# Patient Record
Sex: FEMALE | Race: WHITE | NOT HISPANIC OR LATINO | Employment: OTHER | URBAN - METROPOLITAN AREA
[De-identification: names, ages, dates, MRNs, and addresses within clinical notes are randomized per-mention and may not be internally consistent; named-entity substitution may affect disease eponyms.]

---

## 2017-07-14 ENCOUNTER — TRANSFERRED RECORDS (OUTPATIENT)
Dept: HEALTH INFORMATION MANAGEMENT | Facility: CLINIC | Age: 72
End: 2017-07-14

## 2017-07-18 ENCOUNTER — MEDICAL CORRESPONDENCE (OUTPATIENT)
Dept: HEALTH INFORMATION MANAGEMENT | Facility: CLINIC | Age: 72
End: 2017-07-18

## 2017-08-10 ENCOUNTER — OFFICE VISIT (OUTPATIENT)
Dept: FAMILY MEDICINE | Facility: CLINIC | Age: 72
End: 2017-08-10

## 2017-08-10 VITALS
BODY MASS INDEX: 37.74 KG/M2 | OXYGEN SATURATION: 97 % | WEIGHT: 226.8 LBS | RESPIRATION RATE: 16 BRPM | HEART RATE: 81 BPM | SYSTOLIC BLOOD PRESSURE: 138 MMHG | DIASTOLIC BLOOD PRESSURE: 78 MMHG | TEMPERATURE: 98.5 F

## 2017-08-10 DIAGNOSIS — E03.9 HYPOTHYROIDISM, UNSPECIFIED TYPE: ICD-10-CM

## 2017-08-10 DIAGNOSIS — I10 ESSENTIAL HYPERTENSION: ICD-10-CM

## 2017-08-10 DIAGNOSIS — M46.1 INFLAMMATION OF RIGHT SACROILIAC JOINT (H): ICD-10-CM

## 2017-08-10 DIAGNOSIS — E11.65 TYPE 2 DIABETES MELLITUS WITH HYPERGLYCEMIA, WITHOUT LONG-TERM CURRENT USE OF INSULIN (H): Primary | ICD-10-CM

## 2017-08-10 DIAGNOSIS — E11.9 TYPE 2 DIABETES MELLITUS WITHOUT COMPLICATION, WITHOUT LONG-TERM CURRENT USE OF INSULIN (H): ICD-10-CM

## 2017-08-10 DIAGNOSIS — S62.002D: ICD-10-CM

## 2017-08-10 NOTE — PROGRESS NOTES
Hospitalization Follow-up Visit         HPI       Hospital Follow-up Visit:    [place]  [facility] [place] of Admission: 7/14/17  Date of Discharge: 7/17/17  Reason(s) for Admission: Dislocated Left Hip, Fractured left wrist            Problems taking medications regularly:  None       Post Discharge Medication Reconciliation: discharge medications reconciled and changed, per note/orders (see AVS).       Problems adhering to non-medication therapy:  None - other than she does not want to be on meds       Medications reviewed by: by myself. Findings include limited meds.    Summary of hospitalization:  [facility] discharge summary reviewed  CareEverywhere information obtained and reviewed  Diagnostic Tests/Treatments reviewed.  Follow up needed: none  Other Healthcare Providers Involved in Patient s Care:         Orthopedists  Update since discharge: improved.   Plan of care communicated with patient     Admitted for her left hip dislocation and relocation.  Considering surgery. Likely will not have the surgery unless it is forced on her - unless she has no other choice. The surgery they are considering is to take out the socket and put on a larger head.  Her hip is now 17 years old.   Also had left wrist injury - fracture - happened after her dislocation when she fell off her walker- not supposed to weight bear with the hand.  Hurts along the 2nd MC head. Had her cast off 3 weeks ago and now with brace.  Planning to continue the PT.  Awaiting the decision by the hand specialist on next steps.  Scaphoid fracture on xray 7/15/2017  Can only walk 6 - 8 steps with the walker. Has OT or PT come in every day. Likely will be discharged from nursing.     While in the hospital, did not have her DM checked.  She knows her sugars are high and in the ambulance the BG was quite high.  Not on any meds right now.  Almost feels like she needs a  to check on her with this.  Is not checking her BG since having to pay for her  strips. Is currently not earning any money. Still has her meter. Is losing weight due to better eating at home now.     Hypothyroid - insurance won't pay for the [place].  She won't take synthroid.     Back pain - wondering what to do about that.  May have two compacted vertebra in the lumbar spine with mild scoliosis. Intermittent. Worse when she gets up - it hurts a lot.  If she tries to walk, it hurts.  Helps with the walker.  Is more right sided and over the SI joint. Pain very sharp and painful - unable to breath.  Doesn't seem to radiate although her right leg will hurt down to her foot - but not all the time. No change in bowel or bladder function and no perineal numbness. Told not siatica and is getting US treatment.     Moved to a new place - is a bit more accessible - apartment building for retired people.  Good that all on a flat surface and has an elevator, but is very lonely because there are no youth and all the people there have major health issues - all that people talk about.  Denies depression.   Radha - daughter is working to help her.     Has no car - can't take [employer]. Hurts in her back and her whole body when she gets jostled.                    Review of Systems:               Physical Exam:     Vitals:    08/10/17 0907   Weight: 226 lb 12.8 oz (102.9 kg)     Body mass index is 37.74 kg/(m^2).    GENERAL: healthy, alert, well nourished, well hydrated, no distress  NECK: no tenderness, no adenopathy, no asymmetry, no masses, no stiffness; thyroid- normal to palpation  RESP: lungs clear to auscultation - no rales, no rhonchi, no wheezes  CV: regular rates and rhythm, normal S1 S2, no S3 or S4 and no murmur, no click or rub -  ABDOMEN: soft, no tenderness, no  hepatosplenomegaly, no masses, normal bowel sounds  MS: extremities- 2+ edema bilaterally         Results:       Assessment and Plan      Mikayla was seen today for recheck.    Diagnoses and all orders for this visit:    Type 2  diabetes mellitus with hyperglycemia, without long-term current use of insulin (H) - our visit was about what she is OK doing and what she is not.  Past trauma as a patient and very leary of following our recommendations.  In the past, she was able to get her BG levels down 3 points with diet alone as long as se was testing regularly. Will restart this.  Will pay for her own strips. Would like refresher/support course for DM   -     DIABETES EDUCATOR REFERRAL    Essential hypertension - much better off her meds. Reassuring.     Hypothyroidism, unspecified type - had TSH around 10 in the past. Not wanting to check this. Talked a bit about how being on it would help her but not ready to try synthroid.     Inflammation of right sacroiliac joint (H) - per her description and location of her pain.  Will continue with PT.  May be getting orders for this.     Type 2 diabetes mellitus without complication, without long-term current use of insulin (H)  -     blood glucose monitoring (NO BRAND SPECIFIED) test strip; 100 strips by In Vitro route daily For OneTouch Ultra Blue.    Likely scaphoid fracture on the left - discussed that she likely has osteopenia. We did not talk about meds - since so resistant. Rather she return and we chip at things.     See me back in a month and will look at how her DM has been doing.  She is aware that she can't have wrist surgery if her DM is not controlled.     Total time - 50 min with more than half counseling her on her DM, her pain, her thyroid etc      E&M code to be billed if TCM cannot be: 69343    Type of decision making: High complexity (49118)      Options for treatment and follow-up care were reviewed with the patient  Mikayla Timmons   engaged in the decision making process and verbalized understanding of the options discussed and agreed with the final plan.      Nini Heaton MD

## 2017-08-10 NOTE — PATIENT INSTRUCTIONS
Here is the plan from today's visit    1. Type 2 diabetes mellitus with hyperglycemia, without long-term current use of insulin (H)  The phone number is: Your provider has referred you to Diabetes Education: FMG: Diabetes Education - All [facility] [phone]   [email or web address]  - DIABETES EDUCATOR REFERRAL    2. Essential hypertension  This is looking better    3. Hypothyroidism, unspecified type  Consider whether you want to do anything about this in the future - if we check it, we can chose not to treat it but will know what we are dealing with.     4. Inflammation of right sacroiliac joint (H)  Can take some ibuprofen.  Up to 2000mg a day.      5. Type 2 diabetes mellitus without complication, without long-term current use of insulin (H)  Did call these in.   - blood glucose monitoring (NO BRAND SPECIFIED) test strip; 100 strips by In Vitro route daily For OneTouch Ultra Blue.  Dispense: 100 strip; Refill: 12    Please call or return to clinic if your symptoms don't go away.    Follow up plan  See me in the next few months to see how you are doing.     Thank you for coming to [facility] today.  Lab Testing:  **If you had lab testing today and your results are reassuring or normal they will be mailed to you or sent through [facility] within 7 days.   **If the lab tests need quick action we will call you with the results.  The phone number we will call with results is # [phone] (home) . If this is not the best number please call our clinic and change the number.  Medication Refills:  If you need any refills please call your pharmacy and they will contact us.   If you need to  your refill at a new pharmacy, please contact the new pharmacy directly. The new pharmacy will help you get your medications transferred faster.   Scheduling:  If you have any concerns about today's visit or wish to schedule another appointment please call our office  during normal business hours [phone] (8-5:00 M-F)  If a referral was made to a [facility] and you don't get a call from central scheduling please call [phone].  If a Mammogram was ordered for you at [facility] call [phone] to schedule or change your appointment.  If you had an XRay/CT/Ultrasound/MRI ordered the number is [phone] to schedule or change your radiology appointment.   Medical Concerns:  If you have urgent medical concerns please call [phone] at any time of the day.  If you have a medical emergency please call [phone].

## 2017-08-10 NOTE — MR AVS SNAPSHOT
After Visit Summary   8/10/2017    Mikayla Timmons    MRN: [id number]           Patient Information     Date Of Birth          1945        Visit Information        Provider Department      8/10/2017 9:00 AM Nini Heaton MD [facility]        Today's Diagnoses     Type 2 diabetes mellitus with hyperglycemia, without long-term current use of insulin (H)    -  1    Essential hypertension        Hypothyroidism, unspecified type        Inflammation of right sacroiliac joint (H)        Type 2 diabetes mellitus without complication, without long-term current use of insulin (H)          Care Instructions    Here is the plan from today's visit    1. Type 2 diabetes mellitus with hyperglycemia, without long-term current use of insulin (H)  The phone number is: Your provider has referred you to Diabetes Education: FMG: Diabetes Education - All [facility] [phone]   [email or web address]  - DIABETES EDUCATOR REFERRAL    2. Essential hypertension  This is looking better    3. Hypothyroidism, unspecified type  Consider whether you want to do anything about this in the future - if we check it, we can chose not to treat it but will know what we are dealing with.     4. Inflammation of right sacroiliac joint (H)  Can take some ibuprofen.  Up to 2000mg a day.      5. Type 2 diabetes mellitus without complication, without long-term current use of insulin (H)  Did call these in.   - blood glucose monitoring (NO BRAND SPECIFIED) test strip; 100 strips by In Vitro route daily For OneTouch Ultra Blue.  Dispense: 100 strip; Refill: 12    Please call or return to clinic if your symptoms don't go away.    Follow up plan  See me in the next few months to see how you are doing.     Thank you for coming to [facility] today.  Lab Testing:  **If you had lab testing today and your results are reassuring or normal they will be mailed to you or sent  through [facility] within 7 days.   **If the lab tests need quick action we will call you with the results.  The phone number we will call with results is # [phone] (home) . If this is not the best number please call our clinic and change the number.  Medication Refills:  If you need any refills please call your pharmacy and they will contact us.   If you need to  your refill at a new pharmacy, please contact the new pharmacy directly. The new pharmacy will help you get your medications transferred faster.   Scheduling:  If you have any concerns about today's visit or wish to schedule another appointment please call our office during normal business hours [phone] (8-5:00 M-F)  If a referral was made to a [facility] and you don't get a call from central scheduling please call [phone].  If a Mammogram was ordered for you at [facility] call [phone] to schedule or change your appointment.  If you had an XRay/CT/Ultrasound/MRI ordered the number is [phone] to schedule or change your radiology appointment.   Medical Concerns:  If you have urgent medical concerns please call [phone] at any time of the day.  If you have a medical emergency please call [phone].            Follow-ups after your visit        Additional Services     DIABETES EDUCATOR REFERRAL       DIABETES SELF MANAGEMENT TRAINING (DSMT)      Your provider has referred you to Diabetes Education: FMG: Diabetes Education - All [facility] [phone]   [email or web address]    Type of training and number of hours: Previous Diagnosis: Follow-up DSMT - 2 hours.    Medicare covers: 10 hours of initial DSMT in 12 month period from the time of first visit, plus 2 hours of follow-up DSMT annually, and additional hours as requested for insulin training.    Diabetes Type: Type 2 - Diet Control             Diabetes Co-Morbidities: hypertension                A1C Goal:  <8.0       A1C is: Lab Results       Component                Value               Date                       A1C                      9.0                 07/01/2015              If an urgent visit is needed or A1C is above 12, Care Team to call the Diabetes Education Team at [phone] or send an In Basket message to the Diabetes Education Pool (P DIAB ED-PATIENT CARE).    Diabetes Education Topics: Knowledge: Healthy Coping    Special Educational Needs Requiring Individual DSMT: Has done the DM education in the past and is looking for help with motivation and support to continue working on her diet.  Has in the past, with diet, taken her HgA1c from 11 to 7.  Is not willing at this time to take meds.       MEDICAL NUTRITION THERAPY (MNT) for Diabetes    Medical Nutrition Therapy with a Registered Dietitian can be provided in coordination with Diabetes Self-Management Training to assist in achieving optimal diabetes management.    MNT Type and Hours:                       Medicare will cover: 3 hours initial MNT in 12 month period after first visit, plus 2 hours of follow-up MNT annually    Please be aware that coverage of these services is subject to the terms and limitations of your health insurance plan.  Call member services at your health plan to determine Diabetes Self-Management Training benefits and ask which blood glucose monitor brands are covered by your plan.      Please bring the following with you to your appointment:    (1)  List of current medications   (2)  List of Blood Glucose Monitor brands that are covered by your insurance plan  (3)  Blood Glucose Monitor and log book  (4)   Food records for the 3 days prior to your visit    The Certified Diabetes Educator may make diabetes medication adjustments per the CDE Protocol and Collaborative Practice Agreement.                  Who to contact     Please call your clinic at [phone] to:    Ask questions about your health    Make  or cancel appointments    Discuss your medicines    Learn about your test results    Speak to your doctor   If you have compliments or concerns about an experience at your clinic, or if you wish to file a complaint, please contact [facility] Patient Relations at [phone] or email us at [email or web address]         Additional Information About Your Visit        [place] Information     [employer] gives you secure access to your electronic health record. If you see a primary care provider, you can also send messages to your care team and make appointments. If you have questions, please call your primary care clinic.  If you do not have a primary care provider, please call [phone] and they will assist you.      [employer] is an electronic gateway that provides easy, online access to your medical records. With [employer], you can request a clinic appointment, read your test results, renew a prescription or communicate with your care team.     To access your existing account, please contact your [facility] or call [phone] for assistance.        Care EveryWhere ID     This is your Care EveryWhere ID. This could be used by other organizations to access your [place] medical records  [id number]        Your Vitals Were     Pulse Temperature Respirations Pulse Oximetry BMI (Body Mass Index)       81 98.5  F (36.9  C) (Oral) 16 97% 37.74 kg/m2        Blood Pressure from Last 3 Encounters:   08/10/17 138/78   07/24/15 132/79   07/16/15 115/75    Weight from Last 3 Encounters:   08/10/17 226 lb 12.8 oz (102.9 kg)   07/24/15 225 lb (102.1 kg)   07/01/15 229 lb (103.9 kg)              We Performed the Following     DIABETES EDUCATOR REFERRAL          Today's Medication Changes          These changes are accurate as of: 8/10/17 10:10 AM.  If you have any questions, ask your nurse or doctor.               These medicines have changed or have updated  prescriptions.        Dose/Directions    blood glucose monitoring test strip   Commonly known as:  no brand specified   This may have changed:    - when to take this  - additional instructions  - Another medication with the same name was removed. Continue taking this medication, and follow the directions you see here.   Used for:  Type 2 diabetes mellitus without complication, without long-term current use of insulin (H)   Changed by:  Nini Heaton MD        Dose:  100 strip   100 strips by In Vitro route daily For OneTouch Ultra Blue.   Quantity:  100 strip   Refills:  12       vitamin D 1000 UNITS capsule   This may have changed:  Another medication with the same name was removed. Continue taking this medication, and follow the directions you see here.   Changed by:  Nini Heaton MD        Dose:  1 capsule   Take 1 capsule by mouth daily.   Refills:  0         Stop taking these medicines if you haven't already. Please contact your care team if you have questions.     ARMOUR THYROID 90 MG Tabs   Generic drug:  Thyroid   Stopped by:  Nini Heaton MD           lisinopril 5 MG tablet   Commonly known as:  PRINIVIL/ZESTRIL   Stopped by:  Nini Heaton MD           metFORMIN 1000 MG tablet   Commonly known as:  GLUCOPHAGE   Stopped by:  Nini Heaton MD                Where to get your medicines      These medications were sent to [facility] [address] [facility] [address] [facility] [address] [facility]  [address] [address]     Phone:  [phone]     blood glucose monitoring test strip                Primary Care Provider Office Phone # Fax #    Nini Heaton [phone]       [address] [address]        Equal Access to Services     [facility]: Christiano chase Sorina, waaxda luqadaha, qaybta kaalmona taylor . So [facility] [phone].    ATENCIÓN: Zaira darling,  tiene a moore disposición servicios gratuitos de asistencia lingüística. Janet neri [phone].    We comply with applicable federal civil rights laws and [place] laws. We do not discriminate on the basis of race, color, national origin, age, disability sex, sexual orientation or gender identity.            Thank you!     Thank you for choosing [facility]  for your care. Our goal is always to provide you with excellent care. Hearing back from our patients is one way we can continue to improve our services. Please take a few minutes to complete the written survey that you may receive in the mail after your visit with us. Thank you!             Your Updated Medication List - Protect others around you: Learn how to safely use, store and throw away your medicines at www.disposemymeds.org.          This list is accurate as of: 8/10/17 10:10 AM.  Always use your most recent med list.                   Brand Name Dispense Instructions for use Diagnosis    blood glucose monitoring test strip    no brand specified    100 strip    100 strips by In Vitro route daily For OneTouch Ultra Blue.    Type 2 diabetes mellitus without complication, without long-term current use of insulin (H)       ONE TOUCH F[place] LANCETS      USE AS DIRECTED        ONE TOUCH ULTRA           vitamin D 1000 UNITS capsule      Take 1 capsule by mouth daily.

## 2017-08-14 ENCOUNTER — TELEPHONE (OUTPATIENT)
Dept: FAMILY MEDICINE | Facility: CLINIC | Age: 72
End: 2017-08-14

## 2017-08-14 NOTE — TELEPHONE ENCOUNTER
Returned call to home care nurse, unable to reach. Left VM with verbal orders per protocol as requested. Left callback number for questions.    Demetrice Reid RN

## 2017-08-14 NOTE — TELEPHONE ENCOUNTER
[facility] phone call message - order or referral request for patient:     Order or referral being requested: Extra skilled nursing visit this week, as patient cancelled last week    Additional Comments: none    OK to leave a message on voice mail? Yes    Primary language: English      needed? No    Call taken on August 14, 2017 at 11:16 AM by Ester Valdivia

## 2017-08-16 ENCOUNTER — TELEPHONE (OUTPATIENT)
Dept: FAMILY MEDICINE | Facility: CLINIC | Age: 72
End: 2017-08-16

## 2017-08-16 NOTE — TELEPHONE ENCOUNTER
[facility] phone call message- general phone call:    Reason for call: Jessica is calling to let us know that they are discharging the patient from Skilled Nursing / Home Care services.     Return call needed: No    OK to leave a message on voice mail? Yes    Primary language: English      needed? No    Call taken on August 16, 2017 at 10:56 AM by Gauri Morillo

## 2017-09-06 ENCOUNTER — DOCUMENTATION ONLY (OUTPATIENT)
Dept: FAMILY MEDICINE | Facility: CLINIC | Age: 72
End: 2017-09-06

## 2017-09-06 NOTE — PROGRESS NOTES
"When opening a documentation only encounter, be sure to enter in \"Chief Complaint\" Forms and in \" Comments\" Title of form, description if needed.    Mikayla is a 72 year old  female  Form received via: Fax  Form now resides in: PCS ready    Sheron Wen MA        Form has been completed by provider.     Form sent out via: Fax to [place] at Fax Number: [phone]  Patient informed: No, Reason:via fax  Output date: September 6, 2017    Sheron Wen MA      **Please close the encounter**              "

## 2017-10-19 ENCOUNTER — TELEPHONE (OUTPATIENT)
Dept: FAMILY MEDICINE | Facility: CLINIC | Age: 72
End: 2017-10-19

## 2017-10-19 NOTE — TELEPHONE ENCOUNTER
Called back at 5:09. Not sure why noted I need to call but did. Left message that I received the message. It is possible that they needed a PT order and verbally OKed that.

## 2017-10-19 NOTE — TELEPHONE ENCOUNTER
[facility] phone call message- patient requesting to speak with PCP or provider:    PCP: Nini Heaton    Additional Comments: Itz called from [facility] to send the following FYI to patient's PCP: Patient fell on 10/16/2017.  Per Itz, the patient tripped on walker, fell on right side, and coccyx.  States  came out, helped the patient off of the floor, and checked her out.  Reports no serious injuries, but that patient has bruising and muscle pain.    Is a call back needed? Yes    Patient informed that it may take up to 2 business days to hear back from PCP:Yes    OK to leave a message on voice mail? Yes    Primary language: English      needed? No    Call taken on October 19, 2017 at 4:27 PM by Catrina Hickman

## 2017-10-20 NOTE — TELEPHONE ENCOUNTER
RN called Itz to follow up. Itz states he did not need a return call. He just said to call back if anything needs to be done and just wanted to send a FYI message to patient's PCP to be aware.    Dian Munoz RN

## 2017-12-19 ENCOUNTER — TELEPHONE (OUTPATIENT)
Dept: FAMILY MEDICINE | Facility: CLINIC | Age: 72
End: 2017-12-19

## 2017-12-19 NOTE — TELEPHONE ENCOUNTER
[facility] phone call message - order or referral request for patient:     Order or referral being requested: Extend/continue home care PT effective 12/21/17    Additional Comments: 2x/week for 3 weeks to progress ambulation and progress to loft strand crutches. Itz noted that home care PT has been on hold until patient receives crutches.    OK to leave a message on voice mail? Yes    Primary language: English      needed? No    Call taken on December 19, 2017 at 11:01 AM by Ester Valdivia

## 2017-12-19 NOTE — TELEPHONE ENCOUNTER
Returned call to home care PT, unable to reach. Left VM with verbal orders per protocol as requested. Left callback number for questions.    Demetrice Reid RN

## 2018-02-02 ENCOUNTER — DOCUMENTATION ONLY (OUTPATIENT)
Dept: FAMILY MEDICINE | Facility: CLINIC | Age: 73
End: 2018-02-02

## 2018-02-02 NOTE — PROGRESS NOTES
"When opening a documentation only encounter, be sure to enter in \"Chief Complaint\" Forms and in \" Comments\" Title of form, description if needed.    Mikayla is a 72 year old  female  Form received via: Fax  Form now resides in: Provider Ready    Form has been completed by provider.     Form sent out via: Fax to [place] at Fax Number: [phone] or [phone]  Patient informed: N/A  Output date: February 2, 2018    Genesis Au CMA      **Please close the encounter**                    "

## 2018-05-21 DIAGNOSIS — E11.9 TYPE 2 DIABETES MELLITUS WITHOUT COMPLICATION, WITHOUT LONG-TERM CURRENT USE OF INSULIN (H): ICD-10-CM

## 2018-05-21 NOTE — TELEPHONE ENCOUNTER
Request for medication refill:    Date of last visit at clinic: 8/10/2017    Please complete refill if appropriate and CLOSE ENCOUNTER.    Closing the encounter signifies the refill is complete.    If refill has been denied, please complete the smart phrase .smirefuse and route it to the [facility] RN TRIAGE pool to inform the patient and the pharmacy.    Srikanth De Leon CMA

## 2019-02-25 ENCOUNTER — OFFICE VISIT (OUTPATIENT)
Dept: PHARMACY | Facility: CLINIC | Age: 74
End: 2019-02-25
Payer: MEDICARE

## 2019-02-25 ENCOUNTER — OFFICE VISIT (OUTPATIENT)
Dept: FAMILY MEDICINE | Facility: CLINIC | Age: 74
End: 2019-02-25
Payer: MEDICARE

## 2019-02-25 VITALS
BODY MASS INDEX: 39.11 KG/M2 | HEART RATE: 81 BPM | WEIGHT: 235 LBS | DIASTOLIC BLOOD PRESSURE: 92 MMHG | OXYGEN SATURATION: 97 % | SYSTOLIC BLOOD PRESSURE: 165 MMHG | TEMPERATURE: 97.6 F

## 2019-02-25 DIAGNOSIS — E11.65 TYPE 2 DIABETES MELLITUS WITH HYPERGLYCEMIA, WITHOUT LONG-TERM CURRENT USE OF INSULIN (H): Primary | ICD-10-CM

## 2019-02-25 DIAGNOSIS — E11.9 TYPE 2 DIABETES MELLITUS WITHOUT COMPLICATION, WITHOUT LONG-TERM CURRENT USE OF INSULIN (H): ICD-10-CM

## 2019-02-25 DIAGNOSIS — I10 ESSENTIAL HYPERTENSION: ICD-10-CM

## 2019-02-25 DIAGNOSIS — E03.9 HYPOTHYROIDISM, UNSPECIFIED TYPE: ICD-10-CM

## 2019-02-25 DIAGNOSIS — G14 POSTPOLIO SYNDROME (H): ICD-10-CM

## 2019-02-25 RX ORDER — CALCIUM CARBONATE/VITAMIN D3 500-10/5ML
1 LIQUID (ML) ORAL DAILY
COMMUNITY
End: 2019-11-01

## 2019-02-25 RX ORDER — IBUPROFEN 200 MG
400 TABLET ORAL
COMMUNITY
Start: 2018-05-06 | End: 2019-05-31

## 2019-02-25 RX ORDER — THYROID 90 MG/1
90 TABLET ORAL DAILY
Qty: 90 TABLET | Refills: 3 | Status: SHIPPED | OUTPATIENT
Start: 2019-02-25 | End: 2019-06-01

## 2019-02-25 NOTE — Clinical Note
Srikanth Kang - thanks for seeing her - that really helped!! She does want to test more than once a day and it really did help her in the past. I told her we were looking at how to get her the cheapest strips. Get back to me with ideas!Migdalia

## 2019-02-25 NOTE — PATIENT INSTRUCTIONS
Here is the plan from today's visit    1. Postpolio syndrome  Our goal is to get you to 7 HgA1c so you can have the surgery    2. Essential hypertension  We will discuss this more at our next visit. Hopefully you will find it lower as you do better.     3. Hypothyroidism, unspecified type  Refilled. Repeat testing in 1 month or later to check.   - thyroid (ARMOUR) 90 MG tablet; Take 1 tablet (90 mg) by mouth daily  Dispense: 90 tablet; Refill: 3    4. Type 2 diabetes mellitus with hyperglycemia, without long-term current use of insulin (H)  Increase the Leviimr to 14 Units in the am.       Please call or return to clinic if your symptoms don't go away.    Follow up plan  A month or soon after that. Would do lab testing.     Thank you for coming to [facility] today.  Lab Testing:  **If you had lab testing today and your results are reassuring or normal they will be mailed to you or sent through [employer] within 7 days.   **If the lab tests need quick action we will call you with the results.  The phone number we will call with results is # [phone] (home) . If this is not the best number please call our clinic and change the number.  Medication Refills:  If you need any refills please call your pharmacy and they will contact us.   If you need to  your refill at a new pharmacy, please contact the new pharmacy directly. The new pharmacy will help you get your medications transferred faster.   Scheduling:  If you have any concerns about today's visit or wish to schedule another appointment please call our office during normal business hours [phone] (8-5:00 M-F)  If a referral was made to a [facility] and you don't get a call from central scheduling please call [phone].  If a Mammogram was ordered for you at [facility] call [phone] to schedule or change your appointment.  If you had an XRay/CT/Ultrasound/MRI ordered the number is [phone] to schedule or  change your radiology appointment.   Medical Concerns:  If you have urgent medical concerns please call [phone] at any time of the day.    Nini Heaton MD

## 2019-02-25 NOTE — PROGRESS NOTES
Pharmacy Progress Note: Transitions of Care     History of Hospitalization     Mikayla Timmons was referred by Nini Crystal  for transitional care and medication management following their recent hospitalization.       HISTORY OF Hospitalization    Reason for hospitalization: Left Hip Instability with prosthetic dislocation     Date of ADMIT: 19   Date of DISCHARGE 19   Other hospitalizations in past year:      Hospital problem list/ specific issues to address:  1.     Hip dislocation         SUBJECTIVE  Mikayla is here to see Dr. Heaton regarding her recent hospitalization.  Medication taken after discharge her listed below.      1) Mag 400 1 AM  2) D3 2000 international unit(s) BID (50 mcg)  3) [place] 90mcg QDAY  4)  mg once a day PM as needed   5) Melatonin 5 mg at 8PM    6) Levemer 10 units once a day.  Before breakfast.     7) Novolo units before meals.    Three meals a day.    No snacks.  FlexPen  Inject with meals as instructed:  Glucose 130-150 0   Glucose 151-200 2 units   Glucose 201-250 4 units   Glucose 251-300 6 units   Glucose 301-350 8 units   Glucose 351-400 10 units   Glucose 401-500 12 units   Glucose GREATER THAN 501 14 units and call provider      Access to medication:  Patient describes not being able to afford her medications in the past.  In the hospital she states to have been charged $1600 for her medications.  She acknowledges that insulin is the majority of the cost.  She currently has 5 pens of both a meter and the NovoLog insulin.  She is hoping to reduce her insulin need in the near future so she does not have to continue taking this medication.  She is not sure how she would be able to afford insulin in the        Current Outpatient Medications:      blood glucose monitoring (NO BRAND SPECIFIED) test strip, 100 strips by In Vitro route daily For OneTouch Ultra Blue., Disp: 100 strip, Rfl: 12     Blood Glucose Monitoring Suppl (ONE TOUCH ULTRA), , Disp: , Rfl:       Cholecalciferol (VITAMIN D) 1000 UNITS capsule, Take 1 capsule by mouth daily., Disp: , Rfl:      ONE TOUCH FINE POINT LANCETS, USE AS DIRECTED, Disp: , Rfl:       Adherence    On a scale of 1-10, how good are you at taking your medications? (1= never, 10= always): 10  Factors that may prevent patient from taking medication(s): felix Degroot describes a  that she is going to work with to address some of the cost issues.       OBJECTIVE  Creatinine   Date Value Ref Range Status   07/17/2015 0.7 0.5 - 1.0 mg/dL Final   ]  Lab Results   Component Value Date    A1C 9.0 07/01/2015    A1C 8.1 07/24/2014    A1C 7.8 10/26/2012    A1C 8.2 06/26/2012    A1C 11.1 08/05/2011        Record of home blood sugars        Date Fasting AM Before lunch After lunch Before Dinner After Dinner Before Bedtime Late night   2/25 237, 263               2/24 197 311, 277   236 251       2/23 257 216   220 208   212   2/22 274, 229 243   257 209   190   2/21           218     2/19           229                                                                Liver Function Studies -   Recent Labs   Lab Test 07/01/15  1035   PROTTOTAL 7.2   BILITOTAL 0.5   ALKPHOS 58.4   AST 15.5   ALT 19.6     Immunization History   Administered Date(s) Administered     Influenza (IIV3) PF 10/20/1995, 10/21/1996, 10/21/1997     TD (ADULT, 7+) 10/20/1995     Tdap (Adacel,Boostrix) 04/25/2007           ASSESSMENT     Drug Therapy Problems  Patient s recent hospitalization was likely/not likely related to medication use.      1) DIABETES;       Status:  uncontrolled       DTP:  cannot afford drug product  , DTP degree:2              Home blood sugar results show elevation of results throughout the day.  Fasting values in the low 200s.    Mikayla has been working to reduce her calorie intake per day.  She is restricting her diet to 1200 jeny/day.  White blood sugars have improved since before hospitalization, I believe she still needs insulin.  Today  recommend to increase her long-acting basal insulin between 2 and 4 units.  Today we discussed goal fasting blood sugar to be between 80 and 130 mg/dL.  While the patient is resistant to using insulin long-term is likely that she will need insulin to reach her goals.  Recommended continue to monitor blood sugars 3 times per day.    2) COST;    DTP degree:1            Out-of-pocket expenses for the patient's medication is extremely high.    Cost for the insulin pens the patient has been discharged with her 100s of dollars.  While this is a appropriate insulin regimen we may need to reconsider in the future due to cost.    Mikayla has been on Metformin in the past and felt it was not helpful.      It is possible that she would benefit from an SGL T-2 taking this considering her blood pressure is elevated today.    May recommend in the future to use a 70/30 over-the-counter insulin.    There is concerned about affording test strips.  I have called the pharmacy today to order more frequent blood sugar test strips for the patient and described new insulin.  Pharmacy has informed us that they will send a CMM form to be filled and sent to [facility] to justify the increased frequency and blood sugar monitoring.          Health literacy           General: Low/Moderate/High/Unknown  Comment: Moderate    PLAN     Outpatient medication list has been updated to reflects patient s current medication use.    Recommend to elevate Levemir to 12-14 units once per day.           Follow-up:    In 1-2 weeks with PCP or pharmacy        MTM and medication reconciliation have been completed by pharmacy. Medication assessment and plan address patient s specific concerns and goals.     Dr. Heaton was provided our recommendations in clinic today  before/after they saw patient and  was available for supervision during the visit and is the authorizing prescriber for this visit through the pharmacist collaborative practice agreement.      Thank you for the opportunity to participate in the care of this patient.  Jorge Luis Beckett, Pharm.D.  Appointment length: 30

## 2019-02-25 NOTE — PROGRESS NOTES
Clinical Pharmacy Note     Record of home blood sugars      Date Fasting AM Before lunch After lunch Before Dinner After Dinner Before Bedtime Late night   2/25 237, 263         2/24 197 311, 277  236 251     2/23 257 216  220 208  212   2/22 274, 229 243  257 209  190   2/21      218    2/19      229

## 2019-02-25 NOTE — PROGRESS NOTES
AYESHA       Mikayla is a 73 year old  female  who presents for follow up of concern(s) listed below:    Chief Complaint   Patient presents with     Hospital F/U     1/29/19 left hip dislocation     Goals: gardening this summer, walking without the walker. Gardening provides her with the foods she needs for wellness since can't afford them.     Is now 2 weeks post hospitalization for her hip dislocation. Did not go to rehab this time (was on observation). Has a SW, RN and PT that are coming to her home. NOt sure if needs OT.  Someone is at her house 4/5 days.  Is accepting the attention.   SW did not have a lot of solutions for her meds- only getting coupons.   Never followed through on part D for Medicare so has no coverage for meds. Is now taking her insulin (see pharm D note) and the armour thyroid.     Has had multiple hospitalizations for her hip in the last year.  Hoping to get surgery but needs DM controlled.     DM:   HgA1c - 12/30/2018 = 12.0  TSH = 16.51  Back to  eating much better again  Goal is HgA1c to 7 for her to be able to get the surgery she needs.     Not been able to take care of herself for 2 years - tons of stress.   Moved to be closer to her daughter Radha (by [place]).  Radha is very busy so she can't get in to see us.  Goal is to move back to [facility].  Is only person she has here.      Can't afford the strips and only oked for 1 a day.  In the past benefited by frequent testing.      Is still with the walker.  Is trying to walk more.     Thyroid.  Wanting refill of her armour.  Started likely about 3 weeks ago.      Sx:  Denies CP, SOB or HAs,   Since starting the insulin she no longer gets up 4 times at night and is now only getting up once.    No swelling in her legs uless she sits for wehil.  None when gets up in am.         Patient Active Problem List   Diagnosis     Avitaminosis D     Diabetes mellitus, type 2 (H)     Essential hypertension     Postpolio syndrome     Status  post THR (total hip replacement)     Carpal tunnel syndrome     Hyperlipidemia LDL goal <100     Hypothyroidism     Decreased sensation of foot     Right-sided low back pain without sciatica     Closed fracture of scaphoid of left wrist with routine healing, unspecified portion of scaphoid, subsequent encounter       Current Outpatient Medications   Medication Sig Dispense Refill     ibuprofen (ADVIL/MOTRIN) 200 MG tablet Take 400 mg by mouth       insulin aspart (NOVOLOG FLEXPEN) 100 UNIT/ML pen Inject with meals as instructed:  Glucose 130-150 0   Glucose 151-200 2 units   Glucose 201-250 4 units   Glucose 251-300 6 units   Glucose 301-350 8 units   Glucose 351-400 10 units   Glucose 401-500 12 units   Glucose GREATER THAN 501 14 units and call provider       insulin detemir (LEVEMIR FLEXTOUCH) 100 UNIT/ML pen Inject 10 Units Subcutaneous       magnesium oxide 400 MG CAPS Take 1 capsule by mouth daily       melatonin 5 MG tablet Take 5 mg by mouth nightly as needed for sleep       blood glucose monitoring (NO BRAND SPECIFIED) test strip 100 strips by In Vitro route daily For OneTouch Ultra Blue. 100 strip 12     Blood Glucose Monitoring Suppl (ONE TOUCH ULTRA)        Cholecalciferol (VITAMIN D) 1000 UNITS capsule Take 2,000 Units by mouth 2 times daily        ONE TOUCH FINE POINT LANCETS USE AS DIRECTED            Allergies   Allergen Reactions     Hydromorphone Itching     Ketamine Other (See Comments)                Review of Systems:   CONSTITUTIONAL: no fatigue, no unexpected change in weight  ENT: no ear problems, no mouth problems, no throat problems  RESP: no significant cough, no shortness of breath  CV: no chest pain, no palpitations, no new or worsening peripheral edema            Physical Exam:     There were no vitals filed for this visit.  There is no height or weight on file to calculate BMI.  BP was 150 /100.  P = 85.  Afebrile, Pulse Ox >95  GENERAL: healthy, alert, well nourished, well hydrated,  no distress  RESP: lungs clear to auscultation - no rales, no rhonchi, no wheezes  CV: regular rates and rhythm, normal S1 S2, no S3 or S4 and no murmur, no click or rub -  MS: extremities- no gross deformities noted, 1+ pitting edema to the knees.  Vibartory sense decreased in both feet. Some callous but no foot lesions. Has some hyperpigmented raised areas over the left shin.     Orders Only on 07/17/2015   Component Date Value Ref Range Status     Urea Nitrogen 07/17/2015 18.3  7.0 - 19.0 mg/dL Final     Calcium 07/17/2015 10.6* 8.5 - 10.1 mg/dL Final     Chloride 07/17/2015 109.9  98.0 - 110.0 mmol/L Final     Carbon Dioxide 07/17/2015 24.1  20.0 - 32.0 mmol/L Final     Creatinine 07/17/2015 0.7  0.5 - 1.0 mg/dL Final     Glucose 07/17/2015 162.7* 70.0 - 99.0 mg'dL Final     Potassium 07/17/2015 4.2  3.4 - 5.3 mmol/dL Final     Sodium 07/17/2015 143.3  133.0 - 144.0 mmol/L Final     GFR Estimate 07/17/2015 87.7  mL/min/1.7 m2 Final     GFR Estimate If Black 07/17/2015 106.1  mL/min/1.7 m2 Final     Parathyroid Hormone Intact 07/17/2015 53  12 - 72 pg/mL Final     Calcium Ionized 07/17/2015 5.4* 4.4 - 5.2 mg/dL Final         Assessment and Plan     Mikayla was seen today for hospital f/u.    Diagnoses and all orders for this visit:    Type 2 diabetes mellitus with hyperglycemia, without long-term current use of insulin (H) - is doing relatively well with her daily dosing but her fasting is 200+. Increased her Levimir to 14 U daily. Will talk to the Pharm D team to see if we can get her strips so can test more than once a day.  Struggles with getting in and her independence. She has early neuropathy - asx. Demonstrated this to her.     Postpolio syndrome - continues with very frequent spontaneous dislocations. Hoping to have surgery.  Motivating her to take care of her DM.     Essential hypertension - not controlled. Did not discuss much today but told her we would do lab testing when I see her back in 2 months  (full DM labs and lipids and TSH) and then can start thinking about her BP.      Hypothyroidism, unspecified type- she will only take armour. Refilled today.  Check TSH iin 2 monhts.   -     thyroid (ARMOUR) 90 MG tablet; Take 1 tablet (90 mg) by mouth daily    Total time: 35 minutes with more than half spent counseling on her DM options, her testing, her symptoms and her goals.     There are no discontinued medications.    Options for treatment and follow-up care were reviewed with the patient . Mikayla Timmons  engaged in the decision making process and verbalized understanding of the options discussed and agreed with the final plan.    Nini Heaton MD

## 2019-03-07 ENCOUNTER — TELEPHONE (OUTPATIENT)
Dept: FAMILY MEDICINE | Facility: CLINIC | Age: 74
End: 2019-03-07

## 2019-03-07 NOTE — TELEPHONE ENCOUNTER
RN left voicemail for Mikayla to obtain verbal authorization to speak with Niya at [facility] through [facility]'s home care division. Niya is aware of RN obtaining and understands.    If patient calls back and consents, please document in here and RN will call Niya.  Kiara Currie RN

## 2019-03-07 NOTE — TELEPHONE ENCOUNTER
[facility] phone call message- patient requesting a refill:    Full Medication Name: insulin aspart (NOVOLOG FLEXPEN) 100 UNIT/ML pen    Dose: See chart    Pharmacy confirmed as   [id number] - [place], [place] - [address] AT [facility] [address]  [address]  [facility] [address]  Phone: [phone] Fax: [phone]  : Yes    Additional Comments: Jodee would like to discuss with Dr. Heaton that the patient thinks she is going to be going off insulin in two months so she is not renewing her insurance.  [phone]     OK to leave a message on voice mail? Yes    Primary language: English      needed? No    Call taken on March 7, 2019 at 2:51 PM by Lili Rossi

## 2019-03-07 NOTE — TELEPHONE ENCOUNTER
Spent 20 minutes on the phone with patient who spoke about the fact that she does not want to be insulin dependent for her life. She believes she could eventually get off it with diet and exercise. She believes in the power of the body to be able to heal itself. She spoke about the fact that no medical professional has been able to tell her exactly what insulin is made out of or if it has any significant side effects. Her insulin costs her 1600/month. RN did not really speak much, mainly listened. RN did inquire if she would be interested in meeting with SW to talk about drug coverage programs. She did agree to that.    At the end of the call she agreed to allow RN to speak to Niya, but only about her insulin and not the rest of her life. She requested a call back from RN once we talk to Niya .Niya is out of office on Friday's. Will have to call her Monday.  Kiara Currie RN

## 2019-03-07 NOTE — TELEPHONE ENCOUNTER
Mikayla returned Kiara's call and she would like to speak with Kiara as she is not pleased with her homecare service and is thinking about cancelling it. Please call her back.

## 2019-03-07 NOTE — TELEPHONE ENCOUNTER
" asked me to contact patient and relay a message from our PharmJorge Luis MCCORD. \"Patient can buy all her supplies for her diabetes at [facility] for about $50\". \"Not cheap, but if she goes there, she can discuss with them her options and then I can order the items for her if they think that will help\".   Patient is confused by this message and would like a call back from Jorge Luis Rose. Afternoons work best for the call back.    Marimar Camp  Care Coordinator    "

## 2019-03-11 ENCOUNTER — DOCUMENTATION ONLY (OUTPATIENT)
Dept: FAMILY MEDICINE | Facility: CLINIC | Age: 74
End: 2019-03-11

## 2019-03-11 DIAGNOSIS — E11.9 TYPE 2 DIABETES MELLITUS WITHOUT COMPLICATION, WITHOUT LONG-TERM CURRENT USE OF INSULIN (H): ICD-10-CM

## 2019-03-11 NOTE — PROGRESS NOTES
"When opening a documentation only encounter, be sure to enter in \"Chief Complaint\" Forms and in \" Comments\" Title of form, description if needed.    Mikayla is a 73 year old  female  Form received via: Fax  Form now resides in: Provider Ready    NANCY Currie 2:01 PM March 11, 2019    Form has been completed by provider.     Form sent out via: Fax to [facility] at Fax Number: [phone]  Patient informed: No  Output date: March 14, 2019    NANCY Currie 2:24 PM March 14, 2019        **Please close the encounter**                    "

## 2019-03-11 NOTE — TELEPHONE ENCOUNTER

## 2019-03-12 ENCOUNTER — TELEPHONE (OUTPATIENT)
Dept: PHARMACY | Facility: CLINIC | Age: 74
End: 2019-03-12

## 2019-03-12 DIAGNOSIS — Z79.4 TYPE 2 DIABETES MELLITUS WITH HYPERGLYCEMIA, WITH LONG-TERM CURRENT USE OF INSULIN (H): Primary | ICD-10-CM

## 2019-03-12 DIAGNOSIS — E11.65 TYPE 2 DIABETES MELLITUS WITH HYPERGLYCEMIA, WITH LONG-TERM CURRENT USE OF INSULIN (H): Primary | ICD-10-CM

## 2019-03-13 RX ORDER — FLASH GLUCOSE SCANNING READER
1 EACH MISCELLANEOUS 3 TIMES DAILY PRN
Qty: 1 DEVICE | Refills: 0 | Status: SHIPPED | OUTPATIENT
Start: 2019-03-13 | End: 2019-04-20

## 2019-03-13 RX ORDER — FLASH GLUCOSE SENSOR
1 KIT MISCELLANEOUS
Qty: 2 EACH | Refills: 11 | Status: SHIPPED | OUTPATIENT
Start: 2019-03-13 | End: 2019-04-20

## 2019-03-13 NOTE — TELEPHONE ENCOUNTER
PHARMACY TELEPHONE ENCOUNTER:    Reason: Glucometer      I returned pt's call about purchasing a glucometer.     She is limited to only 3 Test strips per day  She has been trying to find a way to afford more test strips per day.  I have described an out of pocket option at [facility] that be least expensive, if she purchases the DM supplies on her own.      She has been switching between glucometers and purchasing Test strips online that is likely more advantageous than the [facility] option.      Other concerns that Mikayla shared with me include the future expected cost of her insulin. And, the concern she has that she will be on the medication forever.      I tried to explain my position that she will likely need insulin to continue to improve her DM considering her most recent A1c.       Ref Range & Units Value   Hemoglobin A1C 4.0 - 6.0 % 12.0 Abnormally high         We briefly discussed other insulin options, but acknowledged that the OTC versions of insulin would not provide the same level of DM coverage as what we expect from her current  Novolog and Levemir.         Jorge Luis Beckett, Pharm.D.

## 2019-03-18 ENCOUNTER — TELEPHONE (OUTPATIENT)
Dept: PHARMACY | Facility: CLINIC | Age: 74
End: 2019-03-18

## 2019-03-18 NOTE — TELEPHONE ENCOUNTER
PHARMACY TELEPHONE ENCOUNTER:    Reason: Christophe not covered       I called the pharmacy and was told the Christophe glucometer was not covered by her ins. I called the pt and left a VM.  Pt has an appointment 3/27/19 with PCP    Jorge Luis Beckett PharmJasonD.

## 2019-03-19 NOTE — TELEPHONE ENCOUNTER
Patient returned call with questions. Please call back to discuss around 12-1 if possible, okay to leave voicemail.     Kerry Torres, Patient Representative

## 2019-03-20 NOTE — TELEPHONE ENCOUNTER
PHARMACY TELEPHONE ENCOUNTER:    Reason: called to discuss insulin costs      I spoke with Mikayla today regarding her insulin cost.  She takes a combination of rapid acting insulin and long-acting insulin.  She feels that she is on a very expensive regimen.  She would like to find an alternative for Levemir.  Currently this cost her $500 out-of-pocket and has been told that there is $137 option at [facility].  I suspect that the lower cost insulin is Basaglar.  I have explained to the patient that Basaglar/Lantus/Levemir are nearly interchangeable in the clinic would be willing to change prescription to improve affordability.  Patient also requests    Information about patient assistance programs.  I will leave material for Lantus and Levemir and Basaglar patient assistance programs for Dr. Heaton at her next visit.    Jorge Luis Beckett, Pharm.D.

## 2019-03-28 ENCOUNTER — TELEPHONE (OUTPATIENT)
Dept: PHARMACY | Facility: CLINIC | Age: 74
End: 2019-03-28

## 2019-03-28 ENCOUNTER — TELEPHONE (OUTPATIENT)
Dept: FAMILY MEDICINE | Facility: CLINIC | Age: 74
End: 2019-03-28

## 2019-03-28 NOTE — TELEPHONE ENCOUNTER
Clinical Pharmacy Note     Mikayla called the clinic today to ask a question about her insulin.  She states she has been working hard on her diet to help decrease weight.  Lately, in the mornings, her blood sugars have been in the low 80's-100's.  This has happened for the last week.  She is worried that if she continues to take her insulin at night she will wake up with a low blood sugar.     We discussed a true low blood sugar was <70 mg/dL.  I also ensured she was not using the short acting insulin unless needed via instructions.  She is not to use short acting insulin unless blood sugar is >150 mg/dL when she is eating.  She agrees to doing this.      Additionally, she is due for an appointment since insulin was titrated at her last clinic visit. Insulin was increased from 10 units to 14 units daily at the end of February.  She is unable to come in next week due to transportation issues.  We decide to do a phone call next Monday to assess her medications and blood sugars.  She will have ~10 days worth of readings to share with me on Monday.      PharmD plan to call Mikayla around 3pm on 4/1/19.     Laureen Harrison, EstelaD

## 2019-03-28 NOTE — TELEPHONE ENCOUNTER
[facility] phone call message-patient reporting a symptom:     Symptom: Diarrhea, DM    Same Day Visit Offered: Yes, declined    Additional comments: Patient would like to discuss diarrhea symptoms but also discuss issues with insulin. Patient does not want a call back from RN but from PCP, faculty, or Jorge Luis in pharmacy. Per patient, it is urgent she speak with PCP, Faculty, or Jorge Luis as soon as possible bp sut would not disclose any further details.     OK to leave message on voice mail? Yes    Primary language: English      needed? No    Call taken on March 28, 2019 at 11:05 AM by Renetta Abdullahi

## 2019-03-29 NOTE — TELEPHONE ENCOUNTER
Called Mikayla to address her concerns.  Left message that I recommend she talk to our RNs so that we can help her while I am in clinic busy this am.  Will try to call her back around noon.     12:49 PM  Called back.   Woke up early in am with diarrhea 3/27. Is better today.  Diarrhea started 1 week ago.   She is on the insulin and is worried her sugars might be too low.  Long acting is 14 U.  Woke up one morning 2 - 3 days ago, woke up with fasting glucose of 83. Wondering if she needs to reduce the long acting.   Has not been taking the novolog since on a sliding scale - over 150.   Is now eating a lot better    She did talk to Laureen who has a phone meeting coming up.   Will be recording her sugars and report to her.     Advised her that if she has a pattern in the 70s or 80s then should drop to 12 Units.

## 2019-04-01 ENCOUNTER — TELEPHONE (OUTPATIENT)
Dept: PHARMACY | Facility: CLINIC | Age: 74
End: 2019-04-01

## 2019-04-01 NOTE — TELEPHONE ENCOUNTER
Clinical Pharmacy Note     PharmD called to discuss insulin and blood sugars with Mikayla.  See blood sugar log below.  Her blood sugars look good and only a few are outside of the goal of  mg/dL fasting and <180 mg/dL 2 hours after a meal.  She was worried about hypoglycemia, yet she has no numbers indicating a low.      Record of home blood sugars    Date Fasting AM Before lunch After lunch Before Dinner After Dinner Before Bedtime Late night   4/1 121/135 159 180       3/31 140 167  220 147  109   3/30 124/129  157  165 191    3/29 176 164  137 171 150    3/28 89 144 139  170     3/27 116/130  143 172 145 108    3/26 127 154  122 157  149     She is able to identify triggers including stress and eating too late in the day.  When she is stressed she tends to eat more than normal. She worries about the cost of her insulin and asks if there are cheaper alternatives since she does not have insurance.  I discussed that there might be other options but she would need to make an in clinic appointment and we could work as a team to see the best options available.  She plans to try to arrange transportation to a clinic visit.     Plan: continue insulin as prescribed and continue to monitor blood sugars daily.   Insulin detemir 14 units daily   Insulin aspart sliding scale based on blood sugars before eating    Follow-up in clinic when transportation is available. Call with any questions or concerns you may have.     Laureen Harrison, PharmD

## 2019-04-12 ENCOUNTER — TELEPHONE (OUTPATIENT)
Dept: FAMILY MEDICINE | Facility: CLINIC | Age: 74
End: 2019-04-12

## 2019-04-12 NOTE — TELEPHONE ENCOUNTER
[facility] phone call message- general phone call:    Reason for call: Patient calling to schedule an urgent visit with Dr. Heaton regarding new uncontrolled diabetes diagnosis and thyroid issues. Author informed patient PCP does not have openings. Patient refused to schedule with another provider, stating it is unacceptable that PCP is booked/unavailable. Patient wants to be seen on 4/22 or 4/26 at 10:00 with Dr. Heaton. Patient is expecting a call back today, please advise.      Return call needed: Yes    OK to leave a message on voice mail? Yes    Primary language: English      needed? No    Call taken on April 12, 2019 at 3:12 PM by Renetta Abdullahi

## 2019-04-12 NOTE — TELEPHONE ENCOUNTER
RN left voicemail for patient that RN will be sending message to provider, but we will likely not have an answer for her today, and will call her as soon as we can.  Kiara Currie RN

## 2019-04-13 NOTE — TELEPHONE ENCOUNTER
Can she come on 4/23 at 3:40?  I can see her then or possibly earlier.    The other half days are totally full.    I also created a clinic for 4/19 in the am from 8 - 10 to help with access. That is wide open still.   Migadlia

## 2019-04-15 NOTE — TELEPHONE ENCOUNTER
Patient returning RN call. Author relayed message below to which patient stated she cannot come in the afternoon, appointment needs to be in the morning before 10 am. Author unable to schedule patient on 4/18 d/t providers schedule closed. please call patient back AFTER 11:00 AM today.

## 2019-04-15 NOTE — TELEPHONE ENCOUNTER
RN connected with Renetta to gain clarification, she intended to state that she cannot schedule on the 19th (which was the date provider stated was open).  Kiara Currie RN

## 2019-04-15 NOTE — TELEPHONE ENCOUNTER
RN attempted to reach patient, but was unable to reach. Left voicemail with name and callback number.    If she calls back, please offer her what  presented below. If any concerns with the double book, can contact RN. Thanks.  Kiara Currie RN

## 2019-04-18 NOTE — PROGRESS NOTES
pth         HPI       Mikayla is a 73 year old  female  who presents:    Chief Complaint   Patient presents with     Diabetes     recheck      Thyroid Problem     recheck       BP:  Her PT takes her BP every time she is there: 108/81 - 156/90.   Is also always in pain when comes to the clinic.  Not wanting to be on BP meds.     DM:   Readings are going down. This am was 115 fasting.  Ranges to high 200s but many in the mid 100s.  Taking mostly the Levimir - her sugars are otherwise closer to goal so not adding her Novolog.   Medicare is paying for her strips now.    Reviewed CGM    ROS:   Woke up with an exploding headache - just today for the first time. Gets some nagging low grade headaches intermittently.  Now much improved.  Worried that her sugar might have been too low. BG was at about 127.  Was on the top of her head. Took ibuprofen and a couple of hours later much improved.  No neck stiffness. No numbness or tingling anywhere or weakness. No pain over her temporal area.      Needing eye exam.      ROS:  After up for a couple of hours, her ankles do begin to swell  Denies CP, SOB or vision changes.   Continues with nerve symptoms in both legs and is worse on one side. Thinks could be due to her post polio in addition to her diabetes and wanting to know how to treat.   Lost 10 pounds in the last few months    PT twice a week so she is moving more. Is using an eliptical. Although always worried her hip will go out. Plan is to get surgery, why she is working hard on her DM    Compression hose hard for her to put on one of her leg.     Rakesh Mora 2015    Patient Active Problem List   Diagnosis     Avitaminosis D     Diabetes mellitus, type 2 (H)     Essential hypertension     Postpolio syndrome     Status post THR (total hip replacement)     Carpal tunnel syndrome     Hyperlipidemia LDL goal <100     Hypothyroidism     Decreased sensation of foot     Right-sided low back pain without sciatica     Closed fracture of  scaphoid of left wrist with routine healing, unspecified portion of scaphoid, subsequent encounter     Closed dislocation of hip (H)       Current Outpatient Medications   Medication Sig Dispense Refill     blood glucose (NO BRAND SPECIFIED) test strip 100 strips by In Vitro route 3 times daily For OneTouch Ultra Blue. 100 strip 12     blood glucose monitoring (ONE TOUCH DELICA) lancets Use to test blood sugar 3 times daily or as directed. 100 each 11     Blood Glucose Monitoring Suppl (ONE TOUCH ULTRA)        Cholecalciferol (VITAMIN D) 1000 UNITS capsule Take 2,000 Units by mouth 2 times daily        Continuous Blood Gluc  (FREESTYLE PAOLA 14 DAY READER) BERTHA 1 Units 3 times daily as needed (for blood sugar monitoring) 1 Device 0     Continuous Blood Gluc Sensor (FREESTYLE PAOLA 14 DAY SENSOR) MISC 1 patch by Subdermal route every 14 days 2 each 11     ibuprofen (ADVIL/MOTRIN) 200 MG tablet Take 400 mg by mouth       insulin aspart (NOVOLOG FLEXPEN) 100 UNIT/ML pen Inject with meals as instructed:  Glucose 130-150 0   Glucose 151-200 2 units   Glucose 201-250 4 units   Glucose 251-300 6 units   Glucose 301-350 8 units   Glucose 351-400 10 units   Glucose 401-500 12 units   Glucose GREATER THAN 501 14 units and call provider       insulin detemir (LEVEMIR FLEXTOUCH) 100 UNIT/ML pen Inject 14 Units Subcutaneous daily       magnesium oxide 400 MG CAPS Take 1 capsule by mouth daily       melatonin 5 MG tablet Take 5 mg by mouth nightly as needed for sleep       thyroid (ARMOUR) 90 MG tablet Take 1 tablet (90 mg) by mouth daily 90 tablet 3          Allergies   Allergen Reactions     Hydromorphone Itching     Ketamine Other (See Comments)              Review of Systems:               Physical Exam:     Vitals:    04/19/19 0819 04/19/19 0822   BP: 182/90 177/85   BP Location: Left arm Left arm   Patient Position: Sitting Sitting   Cuff Size: Adult Large Adult Large   Pulse: 50    Resp: 16    Temp: 97.6  F (36.4   C)    TempSrc: Oral    SpO2: 98%    Weight: 103 kg (227 lb)      Body mass index is 37.77 kg/m .  Vitals were reviewed and were normal  Vital signs normal except BP  GENERAL: healthy, alert, well nourished, well hydrated, no distress  EXT: 1 + edema bilaterally    Orders Only on 07/17/2015   Component Date Value Ref Range Status     Urea Nitrogen 07/17/2015 18.3  7.0 - 19.0 mg/dL Final     Calcium 07/17/2015 10.6* 8.5 - 10.1 mg/dL Final     Chloride 07/17/2015 109.9  98.0 - 110.0 mmol/L Final     Carbon Dioxide 07/17/2015 24.1  20.0 - 32.0 mmol/L Final     Creatinine 07/17/2015 0.7  0.5 - 1.0 mg/dL Final     Glucose 07/17/2015 162.7* 70.0 - 99.0 mg'dL Final     Potassium 07/17/2015 4.2  3.4 - 5.3 mmol/dL Final     Sodium 07/17/2015 143.3  133.0 - 144.0 mmol/L Final     GFR Estimate 07/17/2015 87.7  mL/min/1.7 m2 Final     GFR Estimate If Black 07/17/2015 106.1  mL/min/1.7 m2 Final     Parathyroid Hormone Intact 07/17/2015 53  12 - 72 pg/mL Final     Calcium Ionized 07/17/2015 5.4* 4.4 - 5.2 mg/dL Final         Assessment and Plan     Mikayla was seen today for diabetes and thyroid problem.    Diagnoses and all orders for this visit:    Type 2 diabetes mellitus with hyperglycemia, without long-term current use of insulin (H) - numbers better.Last HgA1c was 2 months ago so will wait to repeat in 1 month. Plan to do other testing today including her TSH since was off her meds prior to last appointment and now 8 weeks post restart. Referral to optho.  Does not want CBM  -     Basic Metabolic Panel ([facility])  -     Cancel: Hemoglobin A1c ([facility])  -     Albumin Random Urine Quantitative with Creat Ratio  -     TSH  -     Lipid Cascade ([facility])  -     OPTHALMOLOGY ADULT REFERRAL - INTERNAL    Hypercalcemia - mild. Check PTH and high nl. Likely primary hyperparathyroidism and could do 24 hour urine calcium at next visit with ongoing monitoring.   -     Parathyroid Hormone Intact    Postpolio syndrome - asking to see   [facility] - referral put in.  She wonders if some of her foot symptoms are from this rather than her DM. Last time exam consistent with polyneuropathy and has had poorly controlled DM for quite awile. Reviewed that treatment of sugar might help and that other meds available for symptoms  -     Other Specialty Referral - EXTERNAL    Essential hypertension - BP more consistent with white coat. She is very resistant to treatment with meds - so will continue work on lifestyle mgmt.  Is not interested in a statin.     Total time: 25 minutes with more than half spent counseling on med options (CBM, medication payment) and ongoing mgmt.     Medications Discontinued During This Encounter   Medication Reason     Continuous Blood Gluc  (FREESTYLE PAOLA 14 DAY READER) BERTHA      Continuous Blood Gluc Sensor (FREESTYLE PAOLA 14 DAY SENSOR) MISC        Options for treatment and follow-up care were reviewed with the patient . Mikayla Timmons  engaged in the decision making process and verbalized understanding of the options discussed and agreed with the final plan.    Nini Heaton MD

## 2019-04-19 ENCOUNTER — OFFICE VISIT (OUTPATIENT)
Dept: FAMILY MEDICINE | Facility: CLINIC | Age: 74
End: 2019-04-19
Payer: MEDICARE

## 2019-04-19 VITALS
HEART RATE: 50 BPM | OXYGEN SATURATION: 98 % | TEMPERATURE: 97.6 F | BODY MASS INDEX: 37.77 KG/M2 | RESPIRATION RATE: 16 BRPM | SYSTOLIC BLOOD PRESSURE: 177 MMHG | WEIGHT: 227 LBS | DIASTOLIC BLOOD PRESSURE: 85 MMHG

## 2019-04-19 DIAGNOSIS — E11.65 TYPE 2 DIABETES MELLITUS WITH HYPERGLYCEMIA, WITHOUT LONG-TERM CURRENT USE OF INSULIN (H): Primary | ICD-10-CM

## 2019-04-19 DIAGNOSIS — G14 POSTPOLIO SYNDROME (H): ICD-10-CM

## 2019-04-19 DIAGNOSIS — E83.52 HYPERCALCEMIA: ICD-10-CM

## 2019-04-19 DIAGNOSIS — I10 ESSENTIAL HYPERTENSION: ICD-10-CM

## 2019-04-19 LAB
BUN SERPL-MCNC: 11.5 MG/DL (ref 7–19)
CALCIUM SERPL-MCNC: 10.4 MG/DL (ref 8.5–10.1)
CHLORIDE SERPLBLD-SCNC: 106.7 MMOL/L (ref 98–110)
CHOLEST SERPL-MCNC: 207.6 MG/DL (ref 0–200)
CHOLEST/HDLC SERPL: 4.4 {RATIO} (ref 0–5)
CO2 SERPL-SCNC: 25.9 MMOL/L (ref 20–32)
CREAT SERPL-MCNC: 0.5 MG/DL (ref 0.5–1)
CREAT UR-MCNC: 97 MG/DL
GFR SERPL CREATININE-BSD FRML MDRD: >90 ML/MIN/1.7 M2
GLUCOSE SERPL-MCNC: 120.7 MG'DL (ref 70–99)
HDLC SERPL-MCNC: 46.8 MG/DL
LDLC SERPL CALC-MCNC: 124 MG/DL (ref 0–129)
MICROALBUMIN UR-MCNC: 35 MG/L
MICROALBUMIN/CREAT UR: 35.82 MG/G CR (ref 0–25)
POTASSIUM SERPL-SCNC: 3.8 MMOL/DL (ref 3.3–4.5)
PTH-INTACT SERPL-MCNC: 74 PG/ML (ref 18–80)
SODIUM SERPL-SCNC: 137.7 MMOL/L (ref 132.6–141.4)
TRIGL SERPL-MCNC: 184.2 MG/DL (ref 0–150)
TSH SERPL DL<=0.005 MIU/L-ACNC: 18.73 MU/L (ref 0.4–4)
VLDL CHOLESTEROL: 36.8 MG/DL (ref 7–32)

## 2019-04-19 RX ORDER — LANOLIN ALCOHOL/MO/W.PET/CERES
1000 CREAM (GRAM) TOPICAL DAILY
COMMUNITY
End: 2020-04-22

## 2019-04-19 NOTE — LETTER
April 20, 2019      Mikayla Timmons  [address]  [facility] [address]        Dear Mikayla,    Thank you for getting your care at [facility]. Please see below for your test results.  Your kidney function is looking OK although there is some protein in your urine that we need to recheck.   Your thyroid is not at the level it should be - which can make your cholesterol worse, your energy worse, mood worse, your swelling worse etc.  If you have been taking the armour thyroid regularly (every day), then we should increase the dose a bit. I will have Netta call you to see if you are Ok with us increasing the dose (and whether it makes sense to do so). I would try a small increase like 15 mg and then if that is not enough, increase by 30 mg.      Resulted Orders   Basic Metabolic Panel ([facility])   Result Value Ref Range    Urea Nitrogen 11.5 7.0 - 19.0 mg/dL    Calcium 10.4 (H) 8.5 - 10.1 mg/dL    Chloride 106.7 98.0 - 110.0 mmol/L    Carbon Dioxide 25.9 20.0 - 32.0 mmol/L    Creatinine 0.5 0.5 - 1.0 mg/dL    Glucose 120.7 (H) 70.0 - 99.0 mg'dL    Potassium 3.8 3.3 - 4.5 mmol/dL    Sodium 137.7 132.6 - 141.4 mmol/L    GFR Estimate >90 >60.0 mL/min/1.7 m2    GFR Estimate If Black >90 >60.0 mL/min/1.7 m2   Albumin Random Urine Quantitative with Creat Ratio   Result Value Ref Range    Creatinine Urine 97 mg/dL    Albumin Urine mg/L 35 mg/L    Albumin Urine mg/g Cr 35.82 (H) 0 - 25 mg/g Cr   TSH   Result Value Ref Range    TSH 18.73 (H) 0.40 - 4.00 mU/L   Lipid Cascade ([facility])   Result Value Ref Range    Cholesterol 207.6 (H) 0.0 - 200.0 mg/dL    Cholesterol/HDL Ratio 4.4 0.0 - 5.0    HDL Cholesterol 46.8 >40.0 mg/dL    LDL Cholesterol Calculated 124 0 - 129 mg/dL    Triglycerides 184.2 (H) 0.0 - 150.0 mg/dL    VLDL Cholesterol 36.8 (H) 7.0 - 32.0 mg/dL   Parathyroid Hormone Intact   Result Value Ref Range    Parathyroid Hormone Intact 74 18 - 80 pg/mL       If you have any concerns about these results please call  and leave a message for me or send a MyChart message to the clinic.    Sincerely,    Nini Heaton MD

## 2019-04-19 NOTE — Clinical Note
Hi team - could you call Mikayla to figure out what we can do with her thyroid. She is on 90 mg of armour and her TSH is 18 (not controlled). If she has been taking her armour daily then she needs to increase her dose. She tends to be very reluctant to change things... Also, it is a bit odd that her TSH was 16 when she was [place] [facility] [place] ... But regardless, I would like to go up to 90 mg plus 15 mg tabs for 8 weeks, then recheck her levels. Can you have that conversation with her so I know if I can call in 15 mg tabs in addition? Migdalia

## 2019-04-19 NOTE — PATIENT INSTRUCTIONS
Here is the plan from today's visit    1. Type 2 diabetes mellitus with hyperglycemia, without long-term current use of insulin (H)  Keep doing what you are doing.   - Basic Metabolic Panel ([facility])  - Hemoglobin A1c ([facility])  - Albumin Random Urine Quantitative with Creat Ratio  - TSH  - Lipid Cascade ([facility])  - OPTHALMOLOGY ADULT REFERRAL - INTERNAL    I will look into whether we can get you in to see Dr. Mora and Marimar will call you with the status.       Please call or return to clinic if your symptoms don't go away.    Follow up plan  1 month    Thank you for coming to [facility] today.  Lab Testing:  **If you had lab testing today and your results are reassuring or normal they will be mailed to you or sent through [employer] within 7 days.   **If the lab tests need quick action we will call you with the results.  The phone number we will call with results is # [phone] (home) . If this is not the best number please call our clinic and change the number.  Medication Refills:  If you need any refills please call your pharmacy and they will contact us.   If you need to  your refill at a new pharmacy, please contact the new pharmacy directly. The new pharmacy will help you get your medications transferred faster.   Scheduling:  If you have any concerns about today's visit or wish to schedule another appointment please call our office during normal business hours [phone] (8-5:00 M-F)  If a referral was made to a [facility] and you don't get a call from central scheduling please call [phone].  If a Mammogram was ordered for you at [facility] call [phone] to schedule or change your appointment.  If you had an XRay/CT/Ultrasound/MRI ordered the number is [phone] to schedule or change your radiology appointment.   Medical Concerns:  If you have urgent medical concerns please call [phone] at any time of the day.    Nini Heaton  MD    Ophthalmology referral  [phone]  Scheduled 5/23      Referral to Dr. Rakesh Mora - PM&R clinic                                [facility]                [address]                [address]                ?[address]     [phone]    Contacted clinic, message will be forwarded to Jessica Lopez RN, she will review and contact patient directly to schedule.

## 2019-04-20 PROBLEM — E83.52 HYPERCALCEMIA: Status: ACTIVE | Noted: 2019-04-20

## 2019-04-23 ENCOUNTER — TELEPHONE (OUTPATIENT)
Dept: PHYSICAL MEDICINE AND REHAB | Facility: CLINIC | Age: 74
End: 2019-04-23

## 2019-04-23 ENCOUNTER — TELEPHONE (OUTPATIENT)
Dept: FAMILY MEDICINE | Facility: CLINIC | Age: 74
End: 2019-04-23

## 2019-04-23 DIAGNOSIS — E03.9 HYPOTHYROIDISM, UNSPECIFIED TYPE: Primary | ICD-10-CM

## 2019-04-23 NOTE — TELEPHONE ENCOUNTER
[facility] Call Center    Phone Message    May a detailed message be left on voicemail: yes    Reason for Call: Other: [facility] calling on behalf of pt wanting to schedule with Dr. Mora. Please contact pt to schedule.     Action Taken: Message routed to:  [facility] KATLYN PMJONAS

## 2019-04-23 NOTE — TELEPHONE ENCOUNTER
Hi team - could you call Mikayla to figure out what we can do with her thyroid. She is on 90 mg of armour and her TSH is 18 (not controlled). If she has been taking her armour daily then she needs to increase her dose. She tends to be very reluctant to change things... Also, it is a bit odd that her TSH was 16 when she was at [facility] in January ... But regardless, I would like to go up to 90 mg plus 15 mg tabs for 8 weeks, then recheck her levels. Can you have that conversation with her so I know if I can call in 15 mg tabs in addition? Migdalia PARRISH attempted to reach patient, but was unable to reach. Left voicemail with name and callback number.  Kiara Currie RN

## 2019-04-24 RX ORDER — THYROID 15 MG/1
15 TABLET ORAL DAILY
Qty: 90 TABLET | Refills: 0 | Status: SHIPPED | OUTPATIENT
Start: 2019-04-24 | End: 2019-06-01

## 2019-04-24 NOTE — TELEPHONE ENCOUNTER
Patient returned missed call, would like to discuss. Please call back, okay to leave voicemail.    Kerry Torres, Patient Representative

## 2019-04-24 NOTE — TELEPHONE ENCOUNTER
RN called pt to relay. Pt states she takes her armour everyday and never misses a dose. Pt would like to know the cause of her TSH going up and needing more medication. RN stated sometimes the body doesn't respond well to that dose and it needs to be changed. Pt didn't believe that answer and would like to speak with Dr. Heaton before medication is sent to pharmacy.    Pt wouldn't be able to get to pharmacy until Sunday.    Message routed to PCP     Demetrice Reid RN

## 2019-05-07 ENCOUNTER — TELEPHONE (OUTPATIENT)
Dept: FAMILY MEDICINE | Facility: CLINIC | Age: 74
End: 2019-05-07

## 2019-05-07 DIAGNOSIS — E11.65 TYPE 2 DIABETES MELLITUS WITH HYPERGLYCEMIA, WITHOUT LONG-TERM CURRENT USE OF INSULIN (H): Primary | ICD-10-CM

## 2019-05-07 NOTE — TELEPHONE ENCOUNTER

## 2019-05-07 NOTE — TELEPHONE ENCOUNTER
Verify that the refill encounter hasn't been started Yes    [facility] phone call message- patient requesting a refill:    Full Medication Name: Insulin, but a new prescription    Dose:      Pharmacy confirmed as   [id number] - [place], [place] - [address] AT [facility] [address]  [address]  [facility] [address]  Phone: [phone] Fax: [phone]  : Yes    Medication tab checked to see if medication has been sent  Yes    Additional Comments: Patient wants to talk to Dr. Heaton as she needs to get a new type of insulin and is almost out. Also, she did make an appointment with Dr. Heaton for 5/31/19 at 10:20, but she is concerned that this won't be soon enough.She would like a call back.    OK to leave a message on voice mail? Yes    Advised patient refill may take up to 2 business days? Yes    Primary language: English      needed? No    Call taken on May 7, 2019 at 4:10 PM by Lili Cee to  [facility] REFILL

## 2019-05-08 RX ORDER — BLOOD SUGAR DIAGNOSTIC
STRIP MISCELLANEOUS
Qty: 100 EACH | Refills: 11 | Status: SHIPPED | OUTPATIENT
Start: 2019-05-08 | End: 2019-06-12

## 2019-05-08 NOTE — TELEPHONE ENCOUNTER
Mikayla calls today to discuss a change in her insulin regimen.  She has been taking Levemir and Novolog insulin, but is moving into taking only the LA insulin .  She has only used the RA insulin once in the past three days.  She uses this when her BS are above 150 mg/dl.  She has been taking 14 units once daily.    Mikayla has started the process to use a patient assistance program from the manufacture to help pay for the insulin, but has had trouble getting the supporting documentation.  She is nearly out of her insulin and will need a plan for insulin coming into the weekend.  She is interested in using the $25 option available at [facility].    New rx sent for Novolin NPH insulin Generic vial 10 ml :  4 units AM and 6 units PM    Jorge Luis Beckett Pharm.D.

## 2019-05-31 ENCOUNTER — OFFICE VISIT (OUTPATIENT)
Dept: FAMILY MEDICINE | Facility: CLINIC | Age: 74
End: 2019-05-31
Payer: MEDICARE

## 2019-05-31 VITALS
OXYGEN SATURATION: 96 % | TEMPERATURE: 99.3 F | WEIGHT: 219.6 LBS | BODY MASS INDEX: 36.54 KG/M2 | HEART RATE: 77 BPM | SYSTOLIC BLOOD PRESSURE: 158 MMHG | DIASTOLIC BLOOD PRESSURE: 79 MMHG

## 2019-05-31 DIAGNOSIS — Z79.4 TYPE 2 DIABETES MELLITUS WITH HYPERGLYCEMIA, WITH LONG-TERM CURRENT USE OF INSULIN (H): Primary | ICD-10-CM

## 2019-05-31 DIAGNOSIS — G14 POSTPOLIO SYNDROME (H): ICD-10-CM

## 2019-05-31 DIAGNOSIS — E11.65 TYPE 2 DIABETES MELLITUS WITH HYPERGLYCEMIA, WITH LONG-TERM CURRENT USE OF INSULIN (H): Primary | ICD-10-CM

## 2019-05-31 DIAGNOSIS — E03.9 HYPOTHYROIDISM, UNSPECIFIED TYPE: ICD-10-CM

## 2019-05-31 LAB
BUN SERPL-MCNC: 15.8 MG/DL (ref 7–19)
CALCIUM SERPL-MCNC: 10.9 MG/DL (ref 8.5–10.1)
CHLORIDE SERPLBLD-SCNC: 105.7 MMOL/L (ref 98–110)
CO2 SERPL-SCNC: 25 MMOL/L (ref 20–32)
CREAT SERPL-MCNC: 0.7 MG/DL (ref 0.5–1)
CREAT UR-MCNC: 76 MG/DL
GFR SERPL CREATININE-BSD FRML MDRD: 86.7 ML/MIN/1.7 M2
GLUCOSE SERPL-MCNC: 162.3 MG'DL (ref 70–99)
HBA1C MFR BLD: 6.5 % (ref 4.1–5.7)
MICROALBUMIN UR-MCNC: 30 MG/L
MICROALBUMIN/CREAT UR: 39.39 MG/G CR (ref 0–25)
POTASSIUM SERPL-SCNC: 4.3 MMOL/DL (ref 3.3–4.5)
SODIUM SERPL-SCNC: 136.8 MMOL/L (ref 132.6–141.4)
TSH SERPL DL<=0.005 MIU/L-ACNC: 6.58 MU/L (ref 0.4–4)

## 2019-05-31 NOTE — PROGRESS NOTES
AYESHA Degroot is a 73 year old  female  who presents for follow up of concern(s) listed below:    Chief Complaint   Patient presents with     RECHECK     DM     Thyroid Disease     adjusted thyroid medication, needs refills today.      HTN: is very sure she has elevated BP in the clinic and was very upset with her ride here.   Was 110/69 at home.  Checked by PT.     DM: was taking levimir only. Does not want to be on Metformin. Levimir - ran out a couple of weeks ago.  At that time was using a few units of Novolog.Has the Novolog pen at home that she is not using and wondering if she can.   Is now using 6 NPH units in the am and 4 at bedtime. Only thing she is using now.     167, 152, 142, 168, 157, 174, 146, 167  Highest after eating is 187  Highest fasting 183  Although has had 200 for one day.   Is working on her plan with total calories and macronutrients and keeps track of it religously.  And if she sticks to it, she loses weight.    Does not go hungry.     Denies CP, SOB, worsening edema. Edema is a bit better.   Skin over the left leg is better too.   Hgbs have been normal with last nl one in 1/2019      Is doing light PT and finds that helps but only able to very little due to her terrible pain.     Working towards surgery on her hip.  Her surgeon will not proceed unless she is below HgA1c.     Wt Readings from Last 4 Encounters:   05/31/19 99.6 kg (219 lb 9.6 oz)   04/19/19 103 kg (227 lb)   02/25/19 106.6 kg (235 lb)   08/10/17 102.9 kg (226 lb 12.8 oz)       Eye exam done? June 19th appointment.     TSH:  Is now on increased dose of armour.  Started her increased dose on 4/28.      HA - better?  Has had it two more times but then goes away. Finds it gets better when she drinks a lot of water.  And when doesn't, then it recurs. Drinks 8 - 12 glasses a water a day.     Social - struggles with transportation - bad experience with [employer] this am.  Finds that bus are much worse, than the cabs  "since there is so much bumping.  Hoping to get a letter from me. Will send me an application as to why she can't use a bus.   She can use a standard car - can get in and out of them.  [employer].      Patient Active Problem List   Diagnosis     Avitaminosis D     Diabetes mellitus, type 2 (H)     Essential hypertension     Postpolio syndrome     Status post THR (total hip replacement)     Carpal tunnel syndrome     Hyperlipidemia LDL goal <100     Hypothyroidism     Decreased sensation of foot     Right-sided low back pain without sciatica     Closed fracture of scaphoid of left wrist with routine healing, unspecified portion of scaphoid, subsequent encounter     Closed dislocation of hip (H)     Hypercalcemia       Current Outpatient Medications   Medication Sig Dispense Refill     blood glucose (NO BRAND SPECIFIED) test strip 100 strips by In Vitro route 3 times daily For OneTouch Ultra Blue. 100 strip 12     blood glucose monitoring (ONE TOUCH DELICA) lancets Use to test blood sugar 3 times daily or as directed. 100 each 11     Blood Glucose Monitoring Suppl (ONE TOUCH ULTRA)        Cholecalciferol (VITAMIN D) 1000 UNITS capsule Take 2,000 Units by mouth 2 times daily        cyanocobalamin (VITAMIN B-12) 1000 MCG tablet Take 1,000 mcg by mouth daily       ibuprofen (ADVIL/MOTRIN) 200 MG tablet Take 400 mg by mouth       insulin aspart (NOVOLOG FLEXPEN) 100 UNIT/ML pen Inject with meals as instructed:  Glucose 130-150 0   Glucose 151-200 2 units   Glucose 201-250 4 units   Glucose 251-300 6 units   Glucose 301-350 8 units   Glucose 351-400 10 units   Glucose 401-500 12 units   Glucose GREATER THAN 501 14 units and call provider 3 mL 11     insulin NPH (HUMULIN N/NOVOLIN N VIAL) 100 UNIT/ML vial Inject subcutaneous 6 units in the AM and 4 units in the PM 10 mL 3     insulin syringe-needle U-100 (31G X 5/16\" 0.3 ML) 31G X 5/16\" 0.3 ML miscellaneous Use 2 syringes daily or as directed. 100 each 11     magnesium oxide " 400 MG CAPS Take 1 capsule by mouth daily       melatonin 5 MG tablet Take 5 mg by mouth nightly as needed for sleep       STATIN NOT PRESCRIBED (INTENTIONAL) Please choose reason not prescribed, below       thyroid (ARMOUR) 15 MG tablet Take 1 tablet (15 mg) by mouth daily 90 tablet 0     thyroid (ARMOUR) 90 MG tablet Take 1 tablet (90 mg) by mouth daily 90 tablet 3          Allergies   Allergen Reactions     Hydromorphone Itching     Ketamine Other (See Comments)                Review of Systems:               Physical Exam:     Vitals:    05/31/19 1017 05/31/19 1018   BP: 168/90 158/79   Pulse: 77    Temp: 99.3  F (37.4  C)    TempSrc: Oral    SpO2: 96%    Weight: 99.6 kg (219 lb 9.6 oz)      Body mass index is 36.54 kg/m .  Vital signs normal except BP  Extremity - 1+ edema bilaterally. Left shin with thickened, scattered hyperkeratotic plaques    Office Visit on 04/19/2019   Component Date Value Ref Range Status     Urea Nitrogen 04/19/2019 11.5  7.0 - 19.0 mg/dL Final     Calcium 04/19/2019 10.4* 8.5 - 10.1 mg/dL Final     Chloride 04/19/2019 106.7  98.0 - 110.0 mmol/L Final     Carbon Dioxide 04/19/2019 25.9  20.0 - 32.0 mmol/L Final     Creatinine 04/19/2019 0.5  0.5 - 1.0 mg/dL Final     Glucose 04/19/2019 120.7* 70.0 - 99.0 mg'dL Final     Potassium 04/19/2019 3.8  3.3 - 4.5 mmol/dL Final     Sodium 04/19/2019 137.7  132.6 - 141.4 mmol/L Final     GFR Estimate 04/19/2019 >90  >60.0 mL/min/1.7 m2 Final     GFR Estimate If Black 04/19/2019 >90  >60.0 mL/min/1.7 m2 Final     Creatinine Urine 04/19/2019 97  mg/dL Final     Albumin Urine mg/L 04/19/2019 35  mg/L Final     Albumin Urine mg/g Cr 04/19/2019 35.82* 0 - 25 mg/g Cr Final     TSH 04/19/2019 18.73* 0.40 - 4.00 mU/L Final     Cholesterol 04/19/2019 207.6* 0.0 - 200.0 mg/dL Final     Cholesterol/HDL Ratio 04/19/2019 4.4  0.0 - 5.0 Final     HDL Cholesterol 04/19/2019 46.8  >40.0 mg/dL Final     LDL Cholesterol Calculated 04/19/2019 124  0 - 129 mg/dL  Final     Triglycerides 04/19/2019 184.2* 0.0 - 150.0 mg/dL Final     VLDL Cholesterol 04/19/2019 36.8* 7.0 - 32.0 mg/dL Final     Parathyroid Hormone Intact 04/19/2019 74  18 - 80 pg/mL Final         Assessment and Plan     Mikayla was seen today for recheck and thyroid disease.    Diagnoses and all orders for this visit:    Type 2 diabetes mellitus with hyperglycemia, with long-term current use of insulin (H) - much better and very excited. Is working hard on her diet. She has not had anemia so doubt this is falsely low.  Tested numbers a bit higher but close.  Plan to have her continue what she is doing, notify us by MyChart if she is going lower and we can help her back down her insulin.  She is now at goal for her surgery so will be trying to figure out next steps.   -     Hemoglobin A1c ([facility])  -     Basic Metabolic Panel ([facility])  -     Albumin Random Urine Quantitative with Creat Ratio    Hypothyroidism, unspecified type - will refill her meds once get back her testing. A bit early for testing so will need to take this into account. LIkely higher dose is helping her manage her weight and her sugars better.   -     TSH    Postpolio syndrome - to send in her forms. I do believe, due to her chronic pain ,that she needs to be picked up by cars.     Total time: 30 minutes with more than half spent counseling on next steps, insulin needs, plan for thyroid and her travel mgmt.     Medications Discontinued During This Encounter   Medication Reason     ibuprofen (ADVIL/MOTRIN) 200 MG tablet        Options for treatment and follow-up care were reviewed with the patient . Mikayla Timmons  engaged in the decision making process and verbalized understanding of the options discussed and agreed with the final plan.    Nini Heaton MD

## 2019-05-31 NOTE — LETTER
May 31, 2019      Mikayla Timmons  [address]  [facility] [facility] [address]        Dear Mikayla,    Thank you for getting your care at [facility]. Please see below for your test results.  Kidneys look OK as well.     Resulted Orders   Hemoglobin A1c ([facility])   Result Value Ref Range    Hemoglobin A1C 6.5 (H) 4.1 - 5.7 %   Basic Metabolic Panel ([facility])   Result Value Ref Range    Urea Nitrogen 15.8 7.0 - 19.0 mg/dL    Calcium 10.9 (H) 8.5 - 10.1 mg/dL    Chloride 105.7 98.0 - 110.0 mmol/L    Carbon Dioxide 25.0 20.0 - 32.0 mmol/L    Creatinine 0.7 0.5 - 1.0 mg/dL    Glucose 162.3 (H) 70.0 - 99.0 mg'dL    Potassium 4.3 3.3 - 4.5 mmol/dL    Sodium 136.8 132.6 - 141.4 mmol/L    GFR Estimate 86.7 >60.0 mL/min/1.7 m2    GFR Estimate If Black >90 >60.0 mL/min/1.7 m2       If you have any concerns about these results please call and leave a message for me or send a CRVt message to the clinic.    Sincerely,    Nini Heaton MD

## 2019-05-31 NOTE — PATIENT INSTRUCTIONS
Here is the plan from today's visit    1. Type 2 diabetes mellitus with hyperglycemia, with long-term current use of insulin (H)  Keep taking the new Novolin NPH with 6 units and 4 units in the am.   Ok to use the pen here and there as needed for increased blood sugar  - Hemoglobin A1c ([facility])  - Basic Metabolic Panel ([facility])  - Albumin Random Urine Quantitative with Creat Ratio    2. Hypothyroidism, unspecified type  I will send in the correct dose over the weekend. We will recheck this again in the future.   - TSH      Please call or return to clinic if your symptoms don't go away.    Follow up plan  6 - 8 weeks       Thank you for coming to [facility] today.  Lab Testing:  **If you had lab testing today and your results are reassuring or normal they will be mailed to you or sent through [facility] within 7 days.   **If the lab tests need quick action we will call you with the results.  The phone number we will call with results is # [phone] (home) . If this is not the best number please call our clinic and change the number.  Medication Refills:  If you need any refills please call your pharmacy and they will contact us.   If you need to  your refill at a new pharmacy, please contact the new pharmacy directly. The new pharmacy will help you get your medications transferred faster.   Scheduling:  If you have any concerns about today's visit or wish to schedule another appointment please call our office during normal business hours [phone] (8-5:00 M-F)  If a referral was made to a [facility] and you don't get a call from central scheduling please call [phone].  If a Mammogram was ordered for you at [facility] call [phone] to schedule or change your appointment.  If you had an XRay/CT/Ultrasound/MRI ordered the number is [phone] to schedule or change your radiology appointment.   Medical Concerns:  If you have urgent medical concerns please call  [phone] at any time of the day.    Nini Heaton MD

## 2019-05-31 NOTE — LETTER
June 1, 2019      Mikayla Timmons  [address]  [place] [address]        Dear Mikayla,    Thank you for getting your care at [facility]. Please see below for your test results.  Your thyroid level is close to where we want it. I think we keep you on th 90 and 15 mg pills and repeat your TSH in 3 months to see if we need to increase it just a little bit more.  There is no 105 mg pills, so I refilled the 90 mg and the 15 mg pills, for 90 tablets each.   Your urine albumin is up - stable from the last time we checked, but it is a sign of some early kidney strain. Keeping your diabetes well controlled is key to keeping your kidneys from worsening. We can discuss this at your next visit. Your job until then is to keep doing the great work you are doing with your diet.      Resulted Orders   Hemoglobin A1c ([facility])   Result Value Ref Range    Hemoglobin A1C 6.5 (H) 4.1 - 5.7 %   Basic Metabolic Panel ([facility])   Result Value Ref Range    Urea Nitrogen 15.8 7.0 - 19.0 mg/dL    Calcium 10.9 (H) 8.5 - 10.1 mg/dL    Chloride 105.7 98.0 - 110.0 mmol/L    Carbon Dioxide 25.0 20.0 - 32.0 mmol/L    Creatinine 0.7 0.5 - 1.0 mg/dL    Glucose 162.3 (H) 70.0 - 99.0 mg'dL    Potassium 4.3 3.3 - 4.5 mmol/dL    Sodium 136.8 132.6 - 141.4 mmol/L    GFR Estimate 86.7 >60.0 mL/min/1.7 m2    GFR Estimate If Black >90 >60.0 mL/min/1.7 m2   Albumin Random Urine Quantitative with Creat Ratio   Result Value Ref Range    Creatinine Urine 76 mg/dL    Albumin Urine mg/L 30 mg/L    Albumin Urine mg/g Cr 39.39 (H) 0 - 25 mg/g Cr   TSH   Result Value Ref Range    TSH 6.58 (H) 0.40 - 4.00 mU/L       If you have any concerns about these results please call and leave a message for me or send a MyChart message to the clinic.    Sincerely,    Nini Heaton MD

## 2019-06-01 RX ORDER — THYROID 15 MG/1
15 TABLET ORAL DAILY
Qty: 90 TABLET | Refills: 0 | Status: SHIPPED | OUTPATIENT
Start: 2019-06-01 | End: 2019-06-01

## 2019-06-01 RX ORDER — THYROID 90 MG/1
90 TABLET ORAL DAILY
Qty: 90 TABLET | Refills: 0 | Status: SHIPPED | OUTPATIENT
Start: 2019-06-01 | End: 2019-07-30

## 2019-06-01 RX ORDER — THYROID 15 MG/1
15 TABLET ORAL DAILY
Qty: 90 TABLET | Refills: 0 | Status: SHIPPED | OUTPATIENT
Start: 2019-06-01 | End: 2019-07-30

## 2019-06-01 RX ORDER — THYROID 90 MG/1
90 TABLET ORAL DAILY
Qty: 90 TABLET | Refills: 0 | Status: SHIPPED | OUTPATIENT
Start: 2019-06-01 | End: 2019-06-01

## 2019-06-03 ENCOUNTER — MYC MEDICAL ADVICE (OUTPATIENT)
Dept: FAMILY MEDICINE | Facility: CLINIC | Age: 74
End: 2019-06-03

## 2019-06-03 NOTE — TELEPHONE ENCOUNTER
Yes - please use the Novolog pens, following the scale below, for numbers higher than 150.      Glucose 130-150 0   Glucose 151-200 2 units   Glucose 201-250 4 units   Glucose 251-300 6 units   Glucose 301-350 8 units   Glucose 351-400 10 units   Glucose 401-500 12 units   Glucose GREATER THAN 501 14 units and call provider    Thanks for confirming!  Migdalia Heaton MD

## 2019-06-12 ENCOUNTER — TELEPHONE (OUTPATIENT)
Dept: FAMILY MEDICINE | Facility: CLINIC | Age: 74
End: 2019-06-12

## 2019-06-12 DIAGNOSIS — E11.65 TYPE 2 DIABETES MELLITUS WITH HYPERGLYCEMIA, WITHOUT LONG-TERM CURRENT USE OF INSULIN (H): ICD-10-CM

## 2019-06-12 RX ORDER — BLOOD SUGAR DIAGNOSTIC
STRIP MISCELLANEOUS
Qty: 100 EACH | Refills: 11 | Status: CANCELLED | OUTPATIENT
Start: 2019-06-12

## 2019-06-12 RX ORDER — BLOOD SUGAR DIAGNOSTIC
STRIP MISCELLANEOUS
Qty: 100 EACH | Refills: 11 | Status: SHIPPED | OUTPATIENT
Start: 2019-06-12 | End: 2019-06-27

## 2019-06-12 NOTE — TELEPHONE ENCOUNTER
Called patient to notify that prescriptions for pen needles and insulin were sent  to preffered pharmacy on file, patient verbalized understanding and was thankfull for the call.    Mary Deleon RN

## 2019-06-12 NOTE — TELEPHONE ENCOUNTER

## 2019-06-12 NOTE — TELEPHONE ENCOUNTER
RN contacted pharmacy to find out which needles patient prefers and ordered them. During call, RN ensured that they have her insulins on file, they were missing the long acting. RN re-sent the order that was originally sent in May.  Kiara Currie RN

## 2019-06-12 NOTE — TELEPHONE ENCOUNTER
"Called pharmacy to clarify, per pharmacy staff,  patient needs \"new prescription for insulin pen needle\", refil; request routed to provider.  In addition RN called and spoke with the patient who stated that pharmacy needs new prescription for her insulin pen needle, in addition insulin syringe/ Needle refill request also routed to provider to approve if appropriate.    Mary Deleon RN    "

## 2019-06-12 NOTE — TELEPHONE ENCOUNTER
"[facility] phone call message- medication clarification/question:    Full Medication Name: insulin syringe-needle U-100 (31G X 5/16\" 0.3 ML) 31G X 5/16\" 0.3 ML miscellaneous   Dose: Use 2 syringes daily or as directed.    Question/Clarification needed: Patient stated that she is unable to get needle prescription filled due to issues with insurance and is not sure what to do now. Please call back to discuss options. Would preferably like to discuss with PCP, but will discuss with nurse if PCP not available.    Pharmacy confirmed as   [facility] [address] [place], [place] - [address] AT [facility] [address]  [address] [facility] [address]  Phone: [phone] Fax: [phone]  : Yes    Please leave ONLY preferred pharmacy    OK to leave a message on voice mail? Yes    Advised patient that RN would call back within 3 hours, unless emergent.    Primary language: English      needed? No    Call taken on June 12, 2019 at 2:25 PM by Kerry Cee to [facility] TRIAGE      "

## 2019-06-27 ENCOUNTER — TELEPHONE (OUTPATIENT)
Dept: FAMILY MEDICINE | Facility: CLINIC | Age: 74
End: 2019-06-27

## 2019-06-27 DIAGNOSIS — E11.65 TYPE 2 DIABETES MELLITUS WITH HYPERGLYCEMIA, WITHOUT LONG-TERM CURRENT USE OF INSULIN (H): ICD-10-CM

## 2019-06-27 RX ORDER — BLOOD SUGAR DIAGNOSTIC
STRIP MISCELLANEOUS
Qty: 100 EACH | Refills: 11 | Status: SHIPPED | OUTPATIENT
Start: 2019-06-27

## 2019-06-27 NOTE — TELEPHONE ENCOUNTER

## 2019-07-11 ENCOUNTER — DOCUMENTATION ONLY (OUTPATIENT)
Dept: FAMILY MEDICINE | Facility: CLINIC | Age: 74
End: 2019-07-11

## 2019-07-11 NOTE — PROGRESS NOTES
"When opening a documentation only encounter, be sure to enter in \"Chief Complaint\" Forms and in \" Comments\" Title of form, description if needed.    Mikayla is a 73 year old  female  Form received via: Fax  Form now resides in: Provider Ready    NANYC Currie 1:02 PM July 11, 2019      Form has been completed by provider.     Form sent out via: Fax to [facility] at Fax Number: [phone]  Patient informed: No  Output date: July 11, 2019    NANCY Currie 3:01 PM July 11, 2019    **Please close the encounter**                  "

## 2019-07-11 NOTE — PROGRESS NOTES
"When opening a documentation only encounter, be sure to enter in \"Chief Complaint\" Forms and in \" Comments\" Title of form, description if needed.    Mikayla is a 73 year old  female  Form received via: Fax  Form now resides in: Provider Ready    NANCY Currie 1:03 PM July 11, 2019        Form has been completed by provider.     Form sent out via: Fax to [facility] at Fax Number: [phone]  Patient informed: No  Output date: July 11, 2019    NANCY Currie 3:01 PM July 11, 2019    **Please close the encounter**                "

## 2019-07-25 ENCOUNTER — DOCUMENTATION ONLY (OUTPATIENT)
Dept: CARE COORDINATION | Facility: CLINIC | Age: 74
End: 2019-07-25

## 2019-07-25 NOTE — TELEPHONE ENCOUNTER
RECORDS RECEIVED FROM: Spinal Evaluation for adequate preparation of possible revision hip surgery, Back pain, Scoliosis, ref by Raoul Peacock [facility]   DATE RECEIVED: Aug 2, 2019    NOTES STATUS DETAILS   OFFICE NOTE from referring provider Care Everywhere 6/26/19 Dr. Peacock   OFFICE NOTE from other specialist N/A    DISCHARGE SUMMARY from hospital N/A    DISCHARGE REPORT from the ER N/A    OPERATIVE REPORT N/A    MEDICATION LIST Care Everywhere    IMPLANT RECORD/STICKER N/A    LABS     CBC/DIFF N/A    CULTURES N/A    INJECTIONS DONE IN RADIOLOGY N/A    MRI N/A    CT SCAN N/A    XRAYS (IMAGES & REPORTS) Received [facility] 6/26/19, 7/14/17   TUMOR     PATHOLOGY  Slides & report N/A

## 2019-07-29 ENCOUNTER — OFFICE VISIT (OUTPATIENT)
Dept: FAMILY MEDICINE | Facility: CLINIC | Age: 74
End: 2019-07-29
Payer: MEDICARE

## 2019-07-29 VITALS
TEMPERATURE: 98.8 F | HEART RATE: 89 BPM | WEIGHT: 221 LBS | SYSTOLIC BLOOD PRESSURE: 159 MMHG | BODY MASS INDEX: 36.78 KG/M2 | OXYGEN SATURATION: 95 % | DIASTOLIC BLOOD PRESSURE: 84 MMHG | RESPIRATION RATE: 18 BRPM

## 2019-07-29 DIAGNOSIS — E11.65 TYPE 2 DIABETES MELLITUS WITH HYPERGLYCEMIA, WITH LONG-TERM CURRENT USE OF INSULIN (H): Primary | ICD-10-CM

## 2019-07-29 DIAGNOSIS — Z79.4 TYPE 2 DIABETES MELLITUS WITH HYPERGLYCEMIA, WITH LONG-TERM CURRENT USE OF INSULIN (H): Primary | ICD-10-CM

## 2019-07-29 DIAGNOSIS — E03.9 HYPOTHYROIDISM, UNSPECIFIED TYPE: ICD-10-CM

## 2019-07-29 DIAGNOSIS — S73.006D: ICD-10-CM

## 2019-07-29 DIAGNOSIS — I10 ESSENTIAL HYPERTENSION: ICD-10-CM

## 2019-07-29 LAB
GLUCOSE SERPL-MCNC: 188 MG'DL (ref 70–99)
TSH SERPL DL<=0.005 MIU/L-ACNC: 7.42 MU/L (ref 0.4–4)

## 2019-07-29 NOTE — LETTER
July 30, 2019      Mikayla Timmons  [address]  [place] [address]        Dear Mikayla,    Thank you for getting your care at [facility]. Please see below for your test results.  Your thyroid continues to not be fully controlled. So, I think we can increase to 120 mg of the Mears.  I have called that in.     Resulted Orders   Glucose ([facility])   Result Value Ref Range    Glucose 188.0 (H) 70.0 - 99.0 mg'dL   TSH   Result Value Ref Range    TSH 7.42 (H) 0.40 - 4.00 mU/L       If you have any concerns about these results please call and leave a message for me or send a [facility] message to the clinic.    Sincerely,    Nini Heaton MD

## 2019-07-29 NOTE — PROGRESS NOTES
Fasting        HPI       Mikayla is a 73 year old  female  who presents for follow up of concern(s) listed below:    Chief Complaint   Patient presents with     RECHECK     Would like to discuss her diabetes and her unusual reading coming from glucometer and discuss upcoming surgery.     Thyroid - is on 90 and 15 mg [place] thyroid - does not like paying extra for 2 meds.   She notes better sleep. Denies symptoms. Rearranged so she can sleep longer shifts and finds that is working better    Albinuria now diagnosed x 2  - should be on ace. Wanting to research that.     PT BPs at home - she is using a wrist monitor.  115/74, 108/81  Her wrist monitor - 141/71, 143/73, 131/68.  One of her's was a cuff - 129/83  Does not cook with salt. Cooks all her meals.     DM - worried that her sugars are not making sense, where she checks the number 3 times in a row. Specifically she had a 154 late pm result and then 222 in the am (x 3 with the numbers a few points different).  Because they can vary and so she is afraid of taking her insulin. What she does is average out the number and then respond to that for chosing her insulin dosing.   Takes the Novolog when her reaching is over 150 - will then take according to her sliding scale.    Taking the Novolin 6 Units in the am and in the pm.   Bedtime: usually between 120 - 150.   The blood glucose machine company of hers asked her to do an 8 hour fast. To compare to our number.  Was 181 in our clinic and 178 on her meter.   Denies any lows.  Reviewed them and agree.   No longer has nocturia.     Diet - is having some wheat bread but not much. Is limiting.     Was told in the past that she has a murmur but not heard that she has one in her last few admissions. Denies having had an echo recently.     9/9 has her surgery.     Results for orders placed or performed in visit on 07/29/19   Glucose ([facility])   Result Value Ref Range    Glucose 188.0 (H) 70.0 - 99.0 mg'dL         Patient  "Active Problem List   Diagnosis     Avitaminosis D     Diabetes mellitus, type 2 (H)     Essential hypertension     Postpolio syndrome     Status post THR (total hip replacement)     Carpal tunnel syndrome     Hyperlipidemia LDL goal <100     Hypothyroidism     Decreased sensation of foot     Right-sided low back pain without sciatica     Closed fracture of scaphoid of left wrist with routine healing, unspecified portion of scaphoid, subsequent encounter     Closed dislocation of hip (H)     Hypercalcemia       Current Outpatient Medications   Medication Sig Dispense Refill     blood glucose (NO BRAND SPECIFIED) test strip 100 strips by In Vitro route 3 times daily For OneTouch Ultra Blue. 100 strip 12     blood glucose monitoring (ONE TOUCH DELICA) lancets Use to test blood sugar 3 times daily or as directed. 100 each 11     Blood Glucose Monitoring Suppl (ONE TOUCH ULTRA)        Cholecalciferol (VITAMIN D) 1000 UNITS capsule Take 2,000 Units by mouth 2 times daily        cyanocobalamin (VITAMIN B-12) 1000 MCG tablet Take 1,000 mcg by mouth daily       insulin aspart (NOVOLOG FLEXPEN) 100 UNIT/ML pen Inject with meals as instructed:  Glucose 130-150 0   Glucose 151-200 2 units   Glucose 201-250 4 units   Glucose 251-300 6 units   Glucose 301-350 8 units   Glucose 351-400 10 units   Glucose 401-500 12 units   Glucose GREATER THAN 501 14 units and call provider (Patient not taking: Reported on 5/31/2019) 3 mL 11     insulin NPH (HUMULIN N/NOVOLIN N VIAL) 100 UNIT/ML vial Inject subcutaneous 6 units in the AM and 4 units in the PM 10 mL 3     insulin pen needle (31G X 5 MM) 31G X 5 MM miscellaneous Use 3 pen needles daily or as directed. 100 each 11     insulin syringe-needle U-100 (31G X 5/16\" 0.3 ML) 31G X 5/16\" 0.3 ML miscellaneous Use 2 syringes daily or as directed. 100 each 11     magnesium oxide 400 MG CAPS Take 1 capsule by mouth daily       melatonin 5 MG tablet Take 5 mg by mouth nightly as needed for sleep  "      STATIN NOT PRESCRIBED (INTENTIONAL) Please choose reason not prescribed, below (Patient not taking: Reported on 5/31/2019)       thyroid (ARMOUR) 15 MG tablet Take 1 tablet (15 mg) by mouth daily 90 tablet 0     thyroid (ARMOUR) 90 MG tablet Take 1 tablet (90 mg) by mouth daily 90 tablet 0          Allergies   Allergen Reactions     Hydromorphone Itching     Ketamine Other (See Comments)                Review of Systems:                 Physical Exam:     Vitals:    07/29/19 1033 07/29/19 1037   BP: 149/90 159/84   BP Location: Left arm Right arm   Patient Position: Sitting Sitting   Cuff Size: Adult Large Adult Large   Pulse: 89    Resp: 18    Temp: 98.8  F (37.1  C)    TempSrc: Oral    SpO2: 95%    Weight: 100.2 kg (221 lb)      Body mass index is 36.78 kg/m .  Vital signs normal except BP  GENERAL: healthy, alert, well nourished, well hydrated, no distress  NECK: no tenderness, no adenopathy, no asymmetry, no masses, no stiffness; thyroid- normal to palpation  RESP: lungs clear to auscultation - no rales, no rhonchi, no wheezes  CV: regular rates and rhythm, normal S1 S2, no S3 or S4 and no murmur, no click or rub -    Results for orders placed or performed in visit on 05/31/19   Hemoglobin A1c ([facility])   Result Value Ref Range    Hemoglobin A1C 6.5 (H) 4.1 - 5.7 %   Basic Metabolic Panel ([facility])   Result Value Ref Range    Urea Nitrogen 15.8 7.0 - 19.0 mg/dL    Calcium 10.9 (H) 8.5 - 10.1 mg/dL    Chloride 105.7 98.0 - 110.0 mmol/L    Carbon Dioxide 25.0 20.0 - 32.0 mmol/L    Creatinine 0.7 0.5 - 1.0 mg/dL    Glucose 162.3 (H) 70.0 - 99.0 mg'dL    Potassium 4.3 3.3 - 4.5 mmol/dL    Sodium 136.8 132.6 - 141.4 mmol/L    GFR Estimate 86.7 >60.0 mL/min/1.7 m2    GFR Estimate If Black >90 >60.0 mL/min/1.7 m2   Albumin Random Urine Quantitative with Creat Ratio   Result Value Ref Range    Creatinine Urine 76 mg/dL    Albumin Urine mg/L 30 mg/L    Albumin Urine mg/g Cr 39.39 (H) 0 - 25 mg/g Cr   TSH   Result  Value Ref Range    TSH 6.58 (H) 0.40 - 4.00 mU/L          Assessment and Plan     Mikayla was seen today for recheck.    Diagnoses and all orders for this visit:    Type 2 diabetes mellitus with hyperglycemia, with long-term current use of insulin (H) - has had a much better HgA1c 2 weeks ago. Encouraged her to continue to monitor closely - reviewed that it is Ok for her to take extra insulin when it is higher, that I DO believe her machine is working.   -     Glucose ([facility])    Hypothyroidism, unspecified type - repeated today, Has now been on the combined dose for 2 months. Goal is to get her to one pill a day but the next level dose for norbert is 120 mg - and I did not want to move too fast. If 4 or higher, will go to 120 mg daily. If between 2 and 4 will keep on current dose but might switch to 120 mg x 5 days and 90 mg x 2 days.   -     TSH    Essential hypertension - I told her that I do not believe she is fully controlled at home - that I did not want to rely on wrist cuffs. Recommended she start lisinopril and reviewed the benefit in someone with albinuria. She will review and get back to me.     Pre - op = she needs this. Will return late August for her pre-op at which time we will do labs. Has to be at 8 or lower per her orthopedist. If not at goal, will postpone. Do not think she needs echo based on our visit today but will need to better assess her cardiac symptoms.     Total time: 35 minutes with more than half spent counseling on DM, insulin, BP and diet        There are no discontinued medications.    Options for treatment and follow-up care were reviewed with the patient . Mikayla Timmons  engaged in the decision making process and verbalized understanding of the options discussed and agreed with the final plan.    Nini Heaton MD

## 2019-07-29 NOTE — PATIENT INSTRUCTIONS
Here is the plan from today's visit    1. Type 2 diabetes mellitus with hyperglycemia, with long-term current use of insulin (H)  I believe that your meter is working.    Reminder that the occasional higher sugars are due to either diet, stress, pain, or illness.  Plan is for you to see the dietitian to help you better figure out your insulin needs.   - Glucose ([facility])    2. Hypothyroidism, unspecified type  We will check this next time and then change you to one med a day if we can.     3. Essential hypertension  I believe that your blood pressures are likely higher. I would recommend that we start you on lisinopril (or another Ace Inhibitor) to both bring your blood pressure down to 120/70 and to protect your kidneys now that there is some albumin in your urine (albinuria).  Continue to not cook with salt    Please call or return to clinic if your symptoms don't go away.    Follow up plan  August - check the HgA1c and the thyroid.     Thank you for coming to [facility] today.  Lab Testing:  **If you had lab testing today and your results are reassuring or normal they will be mailed to you or sent through [employer] within 7 days.   **If the lab tests need quick action we will call you with the results.  The phone number we will call with results is # [phone] (home) . If this is not the best number please call our clinic and change the number.  Medication Refills:  If you need any refills please call your pharmacy and they will contact us.   If you need to  your refill at a new pharmacy, please contact the new pharmacy directly. The new pharmacy will help you get your medications transferred faster.   Scheduling:  If you have any concerns about today's visit or wish to schedule another appointment please call our office during normal business hours [phone] (8-5:00 M-F)  If a referral was made to a [facility] and you don't get a call from central scheduling please call  [phone].  If a Mammogram was ordered for you at [facility] call [phone] to schedule or change your appointment.  If you had an XRay/CT/Ultrasound/MRI ordered the number is [phone] to schedule or change your radiology appointment.   Medical Concerns:  If you have urgent medical concerns please call [phone] at any time of the day.    Nini Heaton MD

## 2019-07-29 NOTE — RESULT ENCOUNTER NOTE
Discussed the following results during the clinic visit. See progress note for details.     Nini Heaton MD

## 2019-07-30 DIAGNOSIS — E03.9 HYPOTHYROIDISM, UNSPECIFIED TYPE: Primary | ICD-10-CM

## 2019-07-30 RX ORDER — THYROID 120 MG/1
120 TABLET ORAL DAILY
Qty: 90 TABLET | Refills: 1 | Status: SHIPPED | OUTPATIENT
Start: 2019-07-30 | End: 2019-08-05

## 2019-07-31 ENCOUNTER — OFFICE VISIT (OUTPATIENT)
Dept: PHYSICAL MEDICINE AND REHAB | Facility: CLINIC | Age: 74
End: 2019-07-31
Payer: MEDICARE

## 2019-07-31 VITALS
SYSTOLIC BLOOD PRESSURE: 154 MMHG | WEIGHT: 221 LBS | OXYGEN SATURATION: 97 % | HEART RATE: 84 BPM | DIASTOLIC BLOOD PRESSURE: 85 MMHG | BODY MASS INDEX: 36.78 KG/M2

## 2019-07-31 DIAGNOSIS — M41.9 SCOLIOSIS: Primary | ICD-10-CM

## 2019-07-31 DIAGNOSIS — G14 POST-POLIO SYNDROME (H): Primary | ICD-10-CM

## 2019-07-31 ASSESSMENT — PAIN SCALES - GENERAL: PAINLEVEL: WORST PAIN (10)

## 2019-07-31 NOTE — PROGRESS NOTES
Service Date: 07/31/2019      HISTORY OF PRESENT ILLNESS:  Mikayla Timmons is a very pleasant 73-year-old woman who has come into the Rehab Clinic with concerns of postpolio syndrome.  The patient reports that Dr. Nini Rodas about 5 years ago first recommended that she see me but for a number of reasons she missed that appointment and would schedule appointments on about a yearly basis, but always ended up missing the appointments.  However, today she has come in to clinic.      I spent over 60 minutes with the patient talking about postpolio syndrome and her particular situations and concerns.  I did answer all of her questions.      In a nutshell, the patient had polio at the age of 1 in 1947.  She reports that she did not learn a lot about her acute polio illness from her mother.  Apparently, her mother was guilt-ridden her entire life about the patient having polio.  She reports that her mother on her deathbed asked that Mikayla forgive her because her mother had blamed herself for her having gotten polio.      She therefore cannot tell me any information about her acute polio or her early polio care.  As she recalls, even as a young girl she was able to be up and active.  She required no assistive devices as she recalls in grade school, sonu high and high school.  She was as active as her friends.  She states played softball on the softball team and from her recollection she was having no problems.      The first problem she had was requiring a left total hip arthroplasty in 1975.  She reports subsequent to that time, she has had 4 or 5 revisions.  She states now over the past 3-4 years she has had in the range of 25 dislocations.  She has seen Dr. Raoul Peacock at [facility] and she is scheduled for a total hip arthroplasty revision on 09/09 of this year.  She is very much looking forward to this as each time she had a dislocation it was an ambulance ride to the emergency room in severe  pain.      At the present time, she is getting some home physical therapy to help get her ready for her left total hip arthroplasty.      At this point in time, she is using a 4-wheeled walker at home.  She does have a physical therapist working with her.  Her goal is to be able to hopefully walk with 2 canes.  Ultimately, her goal after her hip revision surgery will be able to walk with one single-ended cane.      The patient currently lives in [place] [facility] in accessible housing for elderly called [facility].  She does have a good friend who does her grocery shopping.  She does live independently.  She moved in to [facility] about 3 years ago.  At this point in time, she is considered homebound.      She reports that a couple of months ago she developed pain in the left thigh area where it was tender, sore, throbbing.  She indicates that Dr. Peacock thinks that this is probably related to her dysfunctional hip arthroplasty.  She states that he is confident that that will resolve following the surgery.      PHYSICAL EXAMINATION:  Mikayla is a very pleasant, alert, oriented and cooperative 73-year-old woman.  She does have a 4-wheeled walker that she uses for ambulation.      On manual muscle testing tested in the seated position, she has low normal strength of all muscle groups in both upper limbs.  She has a good cough indicating reasonable abdominal muscle strength.  Right lower limb strength is in the grade 4 range.  Left lower limb strength in the hip area is below grade 4, but she is having discomfort there.  Knee extension and flexion are in the low grade 4 range.      I had a long discussion with the patient about postpolio syndrome.  I was actually encouraging for her.  I indicated that I thought that following her hip reconstruction surgery that she could regain strength and I do believe that it is possible she could advance to a point of walking with a single-ended cane.  At this point in  time of course she does need the 4-wheeled walker.  I would like to see the patient in followup in Rehab Clinic in 4 months, so it will be a good 2-1/2 months following her hip reconstruction surgery to see if there would be other specific exercises that could be offered to help her with regaining more function.      She specifically would like to be able to get back to gardening.  She does have raised gardens and would like to be able to get back to doing that.  She also is planning on a family trip down to [place] in March.  The family there has adopted 3 children, her grandchildren, and she is very much looking forward to that visit and she would like to be more active there.        Again, total time spent on this evaluation well over 60 minutes, the vast majority of the time was spent discussing these various above issues.      MD ZACH Singh MD             D: 2019   T: 2019   MT: JUAN      Name:     TEA SINGER   MRN:      [id number]        Account:      [id number]   :      1945           Service Date: 2019      Document: [id number]

## 2019-07-31 NOTE — LETTER
7/31/2019       RE: Mikayla Timmons  [address]  [place] [address]     Dear Colleague,    Thank you for referring your patient, Mikayla Timmons, to the [facility] PHYSICAL MEDICINE AND REHABILITATION at [facility]. Please see a copy of my visit note below.    Service Date: 07/31/2019      HISTORY OF PRESENT ILLNESS:  Mikayla Timmons is a very pleasant 73-year-old woman who has come into the Rehab Clinic with concerns of postpolio syndrome.  The patient reports that Dr. Nini Rodas about 5 years ago first recommended that she see me but for a number of reasons she missed that appointment and would schedule appointments on about a yearly basis, but always ended up missing the appointments.  However, today she has come in to clinic.      I spent over 60 minutes with the patient talking about postpolio syndrome and her particular situations and concerns.  I did answer all of her questions.      In a nutshell, the patient had polio at the age of 1 in 1947.  She reports that she did not learn a lot about her acute polio illness from her mother.  Apparently, her mother was guilt-ridden her entire life about the patient having polio.  She reports that her mother on her deathbed asked that Mikayla forgive her because her mother had blamed herself for her having gotten polio.      She therefore cannot tell me any information about her acute polio or her early polio care.  As she recalls, even as a young girl she was able to be up and active.  She required no assistive devices as she recalls in grade school, sonu high and high school.  She was as active as her friends.  She states played softball on the softball team and from her recollection she was having no problems.      The first problem she had was requiring a left total hip arthroplasty in 1975.  She reports subsequent to that time, she has had 4 or 5 revisions.  She states now over the past 3-4 years she has had in the range of  25 dislocations.  She has seen Dr. Raoul Peacock at [facility] and she is scheduled for a total hip arthroplasty revision on 09/09 of this year.  She is very much looking forward to this as each time she had a dislocation it was an ambulance ride to the emergency room in severe pain.      At the present time, she is getting some home physical therapy to help get her ready for her left total hip arthroplasty.      At this point in time, she is using a 4-wheeled walker at home.  She does have a physical therapist working with her.  Her goal is to be able to hopefully walk with 2 canes.  Ultimately, her goal after her hip revision surgery will be able to walk with one single-ended cane.      The patient currently lives in [facility] [facility] in accessible housing for elderly called [facility].  She does have a good friend who does her grocery shopping.  She does live independently.  She moved in to [facility] about 3 years ago.  At this point in time, she is considered homebound.      She reports that a couple of months ago she developed pain in the left thigh area where it was tender, sore, throbbing.  She indicates that Dr. Peacock thinks that this is probably related to her dysfunctional hip arthroplasty.  She states that he is confident that that will resolve following the surgery.      PHYSICAL EXAMINATION:  Mikayla is a very pleasant, alert, oriented and cooperative 73-year-old woman.  She does have a 4-wheeled walker that she uses for ambulation.      On manual muscle testing tested in the seated position, she has low normal strength of all muscle groups in both upper limbs.  She has a good cough indicating reasonable abdominal muscle strength.  Right lower limb strength is in the grade 4 range.  Left lower limb strength in the hip area is below grade 4, but she is having discomfort there.  Knee extension and flexion are in the low grade 4 range.      I had a long discussion with the  patient about postpolio syndrome.  I was actually encouraging for her.  I indicated that I thought that following her hip reconstruction surgery that she could regain strength and I do believe that it is possible she could advance to a point of walking with a single-ended cane.  At this point in time of course she does need the 4-wheeled walker.  I would like to see the patient in followup in Rehab Clinic in 4 months, so it will be a good 2-1/2 months following her hip reconstruction surgery to see if there would be other specific exercises that could be offered to help her with regaining more function.      She specifically would like to be able to get back to gardening.  She does have raised gardens and would like to be able to get back to doing that.  She also is planning on a family trip down to [place] in March.  The family there has adopted 3 children, her grandchildren, and she is very much looking forward to that visit and she would like to be more active there.        Again, total time spent on this evaluation well over 60 minutes, the vast majority of the time was spent discussing these various above issues.      MD ZACH Singh MD             D: 2019   T: 2019   MT: JUAN      Name:     TEA SINGER   MRN:      [id number]        Account:      [id number]   :      1945           Service Date: 2019      Document: [id number]

## 2019-07-31 NOTE — NURSING NOTE
Chief Complaint   Patient presents with     New Patient     [facility] NEW VERIFICATION OF POST POLIO, CLEARANCE FOR LEFT HIP RECONSTRUCTION       Rosa Christianson, EMT

## 2019-08-01 ENCOUNTER — TELEPHONE (OUTPATIENT)
Dept: FAMILY MEDICINE | Facility: CLINIC | Age: 74
End: 2019-08-01

## 2019-08-01 NOTE — TELEPHONE ENCOUNTER
[facility] phone call message- patient requesting results.    Test: Lab    Date of test:  07/29/2019 ( Thyroid test)     Additional Comments: Patient is wanting to discuss her Thyroid results and talk with Dr. Heaton about her medication and what the next step is.      Lab tab checked to verify if result comment present with in each order: Yes    Letters tab checked to verify if lab result letter has been entered: Yes    OK to leave a message on voice mail? Yes    Advised patient response may take up to 2 business days: Yes    Primary language: English      needed? No    Call taken on August 1, 2019 at 9:14 AM by Nakia Cee to [facility]

## 2019-08-01 NOTE — TELEPHONE ENCOUNTER
"Called her back. Recommend she go up to 120 mg daily.   She has removed all the foods that can \"aggravate\" her thyroid.   Yesterday went to see the post-polio syndrome provider.  Was a good appointment for her.   Wanting to know when to take the thyroid. Will continue to take it 1 hour before she eats in the am.   Surgery is on schedule.   "

## 2019-08-02 ENCOUNTER — PRE VISIT (OUTPATIENT)
Dept: ORTHOPEDICS | Facility: CLINIC | Age: 74
End: 2019-08-02

## 2019-08-02 ENCOUNTER — ANCILLARY PROCEDURE (OUTPATIENT)
Dept: GENERAL RADIOLOGY | Facility: CLINIC | Age: 74
End: 2019-08-02
Attending: ORTHOPAEDIC SURGERY
Payer: MEDICARE

## 2019-08-02 ENCOUNTER — OFFICE VISIT (OUTPATIENT)
Dept: ORTHOPEDICS | Facility: CLINIC | Age: 74
End: 2019-08-02
Payer: MEDICARE

## 2019-08-02 VITALS — WEIGHT: 221 LBS | BODY MASS INDEX: 36.78 KG/M2

## 2019-08-02 DIAGNOSIS — M54.16 LUMBAR RADICULOPATHY: Primary | ICD-10-CM

## 2019-08-02 ASSESSMENT — ENCOUNTER SYMPTOMS
EXERCISE INTOLERANCE: 1
SLEEP DISTURBANCES DUE TO BREATHING: 0
LEG PAIN: 0
MUSCLE WEAKNESS: 1
LIGHT-HEADEDNESS: 0
HYPERTENSION: 0
SYNCOPE: 0
HYPOTENSION: 0
ORTHOPNEA: 0
STIFFNESS: 1
PALPITATIONS: 0

## 2019-08-02 NOTE — PROGRESS NOTES
Spine Surgery Consultation    REFERRING PHYSICIAN: Raoul Peacock   PRIMARY CARE PHYSICIAN: Nini Heaton           Chief Complaint:   Consult (back pain )      History of Present Illness:  Symptom Profile Including: location of symptoms, onset, severity, exacerbating/alleviating factors, previous treatments:        Mikayla Timmons is a 73 year old female who presents to me as a referral from her hip replacement surgeon with a history of a recurrently dislocating left hip prosthesis.  She has had multiple surgeries for this.  She also has severe dysesthetic type pain in the left thigh.  It is very tender to palpation and she feels like it is hard to stand on that left side.  She is aware that with even small movements the left hip might dislocate.  She is considering another revision surgery for hip dislocation and during the work-up for this it was discovered that she had scoliosis.  She is referred to me to see if the scoliosis could be contributing to her recurrent hip dislocations.         Past Medical History:   No past medical history on file.         Past Surgical History:   No past surgical history on file.         Social History:     Social History     Tobacco Use     Smoking status: Never Smoker     Smokeless tobacco: Never Used   Substance Use Topics     Alcohol use: Yes     Comment: Lightly            Family History:   No family history on file.         Allergies:     Allergies   Allergen Reactions     Hydromorphone Itching     Ketamine Other (See Comments)            Medications:     Current Outpatient Medications   Medication     blood glucose (NO BRAND SPECIFIED) test strip     blood glucose monitoring (ONE TOUCH DELICA) lancets     Blood Glucose Monitoring Suppl (ONE TOUCH ULTRA)     Cholecalciferol (VITAMIN D) 1000 UNITS capsule     cyanocobalamin (VITAMIN B-12) 1000 MCG tablet     insulin aspart (NOVOLOG FLEXPEN) 100 UNIT/ML pen     insulin NPH (HUMULIN N/NOVOLIN N VIAL) 100 UNIT/ML vial      "insulin pen needle (31G X 5 MM) 31G X 5 MM miscellaneous     insulin syringe-needle U-100 (31G X 5/16\" 0.3 ML) 31G X 5/16\" 0.3 ML miscellaneous     magnesium oxide 400 MG CAPS     melatonin 5 MG tablet     STATIN NOT PRESCRIBED (INTENTIONAL)     thyroid (ARMOUR) 120 MG tablet     No current facility-administered medications for this visit.              Review of Systems:     A 10 point ROS was performed and reviewed. Specific responses to these questions are noted at the end of the document.         Physical Exam:   Vitals: Wt 100.2 kg (221 lb)   BMI 36.78 kg/m    Constitutional: awake, alert, cooperative, no apparent distress, appears stated age.    Eyes: The sclera are white.  Ears, Nose, Throat: The trachea is midline.  Psychiatric: The patient has a normal affect.  Respiratory: breathing non-labored  Cardiovascular: The extremities are warm and perfused.  Skin: no obvious rashes or lesions.  Musculoskeletal, Neurologic, and Spine:          Lumbar Spine:    Appearance - No gross stepoffs or deformities    Motor -     L2-3: Hip flexion 5/5 R and 5/5 L strength          L3/4:  Knee extension R 5/5 and L 5/5 strength         L4/5:  Foot dorsiflexion R 5/5 L 5/5 and       EHL dorsiflexion R 4/5 L 4/5 strength         S1:  Plantarflexion/Peroneal Muscles  R 5/5 and L 5/5 strength    Sensation: intact to light touch L3-S1 distribution BLE, dyesthetic pain over left thigh.  Numbness over left thigh.      Neurologic:      REFLEXES Left Right             Brachioradialis 1+ 1+   Patella 1+ 1+   Ankle jerk 1+ 1+   Babinski No upgoing great toe No upgoing great toe   Clonus 0 beats 0 beats     Hip Exam:  No pain with hip log roll and no tenderness over the greater trochanters.    Alignment:  Patient stands with a neutral standing sagittal balance.           Imaging:   We ordered and independently reviewed new radiographs at this clinic visit. The results were discussed with the patient.  Findings include:    Standing " scoliosis radiographs show preserved global coronal and sagittal balance.  Idiopathic appearing scoliosis is present.  Idiopathic appearing scoliotic curvature.  She has a history of a recurrently dislocating hip prosthesis.             Assessment and Plan:   Assessment:  73 year old female with idiopathic scoliosis and recurrent left hip prosthetic dislocations.     Plan:  1. The patient does not have severe pelvic retroversion on her standing images.  I think that her global coronal and sagittal alignment is acceptable.  She has no back pain.  Based on this I would not recommend any surgical intervention for the spine.  I do not think that her current alignment is contributing substantially to risk for hip dislocation and I would not consider spinal realignment surgery for that reason.  I encouraged her to maintain follow-up with her hip replacement surgeon.  I see no spine related contraindications to undergoing a hip surgery.      Respectfully,  Reggie Knox MD  Spine Surgery  [facility]      Answers for HPI/ROS submitted by the patient on 8/2/2019   General Symptoms: No  Skin Symptoms: No  HENT Symptoms: No  EYE SYMPTOMS: No  HEART SYMPTOMS: Yes  LUNG SYMPTOMS: No  INTESTINAL SYMPTOMS: No  URINARY SYMPTOMS: No  GYNECOLOGIC SYMPTOMS: No  BREAST SYMPTOMS: No  SKELETAL SYMPTOMS: Yes  BLOOD SYMPTOMS: No  NERVOUS SYSTEM SYMPTOMS: No  MENTAL HEALTH SYMPTOMS: No  Chest pain or pressure: No  Fast or irregular heartbeat: No  Pain in legs with walking: No  Trouble breathing while lying down: No  Fingers or toes appear blue: No  High blood pressure: No  Low blood pressure: No  Fainting: No  Murmurs: No  Pacemaker: No  Varicose veins: No  Edema or swelling: No  Wake up at night with shortness of breath: No  Light-headedness: No  Exercise intolerance: Yes  Bone pain: Yes  Muscle weakness: Yes  Joint stiffness: Yes  Bone fracture: No

## 2019-08-02 NOTE — NURSING NOTE
Reason For Visit:   Chief Complaint   Patient presents with     Consult     back pain        Primary MD: Nini Heaton  Ref. MD: Madonna     ?  No  Occupation retired .  Currently working? No.  Work status?  Retired.  Date of injury: awhile ago   Type of injury: chronic .  Date of surgery: none   Type of surgery: none .  Smoker: No  Request smoking cessation information: No    Wt 100.2 kg (221 lb)   BMI 36.78 kg/m      Pain Assessment  Patient Currently in Pain: Yes  0-10 Pain Scale: 8(left thigh )    Oswestry (MARLON) Questionnaire    No flowsheet data found.         Neck Disability Index (NDI) Questionnaire    No flowsheet data found.                Promis 10 Assessment    No flowsheet data found.             Ivan Almaraz, ATC

## 2019-08-05 ENCOUNTER — TELEPHONE (OUTPATIENT)
Dept: FAMILY MEDICINE | Facility: CLINIC | Age: 74
End: 2019-08-05

## 2019-08-05 DIAGNOSIS — E03.9 HYPOTHYROIDISM, UNSPECIFIED TYPE: ICD-10-CM

## 2019-08-05 RX ORDER — THYROID 120 MG/1
120 TABLET ORAL DAILY
Qty: 90 TABLET | Refills: 1 | Status: SHIPPED | OUTPATIENT
Start: 2019-08-05 | End: 2019-09-05

## 2019-08-05 NOTE — TELEPHONE ENCOUNTER
Verify that the refill encounter hasn't been started Yes    [facility] phone call message- patient requesting a refill:    Full Medication Name: thyroid (ARMOUR) 120 MG tablet    Dose: Take 1 tablet (120 mg) by mouth daily - Oral     Pharmacy confirmed as   [id number] - [place], [place] - [address]  [address] [place] [address]  Phone: [phone] Fax: [phone]  : Yes    Medication tab checked to see if medication has been sent  Yes    Additional Comments: Rx was sent to the wrong pharmacy per patient. Please resend to [facility] pharmacy listed above. Patient Is out of medication.    OK to leave a message on voice mail? Yes    Advised patient refill may take up to 2 business days? Yes    Primary language: English      needed? No    Call taken on August 5, 2019 at 10:18 AM by Renetta Cee to P [facility] MED REFILL

## 2019-08-22 ENCOUNTER — TELEPHONE (OUTPATIENT)
Dept: ORTHOPEDICS | Facility: CLINIC | Age: 74
End: 2019-08-22

## 2019-08-22 DIAGNOSIS — M54.16 LUMBAR RADICULOPATHY: Primary | ICD-10-CM

## 2019-08-22 RX ORDER — GABAPENTIN 300 MG/1
CAPSULE ORAL
Qty: 90 CAPSULE | Refills: 1 | Status: SHIPPED | OUTPATIENT
Start: 2019-08-22 | End: 2019-09-04

## 2019-08-22 NOTE — TELEPHONE ENCOUNTER
Per Dr. Knox's request, patient is to take 300mg Gabapentin on taper and also receive a L3-4 TFESI. Orders are put in by Ivan.  RN proceeded to call patient to inform her of this plan. Patient has more questions and stated she doesn't know why she needs an injection or taking Gabapentin. Patient stated her physical therapy is helping and she is en route to her house. RN tried his best to explain the reasons behind the med and injection. Also provided the spine-health website so patient can research on her own time. RN encouraged patient to write back to us using [facility] as Dr. Knox responds to [employer] very well. Patient verbalized understanding.  I relayed all these back to Dr. Knox and hope that Dr. Knox will reach out to her.     Felicity Quan RN

## 2019-08-22 NOTE — TELEPHONE ENCOUNTER
I reviewed the patient's lumbar MRI which does show stenosis that could be contributing to her leg symptoms.  I called the patient to offer an epidural steroid injection, gabapentin, and lumbar PT.  I spoke with her hip surgeon via email as well to communicate these findings.  I have asked my team to arrange follow up in clinic.    Reggie Knox MD

## 2019-09-04 ENCOUNTER — TRANSFERRED RECORDS (OUTPATIENT)
Dept: HEALTH INFORMATION MANAGEMENT | Facility: CLINIC | Age: 74
End: 2019-09-04

## 2019-09-04 ENCOUNTER — OFFICE VISIT (OUTPATIENT)
Dept: FAMILY MEDICINE | Facility: CLINIC | Age: 74
End: 2019-09-04
Payer: MEDICARE

## 2019-09-04 VITALS
BODY MASS INDEX: 35.38 KG/M2 | OXYGEN SATURATION: 95 % | TEMPERATURE: 98.2 F | DIASTOLIC BLOOD PRESSURE: 83 MMHG | HEART RATE: 92 BPM | WEIGHT: 212.6 LBS | SYSTOLIC BLOOD PRESSURE: 145 MMHG | RESPIRATION RATE: 18 BRPM

## 2019-09-04 DIAGNOSIS — Z79.4 TYPE 2 DIABETES MELLITUS WITH HYPERGLYCEMIA, WITH LONG-TERM CURRENT USE OF INSULIN (H): Primary | ICD-10-CM

## 2019-09-04 DIAGNOSIS — S73.006D: ICD-10-CM

## 2019-09-04 DIAGNOSIS — Z01.818 PREOP GENERAL PHYSICAL EXAM: ICD-10-CM

## 2019-09-04 DIAGNOSIS — Z01.818 PRE-OP EXAM: ICD-10-CM

## 2019-09-04 DIAGNOSIS — R01.1 SYSTOLIC MURMUR: ICD-10-CM

## 2019-09-04 DIAGNOSIS — E03.9 HYPOTHYROIDISM, UNSPECIFIED TYPE: ICD-10-CM

## 2019-09-04 DIAGNOSIS — E11.65 TYPE 2 DIABETES MELLITUS WITH HYPERGLYCEMIA, WITH LONG-TERM CURRENT USE OF INSULIN (H): Primary | ICD-10-CM

## 2019-09-04 LAB
BUN SERPL-MCNC: 20.7 MG/DL (ref 7–19)
CALCIUM SERPL-MCNC: 10.9 MG/DL (ref 8.5–10.1)
CHLORIDE SERPLBLD-SCNC: 104.1 MMOL/L (ref 98–110)
CO2 SERPL-SCNC: 23.3 MMOL/L (ref 20–32)
CREAT SERPL-MCNC: 0.5 MG/DL (ref 0.5–1)
GFR SERPL CREATININE-BSD FRML MDRD: >90 ML/MIN/1.7 M2
GLUCOSE SERPL-MCNC: 190.4 MG'DL (ref 70–99)
HBA1C MFR BLD: 6.6 % (ref 4.1–5.7)
HEMOGLOBIN: 12.2 G/DL (ref 11.7–15.7)
POTASSIUM SERPL-SCNC: 4.2 MMOL/DL (ref 3.3–4.5)
SODIUM SERPL-SCNC: 138.7 MMOL/L (ref 132.6–141.4)
TSH SERPL DL<=0.005 MIU/L-ACNC: 0.24 MU/L (ref 0.4–4)

## 2019-09-04 ASSESSMENT — PAIN SCALES - GENERAL: PAINLEVEL: EXTREME PAIN (8)

## 2019-09-04 NOTE — PROGRESS NOTES
[facility]  [address],  [address]  [place] [address]  Phone: [phone]  Fax: [phone]    9/4/2019    Adult PRE-OP Evaluation:    Mikayla Timmons, 1945 presents for pre-operative evaluation and assessment as requested by Raoul Astudillo, prior to undergoing surgery/procedure for treatment of  Recurrent left hip dislocation (1 - 2 per year) .  All her past care has been at [facility] so testing not as available to me.   Proposed procedure: partial left revision hip surgery      Date of Surgery/ Procedure: 9/9/2019  Hospital/Surgical Facility: [facility]     Primary Physician: Nini Heaton  Type of Anesthesia Anticipated: General  History of anesthesia complications: NONE - although she does not want Ketamine (absolutely does not want the out of body experience and the Hydromorphone since that caused mouth itching)  History of  abnormal bleeding: NONE   History of blood transfusions: does not believe she has but cannot recall her first surgery  Patient has a Health Care Directive or Living Will:  Did not discuss    Preoperative Questions   1. NO - Do you have a history of heart attack, stroke, stent, bypass or surgery on an artery in the head, neck, heart or legs?  2. NO - Do you ever have any pain or discomfort in your chest?  3. NO - Have you ever had a severe pain across the front of your chest lasting for half an hour or more?  4. NO - Do you have a history of Congestive Heart Failure?  5. NO - Are you troubled by shortness of breath when: walking on the level/ up a slight hill/ at night? (She does not climb stairs or walk up hills)   6. NO - Does your chest ever sound wheezy or whistling?  7. NO - Do you currently have a cold, bronchitis or other respiratory infection?  8. NO - Have you had a cold, bronchitis or other respiratory infection within the last 2 weeks?  9. NO - Do you usually have a cough?  10. NO - Do you sometimes get pains in the calves of your legs when you  walk?  11. NO - Do you or anyone in your family have previous history of blood clots?  12. NO - Do you or does anyone in your family have a serious bleeding problem such as prolonged bleeding following surgeries or cuts?  13. NO - Have you ever had problems with anemia or been told to take iron pills?  14. NO - Have you had any abnormal blood loss such as black, tarry or bloody stools, or abnormal vaginal bleeding?  15. NO - Have you ever had a blood transfusion?  16. YES - Have you or any of your relatives ever had problems with anesthesia? Ketamine and the Hyromorphone  17. NO - Do you have sleep apnea, excessive snoring or daytime drowsiness?  18. NO - Do you have any prosthetic heart valves?  19. YES - Do you have prosthetic joints?  20. NO - Is there any chance that you may be pregnant?  21. Had three teeth taken out in prep for her surgery. One of them had infection and has been using oral rinse and has dental appointment on 9/10/2019    Diabetes: was requiring the aspart insulin 2 - 3 times a week. Ran out 2 weeks ago.   BP: PT checks her BPs at home and they have all been normal.     Patient Active Problem List   Diagnosis     Avitaminosis D     Diabetes mellitus, type 2 (H)     Essential hypertension     Postpolio syndrome     Status post THR (total hip replacement)     Carpal tunnel syndrome     Hyperlipidemia LDL goal <100     Hypothyroidism     Decreased sensation of foot     Right-sided low back pain without sciatica     Closed fracture of scaphoid of left wrist with routine healing, unspecified portion of scaphoid, subsequent encounter     Closed dislocation of hip (H)     Hypercalcemia     Lumbar radiculopathy         Current Outpatient Medications on File Prior to Visit:  blood glucose (NO BRAND SPECIFIED) test strip 100 strips by In Vitro route 3 times daily For OneTouch Ultra Blue.   blood glucose monitoring (ONE TOUCH DELICA) lancets Use to test blood sugar 3 times daily or as directed.   insulin  "aspart (NOVOLOG FLEXPEN) 100 UNIT/ML pen Inject with meals as instructed:Glucose 130-150 0 Glucose 151-200 2 units Glucose 201-250 4 units Glucose 251-300 6 units Glucose 301-350 8 units Glucose 351-400 10 units Glucose 401-500 12 units Glucose GREATER THAN 501 14 units and call provider   insulin NPH (HUMULIN N/NOVOLIN N VIAL) 100 UNIT/ML vial Inject subcutaneous 6 units in the AM and 4 units in the PM   insulin pen needle (31G X 5 MM) 31G X 5 MM miscellaneous Use 3 pen needles daily or as directed.   insulin syringe-needle U-100 (31G X 5/16\" 0.3 ML) 31G X 5/16\" 0.3 ML miscellaneous Use 2 syringes daily or as directed.   thyroid (ARMOUR) 120 MG tablet Take 1 tablet (120 mg) by mouth daily   Blood Glucose Monitoring Suppl (ONE TOUCH ULTRA)    Cholecalciferol (VITAMIN D) 1000 UNITS capsule Take 2,000 Units by mouth 2 times daily    cyanocobalamin (VITAMIN B-12) 1000 MCG tablet Take 1,000 mcg by mouth daily   magnesium oxide 400 MG CAPS Take 1 capsule by mouth daily   melatonin 5 MG tablet Take 5 mg by mouth nightly as needed for sleep   STATIN NOT PRESCRIBED (INTENTIONAL) Please choose reason not prescribed, below     No current facility-administered medications on file prior to visit.     OTC products: None - stopped all her food supplements     Allergies   Allergen Reactions     Hydromorphone Itching     Ketamine Other (See Comments)     Latex Allergy: NO    Social History     Socioeconomic History     Marital status: Unknown     Spouse name: None     Number of children: None     Years of education: None     Highest education level: None   Occupational History     None   Social Needs     Financial resource strain: None     Food insecurity:     Worry: None     Inability: None     Transportation needs:     Medical: None     Non-medical: None   Tobacco Use     Smoking status: Never Smoker     Smokeless tobacco: Never Used   Substance and Sexual Activity     Alcohol use: Yes     Comment: Lightly     Drug use: No     " Sexual activity: None   Lifestyle     Physical activity:     Days per week: None     Minutes per session: None     Stress: None   Relationships     Social connections:     Talks on phone: None     Gets together: None     Attends Caodaism service: None     Active member of club or organization: None     Attends meetings of clubs or organizations: None     Relationship status: None     Intimate partner violence:     Fear of current or ex partner: None     Emotionally abused: None     Physically abused: None     Forced sexual activity: None   Other Topics Concern     Parent/sibling w/ CABG, MI or angioplasty before 65F 55M? Not Asked   Social History Narrative     None       REVIEW OF SYSTEMS:   Constitutional, HEENT, cardiovascular, pulmonary, GI, , neuro, skin, endocrine and psych systems are negative, except as otherwise noted. Does have ongoing spinal stenosis and scoliosis.     EXAM:     Patient Vitals for the past 24 hrs:   BP Temp Temp src Pulse Resp SpO2 Weight   09/04/19 1311 (!) 145/83 -- -- 92 -- -- --   09/04/19 1308 (!) 156/85 98.2  F (36.8  C) Oral 92 18 95 % 96.4 kg (212 lb 9.6 oz)     Body mass index is 35.38 kg/m .  GENERAL: healthy, alert and no distress  EYES: Eyes grossly normal to inspection, extraocular movements - intact, and PERRL  HENT: ear canals- normal; TMs- normal; Nose- normal; Mouth- no ulcers, no lesions  NECK: no tenderness, no adenopathy, no asymmetry, no masses, no stiffness; thyroid- normal to palpation  RESP: lungs clear to auscultation - no rales, no rhonchi, no wheezes  CV: regular rates and rhythm, normal S1 S2, 2/6 short systolic ejection murmur heard best at the LSB, no click or rub -  ABDOMEN: soft, no tenderness, no  hepatosplenomegaly, no masses, normal bowel sounds  MS: extremities- no gross deformities noted, no edema  SKIN: left and right anterior shins with some scale, left more than right. No skin breakdown, no sign of infection - 1+ edema  NEURO: strength and tone-  normal except for LLE, sensory exam- grossly normal, mentation- intact, speech- normal,  BACK: no CVA tenderness, + paralumbar tenderness  PSYCH: Alert and oriented times 3; speech- coherent , normal rate and volume; able to articulate logical thoughts  LYMPHATICS: ant. cervical- normal, post. cervical- normal    DIAGNOSTICS:      Hemoglobin A1C: 6.6  Hemoglobin: 12.2    Last Comprehensive Metabolic Panel:  Sodium   Date Value Ref Range Status   09/04/2019 138.7 132.6 - 141.4 mmol/L Final     Potassium   Date Value Ref Range Status   09/04/2019 4.2 3.3 - 4.5 mmol/dL Final     Chloride   Date Value Ref Range Status   09/04/2019 104.1 98.0 - 110.0 mmol/L Final     Carbon Dioxide   Date Value Ref Range Status   09/04/2019 23.3 20.0 - 32.0 mmol/L Final     Anion Gap   Date Value Ref Range Status   02/26/2010 13 6 - 17 mmol/L Final     Glucose   Date Value Ref Range Status   09/04/2019 190.4 (H) 70.0 - 99.0 mg'dL Final     Urea Nitrogen   Date Value Ref Range Status   09/04/2019 20.7 (H) 7.0 - 19.0 mg/dL Final     Creatinine   Date Value Ref Range Status   09/04/2019 0.5 0.5 - 1.0 mg/dL Final     GFR Estimate   Date Value Ref Range Status   09/04/2019 >90 >60.0 mL/min/1.7 m2 Final     Calcium   Date Value Ref Range Status   09/04/2019 10.9 (H) 8.5 - 10.1 mg/dL Final       TSH   Date Value Ref Range Status   09/04/2019 0.24 (L) 0.40 - 4.00 mU/L Final   07/29/2019 7.42 (H) 0.40 - 4.00 mU/L Final   05/31/2019 6.58 (H) 0.40 - 4.00 mU/L Final   04/19/2019 18.73 (H) 0.40 - 4.00 mU/L Final   07/01/2015 0.84 0.40 - 4.00 mU/L Final       EKG was done.  EKG Interpretation:  By Nini Heaton MD    Indication:Pre op evaluation  Symptoms at time of EKG: None   Interpretation: NSR, horizontal axis, poor R wave progression, no LVH by voltage criteria, there are no prior tracings available      Patient informed at visit.      RISK ASSESSMENT:     Cardiovascular Risk:  -Patient is able to perform ADL's without assistance without chest  pain.  -The patient does not have chest pain at rest.  -Patient does not have a history of congestive heart failure.    -The patient does not have a history of stroke and does not have a history of valvular disease although has a murmur    Pulmonary Risk:  -In terms of risk factors for pulmonary complication, the patient has no risk factors    Perioperative Complications:  -The patient does not have a history of bleeding or clotting problems in the past.    -The patient has not had complications from surgeries.    -The patient does not have a family history of any anesthesia or surgical complications.      IMPRESSION:   Reason for surgery/procedure: recurrent left hip dislocations    The proposed surgical procedure is considered INTERMEDIATE risk.    For above listed surgery and anesthesia:   Patient is at moderate risk for surgery/procedure and perioperative/procedure complications.    RECOMMENDATIONS:     Labs:  Hgb, K+, Creatinine, TSH and A1c:  DM: controlled on diet and low dose insulin.  Thyroid: her TSH was a bit overcontrolled. In the past was undercontrolled. She will only take [place] Thyroid and we recently increased from about 105 mg weekly (90 mg plus 15 mg) to 120 mg.  Recommend that she drop to 120 mg for 5 days a week and 90 mg for 2 days a week post op and in the TCU.    Calcium: is slightly up. Plan to work up post TCU.    Fasting:  No eating or drinking after midnight the night before surgery. Ok to take sip of water with provider-okayed medications the morning of surgery.     Preop Plan:  --Approval given to proceed with proposed procedure, with follow up on the echo to look at the valves.  Is asymptomatic.She has an echo scheduled at [facility] on 9/6 in the am. Please review this prior to proceeding to surgery on Monday.  I anticipate she will still be able to have the surgery but anesthesia needs to be aware.   --Patient is taking diabetes medications. NPH - 6 in am and 4 in pm   -----Take 50% of  long acting insulin (e.g. Lantus, NPH) while NPO (fasting) - ie. 3 Units of NPH    Medications:  Patient should take their regular medications the morning of surgery unless otherwise instructed.      In addition:   call the [facility] at [phone] after 3:00 PM and before 8:00 PM the day before surgery.   Please use the cleansing cloths provided in clinic to clean your body the night before surgery 1-2 hours after showering and repeat again in the morning per information sheet provided.      Nini Heaton MD    Please contact our office if there are any further questions or information required about this patient.

## 2019-09-04 NOTE — LETTER
September 5, 2019      Mikayla Timmons  [address]  [facility] [place] [facility] [address]        Dear Mikayla,    Thank you for getting your care at [facility]. Please see below for your test results.  As we discussed on the phone:  Diabetes - looks good  Kidneys - fine  Calcium - a little higher - in the future, we will test your parathyroid hormone  Thyroid - you are taking a bit too much now. We will move to taking 90 mg twice a week (you pick the days) and continue with the 120 mg the other 5 days a week.  You can start this once you get home from rehab, but I will recommend this on your pre-op so that they can start in the TCU.    You are not anemic.     Resulted Orders   Basic Metabolic Panel ([facility])   Result Value Ref Range    Urea Nitrogen 20.7 (H) 7.0 - 19.0 mg/dL    Calcium 10.9 (H) 8.5 - 10.1 mg/dL    Chloride 104.1 98.0 - 110.0 mmol/L    Carbon Dioxide 23.3 20.0 - 32.0 mmol/L    Creatinine 0.5 0.5 - 1.0 mg/dL    Glucose 190.4 (H) 70.0 - 99.0 mg'dL    Potassium 4.2 3.3 - 4.5 mmol/dL    Sodium 138.7 132.6 - 141.4 mmol/L    GFR Estimate >90 >60.0 mL/min/1.7 m2    GFR Estimate If Black >90 >60.0 mL/min/1.7 m2   Hemoglobin A1c ([facility])   Result Value Ref Range    Hemoglobin A1C 6.6 (H) 4.1 - 5.7 %   TSH   Result Value Ref Range    TSH 0.24 (L) 0.40 - 4.00 mU/L   Hemoglobin (HGB) ([facility])   Result Value Ref Range    Hemoglobin 12.2 11.7 - 15.7 g/dL       If you have any concerns about these results please call and leave a message for me or send a [employer] message to the clinic.    Sincerely,    Nini Heaton MD

## 2019-09-04 NOTE — PATIENT INSTRUCTIONS
Here is the plan from today's visit    1. Type 2 diabetes mellitus with hyperglycemia, with long-term current use of insulin (H) - Recommend taking 3 Units of the NPH the day of surgery  Take your thyroid for the surgery   - Basic Metabolic Panel ([facility])  - Hemoglobin A1c ([facility])    2. Closed dislocation of hip, unspecified laterality, subsequent encounter    3. Pre-op exam  - Hemoglobin (HGB) ([facility])  - Echocardiogram Complete; Future    4. Preop general physical exam    5. Hypothyroidism, unspecified type  - TSH    6. Systolic murmur  I recommend that you get an echo before the surgery.    - Echocardiogram Complete; Future      Please call or return to clinic if your symptoms don't go away.    Follow up plan  Post surgery    Thank you for coming to [facility] today.  Lab Testing:  **If you had lab testing today and your results are reassuring or normal they will be mailed to you or sent through [employer] within 7 days.   **If the lab tests need quick action we will call you with the results.  The phone number we will call with results is # [phone] (home) . If this is not the best number please call our clinic and change the number.  Medication Refills:  If you need any refills please call your pharmacy and they will contact us.   If you need to  your refill at a new pharmacy, please contact the new pharmacy directly. The new pharmacy will help you get your medications transferred faster.   Scheduling:  If you have any concerns about today's visit or wish to schedule another appointment please call our office during normal business hours [phone] (8-5:00 M-F)  If a referral was made to a [facility] and you don't get a call from central scheduling please call [phone].  If a Mammogram was ordered for you at [facility] call [phone] to schedule or change your appointment.  If you had an XRay/CT/Ultrasound/MRI ordered the number is [phone] to schedule  or change your radiology appointment.   Medical Concerns:  If you have urgent medical concerns please call [phone] at any time of the day.    Nini Heaton MD

## 2019-09-05 ENCOUNTER — TELEPHONE (OUTPATIENT)
Dept: FAMILY MEDICINE | Facility: CLINIC | Age: 74
End: 2019-09-05

## 2019-09-05 DIAGNOSIS — E03.9 HYPOTHYROIDISM, UNSPECIFIED TYPE: Primary | ICD-10-CM

## 2019-09-05 RX ORDER — THYROID 90 MG/1
TABLET ORAL
Qty: 90 TABLET | Refills: 1 | Status: SHIPPED | OUTPATIENT
Start: 2019-09-05 | End: 2020-05-18

## 2019-09-05 RX ORDER — THYROID 120 MG/1
TABLET ORAL
Qty: 90 TABLET | Refills: 1 | COMMUNITY
Start: 2019-09-05 | End: 2020-04-22

## 2019-09-05 NOTE — TELEPHONE ENCOUNTER
[facility] phone call message- general phone call:    Reason for call: Niya calling from [facility] to request that pre-op notes from appointment on 9/4/19 please be faxed to [phone]. Patient is scheduled for surgery on 9/9/19.    Action Desired: See above.    Return call needed: No    OK to leave a message on voice mail? Yes    Advised patient response may take up to 2 business days: No    Primary language: English      needed? No    Call taken on September 5, 2019 at 9:56 AM by Ester Cee to [facility] TRIAGE

## 2019-09-05 NOTE — TELEPHONE ENCOUNTER
I called [facility] to see if they can do the echo prior to her surgery.  Talked to Daniela, surgery scheduler ([phone]) who will be contacting Dr. Merritt on whether they can do the echo there.   Migdalia

## 2019-09-06 ENCOUNTER — TRANSFERRED RECORDS (OUTPATIENT)
Dept: HEALTH INFORMATION MANAGEMENT | Facility: CLINIC | Age: 74
End: 2019-09-06

## 2019-09-06 NOTE — TELEPHONE ENCOUNTER
Patient now with echo at 9 am on 9/6/2019 at [facility].  Situation resolved,   Migdalia Heaton MD

## 2019-09-12 ENCOUNTER — DOCUMENTATION ONLY (OUTPATIENT)
Dept: FAMILY MEDICINE | Facility: CLINIC | Age: 74
End: 2019-09-12

## 2019-09-12 NOTE — PROGRESS NOTES
"When opening a documentation only encounter, be sure to enter in \"Chief Complaint\" Forms and in \" Comments\" Title of form, description if needed.    Mikayla is a 74 year old  female  Form received via: Fax  Form now resides in: Provider NANCY Choi 8:08 AM September 12, 2019  Form has been completed by provider.     Form sent out via: Fax to [facility]  at Fax Number: [phone]  Patient informed: No  Output date: September 18, 2019    Lluvia Haynes      **Please close the encounter**                      "

## 2019-09-18 ENCOUNTER — DOCUMENTATION ONLY (OUTPATIENT)
Dept: FAMILY MEDICINE | Facility: CLINIC | Age: 74
End: 2019-09-18

## 2019-09-29 ENCOUNTER — HEALTH MAINTENANCE LETTER (OUTPATIENT)
Age: 74
End: 2019-09-29

## 2019-10-17 ENCOUNTER — TELEPHONE (OUTPATIENT)
Dept: FAMILY MEDICINE | Facility: CLINIC | Age: 74
End: 2019-10-17

## 2019-10-17 DIAGNOSIS — E11.65 TYPE 2 DIABETES MELLITUS WITH HYPERGLYCEMIA, WITHOUT LONG-TERM CURRENT USE OF INSULIN (H): ICD-10-CM

## 2019-10-17 NOTE — TELEPHONE ENCOUNTER
[facility] phone call message- patient requesting to speak directly with PCP or provider.    PCP: Nini Heaton    Reason for Call: Wants to discuss diabetic medications as they were switched several times while patient was in rehab. ONLY wants to speak with Dr Heaton and by the end of tomorrow (10/18/19)    Additional Details: Has TCU F/U scheduled for 11/1/19 but would not agree to PharmD consult beforehand.    Are you willing to speak with a nurse? No    Is a call back needed? Yes    Patient informed that it may take up to 2 business days to hear back from PCP:Yes    OK to leave a message on voice mail? Yes    Primary language: English      needed? No    Call taken on October 17, 2019 at 9:07 AM by Lili Rossi    Only route to PCP unless willing to speak with a nurse.

## 2019-10-18 NOTE — TELEPHONE ENCOUNTER
Called Mikayla.    Home from rehab and has an appt 11/1 with me.   Questions with the insulin - all changed now with lots more insulin that was given throughout the day and at night.   Same insulin 12 units in am and 10 at night (prior was 6 and 4).  Was told to continue with the   Only two more doses left.   Range: has had a few under 100 for 2 - 3 times. 78 was the lowest.  She woke up at night last night shaking and sugar was 80.  Ended up eating fig.   Highest - 151 (lunch).  Almost always under 150 prior to meals.   Almost no pain.     A/P:  Excellent sugars although at higher risk for hypoglycemia.  Plan - stay the course and will see me on 11/1.

## 2019-11-01 ENCOUNTER — TELEPHONE (OUTPATIENT)
Dept: FAMILY MEDICINE | Facility: CLINIC | Age: 74
End: 2019-11-01

## 2019-11-01 ENCOUNTER — OFFICE VISIT (OUTPATIENT)
Dept: FAMILY MEDICINE | Facility: CLINIC | Age: 74
End: 2019-11-01
Payer: MEDICARE

## 2019-11-01 VITALS
SYSTOLIC BLOOD PRESSURE: 132 MMHG | TEMPERATURE: 97.9 F | OXYGEN SATURATION: 97 % | BODY MASS INDEX: 36.89 KG/M2 | RESPIRATION RATE: 16 BRPM | HEART RATE: 70 BPM | DIASTOLIC BLOOD PRESSURE: 78 MMHG | WEIGHT: 221.4 LBS | HEIGHT: 65 IN

## 2019-11-01 DIAGNOSIS — E11.9 TYPE 2 DIABETES MELLITUS WITHOUT COMPLICATION, WITHOUT LONG-TERM CURRENT USE OF INSULIN (H): Primary | ICD-10-CM

## 2019-11-01 DIAGNOSIS — E03.9 HYPOTHYROIDISM, UNSPECIFIED TYPE: ICD-10-CM

## 2019-11-01 DIAGNOSIS — Z00.00 PREVENTATIVE HEALTH CARE: ICD-10-CM

## 2019-11-01 DIAGNOSIS — Z96.642 STATUS POST TOTAL REPLACEMENT OF LEFT HIP: ICD-10-CM

## 2019-11-01 LAB — TSH SERPL DL<=0.005 MIU/L-ACNC: 2.42 MU/L (ref 0.4–4)

## 2019-11-01 RX ORDER — ACETAMINOPHEN 325 MG/1
975 TABLET ORAL PRN
COMMUNITY
Start: 2018-05-06 | End: 2021-03-24

## 2019-11-01 SDOH — ECONOMIC STABILITY: TRANSPORTATION INSECURITY
IN THE PAST 12 MONTHS, HAS THE LACK OF TRANSPORTATION KEPT YOU FROM MEDICAL APPOINTMENTS OR FROM GETTING MEDICATIONS?: YES

## 2019-11-01 SDOH — ECONOMIC STABILITY: TRANSPORTATION INSECURITY
IN THE PAST 12 MONTHS, HAS LACK OF TRANSPORTATION KEPT YOU FROM MEETINGS, WORK, OR FROM GETTING THINGS NEEDED FOR DAILY LIVING?: YES

## 2019-11-01 SDOH — ECONOMIC STABILITY: TRANSPORTATION INSECURITY: IN THE PAST 12 MONTHS, HAS LACK OF TRANSPORTATION KEPT YOU FROM MEDICAL APPOINTMENTS OR FROM GETTING MEDICATIONS?: YES

## 2019-11-01 ASSESSMENT — ACTIVITIES OF DAILY LIVING (ADL): LACK_OF_TRANSPORTATION: YES

## 2019-11-01 ASSESSMENT — MIFFLIN-ST. JEOR: SCORE: 1491.39

## 2019-11-01 NOTE — TELEPHONE ENCOUNTER
Patient called stating that they had a missed call from clinic. Unable to locate call documentation in chart, patient would like a call back from PCP. Please call back, okay to LVM.    Kerry Torres, Patient Representative

## 2019-11-01 NOTE — PATIENT INSTRUCTIONS
Here is the plan from today's visit    1. Hypothyroidism, unspecified type  - TSH    2. Preventative health care  Think about colon cancer screening: Two options are: colonoscopy every 10 years or Fecal Stool Testing every year.      3. Edema - I can see if we can get you the zippered type of compression hose.    - other options are water pill which has the benefit of also lowering your blood pressure.  Ideal goal is 120/80.     4. Diabetes - keep doing great work! We will check on your weight next time.     Please call or return to clinic if your symptoms don't go away.    Follow up plan  1 month       Thank you for coming to [facility] today.  Lab Testing:  **If you had lab testing today and your results are reassuring or normal they will be mailed to you or sent through [email or web address] within 7 days.   **If the lab tests need quick action we will call you with the results.  The phone number we will call with results is # [phone] (home) . If this is not the best number please call our clinic and change the number.  Medication Refills:  If you need any refills please call your pharmacy and they will contact us.   If you need to  your refill at a new pharmacy, please contact the new pharmacy directly. The new pharmacy will help you get your medications transferred faster.   Scheduling:  If you have any concerns about today's visit or wish to schedule another appointment please call our office during normal business hours [phone] (8-5:00 M-F)  If a referral was made to a [facility] and you don't get a call from central scheduling please call [phone].  If a Mammogram was ordered for you at [facility] call [phone] to schedule or change your appointment.  If you had an XRay/CT/Ultrasound/MRI ordered the number is [phone] to schedule or change your radiology appointment.   Medical Concerns:  If you have urgent medical concerns please call [phone] at any  time of the day.    Nini Heaton MD

## 2019-11-01 NOTE — PROGRESS NOTES
AYESHA Degroot is a 74 year old  female  who presents for follow up of concern(s) listed below:    Chief Complaint   Patient presents with     Hospital F/U     Had a left hip revision on 9-9 at [facility], things have been going well since surgery per pt.     Follow up on TCU post left hip revision at [facility]  Hip: she is now working with Dr. Garcia and is very pleased with the care. She actually did not think she would wake up from the surgery (which she was at peace with). Happy with marked improvement in pain.    Continues on all the precautions.  Took off the brace on the left due to her having a bad reaction to the steel.   Exhausting to do PT but believes it will help in endurance.      DM: 12 Units in am and 10 at night.  She thinks it is called Novolog and not sure that it is a mixed brand. Has not used the fast acting. Is always a bit over 100. Highest was 147 to 170 but most in the 120 range.  Eats well. Had grapes the other night and way high but did not change her mgmt. Three hours was back down to 131. No lows.    Pending appointment at the  for eye exam.     Thyroid: is taking as prescribed     Edema: worse on the left side than on the right. Feels like she is not walking enough. She has tried different socks and they bunch at her ankles.  Her surgeon recommended that she consider water pills.     BP: at home, taken by her PT are: 138/88, 132/82, 141/88, 132/85.      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 Denies back pain but finds back fatigue when stands too long.  Also told that she has spinal stenosis but not aware that walking bent forward is better.  Is worried about the diagnosis of lumbar stenosis and wondering why.   Ferny did MRI 8/2019:   There are 5 lumbar-type vertebrae, and this convention is used for the purposes of this dictation.  The tip of the conus medullaris is at approximately the level of T12-L1. Minimal stepwise retrolisthesis of L3 on L4 and L2 on L3. Lumbar  scoliotic curvature with leftward convexity centered at approximately L2-3. Multilevel endplate spurring, and facet arthropathy. Degenerative Schmorl's node like deformities within the superior endplates of L2, L3, T12 and inferior endplates of T10 and T11. Multilevel disc desiccation.   There is  severe disc space narrowing and loss of T2 signal within the discs of L2-3. Type II degenerative Modic changes within the opposing endplates of L2-3.      ROS: no diarrhea.   Metromobility - sedan is only way she can travel due to the pain of the ride and bumpiness.         Patient Active Problem List   Diagnosis     Avitaminosis D     Diabetes mellitus, type 2 (H)     Essential hypertension     Postpolio syndrome     Status post THR (total hip replacement)     Carpal tunnel syndrome     Hyperlipidemia LDL goal <100     Hypothyroidism     Decreased sensation of foot     Right-sided low back pain without sciatica     Closed fracture of scaphoid of left wrist with routine healing, unspecified portion of scaphoid, subsequent encounter     Closed dislocation of hip (H)     Hypercalcemia     Lumbar radiculopathy       Current Outpatient Medications   Medication Sig Dispense Refill     acetaminophen (TYLENOL) 325 MG tablet Take 975 mg by mouth as needed       blood glucose (NO BRAND SPECIFIED) test strip 100 strips by In Vitro route 3 times daily For OneTouch Ultra Blue. 100 strip 12     blood glucose monitoring (ONE TOUCH DELICA) lancets Use to test blood sugar 3 times daily or as directed. 100 each 11     Blood Glucose Monitoring Suppl (ONE TOUCH ULTRA)        Cholecalciferol (VITAMIN D) 1000 UNITS capsule Take 2,000 Units by mouth 2 times daily        cyanocobalamin (VITAMIN B-12) 1000 MCG tablet Take 1,000 mcg by mouth daily       insulin NPH (HUMULIN N/NOVOLIN N VIAL) 100 UNIT/ML vial Inject subcutaneous 6 units in the AM and 4 units in the PM 10 mL 3     insulin pen needle (31G X 5 MM) 31G X 5 MM miscellaneous Use 3 pen  "needles daily or as directed. 100 each 11     insulin syringe-needle U-100 (31G X 5/16\" 0.3 ML) 31G X 5/16\" 0.3 ML miscellaneous Use 2 syringes daily or as directed. 100 each 11     melatonin 5 MG tablet Take 5 mg by mouth nightly as needed for sleep       STATIN NOT PRESCRIBED (INTENTIONAL) Please choose reason not prescribed, below       thyroid (ARMOUR) 120 MG tablet Take one pill 5 times a week in addition to the 90 mg tablets, 2 times a week 90 tablet 1     thyroid (ARMOUR) 90 MG tablet Take 90 mg tablets once a day for 2 days a week, and the 120 mg tablets 5 days a week 90 tablet 1     insulin aspart (NOVOLOG FLEXPEN) 100 UNIT/ML pen Inject with meals as instructed:  Glucose 130-150 0   Glucose 151-200 2 units   Glucose 201-250 4 units   Glucose 251-300 6 units   Glucose 301-350 8 units   Glucose 351-400 10 units   Glucose 401-500 12 units   Glucose GREATER THAN 501 14 units and call provider (Patient not taking: Reported on 11/1/2019) 3 mL 11          Allergies   Allergen Reactions     Hydromorphone Itching     Ketamine Other (See Comments)                Review of Systems:                 Physical Exam:     Vitals:    11/01/19 0940 11/01/19 0944   BP: (!) 161/88 132/78   BP Location: Left arm Right arm   Patient Position: Sitting Sitting   Cuff Size: Adult Large Adult Large   Pulse: 70    Resp: 16    Temp: 97.9  F (36.6  C)    TempSrc: Oral    SpO2: 97%    Weight: 100.4 kg (221 lb 6.4 oz)    Height: 1.645 m (5' 4.76\")      Body mass index is 37.11 kg/m .  Vitals were reviewed and were normal  EXT: bilateral 1+ edema.     No results found for any visits on 11/01/19.       Assessment and Plan     Mikayla was seen today for hospital f/u.    Diagnoses and all orders for this visit:    Type 2 diabetes mellitus without complication, without long-term current use of insulin (H) - likely doing quite well. Return in 1 month for repeat HgA1c.  I will call the pharmacy to make sure that she is on NPH (turns out that she " is)  Will get her eye exam.      Hypothyroidism, unspecified type - check today to make sure the recent adjustment is appropriate.   -     TSH    Preventative health care  -     TDAP VACCINE (BOOSTRIX)    Status post total replacement of left hip - doing well. Reflected that she showed courage and that she is doing a lot more for herself compared to a few years ago.     Edema - discussion around option of hydrochlorothiazide. Tried in the past and no known side effect. Reviewed that a bit lower bp is helpful for her in light of her DM.  She will consider.         Medications Discontinued During This Encounter   Medication Reason     magnesium oxide 400 MG CAPS        Total time: 30 minutes with more than half spent counseling on why she has edema, how to treat, why treating BP is good, and planing for next visit.        Options for treatment and follow-up care were reviewed with the patient . Mikayla Timmons  engaged in the decision making process and verbalized understanding of the options discussed and agreed with the final plan.    Nini Heaton MD

## 2019-11-01 NOTE — TELEPHONE ENCOUNTER
Called Mikayla to clarify her insulin. She is taking NPH which is what our Epic shows. So she is on the correct dosing. Also informed her that her TSH is perfect.

## 2019-11-04 ENCOUNTER — DOCUMENTATION ONLY (OUTPATIENT)
Dept: FAMILY MEDICINE | Facility: CLINIC | Age: 74
End: 2019-11-04

## 2019-11-04 NOTE — PROGRESS NOTES
"When opening a documentation only encounter, be sure to enter in \"Chief Complaint\" Forms and in \" Comments\" Title of form, description if needed.    Mikayla is a 74 year old  female  Form received via: Fax  Form now resides in: Provider NANCY Choi 11:42 AM November 4, 2019    Form has been completed by provider.     Form sent out via: Fax to [facility] at Fax Number: [phone]  Patient informed: N/A  Output date: November 8, 2019    MAYI VANEGAS MA    **Please close the encounter**                        "

## 2019-11-18 ENCOUNTER — TELEPHONE (OUTPATIENT)
Dept: PHYSICAL MEDICINE AND REHAB | Facility: CLINIC | Age: 74
End: 2019-11-18

## 2019-11-18 ENCOUNTER — TELEPHONE (OUTPATIENT)
Dept: FAMILY MEDICINE | Facility: CLINIC | Age: 74
End: 2019-11-18

## 2019-11-18 DIAGNOSIS — E11.65 TYPE 2 DIABETES MELLITUS WITH HYPERGLYCEMIA, WITHOUT LONG-TERM CURRENT USE OF INSULIN (H): ICD-10-CM

## 2019-11-18 NOTE — TELEPHONE ENCOUNTER
[facility] phone call message- medication clarification/question:    Full Medication Name: Patient would only said insulin    Dose: N/A    Question/Clarification needed: Patient said something about needing to get Dr Heaton back on the list of prescribing PCPs at her pharmacy. When I asked which pharmacy, patient said she only uses the [facility] in [place] (though medication tabs show [facility] in [facility]). She was very annoyed by my questions for logging the telephone encounter and said she would call back later to speak directly with Dr Heaton.  She did not want to speak with an RM.      Pharmacy confirmed as   [id number] - [place], [place] - [address] AT [facility] [address]  [address] [facility] [address]  Phone: [phone] Fax: [phone]    [facility] [address] [place] [address] [id number]  [address] [place] [address]  Phone: [phone] Fax: [phone]  : Yes    Please leave ONLY preferred pharmacy    OK to leave a message on voice mail? N/A    Advised patient that RN would call back within 3 hours, unless emergent.    Primary language: English      needed? No    Call taken on November 18, 2019 at 8:28 AM by Lili Cee to [facility] [place] [facility]

## 2019-11-18 NOTE — TELEPHONE ENCOUNTER
[facility] phone call message- medication clarification/question:    Full Medication Name: Patient would not give  Dose: Patient would not give. Said Dr Heaton would know    Question/Clarification needed: Patient said Dr Heaton needs her name back on the list at the pharmacy as PCP.  She also said she had another issue but didn't give me that issue. Patient said she does not want to speak with an RN, only Dr Heaton.    Pharmacy confirmed as     [facility] [address] [place] [address] - [address]  [address] [place] [address]  Phone: [phone] Fax: [phone]  : No:     Please leave ONLY preferred pharmacy    OK to leave a message on voice mail? Yes    Advised patient that RN would call back within 3 hours, unless emergent.    Primary language: English      needed? No    Call taken on November 18, 2019 at 8:32 AM by Lili Rossi    Route to [facility]

## 2019-11-18 NOTE — TELEPHONE ENCOUNTER
[facility]    Phone Message    May a detailed message be left on voicemail: no    Reason for Call: Other: Pt has questions about her appt on 11/20 in regards to what will happen during the appt. Pt also needs to speak with someone who can secure a wheelchair for her and then push her around through the building for her appt while she's there due to her recovery from surgery. Pt has a walker but can only use it far enough to get inside the building and no further. Please call Pt back, she has PT from 11am-12pm, so no calls during that time.    Action Taken: Message routed to:  [facility]

## 2019-11-18 NOTE — TELEPHONE ENCOUNTER
Patient called again as she really needs to speak with Dr Heaton as she will be out of medication (insulin) by Wednesday.

## 2019-11-18 NOTE — TELEPHONE ENCOUNTER
Spoke with the patient. Informed the patient it was a follow up appointment. She was concerned with the amount of pain she would be in due to surgery. She preferred to reschedule her appointment to give herself time to heal. Appointment rescheduled.

## 2019-11-27 ENCOUNTER — DOCUMENTATION ONLY (OUTPATIENT)
Dept: FAMILY MEDICINE | Facility: CLINIC | Age: 74
End: 2019-11-27

## 2019-11-27 NOTE — PROGRESS NOTES
"When opening a documentation only encounter, be sure to enter in \"Chief Complaint\" Forms and in \" Comments\" Title of form, description if needed.    Mikayla is a 74 year old  female  Form received via: Fax  Form now resides in: Provider MAYI Clark MA                  "

## 2019-12-03 NOTE — PROGRESS NOTES
Form has been completed by provider.     Form sent out via: Fax to [facility] at Fax Number: [phone]  Patient informed: N/A  Output date: December 3, 2019    Emmanuelle Mooney CMA

## 2020-02-07 ENCOUNTER — DOCUMENTATION ONLY (OUTPATIENT)
Dept: FAMILY MEDICINE | Facility: CLINIC | Age: 75
End: 2020-02-07

## 2020-02-07 NOTE — PROGRESS NOTES
"When opening a documentation only encounter, be sure to enter in \"Chief Complaint\" Forms and in \" Comments\" Title of form, description if needed.    Mikayla is a 74 year old  female  Form received via: Fax  Form now resides in: Provider Ready    Emmanuelle Mooney CMA                  "

## 2020-02-07 NOTE — PROGRESS NOTES
Form has been completed by provider.     Form sent out via: Fax to [facility] at Fax Number: [phone]  Patient informed: N/A  Output date: February 7, 2020    Emmanuelle Mooney CMA

## 2020-03-15 ENCOUNTER — HEALTH MAINTENANCE LETTER (OUTPATIENT)
Age: 75
End: 2020-03-15

## 2020-04-16 ENCOUNTER — TELEPHONE (OUTPATIENT)
Dept: FAMILY MEDICINE | Facility: CLINIC | Age: 75
End: 2020-04-16

## 2020-04-16 DIAGNOSIS — E11.9 TYPE 2 DIABETES MELLITUS WITHOUT COMPLICATION, WITHOUT LONG-TERM CURRENT USE OF INSULIN (H): ICD-10-CM

## 2020-04-16 NOTE — TELEPHONE ENCOUNTER
Verify that the refill encounter hasn't been started Yes    [facility] phone call message- patient requesting a refill:    Full Medication Name: blood glucose (NO BRAND SPECIFIED) test strip     Dose: Route: 100 strips by In Vitro route 3 times daily For OneTouch Ultra Blue. - In Vitro     Pharmacy confirmed as   [id number] - [place], [place] - [address] AT [facility] [address]  [address]  [facility] [address]  Phone: [phone] Fax: [phone]  : Yes    Medication tab checked to see if medication has been sent  Yes    Additional Comments: Patient is out of test strips, no refills at pharmacy. Please advise.        OK to leave a message on voice mail? Yes    Advised patient refill may take up to 2 business days? Yes    Primary language: English      needed? No    Call taken on April 16, 2020 at 11:32 AM by Renetta Cee to [facility]

## 2020-04-22 ENCOUNTER — VIRTUAL VISIT (OUTPATIENT)
Dept: FAMILY MEDICINE | Facility: CLINIC | Age: 75
End: 2020-04-22
Payer: MEDICARE

## 2020-04-22 VITALS — BODY MASS INDEX: 38.65 KG/M2 | WEIGHT: 232 LBS | HEIGHT: 65 IN

## 2020-04-22 DIAGNOSIS — Z79.4 TYPE 2 DIABETES MELLITUS WITH HYPERGLYCEMIA, WITH LONG-TERM CURRENT USE OF INSULIN (H): Primary | ICD-10-CM

## 2020-04-22 DIAGNOSIS — M46.1 INFLAMMATION OF RIGHT SACROILIAC JOINT (H): ICD-10-CM

## 2020-04-22 DIAGNOSIS — E11.65 TYPE 2 DIABETES MELLITUS WITH HYPERGLYCEMIA, WITH LONG-TERM CURRENT USE OF INSULIN (H): Primary | ICD-10-CM

## 2020-04-22 DIAGNOSIS — E03.9 HYPOTHYROIDISM, UNSPECIFIED TYPE: ICD-10-CM

## 2020-04-22 DIAGNOSIS — G56.03 BILATERAL CARPAL TUNNEL SYNDROME: ICD-10-CM

## 2020-04-22 ASSESSMENT — MIFFLIN-ST. JEOR: SCORE: 1543.23

## 2020-04-22 NOTE — PROGRESS NOTES
"Family Medicine Telephone Visit Note         Telephone Visit Consent   Patient was verbally read the following and verbal consent was obtained.    \"This telephone visit will be conducted via a call between you and your physician/provider. We have found that certain health care needs can be provided without the need for a physical exam.  This service lets us provide the care you need with a short phone conversation.  If a prescription is necessary we can send it directly to your pharmacy.  If lab work is needed we can place an order for that and you can then stop by our lab to have the test done at a later time.    Telephone visits are billed at different rates depending on your insurance coverage. During this emergency period, for some insurers they may be billed the same as an in-person visit.  Please reach out to your insurance provider with any questions.    If during the course of the call the physician/provider feels a telephone visit is not appropriate, you will not be charged for this service.\"    Name person giving consent:  Patient   Date verbal consent given:  4/22/2020  Time verbal consent given:  1:28 PM  Chief Complaint   Patient presents with     Diabetes     Hypertension     Musculoskeletal Problem                HPI   Patients name: Mikayla  Appointment start time:  1:28 PM      DM: needed test strips Rx. Needed to reorder before 3/11. She did get 200 test strips. Usually gets 100 per month. I need to call them to give new approval to Medicare Part D. Needs HGA1c to complete the forms.   She is not eating as well due to COVID since she can't get out of the house. So, has had too much fruit and dessert on Easter and favorite bread.   Emotionally harder to. All alone.   Lowest: 98   All her values are before food: 100 range  She had one 300 after food.   For the past couple of weeks has had to give herself rapid insulin 3 - 4 times a week. Knows will happen when she eats too much or the wrong food. "   Frequency of testing: four times a day.   ROS: No CP, SOB, vision changes or edema more than usual. Denies any neuropathy in her feet. Has had some in her hands and is using splints which help with the numbness at night and during the day. Did have carpal tunnel surgery in one hand. Feels like that.   Weight: gone up because of her eating.    Only able to walk down halls in her building so is doing less walking. Can't go outside.   Changes: needs to be better organized with her food. Her refrigerator is full of good food.  She will not be eating the bread. Has to plan. Her daughter is now asking for a list so she buys what Mikayla wants to eat.   Is being alone - needs to find ways to entertain herself. Is trying to call people.       HTN:  Does not have a blood pressure cuff and has not been out to check. Thinks could use one.     Hips: totally successful. No pain in her hip like she was before. Still has some mild pain in her back which she is managing. Lost her PT and that has discouraged her more than all. She is feeling weaker now. Has not walked outside due to fear and now has a neighbor who will walk with her.      Thyroid - very costly with the two doses and wondering if she can go on one dose in the future.     Recently had Pink Eye - took 16 days to get better but canceled all her appointments.    Has to take metromobitily and no longer can drive in front due to COVID. Radha can take her in to clinic if need be.     Meds - is not taking Vit B12 nor D at this time due to cost. Never had Vit B12 defiency        Current Outpatient Medications   Medication Sig Dispense Refill     insulin NPH (HUMULIN N/NOVOLIN N VIAL) 100 UNIT/ML vial Inject subcutaneous 12 units in the AM and 10 units in the PM 10 mL 3     insulin regular (HUMULIN R/NOVOLIN R VIAL) 100 UNIT/ML vial For premeal  -200 give 1 Unit. Increase by 1 unit for each additional BG of 50. 10 mL 3     thyroid (ARMOUR) 90 MG tablet Take 90 mg  "tablets once a day for 2 days a week, and the 120 mg tablets 5 days a week 90 tablet 1     acetaminophen (TYLENOL) 325 MG tablet Take 975 mg by mouth as needed       blood glucose (NO BRAND SPECIFIED) test strip 100 strips by In Vitro route 3 times daily For OneTouch Ultra Blue. 200 strip 0     Blood Glucose Monitoring Suppl (ONE TOUCH ULTRA)        insulin pen needle (31G X 5 MM) 31G X 5 MM miscellaneous Use 3 pen needles daily or as directed. 100 each 11     insulin syringe-needle U-100 (31G X 5/16\" 0.3 ML) 31G X 5/16\" 0.3 ML miscellaneous Use 2 syringes daily or as directed. 100 each 11     STATIN NOT PRESCRIBED (INTENTIONAL) Please choose reason not prescribed, below       Allergies   Allergen Reactions     Hydromorphone Itching     Ketamine Other (See Comments)              Review of Systems:              Physical Exam:     Ht 1.651 m (5' 5\")   Wt 105.2 kg (232 lb)   LMP  (LMP Unknown)   BMI 38.61 kg/m    Estimated body mass index is 38.61 kg/m  as calculated from the following:    Height as of this encounter: 1.651 m (5' 5\").    Weight as of this encounter: 105.2 kg (232 lb).    Exam:  Constitutional: healthy, alert and no distress  Psychiatric: mentation appears normal and affect normal/bright            Assessment and Plan   Mikayla was seen today for diabetes, hypertension and musculoskeletal problem.    Diagnoses and all orders for this visit:    Type 2 diabetes mellitus with hyperglycemia, with long-term current use of insulin (H) - appears to be relatively well controlled despite her weight gain.  She is requiring a bit more rapid acting correction which makes sense. She is working on improved eating which is hard in the COVID isolation.  She will come in for labs and if she comes in a few weeks from now, we might have a BP cuff that we can offer her to check BPs at home.    -     Hemoglobin A1c ([facility]); Future  -     TSH with free T4 reflex; Future  -     Basic Metabolic Panel ([facility]); Future  -  "    Lipid Cascade ([facility]); Future  -     Albumin Random Urine Quantitative with Creat Ratio; Future    Right hip pain - post recurrent dislocations and now repeat corrective surgery. Her pain is much improved and doing much better. Recommended she call her surgeon to see if she could get virtual PT since she is limited in her ability to get out and do things and was getting inhome PT    Bilateral carpal tunnel syndrome - was going to see a surgeon. Recommended she continue with her braces.     Hypothyroid - will recheck level and see if we can get her on one strength and not a combination one.    Refilled medications that would be required in the next 3 months.     After Visit Information:  Patient chose to view AVS via [employer]    Return in about 3 months (around 7/22/2020) for in person.    Appointment end time: 2:03 PM  This is a telephone visit that took 31 minutes.      Clinician location:  [place]    Nini Heaton MD

## 2020-04-22 NOTE — PATIENT INSTRUCTIONS
Here is the plan from today's visit    1. Hip   Keep doing what you can to get exercise that is reasonable.   Can possibly do the PT virtually - she will contact Dr. Finley.       2. Type 2 diabetes mellitus with hyperglycemia, with long-term current use of insulin (H)  - plan to come in for a diabetes test and also kidneys, cholesterol etc.  - work on the portion sizes and better planning. Not eating late at night.   - I will fill out the paperwork for testing      3. Bilateral carpal tunnel syndrome  It is clear that your symptoms are from carpal tunnel. Keep wearing your splints to help keep symptoms down.  Keeping your sugars as good as possible will help too.     4. Thyroid  Keep taking what you are taking for now. We will check the levels when you come in.       Please call or return to clinic if your symptoms don't go away.    Follow up plan  3 months    Thank you for coming to [facility] today.  Lab Testing:  **If you had lab testing today and your results are reassuring or normal they will be mailed to you or sent through [email or web address] within 7 days.   **If the lab tests need quick action we will call you with the results.  The phone number we will call with results is # [phone] (home) . If this is not the best number please call our clinic and change the number.  Medication Refills:  If you need any refills please call your pharmacy and they will contact us.   If you need to  your refill at a new pharmacy, please contact the new pharmacy directly. The new pharmacy will help you get your medications transferred faster.   Scheduling:  If you have any concerns about today's visit or wish to schedule another appointment please call our office during normal business hours [phone] (8-5:00 M-F)  If a referral was made to a [facility] and you don't get a call from central scheduling please call [phone].  If a Mammogram was ordered for you at [facility] call  [phone] to schedule or change your appointment.  If you had an XRay/CT/Ultrasound/MRI ordered the number is [phone] to schedule or change your radiology appointment.   Medical Concerns:  If you have urgent medical concerns please call [phone] at any time of the day.    Nini Heaton MD

## 2020-04-23 ENCOUNTER — TELEPHONE (OUTPATIENT)
Dept: FAMILY MEDICINE | Facility: CLINIC | Age: 75
End: 2020-04-23

## 2020-04-23 DIAGNOSIS — E11.9 TYPE 2 DIABETES MELLITUS WITHOUT COMPLICATION, WITHOUT LONG-TERM CURRENT USE OF INSULIN (H): ICD-10-CM

## 2020-04-23 RX ORDER — LANCETS 33 GAUGE
1 EACH MISCELLANEOUS 4 TIMES DAILY
Qty: 1 EACH | Refills: 4 | Status: SHIPPED | OUTPATIENT
Start: 2020-04-23 | End: 2021-07-16

## 2020-04-23 NOTE — TELEPHONE ENCOUNTER
"Request for medication refill: blood glucose monitoring (ONE TOUCH DELICA) lancets   - Medication shows to have  on 2020 . Please advise and refill!    Providers if patient needs an appointment and you are willing to give a one month supply please refill for one month and  send a letter/MyChart using \".SMILLIMITEDREFILL\" .smillimited and route chart to \"P SMI \" (Giving one month refill in non controlled medications is strongly recommended before denial)    If refill has been denied, meaning absolutely no refills without visit, please complete the smart phrase \".smirxrefuse\" and route it to the \"P SMI MED REFILLS\"  pool to inform the patient and the pharmacy.    Lauri Scott MA        "

## 2020-05-01 DIAGNOSIS — Z79.4 TYPE 2 DIABETES MELLITUS WITH HYPERGLYCEMIA, WITH LONG-TERM CURRENT USE OF INSULIN (H): ICD-10-CM

## 2020-05-01 DIAGNOSIS — E11.65 TYPE 2 DIABETES MELLITUS WITH HYPERGLYCEMIA, WITH LONG-TERM CURRENT USE OF INSULIN (H): ICD-10-CM

## 2020-05-01 LAB
BUN SERPL-MCNC: 25.1 MG/DL (ref 7–19)
CALCIUM SERPL-MCNC: 9.9 MG/DL (ref 8.5–10.1)
CHLORIDE SERPLBLD-SCNC: 101.6 MMOL/L (ref 98–110)
CHOLEST SERPL-MCNC: 196.1 MG/DL (ref 0–200)
CHOLEST/HDLC SERPL: 4.1 {RATIO} (ref 0–5)
CO2 SERPL-SCNC: 24.1 MMOL/L (ref 20–32)
CREAT SERPL-MCNC: 0.6 MG/DL (ref 0.5–1)
GFR SERPL CREATININE-BSD FRML MDRD: >90 ML/MIN/1.7 M2
GLUCOSE SERPL-MCNC: 136.3 MG'DL (ref 70–99)
HBA1C MFR BLD: 7 % (ref 4.1–5.7)
HDLC SERPL-MCNC: 47.3 MG/DL
LDLC SERPL CALC-MCNC: 119 MG/DL (ref 0–129)
POTASSIUM SERPL-SCNC: 4.3 MMOL/L (ref 3.3–4.5)
SODIUM SERPL-SCNC: 136.2 MMOL/L (ref 132.6–141.4)
TRIGL SERPL-MCNC: 151 MG/DL (ref 0–150)
TSH SERPL DL<=0.005 MIU/L-ACNC: 0.6 MU/L (ref 0.4–4)
VLDL CHOLESTEROL: 30.2 MG/DL (ref 7–32)

## 2020-05-07 DIAGNOSIS — E11.65 TYPE 2 DIABETES MELLITUS WITH HYPERGLYCEMIA, WITHOUT LONG-TERM CURRENT USE OF INSULIN (H): ICD-10-CM

## 2020-05-07 NOTE — TELEPHONE ENCOUNTER

## 2020-05-07 NOTE — TELEPHONE ENCOUNTER
Verify that the refill encounter hasn't been started Yes    [facility] Family Medicine phone call message- patient requesting a refill:    Full Medication Name: insulin NPH (HUMULIN N/NOVOLIN N VIAL) 100 UNIT/ML vial / Inject subcutaneous 12 units in the AM and 10 units in the PM    insulin regular (HUMULIN R/NOVOLIN R VIAL) 100 UNIT/ML vial / For premeal  -200 give 1 Unit. Increase by 1 unit for each additional BG of 50.     thyroid (ARMOUR) 90 MG tablet / Take 90 mg tablets once a day for 2 days a week, and the 120 mg tablets 5 days a week      Pharmacy confirmed as   [facility] [address] [place] [address] - [address]  [address] [place] [address]  Phone: [phone] Fax: [phone]  : Yes    Medication tab checked to see if medication has been sent  Yes    Additional Comments: Will be out of all 3 meds on [place] 5/10/20. Daughter will  Saturday or [place] May 9 or 10. Patient advised that she takes 120mg of thyroid ARMOUR.    OK to leave a message on voice mail? Yes    Advised patient refill may take up to 2 business days? Yes    Primary language: English      needed? No    Call taken on May 7, 2020 at 10:06 AM by Ester Cee to P SMI MED REFILL

## 2020-05-11 DIAGNOSIS — E03.9 HYPOTHYROIDISM, UNSPECIFIED TYPE: ICD-10-CM

## 2020-05-11 NOTE — TELEPHONE ENCOUNTER
"Request for medication refill: thyroid (ARMOUR) 90 MG tablet FYI the sig on the refill request form differs from what is in the computer. The form states \"Take 1 Tablet by mouth once daily.\"      Providers if patient needs an appointment and you are willing to give a one month supply please refill for one month and  send a letter/MyChart using \".SMILLIMITEDREFILL\" .smillimited and route chart to \"P SMI \" (Giving one month refill in non controlled medications is strongly recommended before denial)    If refill has been denied, meaning absolutely no refills without visit, please complete the smart phrase \".smirxrefuse\" and route it to the \"P SMI MED REFILLS\"  pool to inform the patient and the pharmacy.    Amairani Tapia, CMA        "

## 2020-05-13 DIAGNOSIS — E03.9 HYPOTHYROIDISM, UNSPECIFIED TYPE: Primary | ICD-10-CM

## 2020-05-13 RX ORDER — THYROID 120 MG/1
120 TABLET ORAL
COMMUNITY
Start: 2019-10-10 | End: 2020-05-13

## 2020-05-13 RX ORDER — THYROID 90 MG/1
TABLET ORAL
Qty: 90 TABLET | Refills: 1 | Status: CANCELLED | OUTPATIENT
Start: 2020-05-13

## 2020-05-13 NOTE — TELEPHONE ENCOUNTER
"Per other encounter \"She is hoping to be on only one pill for her [place]. Based on her last TSH, I think we can give her 120 mg for 6 days out of the week. So she would skip her thyroid medication one day a week.       If she is willing to do that, then we won't refill the 90 mg pills she is requesting.      Please review this with her when/if she calls back.   Left her a message stating we could change her meds (5/12)\"    RN left Detailed VM on secure line asking if that dosing is what pt wants    Demetrice Reid RN    "

## 2020-05-13 NOTE — TELEPHONE ENCOUNTER
[facility] Family Medicine phone call message- patient requesting a refill:    Full Medication Name: [place] 120 mg        Pharmacy confirmed as   [facility] [address] [place] [address] - [address]  [address] [place] [address]  Phone: [phone] Fax: [phone]: Yes    Additional Comments: Patient is out of medication     OK to leave a message on voice mail? Yes    Primary language: English      needed? No    Call taken on May 13, 2020 at 9:46 AM by Carmel Khan CMA

## 2020-05-13 NOTE — TELEPHONE ENCOUNTER
RN returned call and left VM for patient to call back inuring if she would like the dose of 120mg for 6days a week and nothing on the 7th day    Demetrice Reid RN

## 2020-05-13 NOTE — TELEPHONE ENCOUNTER
She is hoping to be on only one pill for her [place]. Based on her last TSH, I think we can give her 120 mg for 6 days out of the week. So she would skip her thyroid medication one day a week.      If she is willing to do that, then we won't refill the 90 mg pills she is requesting.     Please review this with her when/if she calls back.   Left her a message stating we could change her meds (5/12)

## 2020-05-18 RX ORDER — THYROID 120 MG/1
TABLET ORAL
Qty: 90 TABLET | Refills: 0 | Status: SHIPPED | OUTPATIENT
Start: 2020-05-18 | End: 2020-09-25

## 2020-05-18 NOTE — TELEPHONE ENCOUNTER
Patient called stating she would like the 120 mg dose of the [place] medication for TSH. She has been out of medication for 10 days and would like this sent immediately. She is also concerned that this will affect her rehab being without it for 10 days.     She would like someone to call her as soon as it's sent to the pharmacy, so she can call and check in with them.

## 2020-05-18 NOTE — TELEPHONE ENCOUNTER
RN sent this over for patient since provider clearly stated the plan verbally in the results.    Attempted reaching patient and could not reach. Left detailed message that it was sent and explained the change in directions.  Kiara Currie RN

## 2020-05-20 ENCOUNTER — VIRTUAL VISIT (OUTPATIENT)
Dept: FAMILY MEDICINE | Facility: CLINIC | Age: 75
End: 2020-05-20
Payer: MEDICARE

## 2020-05-20 DIAGNOSIS — R20.2 NUMBNESS AND TINGLING OF FOOT: ICD-10-CM

## 2020-05-20 DIAGNOSIS — E11.65 TYPE 2 DIABETES MELLITUS WITH HYPERGLYCEMIA, WITH LONG-TERM CURRENT USE OF INSULIN (H): ICD-10-CM

## 2020-05-20 DIAGNOSIS — Z79.4 TYPE 2 DIABETES MELLITUS WITH HYPERGLYCEMIA, WITH LONG-TERM CURRENT USE OF INSULIN (H): ICD-10-CM

## 2020-05-20 DIAGNOSIS — E03.9 HYPOTHYROIDISM, UNSPECIFIED TYPE: Primary | ICD-10-CM

## 2020-05-20 DIAGNOSIS — H53.9 VISION CHANGES: ICD-10-CM

## 2020-05-20 DIAGNOSIS — R20.0 NUMBNESS AND TINGLING OF FOOT: ICD-10-CM

## 2020-05-20 NOTE — PROGRESS NOTES
"Family Medicine Telephone Visit Note         Telephone Visit Consent   Patient was verbally read the following and verbal consent was obtained.    \"Telephone visits are billed at different rates depending on your insurance coverage. During this emergency period, for some insurers they may be billed the same as an in-person visit.  Please reach out to your insurance provider with any questions.  If during the course of the call the physician/provider feels a telephone visit is not appropriate, you will not be charged for this service.\"    Name person giving consent:  Patient   Date verbal consent given:  5/20/2020  Time verbal consent given:  3:43 PM    Chief Complaint   Patient presents with     RECHECK     per pt she would like to discuss thyroid medication regarding being out of her medication for about 10 days but is now on its way              HPI   Patients name: Mikayla  Appointment start time:  4:57 PM    Thyroid - Has been without armour thyroid for 10 days. Are mailing her the Rx. Wondering if there are food ways to help. Has some seaweed at home.     DM - insulin - at what point can she decrease the amount. Since last she saw me, has been extra careful - and has lost 4 pounds since her last    (fasting) and only 4 times has been above 150.  One of those days she ate 3 pieces of fruit and the next am was 184.   During the day is 154 and often below 150.   Does not eat processed sugar. All her carbs come from food.   Denies any lows. Occasionally has headaches when wakes up and goes away when drinks water. Has checked when she gets up to urinate in the middle of night. No symptoms of hyperglycemia.     Has started on the second box of test strips. Is testing 3 times a day.      Podiatry - needs someone to trim her toenails due to her hip dislocation and not able to cut them herselves. Also with new numbness.     Current Outpatient Medications   Medication Sig Dispense Refill     acetaminophen (TYLENOL) " "325 MG tablet Take 975 mg by mouth as needed       blood glucose (NO BRAND SPECIFIED) test strip 100 strips by In Vitro route 3 times daily For OneTouch Ultra Blue. 200 strip 0     Blood Glucose Monitoring Suppl (ONE TOUCH ULTRA)        insulin  UNIT/ML vial Inject subcutaneous 12 units in the AM and 10 units in the PM 10 mL 3     insulin pen needle (31G X 5 MM) 31G X 5 MM miscellaneous Use 3 pen needles daily or as directed. 100 each 11     insulin regular 100 UNIT/ML vial For premeal  -200 give 1 Unit. Increase by 1 unit for each additional BG of 50. 10 mL 3     insulin syringe-needle U-100 (31G X 5/16\" 0.3 ML) 31G X 5/16\" 0.3 ML miscellaneous Use 2 syringes daily or as directed. 100 each 11     OneTouch Delica Lancets 33G MISC 1 Units 4 times daily Use to test blood sugar 4 times daily or as directed. 1 each 4     STATIN NOT PRESCRIBED (INTENTIONAL) Please choose reason not prescribed, below       thyroid (ARMOUR) 120 MG tablet Take 1 tablet (120 mg) daily for 6 days out of the week. On seventh day, take nothing. 90 tablet 0     Allergies   Allergen Reactions     Hydromorphone Itching     Ketamine Other (See Comments)              Review of Systems:            Physical Exam:     LMP  (LMP Unknown)   Estimated body mass index is 38.61 kg/m  as calculated from the following:    Height as of 4/22/20: 1.651 m (5' 5\").    Weight as of 4/22/20: 105.2 kg (232 lb).    Exam:  Constitutional: healthy, alert and no distress  Psychiatric: mentation appears normal and affect normal/bright            Assessment and Plan   Mikayla was seen today for recheck.    Diagnoses and all orders for this visit:    Hypothyroidism, unspecified type - to restart her meds when they come. Has not been out terribly long. Will be taking 120 mg of [place] 6 days a week because that is cheaper for her than trying to combine a 90 mg and 120 mg during the week. Will need repeat TSH when we do labs in 3 months.     Type 2 diabetes mellitus " with hyperglycemia, with long-term current use of insulin (H) - recent HgA1c = 7 which is great.  She continues to require lower dose insulin and managing this well. We spent a lot of time on whether she should decrease her dose now since her highs come when she eats sugar - reviewed how the goal is to keep her sugars in line and so at this point it appears that she needs the dose she is on.   -     Orthopedic & Spine  Referral; Future  -     OPHTHALMOLOGY ADULT REFERRAL    Vision changes - needs dilated eye exam. Her sugars don't seem to fluctuate enough to blame her changing vision on changing blood sugars.   -     OPHTHALMOLOGY ADULT REFERRAL    Numbness and tingling of foot - she needs help with her nails (can't bend over without popping her hip) and she has some new numbness.         Refilled medications that would be required in the next 3 months.     After Visit Information:  Patient chose to view AVS via [employer]    No follow-ups on file.    Appointment end time: 5:27 PM  This is a telephone visit that took 30 minutes.  Total time spent counseling was more than half of the 30 minute visit.       Clinician location:  Zaina Heaton MD

## 2020-05-20 NOTE — PATIENT INSTRUCTIONS
Here is the plan from today's visit    1. Hypothyroidism, unspecified type  Ok to restart your medication when you get them. Need to check your thyroid level in 3 months of this new dosing.     2. Type 2 diabetes mellitus with hyperglycemia, with long-term current use of insulin (H)  I have referred you to the podiatrist and to the eye doctor. You do need to have an eye exam with dilation to make sure that your diabetes has not caused damage - we can treat that and prevent blindness. They can also, at that time, make sure your glasses are the right strength.   - Orthopedic & Spine  Referral; Future  - OPHTHALMOLOGY ADULT REFERRAL    3. Vision changes  I have put in a referral to the eye doctor - see above. I do not know if they are seeing everyone again with COVID pandemic, so may not be able to right away  - OPHTHALMOLOGY ADULT REFERRAL    4. Numbness and tingling of foot  Plan to tell your podiatrist too. They will then examine you for numbness from diabetes.     5. Come in for a Wellness exam when you can. We are actually doing these by video too.     Please call or return to clinic if your symptoms don't go away.    Follow up plan  3 months for     Thank you for coming to [facility] today.  Lab Testing:  **If you had lab testing today and your results are reassuring or normal they will be mailed to you or sent through [employer] within 7 days.   **If the lab tests need quick action we will call you with the results.  The phone number we will call with results is # [phone] (home) . If this is not the best number please call our clinic and change the number.  Medication Refills:  If you need any refills please call your pharmacy and they will contact us.   If you need to  your refill at a new pharmacy, please contact the new pharmacy directly. The new pharmacy will help you get your medications transferred faster.   Scheduling:  If you have any concerns about today's visit or wish to  schedule another appointment please call our office during normal business hours [phone] (8-5:00 M-F)  If a referral was made to a [facility] and you don't get a call from central scheduling please call [phone].  If a Mammogram was ordered for you at [facility] call [phone] to schedule or change your appointment.  If you had an XRay/CT/Ultrasound/MRI ordered the number is [phone] to schedule or change your radiology appointment.   Medical Concerns:  If you have urgent medical concerns please call [phone] at any time of the day.    Nini Heaton MD

## 2020-05-21 ENCOUNTER — DOCUMENTATION ONLY (OUTPATIENT)
Dept: FAMILY MEDICINE | Facility: CLINIC | Age: 75
End: 2020-05-21

## 2020-05-21 NOTE — PROGRESS NOTES
"When opening a documentation only encounter, be sure to enter in \"Chief Complaint\" Forms and in \" Comments\" Title of form, description if needed.    Mikayla is a 74 year old  female  Form received via: Fax  Form now resides in: Provider Ready    Lauri Scott MA    Form has been completed by provider.     Form sent out via: Fax to [facility] at Fax Number: [phone]  Patient informed: n/a  Output date: May 21, 2020    Lauri Scott MA      **Please close the encounter**                  "

## 2020-06-03 ENCOUNTER — DOCUMENTATION ONLY (OUTPATIENT)
Dept: FAMILY MEDICINE | Facility: CLINIC | Age: 75
End: 2020-06-03

## 2020-06-03 NOTE — PROGRESS NOTES
"When opening a documentation only encounter, be sure to enter in \"Chief Complaint\" Forms and in \" Comments\" Title of form, description if needed.    Mikayla is a 74 year old  female  Form received via: Fax  Form now resides in: Provider Ready    Lauri Scott MA        Form has been completed by provider.     Form sent out via: [facility] to [facility] at Fax Number: [phone]  Patient informed: n/a  Output date: Araceli 3, 2020    Lauri Scott MA      **Please close the encounter**              "

## 2020-06-05 ENCOUNTER — TELEPHONE (OUTPATIENT)
Dept: FAMILY MEDICINE | Facility: CLINIC | Age: 75
End: 2020-06-05

## 2020-06-05 DIAGNOSIS — E11.9 TYPE 2 DIABETES MELLITUS WITHOUT COMPLICATION, WITHOUT LONG-TERM CURRENT USE OF INSULIN (H): ICD-10-CM

## 2020-06-05 NOTE — TELEPHONE ENCOUNTER
Verify that the refill encounter hasn't been started Yes    [facility] phone call message- patient requesting a refill:    Full Medication Name: blood glucose (NO BRAND SPECIFIED) test strip     Dose: see chart     Pharmacy confirmed as   [id number] - [place], [place] - [address] AT [facility] [address]  [address] [facility] [address]  Phone: [phone] Fax: [phone]  : Yes    Medication tab checked to see if medication has been sent  Yes    Additional Comments: Refill request called in by pharmacy staff Bobby at request of patient. Bobby unsure, but thinks patient may be out of test strips (or almost out).     OK to leave a message on voice mail? Yes    Advised patient refill may take up to 2 business days? Yes    Primary language: English      needed? No    Call taken on June 5, 2020 at 3:34 PM by Ester Valdivia    Route to P [facility] MED REFILL

## 2020-06-09 NOTE — TELEPHONE ENCOUNTER
DIAGNOSIS: Diabetic foot care, nail trim per pt. Referred by Dr. Nini Heaton from [facility]. Ok'd by Dian from [facility].    APPOINTMENT DATE: 6.29.2020   NOTES STATUS DETAILS   OFFICE NOTE from referring provider Internal 5.20.20 [facility]   OFFICE NOTE from other specialist N/A    DISCHARGE SUMMARY from hospital N/A    DISCHARGE REPORT from the ER N/A    OPERATIVE REPORT N/A    MEDICATION LIST Internal    EMG (for Spine) N/A    IMPLANT RECORD/STICKER N/A    LABS     CBC/DIFF Internal    CULTURES N/A    INJECTIONS DONE IN RADIOLOGY N/A    MRI N/A    CT SCAN N/A    XRAYS (IMAGES & REPORTS) Internal 7.13.07   TUMOR     PATHOLOGY  Slides & report N/A

## 2020-06-29 ENCOUNTER — OFFICE VISIT (OUTPATIENT)
Dept: ORTHOPEDICS | Facility: CLINIC | Age: 75
End: 2020-06-29
Payer: MEDICARE

## 2020-06-29 ENCOUNTER — PRE VISIT (OUTPATIENT)
Dept: ORTHOPEDICS | Facility: CLINIC | Age: 75
End: 2020-06-29

## 2020-06-29 DIAGNOSIS — L60.3 NAIL DYSTROPHY: ICD-10-CM

## 2020-06-29 DIAGNOSIS — E11.49 TYPE II OR UNSPECIFIED TYPE DIABETES MELLITUS WITH NEUROLOGICAL MANIFESTATIONS, NOT STATED AS UNCONTROLLED(250.60) (H): Primary | ICD-10-CM

## 2020-06-29 NOTE — NURSING NOTE
Reason For Visit:   Chief Complaint   Patient presents with     Consult     Diabetic foot exam. Toe nail trimming.        Pain Assessment  Patient Currently in Pain: Yes  Primary Pain Location: (Left hip, right knee.)        Allergies   Allergen Reactions     Hydromorphone Itching     Ketamine Other (See Comments)           Love Evans LPN

## 2020-06-29 NOTE — PROGRESS NOTES
Date of Service: 6/29/2020    Chief Complaint   Patient presents with     Consult     Diabetic foot exam. Toe nail trimming.           HPI: Mikayla is a 74 year old female who presents today for a diabetic foot exam and management. She relates that she has been diabetic for years. She is being managed by Dr. Rodas at [facility]. She relates that she does use insulin. Does have any neuropathy symptoms in her feet. She had polio when she was 2. Relates that she has not seen pod in about 10 years. She wears crocs because she cannot bend her hip to get shoes on. She cannot perform nail care by herself. Relates that she does have an uncomfortable sensation in her feet, but does not want oral medication for this.     Review of Systems: No n/v/d/f/c/ns/sob/cp    PMH: LBP  Postpolio syndrome  Carpal Tunnel  T2DM  HTN    PsxHx: Right THR    Allergies: Hydromorphone and Ketamine    SH:   Social History     Socioeconomic History     Marital status: Unknown     Spouse name: Not on file     Number of children: Not on file     Years of education: Not on file     Highest education level: Not on file   Occupational History     Not on file   Social Needs     Financial resource strain: Not on file     Food insecurity     Worry: Not on file     Inability: Not on file     Transportation needs     Medical: Yes     Non-medical: Yes   Tobacco Use     Smoking status: Never Smoker     Smokeless tobacco: Never Used   Substance and Sexual Activity     Alcohol use: Yes     Comment: Lightly     Drug use: No     Sexual activity: Not on file   Lifestyle     Physical activity     Days per week: Not on file     Minutes per session: Not on file     Stress: Not on file   Relationships     Social connections     Talks on phone: Not on file     Gets together: Not on file     Attends Tenriism service: Not on file     Active member of club or organization: Not on file     Attends meetings of clubs or organizations: Not on file     Relationship status: Not  on file     Intimate partner violence     Fear of current or ex partner: Not on file     Emotionally abused: Not on file     Physically abused: Not on file     Forced sexual activity: Not on file   Other Topics Concern     Parent/sibling w/ CABG, MI or angioplasty before 65F 55M? Not Asked   Social History Narrative     Not on file       FH: No family history on file.    Objective:    Hemoglobin A1C   Date Value Ref Range Status   05/01/2020 7.0 (H) 4.1 - 5.7 % Final   09/04/2019 6.6 (H) 4.1 - 5.7 % Final   05/31/2019 6.5 (H) 4.1 - 5.7 % Final         PT pulses are not palpable 2/2 edema and DP pulses are 2/4 bilaterally. CRT is WNL. Diminished pedal hair.   Gross sensation is intact bilaterally. Protective sensation is normal except at the plantar halluces.   Equinus is noted bilaterally. No pain with active or passive ROM of the ankle, MTJ, 1st ray, or halluces bilaterally,.   Nails elongated and dystrophic bilaterally. No open lesions are noted.     Assessment: DM2 with neuropathy - controlled.   Nail dystrophy x 10.    Plan:  - Pt seen and evaluated  - Nails trimmed x 10.   - She cannot get any other shoes on except slides. Will defer these.  - See again in 4 months.

## 2020-06-29 NOTE — LETTER
6/29/2020     RE: Mikayla Timmons  [address]  [facility] [address]    Dear Colleague,    Thank you for referring your patient, Mikayla Timmons, to the [facility]. Please see a copy of my visit note below.    Date of Service: 6/29/2020    Chief Complaint   Patient presents with     Consult     Diabetic foot exam. Toe nail trimming.           HPI: Mikayla is a 74 year old female who presents today for a diabetic foot exam and management. She relates that she has been diabetic for years. She is being managed by Dr. Rodas at [facility]. She relates that she does use insulin. Does have any neuropathy symptoms in her feet. She had polio when she was 2. Relates that she has not seen pod in about 10 years. She wears crocs because she cannot bend her hip to get shoes on. She cannot perform nail care by herself. Relates that she does have an uncomfortable sensation in her feet, but does not want oral medication for this.     Review of Systems: No n/v/d/f/c/ns/sob/cp    PMH: LBP  Postpolio syndrome  Carpal Tunnel  T2DM  HTN    PsxHx: Right THR    Allergies: Hydromorphone and Ketamine    SH:   Social History     Socioeconomic History     Marital status: Unknown     Spouse name: Not on file     Number of children: Not on file     Years of education: Not on file     Highest education level: Not on file   Occupational History     Not on file   Social Needs     Financial resource strain: Not on file     Food insecurity     Worry: Not on file     Inability: Not on file     Transportation needs     Medical: Yes     Non-medical: Yes   Tobacco Use     Smoking status: Never Smoker     Smokeless tobacco: Never Used   Substance and Sexual Activity     Alcohol use: Yes     Comment: Lightly     Drug use: No     Sexual activity: Not on file   Lifestyle     Physical activity     Days per week: Not on file     Minutes per session: Not on file     Stress: Not on file   Relationships     Social connections     Talks on phone: Not  on file     Gets together: Not on file     Attends Nondenominational service: Not on file     Active member of club or organization: Not on file     Attends meetings of clubs or organizations: Not on file     Relationship status: Not on file     Intimate partner violence     Fear of current or ex partner: Not on file     Emotionally abused: Not on file     Physically abused: Not on file     Forced sexual activity: Not on file   Other Topics Concern     Parent/sibling w/ CABG, MI or angioplasty before 65F 55M? Not Asked   Social History Narrative     Not on file       FH: No family history on file.    Objective:    Hemoglobin A1C   Date Value Ref Range Status   05/01/2020 7.0 (H) 4.1 - 5.7 % Final   09/04/2019 6.6 (H) 4.1 - 5.7 % Final   05/31/2019 6.5 (H) 4.1 - 5.7 % Final         PT pulses are not palpable 2/2 edema and DP pulses are 2/4 bilaterally. CRT is WNL. Diminished pedal hair.   Gross sensation is intact bilaterally. Protective sensation is normal except at the plantar halluces.   Equinus is noted bilaterally. No pain with active or passive ROM of the ankle, MTJ, 1st ray, or halluces bilaterally,.   Nails elongated and dystrophic bilaterally. No open lesions are noted.     Assessment: DM2 with neuropathy - controlled.   Nail dystrophy x 10.    Plan:  - Pt seen and evaluated  - Nails trimmed x 10.   - She cannot get any other shoes on except slides. Will defer these.  - See again in 4 months.       Again, thank you for allowing me to participate in the care of your patient.      Sincerely,      Swapnil Baez DPM

## 2020-07-07 DIAGNOSIS — E11.9 TYPE 2 DIABETES MELLITUS WITHOUT COMPLICATION, WITHOUT LONG-TERM CURRENT USE OF INSULIN (H): ICD-10-CM

## 2020-07-07 NOTE — TELEPHONE ENCOUNTER

## 2020-09-16 ENCOUNTER — TELEPHONE (OUTPATIENT)
Dept: FAMILY MEDICINE | Facility: CLINIC | Age: 75
End: 2020-09-16

## 2020-09-16 NOTE — LETTER
September 16, 2020      Miakyla Timmons  [address],   [place] [address]        Dear Mikayla,    This letter is to confirm that you are at high risk for poor outcomes from COVID-19, based on your age and medical health issues. Due to your health conditions, you are unable to manage your own shopping and  and thus rely on your only in-town daughter, Radha Timmons, for your health and wellness.     Sincerely,    Nini Heaton MD

## 2020-09-16 NOTE — TELEPHONE ENCOUNTER
[facility] phone call message- patient requesting to speak directly with PCP or provider.    PCP: Nini Heaton    Reason for Call: Patient calling to speak with Dr. Heaton to discuss having her daughter work from home instead of going into the classroom during COVID. Patient is requesting to be contacted today.     Additional Details:     Are you willing to speak with a nurse? NO    Is a call back needed? Yes    Patient informed that it may take up to 2 business days to hear back from PCP:Yes    OK to leave a message on voice mail? Yes    Primary language: English      needed? No    Call taken on September 16, 2020 at 11:32 AM by Renetta Vinson route to PCP unless willing to speak with a nurse.

## 2020-09-16 NOTE — TELEPHONE ENCOUNTER
Appointment coming up for lab work next week and phone visit coming up.     Radha, her daughter, needs a letter about Mikayla being a vulnerable adult during this crisis which will allow her to work on line. Advice given to her by HR.  Not sure what that all entails. Are currently on line and plan is to be in the classroom.   Radha does the house work, does the groceries and cares for Mikayla. Do not live together. Mikayla can't get into any car other than Radha's and so she is the only one that can get her where she needs to go. She brings her [employer]. [employer] is not operating right now.     Will pick it up in a week from now.

## 2020-09-23 ENCOUNTER — DOCUMENTATION ONLY (OUTPATIENT)
Dept: PHARMACY | Facility: PHYSICIAN GROUP | Age: 75
End: 2020-09-23

## 2020-09-23 DIAGNOSIS — E11.65 TYPE 2 DIABETES MELLITUS WITH HYPERGLYCEMIA, WITH LONG-TERM CURRENT USE OF INSULIN (H): ICD-10-CM

## 2020-09-23 DIAGNOSIS — Z79.4 TYPE 2 DIABETES MELLITUS WITH HYPERGLYCEMIA, WITH LONG-TERM CURRENT USE OF INSULIN (H): ICD-10-CM

## 2020-09-23 DIAGNOSIS — E83.52 HYPERCALCEMIA: ICD-10-CM

## 2020-09-23 LAB
CALCIUM SERPL-MCNC: 10 MG/DL (ref 8.5–10.1)
CREAT UR-MCNC: 86 MG/DL
HBA1C MFR BLD: 7.5 % (ref 4.1–5.7)
MICROALBUMIN UR-MCNC: 24 MG/L
MICROALBUMIN/CREAT UR: 28.42 MG/G CR (ref 0–25)
TSH SERPL DL<=0.005 MIU/L-ACNC: 2.51 MU/L (ref 0.4–4)

## 2020-09-23 NOTE — PROGRESS NOTES
Clinical Pharmacy Consult:                                                    Mikayla Timmons is a 75 year old female seen for a clinical pharmacist consult.  She was referred to me from Nini Heaton MD.     Reason for Consult: Mikayla had a wrist blood pressure cuff at home that stopped working recently. Dr. Heaton referred her to Pharm D for new cuff to continue to monitor blood pressure at home. Mikayla also requested education on how to properly use the cuff at home.    Discussion: In clinic today, Mikayla was educated on proper at-home blood pressure technique. This included sitting for at least 5 mins before monitoring, sitting with feet flat on the floor, no caffeine, smoking, or exercise 30 mins prior to reading, place cuff with cord down inside of arm and rest arm at heart level while reading. Take 3 readings in the morning and record the readings.    Plan:  1. Pharmacist will call in 2 weeks to obtain blood pressure readings and continue to monitor.    Jessica Mccarthy, Pharm D.       ===================    Pharmacy Attestation Statement:    I have verified the content of the note, which accurately reflects my assessment of the patient and the plan of care.     Jorge Luis Beckett, PharmD.    ===================

## 2020-09-24 LAB — DEPRECATED CALCIDIOL+CALCIFEROL SERPL-MC: 48 UG/L (ref 20–75)

## 2020-09-25 ENCOUNTER — VIRTUAL VISIT (OUTPATIENT)
Dept: FAMILY MEDICINE | Facility: CLINIC | Age: 75
End: 2020-09-25
Payer: MEDICARE

## 2020-09-25 VITALS — BODY MASS INDEX: 40.94 KG/M2 | WEIGHT: 246 LBS

## 2020-09-25 DIAGNOSIS — E03.9 HYPOTHYROIDISM, UNSPECIFIED TYPE: ICD-10-CM

## 2020-09-25 DIAGNOSIS — R80.9 MICROALBUMINURIA: Primary | ICD-10-CM

## 2020-09-25 DIAGNOSIS — E11.65 TYPE 2 DIABETES MELLITUS WITH HYPERGLYCEMIA, WITHOUT LONG-TERM CURRENT USE OF INSULIN (H): ICD-10-CM

## 2020-09-25 RX ORDER — THYROID 120 MG/1
TABLET ORAL
Qty: 90 TABLET | Refills: 1 | Status: SHIPPED | OUTPATIENT
Start: 2020-09-25 | End: 2021-08-02

## 2020-09-25 NOTE — PROGRESS NOTES
"Family Medicine Telephone Visit Note           Telephone Visit Consent   Patient was verbally read the following and verbal consent was obtained.    \"Telephone visits are billed at different rates depending on your insurance coverage. During this emergency period, for some insurers they may be billed the same as an in-person visit.  Please reach out to your insurance provider with any questions.  If during the course of the call the physician/provider feels a telephone visit is not appropriate, you will not be charged for this service.\"    Name person giving consent:  Patient   Date verbal consent given:  9/25/2020  Time verbal consent given:  8:46 AM     Chief Complaint   Patient presents with     Follow Up     Lab results she had done on Wednesday, pt infroms to be taking a advance nerve supplement (reccommended OTC),      Refill Request     Thyroid and insulin     Hypertension     pt informs to have recieved a BP cuff but the machine did not come with batteries so she is not able to record her BP until next week when her batteries come in.        Due to patient being non-English speaking/uses sign language, an  was used for this visit. Only for face-to-face interpretation by an external agency, date and length of interpretation can be found on the scanned worksheet.             HPI   Patients name: Mikayla  Appointment start time:  9:14 AM    She got the letter.     Is not doing as well with her DM.    Notes that her eating habits are worse. Is not getting the food that she normally gets partially due to COVID. Has talked to her daughter who buys her food for her and so her daughter will try to do better. Also found out that [employer] will deliver and [employer] will  and deliver food for no charge.      BG: her numbers are going up more now.  Is eating more fruit. Is eating more volumes  There were 4 times where her meter said she was over 600. This was the two prior weeks. This week has " "been fine.    When 600, took 7 Units.  She then checked it every hour until normalized and then by 5 am it was back down to 200. Before bedtime. Then she can't sleep due to being afraid.  She then checks the next morning and it will be around 200 or less. Lowest it gets is 127.  All day yesterday was 143 (7 am)- 138 (noon) and 147(7 pm). Ate well yesterday.   Another issue is staying up late and then eating late.   Drinks about 2.5L of fluids every day. Has been doing less recently.     Insulin: is still on 12 Units and 10 Units. NPH    Weight - has regained. Now at 246.4. Was down to 211 a year ago.       HTN:  Not able to check due to no batteries. Does not have an old cuff.   PT was coming twice weekly and was doing pretty well.      Thyroid:  Reviewed and is normal    Vitamin D:  Reviewed. Has run out of meds at this time for the last months.         Current Outpatient Medications   Medication Sig Dispense Refill     acetaminophen (TYLENOL) 325 MG tablet Take 975 mg by mouth as needed       blood glucose (NO BRAND SPECIFIED) test strip 100 strips by In Vitro route 3 times daily For OneTouch Ultra Blue. 200 strip 3     Blood Glucose Monitoring Suppl (ONE TOUCH ULTRA)        insulin  UNIT/ML vial Inject subcutaneous 15 units in the AM and 11 units in the PM 10 mL 3     insulin pen needle (31G X 5 MM) 31G X 5 MM miscellaneous Use 3 pen needles daily or as directed. 100 each 11     insulin regular 100 UNIT/ML vial For premeal  -200 give 1 Unit. Increase by 1 unit for each additional BG of 50. 10 mL 3     insulin syringe-needle U-100 (31G X 5/16\" 0.3 ML) 31G X 5/16\" 0.3 ML miscellaneous Use 2 syringes daily or as directed. 100 each 11     OneTouch Delica Lancets 33G MISC 1 Units 4 times daily Use to test blood sugar 4 times daily or as directed. 1 each 4     thyroid (ARMOUR) 120 MG tablet Take 1 tablet (120 mg) daily for 6 days out of the week. On seventh day, take nothing. 90 tablet 1     STATIN NOT " "PRESCRIBED (INTENTIONAL) Please choose reason not prescribed, below (Patient not taking: Reported on 5/20/2020)       Allergies   Allergen Reactions     Hydromorphone Itching     Ketamine Other (See Comments)              Review of Systems:              Physical Exam:     Wt 111.6 kg (246 lb)   LMP  (LMP Unknown)   BMI 40.94 kg/m    Estimated body mass index is 40.94 kg/m  as calculated from the following:    Height as of 4/22/20: 1.651 m (5' 5\").    Weight as of this encounter: 111.6 kg (246 lb).    Exam:  Constitutional: healthy, alert and no distress  Psychiatric: mentation appears normal and affect normal/bright    Results from last visit:  Orders Only on 09/23/2020   Component Date Value Ref Range Status     Calcium 09/23/2020 10.0  8.5 - 10.1 mg/dL Final     Vitamin D Deficiency screening 09/23/2020 48  20 - 75 ug/L Final    Comment: Season, race, dietary intake, and treatment affect the concentration of   25-hydroxy-Vitamin D. Values may decrease during winter months and increase   during summer months. Values 20-29 ug/L may indicate Vitamin D insufficiency   and values <20 ug/L may indicate Vitamin D deficiency.  Vitamin D determination is routinely performed by an immunoassay specific for   25 hydroxyvitamin D3.  If an individual is on vitamin D2 (ergocalciferol)   supplementation, please specify 25 OH vitamin D2 and D3 level determination by   LCMSMS test VITD23.       TSH 09/23/2020 2.51  0.40 - 4.00 mU/L Final     Hemoglobin A1C 09/23/2020 7.5* 4.1 - 5.7 % Final     Creatinine Urine 09/23/2020 86  mg/dL Final     Albumin Urine mg/L 09/23/2020 24  mg/L Final     Albumin Urine mg/g Cr 09/23/2020 28.42* 0 - 25 mg/g Cr Final           Assessment and Plan   Mikayla was seen today for follow up, refill request and hypertension.    Diagnoses and all orders for this visit:    Microalbuminuria - reviewed that she should be on an Ace Inh regardless of HTN.  She will be meeting by phone with Jessica, our Pharm D " resident, in 5 days to review her BP numbers with her cuff at home.  Jessica should consider starting low dose Ace if her BP is above 110/70 and normal.  Mikayla pays for her own meds so is very resistant to adding.  Reviewed the benefits today.     Type 2 diabetes mellitus with hyperglycemia, without long-term current use of insulin (H) - worried with her increased weight that she is more insulin resistant and needing higher doses.  She was better this week but based on 600s last week, will increase am NPH to 15 and pm to 11.  Total increase of 4 units.  She will continue to frequently check and if too low, to back down to her usual dosing. Will not change her rapid dosing.    Recent BG of 600s. Made Mikayla aware that this can cause confusion, is typically managed in the hospital and that I worry about her doing the mgmt at home. If this happens again, to take 10 Units of insulin, drink 2 L of fluids and call 911 if any signs of confusion or symptoms.  She notes being completely asymptomatic at the time. Also to call our clinic asap so that we can help her manage.   -     insulin  UNIT/ML vial; Inject subcutaneous 15 units in the AM and 11 units in the PM    Hypothyroidism, unspecified type - reassured that at goal. No need to change mgmt at this time.   -     thyroid (ARMOUR) 120 MG tablet; Take 1 tablet (120 mg) daily for 6 days out of the week. On seventh day, take nothing.        Refilled medications that would be required in the next 3 months.     After Visit Information:  Patient chose to view AVS via [employer]    No follow-ups on file.    Appointment end time: 9:51 AM  This is a telephone visit that took 37 minutes.      Clinician location:  [facility]     Nini Heaton MD

## 2020-09-25 NOTE — PATIENT INSTRUCTIONS
Here is the plan from today's visit    1. Type 2 diabetes mellitus with hyperglycemia, without long-term current use of insulin (H)  Increase your NPH insulin in the morning to 15 Units and then 11 Units in the pm.   If your sugars are low then please go back down to your normal dosing.   I am going to ask Jessica/Jorge Luis if they can check on your diabetes  If any questions, call over the weekend  - insulin  UNIT/ML vial; Inject subcutaneous 15 units in the AM and 11 units in the PM  Dispense: 10 mL; Refill: 3    2. Microalbuminuria  I recommend that you start on Lisinopril, even if your blood pressure is normal, if it is higher than 110/70. This medication protects your kidneys from more scarring even if you don't have high blood pressure. Also, treating high blood pressure is super important as well - so glad you are working with Jessica to figure that part out.     3. Hypothyroidism, unspecified type  Level is perfect. No need to change anything!  - thyroid (ARMOUR) 120 MG tablet; Take 1 tablet (120 mg) daily for 6 days out of the week. On seventh day, take nothing.  Dispense: 90 tablet; Refill: 1        Follow up plan  Return in about 3 months (around 12/25/2020) for phone, in person, video, Lab Work.  (choose between phone/video or in-person).  Also to keep working with Devora on your sugars in the meantime.      Thank you for coming to [facility] today.  Lab Testing:  **If you had lab testing today and your results are reassuring or normal they will be mailed to you or sent through [employer] within 7 days.   **If the lab tests need quick action we will call you with the results.  The phone number we will call with results is # [phone] (home) . If this is not the best number please call our clinic and change the number.  Medication Refills:  If you need any refills please call your pharmacy and they will contact us.   If you need to  your refill at a new pharmacy, please contact the  new pharmacy directly. The new pharmacy will help you get your medications transferred faster.   Scheduling:  If you have any concerns about today's visit or wish to schedule another appointment please call our office during normal business hours [phone] (8-5:00 M-F)  If a referral was made to a [facility] and you don't get a call from central scheduling please call [phone].  If a Mammogram was ordered for you at [facility] call [phone] to schedule or change your appointment.  If you had an XRay/CT/Ultrasound/MRI ordered the number is [phone] to schedule or change your radiology appointment.   Medical Concerns:  If you have urgent medical concerns please call [phone] at any time of the day.    Nini Heaton MD

## 2020-09-25 NOTE — Clinical Note
Srikanth Lopez  I talked to Jorge Luis about her. Hoping that in addition to managing her BPs with the new cuff, you can revisit her sugars and decide if adding lisinopril is reasonable with her microalbinuria.   Migdalia

## 2020-10-07 DIAGNOSIS — Z79.4 TYPE 2 DIABETES MELLITUS WITH HYPERGLYCEMIA, WITH LONG-TERM CURRENT USE OF INSULIN (H): Primary | ICD-10-CM

## 2020-10-07 DIAGNOSIS — E11.65 TYPE 2 DIABETES MELLITUS WITH HYPERGLYCEMIA, WITH LONG-TERM CURRENT USE OF INSULIN (H): Primary | ICD-10-CM

## 2020-10-08 ENCOUNTER — TELEPHONE (OUTPATIENT)
Dept: PHARMACY | Facility: CLINIC | Age: 75
End: 2020-10-08

## 2020-10-08 NOTE — TELEPHONE ENCOUNTER
Clinical Pharmacy Consult:                                                    Mikayla Timmons is a 75 year old female called for a clinical pharmacist consult.  She was referred to me from Nini Heaton MD .     Reason for Consult:Follow up with at home blood pressure cuff monitoring.      Discussion:     Mikayla reports that due to carpal tunnel and prior dislocated thumbs she is having a difficult time getting her cuff on to measure her BP at home. When she has been able to get readings they have been 150s/70s-80s. She is unable to locate her log where she has been writing them down.    She agreed to have her daughter help her get readings when she comes over on Sundays.    Mikayla also had questions on whether it was safe to eat grapefruit while she was on [place] thyroid. It was explained as long as she takes [place] on an empty stomach 1 hour before food, it is unlikely to have a significant effect on thyroid levels.    Mikayla is also concerned with weight gain and insulin. She has taken metformin in the past. She doesn't care to take medications when possible and pays for medication out of pocket (no drug coverage). Mikayla agreed to set up a phone visit to discuss options in 2 weeks.        Plan:  1. Record blood pressure readings.  2. Ok to enjoy occasional grapefruit  3. Schedule phone visit in 2 weeks with PharmD    Jessica Mccarthy, Pharm D.

## 2020-10-08 NOTE — TELEPHONE ENCOUNTER
Clinical Pharmacy Consult:                                                    Mikayla Timmons is a 75 year old female called for a clinical pharmacist follow-up.  She was referred to me from Nini Heaton MD.     Reason for Consult: Home blood pressure monitoring follow up    No answer; LM to call back PharmFLEX Mccarthy, Pharm D.

## 2020-10-30 ENCOUNTER — OFFICE VISIT (OUTPATIENT)
Dept: ORTHOPEDICS | Facility: CLINIC | Age: 75
End: 2020-10-30
Payer: MEDICARE

## 2020-10-30 DIAGNOSIS — E11.49 TYPE II OR UNSPECIFIED TYPE DIABETES MELLITUS WITH NEUROLOGICAL MANIFESTATIONS, NOT STATED AS UNCONTROLLED(250.60) (H): Primary | ICD-10-CM

## 2020-10-30 DIAGNOSIS — L60.3 NAIL DYSTROPHY: ICD-10-CM

## 2020-10-30 PROCEDURE — 99213 OFFICE O/P EST LOW 20 MIN: CPT | Performed by: PODIATRIST

## 2020-10-30 NOTE — PROGRESS NOTES
Date of Service: 6/29/2020    Chief Complaint   Patient presents with     RECHECK     4 month follow up Foot care          HPI: Mikayla is a 74 year old female who presents today for a diabetic foot exam and management. She relates that she would like a decreased amount of time between visits, as her nails get long and painful. Relates that she has been concerned about neuropathy and its implications. She is getting her glucose under good control.     Review of Systems: No n/v/d/f/c/ns/sob/cp    PMH: LBP  Postpolio syndrome  Carpal Tunnel  T2DM  HTN    PsxHx: Right THR    Allergies: Hydromorphone and Ketamine    SH:   Social History     Socioeconomic History     Marital status:      Spouse name: Not on file     Number of children: Not on file     Years of education: Not on file     Highest education level: Not on file   Occupational History     Not on file   Social Needs     Financial resource strain: Not on file     Food insecurity     Worry: Not on file     Inability: Not on file     Transportation needs     Medical: Yes     Non-medical: Yes   Tobacco Use     Smoking status: Never Smoker     Smokeless tobacco: Never Used   Substance and Sexual Activity     Alcohol use: Yes     Comment: Lightly     Drug use: No     Sexual activity: Not on file   Lifestyle     Physical activity     Days per week: Not on file     Minutes per session: Not on file     Stress: Not on file   Relationships     Social connections     Talks on phone: Not on file     Gets together: Not on file     Attends Religion service: Not on file     Active member of club or organization: Not on file     Attends meetings of clubs or organizations: Not on file     Relationship status: Not on file     Intimate partner violence     Fear of current or ex partner: Not on file     Emotionally abused: Not on file     Physically abused: Not on file     Forced sexual activity: Not on file   Other Topics Concern     Parent/sibling w/ CABG, MI or  angioplasty before 65F 55M? Not Asked   Social History Narrative     Not on file       FH: No family history on file.    Objective:    Hemoglobin A1C   Date Value Ref Range Status   09/23/2020 7.5 (H) 4.1 - 5.7 % Final   05/01/2020 7.0 (H) 4.1 - 5.7 % Final   09/04/2019 6.6 (H) 4.1 - 5.7 % Final         PT pulses are not palpable 2/2 edema and DP pulses are 2/4 bilaterally. CRT is WNL. Diminished pedal hair.   Gross sensation is intact bilaterally. Protective sensation is normal except at the plantar halluces at the last visit. Intact intact.   Equinus is noted bilaterally. No pain with active or passive ROM of the ankle, MTJ, 1st ray, or halluces bilaterally,.   Nails elongated and dystrophic bilaterally. No open lesions are noted.     Assessment: DM2 with neuropathy - controlled.   Nail dystrophy x 10.    Plan:  - Pt seen and evaluated.  - I discussed neuropathy in detail with her.   - Nails trimmed x 10.   - She cannot get any other shoes on except slides. Will defer diabetic shoes.  - See again in 3 months.

## 2020-10-30 NOTE — LETTER
10/30/2020         RE: Mikayla Timmons  [address]  [place] [address]        Dear Colleague,    Thank you for referring your patient, Mikayla Timmons, to the [facility] [place]. Please see a copy of my visit note below.    Date of Service: 6/29/2020    Chief Complaint   Patient presents with     RECHECK     4 month follow up Foot care          HPI: Mikayla is a 74 year old female who presents today for a diabetic foot exam and management. She relates that she would like a decreased amount of time between visits, as her nails get long and painful. Relates that she has been concerned about neuropathy and its implications. She is getting her glucose under good control.     Review of Systems: No n/v/d/f/c/ns/sob/cp    PMH: LBP  Postpolio syndrome  Carpal Tunnel  T2DM  HTN    PsxHx: Right THR    Allergies: Hydromorphone and Ketamine    SH:   Social History     Socioeconomic History     Marital status:      Spouse name: Not on file     Number of children: Not on file     Years of education: Not on file     Highest education level: Not on file   Occupational History     Not on file   Social Needs     Financial resource strain: Not on file     Food insecurity     Worry: Not on file     Inability: Not on file     Transportation needs     Medical: Yes     Non-medical: Yes   Tobacco Use     Smoking status: Never Smoker     Smokeless tobacco: Never Used   Substance and Sexual Activity     Alcohol use: Yes     Comment: Lightly     Drug use: No     Sexual activity: Not on file   Lifestyle     Physical activity     Days per week: Not on file     Minutes per session: Not on file     Stress: Not on file   Relationships     Social connections     Talks on phone: Not on file     Gets together: Not on file     Attends Cheondoism service: Not on file     Active member of club or organization: Not on file     Attends meetings of clubs or organizations: Not on file     Relationship  status: Not on file     Intimate partner violence     Fear of current or ex partner: Not on file     Emotionally abused: Not on file     Physically abused: Not on file     Forced sexual activity: Not on file   Other Topics Concern     Parent/sibling w/ CABG, MI or angioplasty before 65F 55M? Not Asked   Social History Narrative     Not on file       FH: No family history on file.    Objective:    Hemoglobin A1C   Date Value Ref Range Status   09/23/2020 7.5 (H) 4.1 - 5.7 % Final   05/01/2020 7.0 (H) 4.1 - 5.7 % Final   09/04/2019 6.6 (H) 4.1 - 5.7 % Final         PT pulses are not palpable 2/2 edema and DP pulses are 2/4 bilaterally. CRT is WNL. Diminished pedal hair.   Gross sensation is intact bilaterally. Protective sensation is normal except at the plantar halluces at the last visit. Intact intact.   Equinus is noted bilaterally. No pain with active or passive ROM of the ankle, MTJ, 1st ray, or halluces bilaterally,.   Nails elongated and dystrophic bilaterally. No open lesions are noted.     Assessment: DM2 with neuropathy - controlled.   Nail dystrophy x 10.    Plan:  - Pt seen and evaluated.  - I discussed neuropathy in detail with her.   - Nails trimmed x 10.   - She cannot get any other shoes on except slides. Will defer diabetic shoes.  - See again in 3 months.           Swapnil Baez, DPM

## 2020-10-30 NOTE — NURSING NOTE
Reason For Visit:   Chief Complaint   Patient presents with     RECHECK     4 month follow up Foot care       LMP  (LMP Unknown)     Pain Assessment  Patient Currently in Pain: Everardo Aguirre, ATC

## 2020-11-05 DIAGNOSIS — E11.65 TYPE 2 DIABETES MELLITUS WITH HYPERGLYCEMIA, WITHOUT LONG-TERM CURRENT USE OF INSULIN (H): ICD-10-CM

## 2020-11-05 NOTE — TELEPHONE ENCOUNTER
"Request for medication refill:  insulin regular 100 UNIT/ML vial    Providers if patient needs an appointment and you are willing to give a one month supply please refill for one month and  send a letter/MyChart using \".SMILLIMITEDREFILL\" .smillimited and route chart to \"P SMI \" (Giving one month refill in non controlled medications is strongly recommended before denial)    If refill has been denied, meaning absolutely no refills without visit, please complete the smart phrase \".smirxrefuse\" and route it to the \"P SMI MED REFILLS\"  pool to inform the patient and the pharmacy.    Marcela Arteaga MA        "

## 2020-12-08 ENCOUNTER — TELEPHONE (OUTPATIENT)
Dept: PHARMACY | Facility: CLINIC | Age: 75
End: 2020-12-08

## 2020-12-08 NOTE — TELEPHONE ENCOUNTER
Called Mikayla to discuss home blood pressure readings and request appointment to discuss blood pressure and diabetes. No answer. Requested call back to make a phone appointment with Pharm D.    Jessica Mccarthy, Pharm D.

## 2020-12-31 ENCOUNTER — TELEPHONE (OUTPATIENT)
Dept: FAMILY MEDICINE | Facility: CLINIC | Age: 75
End: 2020-12-31

## 2020-12-31 DIAGNOSIS — E11.9 TYPE 2 DIABETES MELLITUS WITHOUT COMPLICATION, WITHOUT LONG-TERM CURRENT USE OF INSULIN (H): ICD-10-CM

## 2020-12-31 NOTE — TELEPHONE ENCOUNTER
"Request for medication refill: Pt is requesting test strips. Pt is testing three to five times  Daily but per medicare Part B pharmacy cant bill for variable testing. Please send new rx for five times a day for a 3 month supply    Providers if patient needs an appointment and you are willing to give a one month supply please refill for one month and  send a letter/MyChart using \".SMILLIMITEDREFILL\" .smillimited and route chart to \"P SMI \" (Giving one month refill in non controlled medications is strongly recommended before denial)    If refill has been denied, meaning absolutely no refills without visit, please complete the smart phrase \".smirxrefuse\" and route it to the \"P SMI MED REFILLS\"  pool to inform the patient and the pharmacy.    Nakia Traore, [facility]        "

## 2021-01-04 ENCOUNTER — TELEPHONE (OUTPATIENT)
Dept: FAMILY MEDICINE | Facility: CLINIC | Age: 76
End: 2021-01-04

## 2021-01-04 DIAGNOSIS — E11.9 TYPE 2 DIABETES MELLITUS WITHOUT COMPLICATION, WITHOUT LONG-TERM CURRENT USE OF INSULIN (H): ICD-10-CM

## 2021-01-04 NOTE — TELEPHONE ENCOUNTER
"Request for medication refill:  Per pharmacy medicare won't cover testing 5 times daily. Max 3 times daily--please send new Rx.      Providers if patient needs an appointment and you are willing to give a one month supply please refill for one month and  send a letter/MyChart using \".SMILLIMITEDREFILL\" .smillimited and route chart to \"P SMI \" (Giving one month refill in non controlled medications is strongly recommended before denial)    If refill has been denied, meaning absolutely no refills without visit, please complete the smart phrase \".smirxrefuse\" and route it to the \"P SMI MED REFILLS\"  pool to inform the patient and the pharmacy.    Taina Arnett, EMT        "

## 2021-01-14 ENCOUNTER — HEALTH MAINTENANCE LETTER (OUTPATIENT)
Age: 76
End: 2021-01-14

## 2021-03-08 ENCOUNTER — TELEPHONE (OUTPATIENT)
Dept: FAMILY MEDICINE | Facility: CLINIC | Age: 76
End: 2021-03-08

## 2021-03-08 NOTE — TELEPHONE ENCOUNTER
Verify that the refill encounter hasn't been started Yes    [facility] phone call message- patient requesting a refill:    Full Medication Name: Ina Freestyle Glucose Monitor    Dose: see chart     Pharmacy confirmed as   [facility] - [place] - [address]  [address] [place] [address]  Phone: [phone] Fax: [phone]  : Yes    Medication tab checked to see if medication has been sent  Yes    Additional Comments: none     OK to leave a message on voice mail? Yes    Advised patient refill may take up to 2 business days? No    Primary language: English      needed? No    Call taken on March 8, 2021 at 1:48 PM by Ester Valdivia    Route to [facility] MED REFILL

## 2021-03-08 NOTE — TELEPHONE ENCOUNTER
Attempted to reach patient today to schedule possible COVID vaccine. Left message for patient to call back at [phone], if she is interested in getting the vaccine. If patient calls back, please schedule.    Marimar Camp  Care Coordinator

## 2021-03-08 NOTE — TELEPHONE ENCOUNTER
RN spoke to patient to clarify request- states she is interested in getting the Freestyle Christophe blood glucose monitor. States she was told that it is now being covered by Medicare. Patient would like Dr. Heaton to order if appropriate and Dr. Beckett to follow up about questions and how to use it. Patient does have virtual appointment with Dr. Heaton for 3/24/21.   Shelbie Hanson RN

## 2021-03-10 DIAGNOSIS — E83.52 HYPERCALCEMIA: Primary | ICD-10-CM

## 2021-03-10 DIAGNOSIS — E11.65 TYPE 2 DIABETES MELLITUS WITH HYPERGLYCEMIA, WITHOUT LONG-TERM CURRENT USE OF INSULIN (H): ICD-10-CM

## 2021-03-10 LAB
ANION GAP SERPL CALCULATED.3IONS-SCNC: 7 MMOL/L (ref 3–14)
BUN SERPL-MCNC: 23 MG/DL (ref 7–30)
CALCIUM SERPL-MCNC: 10.3 MG/DL (ref 8.5–10.1)
CHLORIDE SERPL-SCNC: 110 MMOL/L (ref 94–109)
CHOLEST SERPL-MCNC: 231 MG/DL
CO2 SERPL-SCNC: 23 MMOL/L (ref 20–32)
CREAT SERPL-MCNC: 0.6 MG/DL (ref 0.52–1.04)
DEPRECATED CALCIDIOL+CALCIFEROL SERPL-MC: 30 UG/L (ref 20–75)
GFR SERPL CREATININE-BSD FRML MDRD: 88 ML/MIN/{1.73_M2}
GLUCOSE SERPL-MCNC: 120 MG/DL (ref 70–99)
HBA1C MFR BLD: 6.9 % (ref 4.1–5.7)
HDLC SERPL-MCNC: 54 MG/DL
LDLC SERPL CALC-MCNC: 149 MG/DL
NONHDLC SERPL-MCNC: 177 MG/DL
POTASSIUM SERPL-SCNC: 4.4 MMOL/L (ref 3.4–5.3)
SODIUM SERPL-SCNC: 141 MMOL/L (ref 133–144)
T4 FREE SERPL-MCNC: 0.63 NG/DL (ref 0.76–1.46)
TRIGL SERPL-MCNC: 139 MG/DL
TSH SERPL DL<=0.005 MIU/L-ACNC: 8.28 MU/L (ref 0.4–4)

## 2021-03-10 PROCEDURE — 36416 COLLJ CAPILLARY BLOOD SPEC: CPT

## 2021-03-10 PROCEDURE — 84439 ASSAY OF FREE THYROXINE: CPT | Performed by: FAMILY MEDICINE

## 2021-03-10 PROCEDURE — 80048 BASIC METABOLIC PNL TOTAL CA: CPT | Performed by: FAMILY MEDICINE

## 2021-03-10 PROCEDURE — 80061 LIPID PANEL: CPT | Performed by: FAMILY MEDICINE

## 2021-03-10 PROCEDURE — 84443 ASSAY THYROID STIM HORMONE: CPT | Performed by: FAMILY MEDICINE

## 2021-03-10 PROCEDURE — 83036 HEMOGLOBIN GLYCOSYLATED A1C: CPT

## 2021-03-10 PROCEDURE — 82306 VITAMIN D 25 HYDROXY: CPT | Performed by: FAMILY MEDICINE

## 2021-03-10 RX ORDER — FLASH GLUCOSE SENSOR
1 KIT MISCELLANEOUS
Qty: 2 EACH | Refills: 11 | Status: CANCELLED | OUTPATIENT
Start: 2021-03-10

## 2021-03-10 NOTE — TELEPHONE ENCOUNTER
Patient presented in clinic (here for lab appointment) to request that order for glucose monitor please be sent to [facility], fax [phone], as Medicare will cover monitor 100% through this vendor.

## 2021-03-11 ENCOUNTER — DOCUMENTATION ONLY (OUTPATIENT)
Dept: FAMILY MEDICINE | Facility: CLINIC | Age: 76
End: 2021-03-11

## 2021-03-11 NOTE — PROGRESS NOTES
"When opening a documentation only encounter, be sure to enter in \"Chief Complaint\" Forms and in \" Comments\" Title of form, description if needed.    Mikayla is a 75 year old  female  Form received via: Fax  Form now resides in: Provider Ready    Lauri Scott MA                  "

## 2021-03-12 ENCOUNTER — MEDICAL CORRESPONDENCE (OUTPATIENT)
Dept: HEALTH INFORMATION MANAGEMENT | Facility: CLINIC | Age: 76
End: 2021-03-12

## 2021-03-12 ENCOUNTER — TELEPHONE (OUTPATIENT)
Dept: FAMILY MEDICINE | Facility: CLINIC | Age: 76
End: 2021-03-12

## 2021-03-12 DIAGNOSIS — E11.65 TYPE 2 DIABETES MELLITUS WITH HYPERGLYCEMIA, WITH LONG-TERM CURRENT USE OF INSULIN (H): Primary | ICD-10-CM

## 2021-03-12 DIAGNOSIS — Z79.4 TYPE 2 DIABETES MELLITUS WITH HYPERGLYCEMIA, WITH LONG-TERM CURRENT USE OF INSULIN (H): Primary | ICD-10-CM

## 2021-03-12 RX ORDER — FLASH GLUCOSE SCANNING READER
1 EACH MISCELLANEOUS ONCE
Qty: 1 EACH | Refills: 0 | Status: SHIPPED | OUTPATIENT
Start: 2021-03-12 | End: 2021-03-13

## 2021-03-12 RX ORDER — FLASH GLUCOSE SENSOR
1 KIT MISCELLANEOUS
Qty: 2 EACH | Refills: 11 | Status: SHIPPED | OUTPATIENT
Start: 2021-03-12 | End: 2021-03-13

## 2021-03-12 NOTE — TELEPHONE ENCOUNTER
[facility] phone call message- general phone call:    Reason for call: Patient returning call from Pharm D on 3/10/21; advised to call Friday morning 3/12/21. Patient advised call is regarding a refill; no other details available.    Action Desired: Please return patient's call    Return call needed: Yes    OK to leave a message on voice mail? Yes    Advised patient response may take up to 2 business days: No    Primary language: English      needed? No    Call taken on March 12, 2021 at 8:10 AM by Ester Cee to [facility] TRIAGE

## 2021-03-12 NOTE — TELEPHONE ENCOUNTER
Spoke to pt about plan  Will send angie to  pharmacy first and see if this is covered.  If not covered will send to [facility].    Will return call on Ayad RendonD.

## 2021-03-12 NOTE — TELEPHONE ENCOUNTER
Patient calling to follow up, as she has not received call back from Pharm D yet and she needs to leave home for a while. Patient advised that she will be available again between 12-1pm today.

## 2021-03-13 DIAGNOSIS — E11.65 TYPE 2 DIABETES MELLITUS WITH HYPERGLYCEMIA, WITH LONG-TERM CURRENT USE OF INSULIN (H): ICD-10-CM

## 2021-03-13 DIAGNOSIS — Z79.4 TYPE 2 DIABETES MELLITUS WITH HYPERGLYCEMIA, WITH LONG-TERM CURRENT USE OF INSULIN (H): ICD-10-CM

## 2021-03-13 RX ORDER — FLASH GLUCOSE SENSOR
1 KIT MISCELLANEOUS
Qty: 2 EACH | Refills: 11 | Status: SHIPPED | OUTPATIENT
Start: 2021-03-13 | End: 2022-10-17

## 2021-03-13 RX ORDER — FLASH GLUCOSE SCANNING READER
1 EACH MISCELLANEOUS ONCE
Qty: 1 EACH | Refills: 0 | Status: SHIPPED | OUTPATIENT
Start: 2021-03-13 | End: 2021-03-13

## 2021-03-13 NOTE — TELEPHONE ENCOUNTER
I have called [facility] pharmacy and confirm that they are not able to process this Freestyle Christophe per their Medicare Part B contract.  Rx sent to [facility], fax [phone] per the pt's request.  I have called and left a VM for Mikayla to let her know the Rx was faxed 03/13/21 .    Jorge Luis eBckett, Pharm.D.

## 2021-03-13 NOTE — TELEPHONE ENCOUNTER
Rx sent to [facility].  Patient notified. See other Telephone encounter note.    Jorge Luis Beckett, Pharm.D.

## 2021-03-15 NOTE — PROGRESS NOTES
Form has been completed by provider.     Form sent out via: Fax to [employer] at Fax Number: [phone]  Patient informed: n/a  Output date: March 15, 2021    Lauri Scott MA      **Please close the encounter**

## 2021-03-16 NOTE — TELEPHONE ENCOUNTER
Michela from [employer] called stating they need their original paperwork and the last 2 office notes in order to process this order. Advised the paperwork was faxed yesterday, states she has not received it.

## 2021-03-16 NOTE — PROGRESS NOTES
Michela, from [employer] states she never received faxed paperwork and would like to receive the fax again with also the last 2 office visit notes.

## 2021-03-16 NOTE — TELEPHONE ENCOUNTER
Called [facility].  They state that the only missing item is a 2021 office visit note.  They need a recent office visit in order to proceed.  They are contacting the patient to explain.    Jorge Luis Beckett Pharm.D.

## 2021-03-19 ENCOUNTER — OFFICE VISIT (OUTPATIENT)
Dept: ORTHOPEDICS | Facility: CLINIC | Age: 76
End: 2021-03-19
Payer: MEDICARE

## 2021-03-19 DIAGNOSIS — E11.49 TYPE II OR UNSPECIFIED TYPE DIABETES MELLITUS WITH NEUROLOGICAL MANIFESTATIONS, NOT STATED AS UNCONTROLLED(250.60) (H): Primary | ICD-10-CM

## 2021-03-19 DIAGNOSIS — L60.3 NAIL DYSTROPHY: ICD-10-CM

## 2021-03-19 PROCEDURE — 99213 OFFICE O/P EST LOW 20 MIN: CPT | Performed by: PODIATRIST

## 2021-03-19 NOTE — LETTER
3/19/2021         RE: Mikayla Timmons  [address]  [place] [address]        Dear Colleague,    Thank you for referring your patient, Mikayla Timmons, to the [facility] [place]. Please see a copy of my visit note below.      Chief Complaint   Patient presents with     RECHECK     foot care          HPI: Mikayla is a 74 year old female who presents today for a diabetic foot exam and management. Right little toenail came off a few weeks ago. Bled a little, but dried out.     Review of Systems: No n/v/d/f/c/ns/sob/cp    PMH: LBP  Postpolio syndrome  Carpal Tunnel  T2DM  HTN    PsxHx: Right THR    Allergies: Hydromorphone and Ketamine    SH:   Social History     Socioeconomic History     Marital status:      Spouse name: Not on file     Number of children: Not on file     Years of education: Not on file     Highest education level: Not on file   Occupational History     Not on file   Social Needs     Financial resource strain: Not on file     Food insecurity     Worry: Not on file     Inability: Not on file     Transportation needs     Medical: Yes     Non-medical: Yes   Tobacco Use     Smoking status: Never Smoker     Smokeless tobacco: Never Used   Substance and Sexual Activity     Alcohol use: Yes     Comment: Lightly     Drug use: No     Sexual activity: Not on file   Lifestyle     Physical activity     Days per week: Not on file     Minutes per session: Not on file     Stress: Not on file   Relationships     Social connections     Talks on phone: Not on file     Gets together: Not on file     Attends Uatsdin service: Not on file     Active member of club or organization: Not on file     Attends meetings of clubs or organizations: Not on file     Relationship status: Not on file     Intimate partner violence     Fear of current or ex partner: Not on file     Emotionally abused: Not on file     Physically abused: Not on file     Forced sexual activity: Not  on file   Other Topics Concern     Parent/sibling w/ CABG, MI or angioplasty before 65F 55M? Not Asked   Social History Narrative     Not on file       FH: No family history on file.    Objective:    Hemoglobin A1C   Date Value Ref Range Status   03/10/2021 6.9 (H) 4.1 - 5.7 % Final   09/23/2020 7.5 (H) 4.1 - 5.7 % Final   05/01/2020 7.0 (H) 4.1 - 5.7 % Final         PT pulses are not palpable 2/2 edema and DP pulses are 2/4 bilaterally. CRT is WNL. Diminished pedal hair.   Gross sensation is intact bilaterally. Protective sensation is normal except at the plantar halluces at the last visit. Intact intact.   Equinus is noted bilaterally. No pain with active or passive ROM of the ankle, MTJ, 1st ray, or halluces bilaterally,.   Nails elongated and dystrophic bilaterally. No open lesions are noted.     Assessment: DM2 with neuropathy - controlled.   Nail dystrophy x 10.    Plan:  - Pt seen and evaluated.  - I discussed neuropathy in detail with her.   - Nails trimmed x 10. Nail bed damage 2/2 thick nail on the left 5th digit. Covered with abx ointment.   - She cannot get any other shoes on except slides. Will defer diabetic shoes.  - See again in 3 months.         Swapnil Baez DPM

## 2021-03-19 NOTE — NURSING NOTE
Reason For Visit:   Chief Complaint   Patient presents with     RECHECK     foot care       LMP  (LMP Unknown)          Gauri Aguirre, ATC

## 2021-03-19 NOTE — PROGRESS NOTES
Chief Complaint   Patient presents with     RECHECK     foot care          HPI: Mikayla is a 74 year old female who presents today for a diabetic foot exam and management. Right little toenail came off a few weeks ago. Bled a little, but dried out.     Review of Systems: No n/v/d/f/c/ns/sob/cp    PMH: LBP  Postpolio syndrome  Carpal Tunnel  T2DM  HTN    PsxHx: Right THR    Allergies: Hydromorphone and Ketamine    SH:   Social History     Socioeconomic History     Marital status:      Spouse name: Not on file     Number of children: Not on file     Years of education: Not on file     Highest education level: Not on file   Occupational History     Not on file   Social Needs     Financial resource strain: Not on file     Food insecurity     Worry: Not on file     Inability: Not on file     Transportation needs     Medical: Yes     Non-medical: Yes   Tobacco Use     Smoking status: Never Smoker     Smokeless tobacco: Never Used   Substance and Sexual Activity     Alcohol use: Yes     Comment: Lightly     Drug use: No     Sexual activity: Not on file   Lifestyle     Physical activity     Days per week: Not on file     Minutes per session: Not on file     Stress: Not on file   Relationships     Social connections     Talks on phone: Not on file     Gets together: Not on file     Attends Protestant service: Not on file     Active member of club or organization: Not on file     Attends meetings of clubs or organizations: Not on file     Relationship status: Not on file     Intimate partner violence     Fear of current or ex partner: Not on file     Emotionally abused: Not on file     Physically abused: Not on file     Forced sexual activity: Not on file   Other Topics Concern     Parent/sibling w/ CABG, MI or angioplasty before 65F 55M? Not Asked   Social History Narrative     Not on file       FH: No family history on file.    Objective:    Hemoglobin A1C   Date Value Ref Range Status   03/10/2021 6.9 (H) 4.1 - 5.7  % Final   09/23/2020 7.5 (H) 4.1 - 5.7 % Final   05/01/2020 7.0 (H) 4.1 - 5.7 % Final         PT pulses are not palpable 2/2 edema and DP pulses are 2/4 bilaterally. CRT is WNL. Diminished pedal hair.   Gross sensation is intact bilaterally. Protective sensation is normal except at the plantar halluces at the last visit. Intact intact.   Equinus is noted bilaterally. No pain with active or passive ROM of the ankle, MTJ, 1st ray, or halluces bilaterally,.   Nails elongated and dystrophic bilaterally. No open lesions are noted.     Assessment: DM2 with neuropathy - controlled.   Nail dystrophy x 10.    Plan:  - Pt seen and evaluated.  - I discussed neuropathy in detail with her.   - Nails trimmed x 10. Nail bed damage 2/2 thick nail on the left 5th digit. Covered with abx ointment.   - She cannot get any other shoes on except slides. Will defer diabetic shoes.  - See again in 3 months.

## 2021-03-24 ENCOUNTER — VIRTUAL VISIT (OUTPATIENT)
Dept: FAMILY MEDICINE | Facility: CLINIC | Age: 76
End: 2021-03-24
Payer: MEDICARE

## 2021-03-24 DIAGNOSIS — E03.9 HYPOTHYROIDISM, UNSPECIFIED TYPE: ICD-10-CM

## 2021-03-24 DIAGNOSIS — Z79.4 TYPE 2 DIABETES MELLITUS WITH HYPERGLYCEMIA, WITH LONG-TERM CURRENT USE OF INSULIN (H): Primary | ICD-10-CM

## 2021-03-24 DIAGNOSIS — E11.65 TYPE 2 DIABETES MELLITUS WITH HYPERGLYCEMIA, WITH LONG-TERM CURRENT USE OF INSULIN (H): Primary | ICD-10-CM

## 2021-03-24 DIAGNOSIS — I10 ESSENTIAL HYPERTENSION: ICD-10-CM

## 2021-03-24 PROCEDURE — 99443 PR PHYSICIAN TELEPHONE EVALUATION 21-30 MIN: CPT | Mod: 95 | Performed by: FAMILY MEDICINE

## 2021-03-24 NOTE — PROGRESS NOTES
"Mikayla is a 75 year old who is being evaluated via a billable telephone visit.      What phone number would you like to be contacted at? [phone]  How would you like to obtain your AVS? Nitish    3:07 PM     Assessment & Plan     Type 2 diabetes mellitus with hyperglycemia, with long-term current use of insulin (H)  Per last testing, and her reported BG levels her control is doing well on her current insulin dose.  Working with me and our Pharm D on getting her a CBG monitor which I think will be particularly useful to her since she truly uses her BG levels to guide her nutrition.    She is not willing to be on a statin  - EYE ADULT REFERRAL; Future    Hypothyroidism, unspecified type  TSH is worse. She reports a change in her armour to a generic version and so I am guessing that the dosing is not quite equivalent. Will start taking 120 mcg daily and repeat testing in 2 months. She and I discussed need to stay on the same brand in the future.     Essential hypertension  We don't have these values. She thought checking intermittently was not good enough - which is not the case. Will have her daughter Radha check her BPs every time they are together and send me the results.        Diagnosis or treatment significantly limited by social determinants of health - disability         BMI:   Estimated body mass index is 40.94 kg/m  as calculated from the following:    Height as of 4/22/20: 1.651 m (5' 5\").    Weight as of 9/25/20: 111.6 kg (246 lb).   Plan to increase treatment of her thyroid        Return in about 3 months (around 6/24/2021) for with me, using a phone visit.    Nini Heaton MD  [facility]   Mikayla is a 75 year old who presents for the following health issues:    HPI   Needs to be seen to get BP - key for her ongoing care   Sent home with BP monitor but due to her injured hands, she can't use it.    Her daughter can check it once every couple of weeks.      LDL " high:  Is not Ok starting a medication.   Is higher on her last check and at that time her thyroid is elevated.   Questions about whether she needs micronutrients checked    Thyroid is also not controlled - worse again - ? Why  - 8 months did not have any for 2 weeks  - is now taking it 6 days a week  - is gaining weight without any change in her eating  - is more tired  - she is on generic armour now for the last two months    DM:  Monday morning her glucometer batteries have . Can't buy those because her bank account was compromised and her card was stopped. Takes 10 days to get replaced so will be another 5 days or so.   Is doing her insulin the same she has before.  Wants to know how to deal with that.    Lowest reading was 87 in the am.  Has learned that it is important to look at what, when and how much she eats.    Highest level is the biggest mid day when she eats her biggest meal and then in the mid 200s.   Has been trying to get a Free Style. Originally sent to her pharmacy and then to a medical supply place. Needs progress notes to finish the Rx.   Has been able to dramatically control her blood sugars with very frequent blood glucose checks.            Review of Systems         Objective           Vitals:  No vitals were obtained today due to virtual visit.    Physical Exam   healthy, alert and no distress  PSYCH: Alert and oriented times 3; coherent speech, normal   rate and volume, able to articulate logical thoughts, able   to abstract reason, no tangential thoughts, no hallucinations   or delusions  Her affect is normal  RESP: No cough, no audible wheezing, able to talk in full sentences  Remainder of exam unable to be completed due to telephone visits              3:36 PM    Phone call duration: 29 minutes

## 2021-03-24 NOTE — PATIENT INSTRUCTIONS
Patient Education   Here is the plan from today's visit    1. Type 2 diabetes mellitus with hyperglycemia, with long-term current use of insulin (H)  Keep doing what you are doing! I will work with the company for the continuous blood check   Referred you for eye exam     2. Hypothyroidism, unspecified type  Increase to taking the armour every day and then come return in 2 months to repeat the levels    3. Essential hypertension  Please check your blood pressure and send me your numbers      Please call or return to clinic if your symptoms don't go away.    Follow up plan  Recheck blood work in 8 weeks    Thank you for coming to [facility] today.  Lab Testing:  **If you had lab testing today and your results are reassuring or normal they will be mailed to you or sent through [facility] within 7 days.   **If the lab tests need quick action we will call you with the results.  The phone number we will call with results is # [phone] (home) . If this is not the best number please call our clinic and change the number.  Medication Refills:  If you need any refills please call your pharmacy and they will contact us.   If you need to  your refill at a new pharmacy, please contact the new pharmacy directly. The new pharmacy will help you get your medications transferred faster.   Scheduling:  If you have any concerns about today's visit or wish to schedule another appointment please call our office during normal business hours [phone] (8-5:00 M-F)  If a referral was made to a [facility] and you don't get a call from central scheduling please call [phone].  If a Mammogram was ordered for you at [facility] call [phone] to schedule or change your appointment.  If you had an XRay/CT/Ultrasound/MRI ordered the number is [phone] to schedule or change your radiology appointment.   Medical Concerns:  If you have urgent medical concerns please call [phone] at any  time of the day.    Nini Heaton MD

## 2021-04-01 DIAGNOSIS — E11.65 TYPE 2 DIABETES MELLITUS WITH HYPERGLYCEMIA, WITHOUT LONG-TERM CURRENT USE OF INSULIN (H): ICD-10-CM

## 2021-04-01 NOTE — TELEPHONE ENCOUNTER
"Request for medication refill:  insulin  UNIT/ML vial    Providers if patient needs an appointment and you are willing to give a one month supply please refill for one month and  send a letter/MyChart using \".SMILLIMITEDREFILL\" .smillimited and route chart to \"P SMI \" (Giving one month refill in non controlled medications is strongly recommended before denial)    If refill has been denied, meaning absolutely no refills without visit, please complete the smart phrase \".smirxrefuse\" and route it to the \"P SMI MED REFILLS\"  pool to inform the patient and the pharmacy.    Marcela Arteaga MA        "

## 2021-04-08 ENCOUNTER — TELEPHONE (OUTPATIENT)
Dept: PHARMACY | Facility: CLINIC | Age: 76
End: 2021-04-08

## 2021-04-08 NOTE — TELEPHONE ENCOUNTER
Clinical Pharmacy Telephone Encounter     Mikayla called today to follow-up on status of Freestyle Christophe documentation necessary to fill meter through [employer].     She called [employer] this morning and reports a progress note from her most recent visit with Dr. Heaton is necessary for filling meter per representative with [employer].    Mikayla has one test strip left for tomorrow. Offered to send prescription to nearby pharmacy for finger-stick glucometer for her to use until Christophe is received, but Mikayla does not have transportation to a pharmacy.     Communicated to Mikayla that I would call [employer] to verify the information required and push to get meter filled and sent as soon as possible.    Mikayla spoke with Leanne at [employer] - [phone]. Will call to follow-up on current issue.    Will call Mikayla back on mobile number when issue has been resolved.     Mayela Agudelo, PharmD, BCACP

## 2021-04-08 NOTE — TELEPHONE ENCOUNTER
Clinical Pharmacy Telephone Encounter     Spoke to Leanne at [facility] ([phone]) regarding Mikayla's Freestyle Christophe. Leanne verified that the prescription was received, but that a progress note from most recent visit with Dr. Heaton is also required. Leanne requested most recent progress note and medication list be faxed to her. She is aware that Mikayla needs the meter quickly and has indicated she will be watching for this fax.     Successfully faxed progress note and medication list to [phone].     Requested a call back from Leanne regarding the progress of this Christophe order.    Mayela Agudelo, PharmD, BCACP

## 2021-04-08 NOTE — TELEPHONE ENCOUNTER
Clinical Pharmacy Telephone Encounter     [facility] called Mikayla to let her know the documentation was complete and that her prescription has been approved. They are sending her meter [employer] today.     Mikayla is worried about being without test strips for the next few days as she awaits her new meter. We discussed the concern about her blood sugars going too low. She has never experienced a low episode before (lowest number she recalls is 86mg/dL). She also rarely requires her meal-time insulin, so we discussed skipping it until getting her Freestyle Christophe. She will continue NPH and measure fasting BGs as she is able (with only a few strips left for the next few days).     We discussed symptoms of hypoglycemia and how to safely correct suspected low episodes without ability to test. Encouraged Mikayla that it is safer to err on the side of higher blood sugars without ability to test. She was able to accurately report back to me the symptoms of hypoglycemia and how to safely correct.     Because Mikayla has never used Freestyle Christophe before we discussed plans for education. She does not have ability to have video visit, but does have access to the internet. I shared some online instructional resources with her and encouraged her to call pharmacy if she has remaining questions.     Mayela Agudelo, PharmD, BCACP

## 2021-04-09 ENCOUNTER — OFFICE VISIT (OUTPATIENT)
Dept: OPHTHALMOLOGY | Facility: CLINIC | Age: 76
End: 2021-04-09
Attending: FAMILY MEDICINE
Payer: MEDICARE

## 2021-04-09 DIAGNOSIS — H52.03 HYPEROPIA OF BOTH EYES: ICD-10-CM

## 2021-04-09 DIAGNOSIS — H40.003 GLAUCOMA SUSPECT OF BOTH EYES: ICD-10-CM

## 2021-04-09 DIAGNOSIS — E11.3491 SEVERE NONPROLIFERATIVE DIABETIC RETINOPATHY OF RIGHT EYE WITHOUT MACULAR EDEMA ASSOCIATED WITH TYPE 2 DIABETES MELLITUS (H): ICD-10-CM

## 2021-04-09 DIAGNOSIS — E11.65 TYPE 2 DIABETES MELLITUS WITH HYPERGLYCEMIA, WITH LONG-TERM CURRENT USE OF INSULIN (H): ICD-10-CM

## 2021-04-09 DIAGNOSIS — E11.3522 LEFT EYE AFFECTED BY PROLIFERATIVE DIABETIC RETINOPATHY WITH TRACTION RETINAL DETACHMENT INVOLVING MACULA, ASSOCIATED WITH TYPE 2 DIABETES MELLITUS (H): Primary | ICD-10-CM

## 2021-04-09 DIAGNOSIS — Z79.4 TYPE 2 DIABETES MELLITUS WITH HYPERGLYCEMIA, WITH LONG-TERM CURRENT USE OF INSULIN (H): ICD-10-CM

## 2021-04-09 DIAGNOSIS — H52.4 PRESBYOPIA: ICD-10-CM

## 2021-04-09 DIAGNOSIS — H35.3131 EARLY DRY STAGE NONEXUDATIVE AGE-RELATED MACULAR DEGENERATION OF BOTH EYES: ICD-10-CM

## 2021-04-09 DIAGNOSIS — H25.813 COMBINED FORM OF AGE-RELATED CATARACT, BOTH EYES: ICD-10-CM

## 2021-04-09 DIAGNOSIS — H53.9 VISION CHANGES: ICD-10-CM

## 2021-04-09 PROCEDURE — 99207 FUNDUS PHOTOS OU (BOTH EYES): CPT | Performed by: OPHTHALMOLOGY

## 2021-04-09 PROCEDURE — G0463 HOSPITAL OUTPT CLINIC VISIT: HCPCS | Mod: 25

## 2021-04-09 PROCEDURE — 92235 FLUORESCEIN ANGRPH MLTIFRAME: CPT | Performed by: OPHTHALMOLOGY

## 2021-04-09 PROCEDURE — 92250 FUNDUS PHOTOGRAPHY W/I&R: CPT | Performed by: OPHTHALMOLOGY

## 2021-04-09 PROCEDURE — 99205 OFFICE O/P NEW HI 60 MIN: CPT | Performed by: OPHTHALMOLOGY

## 2021-04-09 PROCEDURE — 92015 DETERMINE REFRACTIVE STATE: CPT | Mod: GY

## 2021-04-09 PROCEDURE — 92134 CPTRZ OPH DX IMG PST SGM RTA: CPT | Performed by: OPHTHALMOLOGY

## 2021-04-09 ASSESSMENT — CONF VISUAL FIELD
OS_NORMAL: 1
OD_NORMAL: 1
METHOD: COUNTING FINGERS

## 2021-04-09 ASSESSMENT — VISUAL ACUITY
OD_SC+: -2
METHOD: SNELLEN - LINEAR
OD_PH_SC+: -1
OS_PH_SC: 20/60
OD_SC: J16
OD_SC: 20/60
OD_PH_SC: 20/30
OS_SC: 20/400
OS_SC: 20/125

## 2021-04-09 ASSESSMENT — REFRACTION_MANIFEST
OD_ADD: +2.50
OS_ADD: +2.50
OS_SPHERE: +1.25
OD_CYLINDER: +1.25
OD_AXIS: 151
OS_AXIS: 173
OS_CYLINDER: +1.50
OD_SPHERE: +1.00

## 2021-04-09 ASSESSMENT — TONOMETRY
IOP_METHOD: TONOPEN
OD_IOP_MMHG: 25
OS_IOP_MMHG: 23

## 2021-04-09 ASSESSMENT — SLIT LAMP EXAM - LIDS
COMMENTS: NORMAL
COMMENTS: NORMAL

## 2021-04-09 ASSESSMENT — CUP TO DISC RATIO
OD_RATIO: 0.35
OS_RATIO: 0.5

## 2021-04-09 NOTE — NURSING NOTE
Chief Complaints and History of Present Illnesses   Patient presents with     Diabetic Eye Exam     Chief Complaint(s) and History of Present Illness(es)     Diabetic Eye Exam     Associated symptoms: Negative for floaters and flashes    Diabetes Type: Type 2 and on insulin    Duration: 10 years    Blood Sugars: is controlled    Pain scale: 0/10              Comments     Mikayla is here as a new patient in need of a diabetic eye exam. She wears only readers, but says they don't work as well as they used to. He last eye exam was 11 years ago she says.     Last A1C was 6.2 less than a month ago.  BS today was 118    Erick Dennis COT 2:14 PM April 9, 2021

## 2021-04-09 NOTE — Clinical Note
Dear Dr. Heaton,    I saw Mikayla Timmons today in ophthalmology clinic for a diabetic eye exam. 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 I discussed with her the importance of appropriate treatment to avoid permanent vision loss and she will follow up with our retina service. She is currently scheduled for 4/20.    Thanks,  Anita

## 2021-04-09 NOTE — PROGRESS NOTES
HPI:  Mikayla Timmons is a 75 year old female presenting for diabetic eye exam.    Referred by Dr. Nini Heaton.  Type 2 diabetes on insulin.    Saw Dr. Zaragoza in 2010 as glaucoma suspect with IOP 21/24, thick pachys, open angles, slightly larger cup left eye.  No eye exam since that time.    Here for diabetic eye exam and states she has been having trouble with reading, feels this is at its worst in the morning but lasts all day.  Has been using reading glasses for the past 10 years. Feels worsening gradually and has been the worst for the past few months.  Has been having trouble with computer too.  Able to read street signs at a distance, but has to squint. Most concerned about near/intermediate distance vision.  Using OTC readers which help but are not helping enough.  Has +4.50 readers with her, had tried +5 and felt they were too much.  Does not drive.  Has floating right knee cap and cannot lift foot well enough to control pedals, no car at present. Thinking about driving in the future if gets surgical repair of joints.     No eye pain. Left eye burning since drops in eye clinic.    Diabetes on insulin. Last .  Going to start reducing insulin soon because A1c doing so well. Has been managing herself mostly with diet.  Sugars pretty constant, not fluctuating.  Issues with reading are constant and longstanding, not changing. No changes in vision with sugars.    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 No pets. Daughter lives 40 miles away and helps get her around.  Never smoker.    FH:  No known family history of blindness, glaucoma, macular  degeneration    ASSESSMENT and PLAN:  1. Severe nonproliferative 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 macular edema associated with type 2 diabetes mellitus (H)  2. Left eye affected by proliferative diabetic retinopathy with traction retinal detachment involving macula, associated with type 2 diabetes mellitus (H)  - last A1c 6.9 in March 2021, improved from 7.5 in Sept 2020, 10.6 in Sept 2019, 12.2 on 9/4/19  - blood sugars have been well-controlled recently; however, previously uncontrolled and no eye exams since 2010    - Fundus Photos OU (both eyes)  Right Eye: sharp disc margins with PPA, microaneurysms and blot hemorrhages scattered in macula/nasal/temporal/inferior and milder superior, no neovascularization, superonasal two small round flat scars; FAF with hypoAF corresponding to areas of blot hemorrhages and aneurysms  Left Eye: sharp disc margins with PPA, PVD, many blot hemorrhages with superior MARCELO, nasal neovascularization with area of peripheral hemorrhage, temporal neovascularization with temporal/inferotemporal tractional detachment extending into peripheral macula with overlying retinal hemorrhages; FAF with hypoAF corresponding to areas of heme    - OCT Retina Spectralis OU (both eyes)  Right Eye: hazy view, detached posterior hyaloid, relative thickening of subfoveal choroid, scattered drusen, temporal and superior intraretinal hemorrhages and exudate with thickening of superotemporal retina, no definite IRF, no SRF  Left Eye: hazy view, ERM, inner retinal wrinkling, intraretinal cyst perifoveally, inferotemporal tractional detachment with retina attached more peripherally, scattered drusen    - Fluorescein Angiography OU (both eyes)  Right Eye: scattered microaneurysms throughout the macula and extending nasally with concern for peripheral nasal/temporal ischemia vs imaging artifact, hyperfluorescence of superior peripheral drusen, no neovascularization  Left eye: nasal neovascularization  and hemorrhage with peripheral ischemia, temporal neovascularization and hemorrhages overlying area of tractional retinal detachment with leakage at nasal margin of detachment, diffuse microaneurysms 360 with leakage of neovascularization nasal/temporal and leakage along inferior>superior arcade with late disc leakage and no NVD, hyperfluorescence of superior peripheral drusen    - long discussion with patient and reviewed images; discussed severe diabetic retinopathy left>right with neovascularization left eye  - discussed importance of management and follow up to protect vision, risk of permanent vision loss if left untreated  - discussed importance of blood sugar control but that given extent of retinopathy she requires further treatment and that her retinopathy will not go away on its own  - recommend retina evaluation   - initiated insurance paperwork for anti-VEGF   - patient unable to transport herself and unable to use [employer] during COVID due to seating limitations and her hip issues; her daughter (Radha) brings her to appointments but works as a teacher and cannot come in the mornings, needs to take time off work for transportation --> discussed and made appointment with Dr. Rangel for Tuesday PM on 4/20    - discuss with Dr. Heaton and Dr. Rangel    3. Early dry stage nonexudative age-related macular degeneration of both eyes  - small drusen both eyes  - does not meet criteria for AREDS2  - discussed UV protection, dark leafy greens, no smoking    4. Combined form of age-related cataract, both eyes  5. Hyperopia of both eyes  6. Presbyopia  - discussed with patient visually significant cataracts limiting vision; however, need to address #1-2 as priority and she understands  - she is currently happy with uncorrected distance vision and does not drive; discussed she does not see well enough to drive and would need glasses if she starts driving again. She understands  - discussed OK to  continue with [facility] readers and would need less strong ones for intermediate (computer work) and discussed bright lighting to help with vision    7. Glaucoma suspect  - previously evaluated with Dr. Zaragoza in 2010 with thick pachys and open angles and IOP in low-mid 20s  - IOP today 25/23 with tonopen before dilation; patient then seen dilated  - clinically discs stable compared to topcon disc photos from 2010; unable to obtain repeat disc photos today as topcon machine broken  - address #1-2 above and monitor for now; can repeat disc photos for comparison at a future visit      Follow up with retina (Dr. Rangel on 4/20 at 1pm) or sooner PRN        -----------------------------------------------------------------------------------    Attestation:  Complete documentation of historical and exam elements from today's encounter can be found in the full encounter summary report (not reduplicated in this progress note). I personally obtained the chief complaint(s) and history of present illness.  I confirmed and edited as necessary the review of systems, past medical/surgical history, family history, social history, and examination findings as documented by others; and I examined the patient myself. I personally reviewed the relevant tests, images, and reports as documented above.     I formulated and edited as necessary the assessment and plan and discussed the findings and management plan with the patient and family.      Anita Lan MD

## 2021-04-10 ENCOUNTER — IMMUNIZATION (OUTPATIENT)
Dept: FAMILY MEDICINE | Facility: CLINIC | Age: 76
End: 2021-04-10
Payer: MEDICARE

## 2021-04-10 PROCEDURE — 0031A PR COVID VAC JANSSEN AD26 0.5ML: CPT

## 2021-04-10 PROCEDURE — 91303 PR COVID VAC JANSSEN AD26 0.5ML: CPT

## 2021-04-11 PROBLEM — E11.3491: Status: ACTIVE | Noted: 2021-04-11

## 2021-04-13 DIAGNOSIS — E11.3491: Primary | ICD-10-CM

## 2021-04-20 ENCOUNTER — OFFICE VISIT (OUTPATIENT)
Dept: OPHTHALMOLOGY | Facility: CLINIC | Age: 76
End: 2021-04-20
Attending: OPHTHALMOLOGY
Payer: MEDICARE

## 2021-04-20 DIAGNOSIS — E11.3512 PROLIFERATIVE DIABETIC RETINOPATHY OF LEFT EYE WITH MACULAR EDEMA ASSOCIATED WITH TYPE 2 DIABETES MELLITUS (H): ICD-10-CM

## 2021-04-20 DIAGNOSIS — E11.3391 MODERATE NONPROLIFERATIVE DIABETIC RETINOPATHY OF RIGHT EYE WITHOUT MACULAR EDEMA ASSOCIATED WITH TYPE 2 DIABETES MELLITUS (H): Primary | ICD-10-CM

## 2021-04-20 PROCEDURE — 67228 TREATMENT X10SV RETINOPATHY: CPT | Mod: LT | Performed by: OPHTHALMOLOGY

## 2021-04-20 PROCEDURE — G0463 HOSPITAL OUTPT CLINIC VISIT: HCPCS | Mod: 25

## 2021-04-20 PROCEDURE — 99207 PR BUNDLED PROCEDURE IN GLOBAL PKG: CPT | Performed by: OPHTHALMOLOGY

## 2021-04-20 ASSESSMENT — VISUAL ACUITY
METHOD: SNELLEN - LINEAR
OD_SC: 20/50
OS_PH_SC: 20/70
OD_SC+: -2
OS_PH_SC+: -1
OS_SC: 20/100

## 2021-04-20 ASSESSMENT — TONOMETRY
OS_IOP_MMHG: 26
IOP_METHOD: TONOPEN
OD_IOP_MMHG: 30

## 2021-04-20 ASSESSMENT — CUP TO DISC RATIO
OS_RATIO: 0.4
OD_RATIO: 0.4

## 2021-04-20 ASSESSMENT — EXTERNAL EXAM - LEFT EYE: OS_EXAM: NORMAL

## 2021-04-20 ASSESSMENT — CONF VISUAL FIELD
OS_NORMAL: 1
METHOD: COUNTING FINGERS
OD_NORMAL: 1

## 2021-04-20 ASSESSMENT — SLIT LAMP EXAM - LIDS
COMMENTS: NORMAL
COMMENTS: NORMAL

## 2021-04-20 ASSESSMENT — EXTERNAL EXAM - RIGHT EYE: OD_EXAM: NORMAL

## 2021-04-20 NOTE — PROGRESS NOTES
CC -   PDR    INTERVAL HISTORY - No changes since saw Dr. Lan    Initial visit with me.  Referred to retina by Edyta, saw Edyta d/t decreaed vision and had not had eye exam since ~ 2011 with Dr. Zaragoza      [facility] -   Mikayla Timmons is a  75 year old year-old patient with PDR OS referred by Dr Lan.  Developed DM II ~ 2010, seen for eye exams by Dr. Nini Velasquez and Saida Zaragoza @ [facility] in 2011, then no eye exam until 2021.  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 Lan      # OAG suspect/OHT   - IOP > 20, moderate CDR, thick pachmetry   - will follow with Edyta      return to clinic: 3-4 weeks , DFE OU, OCT OU, possible PRP OS    ATTESTATION     Attending Attestation:     Complete documentation of historical and exam elements from today's encounter can be found in the full encounter summary report (not reduplicated in this progress note).  I personally obtained the chief complaint(s) and  history of present illness.  I confirmed and edited as necessary the review of systems, past medical/surgical history, family history, social history, and examination findings as documented by others; and I examined the patient myself.  I personally reviewed the relevant tests, images, and reports as documented above.  I formulated and edited as necessary the assessment and plan and discussed the findings and management plan with the patient and family    Chelsi Rangel MD, PhD  , Vitreoretinal Surgery  Department of Ophthalmology  [facility]

## 2021-04-20 NOTE — NURSING NOTE
Chief Complaints and History of Present Illnesses   Patient presents with     Diabetic Eye Exam Follow Up     Chief Complaint(s) and History of Present Illness(es)     Diabetic Eye Exam Follow Up     Associated symptoms: itching and tearing (LE)    Response to treatment: no improvement    Pain scale: 0/10              Comments     Mikayla is here referred by Dr Lan for Type 2 diabetes mellitus with severe nonproliferative retinopathy of left eye without macular edema, unspecified whether long term insulin use (H).  Imaging already done, here for injection only today LE.  She states no vision change since last visit.    Lab Results       Component                Value               Date                       A1C                      6.9                 03/10/2021                 A1C                      7.5                 09/23/2020                 BS today was 123             Erick Dennis COT 1:02 PM April 20, 2021

## 2021-04-28 DIAGNOSIS — E11.3491: Primary | ICD-10-CM

## 2021-05-04 ENCOUNTER — OFFICE VISIT (OUTPATIENT)
Dept: OPHTHALMOLOGY | Facility: CLINIC | Age: 76
End: 2021-05-04
Attending: OPHTHALMOLOGY
Payer: MEDICARE

## 2021-05-04 DIAGNOSIS — E11.3491: ICD-10-CM

## 2021-05-04 PROCEDURE — 99213 OFFICE O/P EST LOW 20 MIN: CPT | Mod: 25 | Performed by: OPHTHALMOLOGY

## 2021-05-04 PROCEDURE — 92134 CPTRZ OPH DX IMG PST SGM RTA: CPT | Performed by: OPHTHALMOLOGY

## 2021-05-04 PROCEDURE — 67228 TREATMENT X10SV RETINOPATHY: CPT | Mod: LT | Performed by: OPHTHALMOLOGY

## 2021-05-04 PROCEDURE — G0463 HOSPITAL OUTPT CLINIC VISIT: HCPCS | Mod: 25

## 2021-05-04 PROCEDURE — 99207 PANRETINAL PHOTOCOAGULATION (PRP) OS (LEFT EYE): CPT | Mod: LT | Performed by: OPHTHALMOLOGY

## 2021-05-04 ASSESSMENT — CONF VISUAL FIELD
OS_INFERIOR_NASAL_RESTRICTION: 3
OD_NORMAL: 1
OS_SUPERIOR_NASAL_RESTRICTION: 3

## 2021-05-04 ASSESSMENT — SLIT LAMP EXAM - LIDS
COMMENTS: NORMAL
COMMENTS: NORMAL

## 2021-05-04 ASSESSMENT — CUP TO DISC RATIO
OS_RATIO: 0.4
OD_RATIO: 0.4

## 2021-05-04 ASSESSMENT — VISUAL ACUITY
OS_SC: 20/300
OD_PH_SC: 20/60
OS_PH_SC: 20/60-2
OD_SC: 20/70+
METHOD: SNELLEN - LINEAR

## 2021-05-04 ASSESSMENT — EXTERNAL EXAM - RIGHT EYE: OD_EXAM: NORMAL

## 2021-05-04 ASSESSMENT — TONOMETRY
IOP_METHOD: TONOPEN
OD_IOP_MMHG: 23
OS_IOP_MMHG: 19

## 2021-05-04 ASSESSMENT — EXTERNAL EXAM - LEFT EYE: OS_EXAM: NORMAL

## 2021-05-04 NOTE — PROGRESS NOTES
CC -   PDR    INTERVAL HISTORY - VA blurry, no changes in night or peripheral vision      PMH -   Mikayla Timmons is a  75 year old year-old patient with PDR OS referred by Dr Lan.  Developed DM II ~ 2010, seen for eye exams by Dr. Nini Velasquez and Saida Zaragoza @ [facility] in 2011, then no eye exam until 2021.  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   - BP/BG control   - given asymmetry check carotid U/S    # PDR OS with DM II and DME   - PRP done 4/20/21 (350 spots)   - further fill-in today (125 spots today 5/4/21)     - recheck 1 month      - r/b/a PRP d/w patient (peripheral vision loss, night vision loss)    # DME OU   - very mild on OCT      # 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   - no RD, not threatenign fovea   - observe      # VH OS   - mild      # NS/PSC OU   - visually significant   - will address after PDR stable    - has seen Dr. Lan      # OAG suspect/OHT   - IOP > 20, moderate CDR, thick pachmetry   - will follow with Edyta      return to clinic:  1 month, OCT OU, DFE OU    ATTESTATION     Attending Attestation:     Complete documentation of historical and exam elements from today's encounter can be found in the full encounter summary report (not reduplicated in this progress note).  I personally obtained the chief complaint(s) and history of present illness.  I confirmed and edited as necessary the review of systems, past  medical/surgical history, family history, social history, and examination findings as documented by others; and I examined the patient myself.  I personally reviewed the relevant tests, images, and reports as documented above.  I formulated and edited as necessary the assessment and plan and discussed the findings and management plan with the patient and family    Chelsi Rangel MD, PhD  , Vitreoretinal Surgery  Department of Ophthalmology  [facility]

## 2021-05-09 ENCOUNTER — HEALTH MAINTENANCE LETTER (OUTPATIENT)
Age: 76
End: 2021-05-09

## 2021-05-21 ENCOUNTER — TELEPHONE (OUTPATIENT)
Dept: FAMILY MEDICINE | Facility: CLINIC | Age: 76
End: 2021-05-21

## 2021-05-21 NOTE — TELEPHONE ENCOUNTER
"Rn received call from patient who is currently at [facility] for dislocated hip. She states her hospital nurse questioned why she was on her current doses of insulin when her blood sugar has been normal, and the hospital nurse suggested patient consult her primary about it.     This RN inquired whether there was an inpatient endocrine consult that had occurred, patient stated no. This RN also advised that the available BGs that are in the chart didn't appear to be too labile. Patient stated \"I just want Dr. Heaton to look at this because the insulin could kill me, and I want to live another 50 years.\"  RN also assured patient that there are frequent hospital BG checks, but patient mentioned she would like the reassurance and to be rid of the anxiety surrounding insulin right now. RN will route message to Dr. Heaton, patient requesting phone call follow up.     Brady Jang RN    "

## 2021-05-28 NOTE — TELEPHONE ENCOUNTER
Reviewed that her hospital team discharged her on her usual insulin. Did not call back. Will hopefully reconnect in her follow up visit.

## 2021-06-02 ENCOUNTER — TRANSFERRED RECORDS (OUTPATIENT)
Dept: HEALTH INFORMATION MANAGEMENT | Facility: CLINIC | Age: 76
End: 2021-06-02

## 2021-06-09 ENCOUNTER — VIRTUAL VISIT (OUTPATIENT)
Dept: FAMILY MEDICINE | Facility: CLINIC | Age: 76
End: 2021-06-09
Payer: MEDICARE

## 2021-06-09 DIAGNOSIS — M25.561 CHRONIC PAIN OF RIGHT KNEE: ICD-10-CM

## 2021-06-09 DIAGNOSIS — G89.29 CHRONIC PAIN OF RIGHT KNEE: ICD-10-CM

## 2021-06-09 DIAGNOSIS — S73.005D CLOSED DISLOCATION OF LEFT HIP, SUBSEQUENT ENCOUNTER: Primary | ICD-10-CM

## 2021-06-09 DIAGNOSIS — Z79.4 TYPE 2 DIABETES MELLITUS WITH HYPERGLYCEMIA, WITH LONG-TERM CURRENT USE OF INSULIN (H): ICD-10-CM

## 2021-06-09 DIAGNOSIS — E11.65 TYPE 2 DIABETES MELLITUS WITH HYPERGLYCEMIA, WITH LONG-TERM CURRENT USE OF INSULIN (H): ICD-10-CM

## 2021-06-09 PROBLEM — M46.1: Status: RESOLVED | Noted: 2020-04-22 | Resolved: 2021-06-09

## 2021-06-09 PROCEDURE — 99496 TRANSJ CARE MGMT HIGH F2F 7D: CPT | Mod: 95 | Performed by: FAMILY MEDICINE

## 2021-06-09 RX ORDER — LIDOCAINE 50 MG/G
1 PATCH TOPICAL EVERY 24 HOURS
Qty: 30 PATCH | Refills: 3 | Status: SHIPPED | OUTPATIENT
Start: 2021-06-09 | End: 2021-12-10

## 2021-06-09 RX ORDER — ACETAMINOPHEN 325 MG/1
975 TABLET ORAL
COMMUNITY
Start: 2021-05-15 | End: 2023-02-02

## 2021-06-09 NOTE — PROGRESS NOTES
Mikayla is a 75 year old who is being evaluated via a billable telephone visit.      What phone number would you like to be contacted at? [phone]  How would you like to obtain your AVS? Carolinhart    Assessment & Plan     Closed dislocation of left hip, subsequent encounter  She is managing to do Ok post recent multiple dislocations.  Limited in her ability to get in and out of cars so she can't come to the office for evaluation    Chronic pain of right knee  Is having more right knee pain and Oked lidoderm patches. Is paying for her own meds.   - lidocaine (LIDODERM) 5 % patch; Place 1 patch onto the skin every 24 hours To prevent lidocaine toxicity, patient should be patch free for 12 hrs daily.    Type 2 diabetes mellitus with hyperglycemia, with long-term current use of insulin (H)  These meds were not changed. Is doing well and her sugars are at goal,.  Reviewed that limiting before bed foods might lead to hypoglycemia especially with NPH at night. Plan is to move that to 6 pm and monitor. If continues to have some lows, might need to decrease the pm dose and/or add back a snack.   Come see me in 3 months for HgA1c                   No follow-ups on file.    Nini Heaton MD  [facility] CLAIRE Degroot is a 75 year old who presents for the following health issues     HPI   1:47 PM      Hospital Follow-up Visit:    Hospital/Nursing Home/IP Rehab Facility: [facility]  Date of Admission: 5/14, 5/18, and 5/25  Date of Discharge:  with last discharge 6/2/2021  Reason(s) for Admission: Dislocation of her left hip. Went straight home from the hospital   The first two times she dislocated right after going home and was kept longer the last time. No surgery. Is working very hard to prevent further dislocation and has not had any further events.       Was your hospitalization related to COVID-19? No   Problems taking medications regularly:  None  Medication changes since discharge: Her insulins  were kept the same   While in the hospital was on Vitamin D and a blood thinner but not sent home on this. 15 Units in the am 11 in the pm. Not requiring the rapid acting  Also got a steroid injection in her right knee  Pain is tylenol which is taking rarely  Sent home on lidoderm patch and needs more  Problems adhering to non-medication therapy:  None    Summary of hospitalization:  CareEverywhere information obtained and reviewed  -  Primarily managed her dislocation through pain mgmt, insulin mgmt, physical therapy.     Diagnostic Tests/Treatments reviewed.  Follow up needed: none  Other Healthcare Providers Involved in Patient s Care: PT at home twice a week.          Update since discharge: improved.     Now has a hospital bed which she agreed to to limit her movements that cause the dislocation  Post Discharge Medication Reconciliation: discharge medications reconciled, continue medications without change.  Plan of care communicated with patient                    B, 91, 115,124, 150, 177  Is eating earlier in the evenings and finds her sugars are better in the am. One time she woke up at 3 am and was sweating and she checked her blood sugar 68.   Takes her NPH at 7 pm.    Review of Systems         Objective       Vitals:  No vitals were obtained today due to virtual visit.    Physical Exam   healthy, alert and no distress  PSYCH: Alert and oriented times 3; coherent speech, normal   rate and volume,  Her affect is normal  RESP: No cough, no audible wheezing, able to talk in full sentences  Remainder of exam unable to be completed due to telephone visits              2:09 PM  Phone call duration: 24 minutes

## 2021-06-09 NOTE — PATIENT INSTRUCTIONS
Patient Education   Here is the plan from today's visit    1. Closed dislocation of left hip, subsequent encounter  Doing better!!!    2. Chronic pain of right knee  - lidocaine (LIDODERM) 5 % patch; Place 1 patch onto the skin every 24 hours To prevent lidocaine toxicity, patient should be patch free for 12 hrs daily.  Dispense: 30 patch; Refill: 3    3. Type 2 diabetes mellitus with hyperglycemia, with long-term current use of insulin (H)  Keep doing what you are doing and drop your evening NPH to 6 pm so that you decrease your chance of having low sugars in the middle of the night.  Plan to come back in 3 months for testing.       Please call or return to clinic if your symptoms don't go away.    Follow up plan  Return in about 3 months (around 9/9/2021) for in person.    Thank you for coming to [facility] today.  COVID-19 Vaccine:  If you are eligible for the COVID-19 vaccine, you can schedule via [employer] or call [facility] Sched[facility] at [phone]. If you need assistance with scheduling, please speak to a Care Coordinator or your provider.   Lab Testing:  **If you had lab testing today and your results are reassuring or normal they will be mailed to you or sent through [employer] within 7 days.   **If the lab tests need quick action we will call you with the results.  **If you are having labs done on a different day, please call [phone] to schedule at [facility] or [phone] for other [facility] locations.   The phone number we will call with results is # [phone] (home) . If this is not the best number please call our clinic and change the number.  Medication Refills:  If you need any refills please call your pharmacy and they will contact us.   If you need to  your refill at a new pharmacy, please contact the new pharmacy directly. The new pharmacy will help you get your medications transferred faster.   Scheduling:  If you have any concerns about today's visit or wish to  schedule another appointment please call our office during normal business hours [phone] (8-5:00 M-F)  If a referral was made to a [facility] and you don't get a call from central scheduling please call [phone].  If a Mammogram was ordered for you at [facility] call [phone] to schedule or change your appointment.  If you had an EKG/XRay/CT/Ultrasound/MRI ordered the number is [phone] to schedule or change your radiology appointment.   Medical Concerns:  If you have urgent medical concerns please call [phone] at any time of the day.    Nini Heaton MD

## 2021-06-11 DIAGNOSIS — E11.3491: Primary | ICD-10-CM

## 2021-06-21 ENCOUNTER — TELEPHONE (OUTPATIENT)
Dept: OPHTHALMOLOGY | Facility: CLINIC | Age: 76
End: 2021-06-21

## 2021-06-21 NOTE — TELEPHONE ENCOUNTER
[facility] Call Center    Phone Message    May a detailed message be left on voicemail: yes     Reason for Call: Other: Pt said she has questions about her Eye Glass Prescription from Dr Lan, Please call Pt back to discuss    Thank you,    Action Taken: Message routed to:  [facility]    Travel Screening: Not Applicable

## 2021-06-22 NOTE — TELEPHONE ENCOUNTER
I spoke to the patient who needs the PD for her eyeglasses.  She will have someone take her PD for her online eyeglass shop    She also notes that she cannot get into a car currently because of reoccurring dislocated hip.  She is concerned about her retina follow up.  She will schedule as soon as she is able to travel.

## 2021-07-04 ENCOUNTER — TRANSFERRED RECORDS (OUTPATIENT)
Dept: HEALTH INFORMATION MANAGEMENT | Facility: CLINIC | Age: 76
End: 2021-07-04

## 2021-07-04 ENCOUNTER — HEALTH MAINTENANCE LETTER (OUTPATIENT)
Age: 76
End: 2021-07-04

## 2021-07-16 ENCOUNTER — VIRTUAL VISIT (OUTPATIENT)
Dept: FAMILY MEDICINE | Facility: CLINIC | Age: 76
End: 2021-07-16
Payer: MEDICARE

## 2021-07-16 DIAGNOSIS — E83.52 HYPERCALCEMIA: ICD-10-CM

## 2021-07-16 DIAGNOSIS — R60.0 LOCALIZED EDEMA: ICD-10-CM

## 2021-07-16 DIAGNOSIS — E11.9 TYPE 2 DIABETES MELLITUS WITHOUT COMPLICATION, WITHOUT LONG-TERM CURRENT USE OF INSULIN (H): Primary | ICD-10-CM

## 2021-07-16 DIAGNOSIS — E03.9 HYPOTHYROIDISM, UNSPECIFIED TYPE: ICD-10-CM

## 2021-07-16 PROCEDURE — 99214 OFFICE O/P EST MOD 30 MIN: CPT | Mod: 95 | Performed by: FAMILY MEDICINE

## 2021-07-16 NOTE — PATIENT INSTRUCTIONS
Patient Education   Here is the plan from today's visit    1. Type 2 diabetes mellitus without complication, without long-term current use of insulin (H)  Doing well!  CGM working well.  No change in insulin needs. Keep tracking and at some point, we have to figure out how to check your Hemoglobin A1c.  Would you be Ok with a nurse coming to draw blood at your home?    2. Hypercalcemia  Has had two slightly elevated levels in the last 6 months. May be related to the armour if there is some contamination so offered switching to synthroid to test that option. Otherwise, we need to repeat test your calcium in the next 3 months and if still high do some of the testing to figure out why.   - Calcium; Future    3. Hypothyroidism, unspecified type  DIsucssion around switching to synthroid because it is more consistent. You will get back to me in a few days on what you decide to do.     4. Localized edema  Likely from being in the hospital and getting IV fluids and your inability to walk as much as you should.  Great to hear you are starting to walk. Keeping your legs up as much as you can is important too. I think it is fine to try a diuretic tea to see if that helps.       Please call or return to clinic if your symptoms don't go away.    Follow up plan  No follow-ups on file.    Thank you for coming to [facility] today.  COVID-19 Vaccine:  If you are eligible for the COVID-19 vaccine, you can schedule via [email or web address] or call [facility] Scheduling at [phone]. If you need assistance with scheduling, please speak to a Care Coordinator or your provider.   Lab Testing:  **If you had lab testing today and your results are reassuring or normal they will be mailed to you or sent through [email or web address] within 7 days.   **If the lab tests need quick action we will call you with the results.  **If you are having labs done on a different day, please call [phone] to schedule at [facility] or [phone] for other [facility] locations.   The phone number we will call with results is # [phone] (home) . If this is not the best number please call our clinic and change the number.  Medication Refills:  If you need any refills please call your pharmacy and they will contact us.   If you need to  your refill at a new pharmacy, please contact the new pharmacy directly. The new pharmacy will help you get your medications transferred faster.   Scheduling:  If you have any concerns about today's visit or wish to schedule another appointment please call our office during normal business hours [phone] (8-5:00 M-F)  If a referral was made to a [facility] and you don't get a call from central scheduling please call [phone].  If a Mammogram was ordered for you at [facility] call [phone] to schedule or change your appointment.  If you had an EKG/XRay/CT/Ultrasound/MRI ordered the number is [phone] to schedule or change your radiology appointment.   Medical Concerns:  If you have urgent medical concerns please call [phone] at any time of the day.    Nini Heaton MD

## 2021-08-02 ENCOUNTER — TELEPHONE (OUTPATIENT)
Dept: FAMILY MEDICINE | Facility: CLINIC | Age: 76
End: 2021-08-02

## 2021-08-02 DIAGNOSIS — L89.309 PRESSURE INJURY OF SKIN OF BUTTOCK, UNSPECIFIED INJURY STAGE, UNSPECIFIED LATERALITY: Primary | ICD-10-CM

## 2021-08-02 DIAGNOSIS — L89.91 PRESSURE INJURY, STAGE 1, UNSPECIFIED LOCATION: Primary | ICD-10-CM

## 2021-08-02 RX ORDER — ZINC OXIDE
OINTMENT (GRAM) TOPICAL 2 TIMES DAILY PRN
Qty: 113 G | Refills: 1 | Status: SHIPPED | OUTPATIENT
Start: 2021-08-02 | End: 2021-12-10

## 2021-08-02 NOTE — TELEPHONE ENCOUNTER
[facility] phone call message- general phone call:    Reason for call: Patient is requesting an order for home nursing from [facility] because she is developing bed sores where he PT is so they caN WORK [facility]    Return call needed: Yes    OK to leave a message on voice mail? Yes    Primary language: English      needed? No    Call taken on August 2, 2021 at 11:39 AM by John Vincent

## 2021-08-02 NOTE — TELEPHONE ENCOUNTER
"Rn spoke to patient who states she is currently bed/recliner bound due to recent surgery on her hip. States her physical therapist told her she has developed a pressure ulcer on her bottom that is \"open\" and two other \"areas of concern\". Patient denies fever but does endorse pain if she is sitting directly on the area. She is requesting that Dr. Heaton place a Home Care Nursing order to evaluate and treat the ulcers. States she would like this sent to [facility] who she is currently getting her PT through so that they all have the same information. Patient requesting this be done as soon as possible and faxed over to them. She would also like a call back when it has been done.   Routing high priority to PCP to order if appropriate.   Shelbie Hanson RN    "

## 2021-08-02 NOTE — TELEPHONE ENCOUNTER
-BP normal  -Immunizations: declined flu, s/p Tdap in last 10 years  -HIV/STI screening: declined    -Negative alcohol and tobacco screening. Reviewed alcohol intake for men recommended at no more than 2 drinks a day.   -Reviewed diet recommendation of decreased intake of saturated fat and cholesterol, sodium and added sugar and increased amounts of vegetables, fruits, whole grains, legumes, and fish 1-2 times a week.  -Reviewed exercise recommendations for adults are 150 minutes a week of moderate intensity aerobic exercise and strength training two times a week.     Patient calling to check status of order- RN unable to send as it is not signed yet by provider. Will route high priority to provider.   Shelbie Hanson, RN

## 2021-08-03 ENCOUNTER — TELEPHONE (OUTPATIENT)
Dept: OPHTHALMOLOGY | Facility: CLINIC | Age: 76
End: 2021-08-03

## 2021-08-03 NOTE — TELEPHONE ENCOUNTER
Recommended f/u was for one month following 5-4-2021 appt with Dr. Rangel.    Pt not able to ambulate/transfer to car following hospitalizations recently.    Pt currently scheduled august 30th for follow up    Pt wondering if ok to wait til august 30thj    Reviewed recommmendations for one month f/u     Reviewed would be able to see in clinic if pt would be able to transfer/ambulate to automobile/clinic sooner than august 30th    No new vision changes/symptoms per pt in past 3 months and reviewed encouraging has not had changes.    Reviewed may f/u in few more weeks when feels able to come to clinic.    Reviewed may call for any worsening symptoms/vision changes between visits    Pt verbally demonstrated understanding    Soy Simon RN 5:23 PM 08/03/21            [facility] Call [facility]    Phone Message    May a detailed message be left on voicemail: yes     Reason for Call: Other: Pt requesting call back to discuss the care of her eyes as she was hospitalized and has had to reschedule a needed f/up a few times now. Please call as soon as possible to discuss, thanks!     Action Taken: Message routed to:  [facility] Eye    Travel Screening: Not Applicable

## 2021-08-20 ENCOUNTER — MEDICAL CORRESPONDENCE (OUTPATIENT)
Dept: HEALTH INFORMATION MANAGEMENT | Facility: CLINIC | Age: 76
End: 2021-08-20

## 2021-08-31 ENCOUNTER — TELEPHONE (OUTPATIENT)
Dept: FAMILY MEDICINE | Facility: CLINIC | Age: 76
End: 2021-08-31

## 2021-08-31 NOTE — TELEPHONE ENCOUNTER
Home care PT esme called stating she saw a note in chart that PCP was going to call her with BP parameters but has not seen any    Routing to PCP to advise    Esme said we can call her with the parameters and she will type something up to fax to us to sign    Demetrice Reid RN

## 2021-08-31 NOTE — TELEPHONE ENCOUNTER
Per PCP if BP >190/105 do not do PT    RN called esme and left detailed VM on secure line relaying    Demetrice Reid RN

## 2021-09-09 ENCOUNTER — DOCUMENTATION ONLY (OUTPATIENT)
Dept: FAMILY MEDICINE | Facility: CLINIC | Age: 76
End: 2021-09-09

## 2021-09-09 NOTE — PROGRESS NOTES
"When opening a documentation only encounter, be sure to enter in \"Chief Complaint\" Forms and in \" Comments\" Title of form, description if needed.    Mikayla is a 76 year old  female  Form received via: Fax  Form now resides in: Provider Ready    Lauri Scott MA                  "

## 2021-09-15 NOTE — PROGRESS NOTES
Form has been completed by provider.     Form sent out via: Fax to [facility] at Fax Number: [phone]  Patient informed: n/a  Output date: September 15, 2021    Lauri Scott MA      **Please close the encounter**

## 2021-09-27 ENCOUNTER — TRANSFERRED RECORDS (OUTPATIENT)
Dept: HEALTH INFORMATION MANAGEMENT | Facility: CLINIC | Age: 76
End: 2021-09-27

## 2021-09-29 ENCOUNTER — DOCUMENTATION ONLY (OUTPATIENT)
Dept: FAMILY MEDICINE | Facility: CLINIC | Age: 76
End: 2021-09-29

## 2021-09-29 NOTE — PROGRESS NOTES
"When opening a documentation only encounter, be sure to enter in \"Chief Complaint\" Forms and in \" Comments\" Title of form, description if needed.    Mikayla is a 76 year old  female  Form received via: [facility]OS[facility]  Form now resides in: Provider Ready    Lauri Scott MA    Form has been completed by provider.     Form sent out via: placed into scan basket for form to be scanned into patient chart   Patient informed: n/a  Output date: September 29, 2021    Lauri Scott MA      **Please close the encounter**                  "

## 2021-10-20 ENCOUNTER — NURSE TRIAGE (OUTPATIENT)
Dept: FAMILY MEDICINE | Facility: CLINIC | Age: 76
End: 2021-10-20

## 2021-10-20 NOTE — TELEPHONE ENCOUNTER
Okay to add 2 units to each dose--17 units, 13 units.  If BS>500 then would go to the ED for further evaluation.  This rise may last 3-7 days.  Thanks!  Raúl

## 2021-10-20 NOTE — TELEPHONE ENCOUNTER
[facility] phone call message-patient reporting a symptom:     Symptom: Patient called in and reported elevated blood sugars after a Cortisone shot in her knee this morning at 10:30am. Patients blood sugar is 255 after waking up from a nap. Patient is having no symptoms and is very concerned and anxious about what to do since she has no short term insulin    Same Day Visit Offered: NA    Additional comments: Patient requesting a call back today    OK to leave message on voice mail? No    Primary language: English      needed? No    Call taken on October 20, 2021 at 3:53 PM by Carmel Khan CMA

## 2021-10-20 NOTE — TELEPHONE ENCOUNTER
RN spoke to patient and relayed recommendations form Dr. Oliveros below. She was able to relay back the plan to increase by 2 units for both AM and PM doses. Advised to contact clinic if she develops any symptoms such as N/V, blurry vision, shortness of breath and present to ED if symptoms severe or BG >500. She verbalized understanding. Patient wondering how long to continue with the increased dose-per Dr. Altamirano until they normalize. RN advised I would contact her on Friday to check in and see how she is doing. Patient requesting call after 10 am. Patient also wanted Dr. Heaton to be aware and provide recommendations. Routing to PCP as requested.   Shelbie Hanson RN

## 2021-10-22 NOTE — TELEPHONE ENCOUNTER
RN spoke to patient- reports she is doing well. States sugars are still above 200, 218 this morning. RN reassured her that it has only been 2 days since her injection and that elevated sugars can last 3-7 days following steroids. She confirms she is taking 2 extra units in the morning and 2 extra in evening. Denies any symptoms. Plan to continue with the higher dosing through the weekend- patient has appointment on Monday 10/25/21 with Dr. Heaton and can follow up at that time. RN did review with patient if her sugars normalize this weekend back to her average 130s-140s she could self titrate back down to her normal 15 units in the am and 11 unit in the pm. Patient verbalized understanding. RN encouraged good hydration and health food options.     Patient also questioning if she should still have Hemglobin A1C drawn on Monday and if it would be a valid representation since she has had elevated sugars these last few days. Encouraged her to discuss this with Dr. Heaton on Monday. Patient also wanted to let Dr. Heaton know that she wants to discuss her levothyroxine and hydrochlorothiazide at upcoming appointment as those were new medications. Encouraged patient to contact on call provider if she has any questions over the weekend or develops symptoms. She verbalized understanding. Routing to PCP as requested.   Shelbie Hanson RN

## 2021-10-24 ENCOUNTER — HEALTH MAINTENANCE LETTER (OUTPATIENT)
Age: 76
End: 2021-10-24

## 2021-10-26 DIAGNOSIS — I10 ESSENTIAL HYPERTENSION: ICD-10-CM

## 2021-10-26 RX ORDER — HYDROCHLOROTHIAZIDE 12.5 MG/1
12.5 TABLET ORAL DAILY
Qty: 30 TABLET | Refills: 1 | Status: SHIPPED | OUTPATIENT
Start: 2021-10-26 | End: 2021-12-01

## 2021-10-26 NOTE — TELEPHONE ENCOUNTER
"Request for medication refill: hydrochlorothiazide      Providers if patient needs an appointment and you are willing to give a one month supply please refill for one month and  send a letter/MyChart using \".SMILLIMITEDREFILL\" .smillimited and route chart to \"P SMI \" (Giving one month refill in non controlled medications is strongly recommended before denial)    If refill has been denied, meaning absolutely no refills without visit, please complete the smart phrase \".smirxrefuse\" and route it to the \"P SMI MED REFILLS\"  pool to inform the patient and the pharmacy.    Nakia Traore, CMA        "

## 2021-10-27 NOTE — TELEPHONE ENCOUNTER
Patient calling back today to give status update- reports her sugars have gone back down to normal levels, was 108 this morning so she has returned to her normal 15 units of NPH in am and 11 in the PM. States she had to cancel her appointment yesterday as she was too tired to try and get into clinic with her hip and knee pain. Declines rescheduling until December- wanting to wait another month before having labs done. States she has appointment with Physical Therapist today at 10am and is seeing her orthopedic provider on Friday 10/29/21. Will call back to reschedule with Dr. Heaton next week once schedule is released.     Patient also requesting DME order for home blood pressure monitor. States she needs one that measures on her wrist as she can't use the regular upper arm devices due to her underlying conditions. Routing to PCP to order and send to  DME if appropriate.   Shelbie Hanson, RN

## 2021-11-02 NOTE — TELEPHONE ENCOUNTER
Would not recommend she get a wrist blood pressure cuff.  Has difficulty using the cuff due to her hands having carpal and injury of her left hand.  Has had a wrist one that she can use.      Radha comes most weeks. She could ask her to take one with a regular cuff. And has one already at home.     So won't order wrist one.

## 2021-11-26 ENCOUNTER — TELEPHONE (OUTPATIENT)
Dept: OPHTHALMOLOGY | Facility: CLINIC | Age: 76
End: 2021-11-26
Payer: MEDICARE

## 2021-12-01 ENCOUNTER — OFFICE VISIT (OUTPATIENT)
Dept: FAMILY MEDICINE | Facility: CLINIC | Age: 76
End: 2021-12-01
Payer: MEDICARE

## 2021-12-01 VITALS
SYSTOLIC BLOOD PRESSURE: 174 MMHG | DIASTOLIC BLOOD PRESSURE: 100 MMHG | TEMPERATURE: 98.4 F | HEART RATE: 82 BPM | OXYGEN SATURATION: 95 %

## 2021-12-01 DIAGNOSIS — E83.52 HYPERCALCEMIA: Primary | ICD-10-CM

## 2021-12-01 DIAGNOSIS — E11.65 TYPE 2 DIABETES MELLITUS WITH HYPERGLYCEMIA, WITH LONG-TERM CURRENT USE OF INSULIN (H): ICD-10-CM

## 2021-12-01 DIAGNOSIS — E03.9 HYPOTHYROIDISM, UNSPECIFIED TYPE: ICD-10-CM

## 2021-12-01 DIAGNOSIS — R60.0 LOCALIZED EDEMA: ICD-10-CM

## 2021-12-01 DIAGNOSIS — I10 ESSENTIAL HYPERTENSION: ICD-10-CM

## 2021-12-01 DIAGNOSIS — Z13.9 SCREENING FOR CONDITION: ICD-10-CM

## 2021-12-01 DIAGNOSIS — H10.13 ALLERGIC CONJUNCTIVITIS, BILATERAL: ICD-10-CM

## 2021-12-01 DIAGNOSIS — Z79.4 TYPE 2 DIABETES MELLITUS WITH HYPERGLYCEMIA, WITH LONG-TERM CURRENT USE OF INSULIN (H): ICD-10-CM

## 2021-12-01 DIAGNOSIS — Z23 HIGH PRIORITY FOR 2019-NCOV VACCINE: ICD-10-CM

## 2021-12-01 LAB
ANION GAP SERPL CALCULATED.3IONS-SCNC: 11 MMOL/L (ref 3–14)
BUN SERPL-MCNC: 18 MG/DL (ref 7–30)
CALCIUM SERPL-MCNC: 10.6 MG/DL (ref 8.5–10.1)
CHLORIDE BLD-SCNC: 106 MMOL/L
CO2 SERPL-SCNC: 26 MMOL/L (ref 20–32)
CREAT SERPL-MCNC: 0.7 MG/DL
GFR SERPL CREATININE-BSD FRML MDRD: 83 ML/MIN/1.73M2
GLUCOSE BLD-MCNC: 166 MG/DL (ref 70–99)
HBA1C MFR BLD: 7.3 % (ref 0–5.6)
POTASSIUM BLD-SCNC: 4 MMOL/L (ref 3.4–5.3)
SODIUM SERPL-SCNC: 143 MMOL/L (ref 133–144)
TSH SERPL DL<=0.005 MIU/L-ACNC: 9.33 MU/L (ref 0.4–4)

## 2021-12-01 PROCEDURE — 0064A COVID-19,PF,MODERNA (18+ YRS BOOSTER .25ML): CPT | Performed by: FAMILY MEDICINE

## 2021-12-01 PROCEDURE — 84443 ASSAY THYROID STIM HORMONE: CPT | Performed by: FAMILY MEDICINE

## 2021-12-01 PROCEDURE — 91306 COVID-19,PF,MODERNA (18+ YRS BOOSTER .25ML): CPT | Performed by: FAMILY MEDICINE

## 2021-12-01 PROCEDURE — 83036 HEMOGLOBIN GLYCOSYLATED A1C: CPT | Performed by: FAMILY MEDICINE

## 2021-12-01 PROCEDURE — 80048 BASIC METABOLIC PNL TOTAL CA: CPT | Performed by: FAMILY MEDICINE

## 2021-12-01 PROCEDURE — 99215 OFFICE O/P EST HI 40 MIN: CPT | Performed by: FAMILY MEDICINE

## 2021-12-01 PROCEDURE — 36415 COLL VENOUS BLD VENIPUNCTURE: CPT | Performed by: FAMILY MEDICINE

## 2021-12-01 PROCEDURE — 86803 HEPATITIS C AB TEST: CPT | Performed by: FAMILY MEDICINE

## 2021-12-01 RX ORDER — HYDROCHLOROTHIAZIDE 12.5 MG/1
12.5 TABLET ORAL DAILY
Qty: 30 TABLET | Refills: 11 | Status: SHIPPED | OUTPATIENT
Start: 2021-12-01 | End: 2022-10-17

## 2021-12-01 RX ORDER — OLOPATADINE HYDROCHLORIDE 1 MG/ML
1 SOLUTION/ DROPS OPHTHALMIC 2 TIMES DAILY
Qty: 5 ML | Refills: 4 | Status: SHIPPED | OUTPATIENT
Start: 2021-12-01 | End: 2024-06-21

## 2021-12-01 RX ORDER — LEVOTHYROXINE SODIUM 50 UG/1
50 TABLET ORAL DAILY
Qty: 30 TABLET | Refills: 11 | Status: SHIPPED | OUTPATIENT
Start: 2021-12-01 | End: 2022-01-24

## 2021-12-01 NOTE — PATIENT INSTRUCTIONS
Patient Education   Here is the plan from today's visit    1. Hypercalcemia  We are checking on this   - Basic metabolic panel    2. Type 2 diabetes mellitus with hyperglycemia, with long-term current use of insulin (H)  - Hemoglobin A1c  - Basic metabolic panel  - Albumin Random Urine Quantitative with Creat Ratio; Future    3. Hypothyroidism, unspecified type  - TSH; Future  - TSH    4. Screening for condition  - Hepatitis C antibody; Future  - Hepatitis C antibody    5. Essential hypertension  I would start Avapro or Cozaar for your blood pressure    6. Localized edema  Compression or possibly more water pills   - Nan Protect (EUCERIN) external cream; Apply topically 2 times daily as needed for dry skin or other  Dispense: 142 g; Refill: 4    7. Allergic conjunctivitis, bilateral  Try this to see if it helps. If any worse, you need your eyes checked- olopatadine (PATANOL) 0.1 % ophthalmic solution; Place 1 drop into both eyes 2 times daily  Dispense: 5 mL; Refill: 4      Please call or return to clinic if your symptoms don't go away.    Follow up plan  No follow-ups on file.    Thank you for coming to [facility] today.  Lab Testing:  **If you had lab testing today and your results are reassuring or normal they will be mailed to you or sent through [employer] within 7 days.   **If the lab tests need quick action we will call you with the results.  **If you are having labs done on a different day, please call [phone] to schedule at [facility] or [phone] for other [facility] locations. Labs do not offer walk-in appointments.  The phone number we will call with results is # [phone] (home) . If this is not the best number please call our clinic and change the number.  Medication Refills:  If you need any refills please call your pharmacy and they will contact us.   If you need to  your refill at a new pharmacy, please contact the new pharmacy directly. The new pharmacy will help  you get your medications transferred faster.   Scheduling:  If you have any concerns about today's visit or wish to schedule another appointment please call our office during normal business hours [phone] (8-5:00 M-F)  If a referral was made to a [facility] and you don't get a call from central scheduling please call [phone].  If a Mammogram was ordered for you at [facility] call [phone] to schedule or change your appointment.  If you had an EKG/XRay/CT/Ultrasound/MRI ordered the number is [phone] to schedule or change your radiology appointment.   [facility] has limited ultrasound appointments available on Wednesdays, if you would like your ultrasound at [facility], please call [phone] to schedule.   Medical Concerns:  If you have urgent medical concerns please call [phone] at any time of the day.    Nini Heaton MD

## 2021-12-01 NOTE — PROGRESS NOTES
Assessment & Plan     Hypercalcemia  Repeat today and if elevated, will add PTH.  Will call Mikayla with results since is worried about this and wanting to know what the plan is.   - Basic metabolic panel  - Vitamin D Level; Future    Type 2 diabetes mellitus with hyperglycemia, with long-term current use of insulin (H)  Sugars not quite as well controlled as before but at reasonable goal. She is not interested in changing her insulin so we will leave as is and she will keep working on her nutrition. 2  - Hemoglobin A1c  - Basic metabolic panel  - Albumin Random Urine Quantitative with Creat Ratio; Future    Hypothyroidism, unspecified type  - TSH; Future  - TSH  - levothyroxine (SYNTHROID/LEVOTHROID) 50 MCG tablet; Take 1 tablet (50 mcg) by mouth daily    Screening for condition  - Hepatitis C antibody; Future  - Hepatitis C antibody    Essential hypertension  Not controlled despite the hydrochlorothiazide. Discussed that an AceInh is the ideal next med but she wanted to think about this. Gave her the name of ARBs to research.  Aware of the reasons for treating. Problem with taking her BPs since she is intolerant of arm BPs.  So, not terribly sure how elevated her BPs are.   - hydrochlorothiazide (HYDRODIURIL) 12.5 MG tablet; Take 1 tablet (12.5 mg) by mouth daily    Localized edema  Discussed adding a more potent diuretic like Lasix to see if would help her edema. She already struggles with urinating more frequently in the am. Will consider.   - Nan Protect (EUCERIN) external cream; Apply topically 2 times daily as needed for dry skin or other    Allergic conjunctivitis, bilateral  Strongly recommended she see optho sooner but is not able. Will do trial of antihistamine since she definitely has allergic changes around her eyes and they itch.   - olopatadine (PATANOL) 0.1 % ophthalmic solution; Place 1 drop into both eyes 2 times daily    Type 2 diabetes mellitus with hyperglycemia, without long-term current use  of insulin (H)  Refilled  - insulin  UNIT/ML vial; Inject subcutaneous 15 units in the AM and 11 units in the PM    High priority for 2019-nCoV vaccine  Booster today  - COVID-19,PF,MODERNA (18+ Yrs BOOSTER .25mL)      I spent a total of 47 minutes on the day of the visit.   Time spent doing chart review, history and exam, documentation and further activities per the note           No follow-ups on file.    Nini Heaton MD  [facility] CLAIRE Degroot is a 76 year old who presents for the following health issues     HPI   TSH    Calcium high in the past        DM;  HgA1c = 7.1 9/2021  Can see the numbers on her current glucometer 110s to 160s.   Has ways to manage her foods to keep the sugars in range.    Continuing to be on 15 Units in am and 11 units in pm   Rare low - 80s in the evenings. Does eat an egg before bed if she has a lower number like 80 before bed.   Often wakes up nights to urinate and then checks her sugar.     HTN:   Is taking the water pill every day.   Cant get the compression hose on  No extra salt - uses 1500 mg a day      Thyroid:   Has missed only 2 - 3 times in the last few months.     Eyes:  Has an appointment with her retinal reattachment issues - left eye. Started this work 5/2021 and interrupted by her hospitalizations.   3 weeks ago started in the left eye, and her eye stated itching and started rubbing around her eyes. And then her eyes were swollen shut and then into the right eye.   Is having her eyes drainage.  Sees a bit blurry at times.    Has not used the sterile eye wash.  Eyes are sensitive to light.   Has appointment 1/2022    Hip   Is continuing to work as best she can with PT to not re-dislocate it.  Is still not able to get into a bed and sleeps in her recliner.  Working on core strength.               Review of Systems         Objective    BP (!) 174/100 (BP Location: Left arm, Patient Position: Sitting)   Pulse 82   Temp 98.4  F (36.9   C) (Oral)   LMP  (LMP Unknown)   SpO2 95%   There is no height or weight on file to calculate BMI.  Physical Exam   GENERAL: healthy, alert and no distress  NECK: no adenopathy, no asymmetry, masses, or scars and thyroid normal to palpation  FACE: diffusely mildly edematous skin around her eyes, with some accentuation of skin lines. Dry. Eyes with mild bilateral conjunctival injection and minimal drainage. ELAINE - do not look irregular.  Nl EOMI  RESP: lungs clear to auscultation - no rales, rhonchi or wheezes  CV: regular rate and rhythm, normal S1 S2, no S3 or S4, no murmur, click or rub, no peripheral edema and peripheral pulses strong  MS: no gross musculoskeletal defects noted, Has some 1+ edema in both legs with some anterior changes over her left shin (some thickened cobblestoning of the skin, no pigment changes)

## 2021-12-02 LAB — HCV AB SERPL QL IA: NONREACTIVE

## 2021-12-10 RX ORDER — LEVOTHYROXINE SODIUM 75 UG/1
75 TABLET ORAL DAILY
Qty: 30 TABLET | Refills: 11 | Status: SHIPPED | OUTPATIENT
Start: 2021-12-10 | End: 2022-10-17

## 2022-01-12 ENCOUNTER — DOCUMENTATION ONLY (OUTPATIENT)
Dept: FAMILY MEDICINE | Facility: CLINIC | Age: 77
End: 2022-01-12
Payer: MEDICARE

## 2022-01-12 DIAGNOSIS — L89.309 PRESSURE INJURY OF SKIN OF BUTTOCK, UNSPECIFIED INJURY STAGE, UNSPECIFIED LATERALITY: Primary | ICD-10-CM

## 2022-01-12 NOTE — PROGRESS NOTES
"When opening a documentation only encounter, be sure to enter in \"Chief Complaint\" Forms and in \" Comments\" Title of form, description if needed.    Mikayla is a 76 year old  female  Form received via: Fax  Form now resides in: Provider Ready    Karine Macdonald                  "

## 2022-01-13 NOTE — PROGRESS NOTES
Order for [facility] faxed to [phone]. They will review and contact patient directly

## 2022-01-17 ENCOUNTER — DOCUMENTATION ONLY (OUTPATIENT)
Dept: FAMILY MEDICINE | Facility: CLINIC | Age: 77
End: 2022-01-17
Payer: MEDICARE

## 2022-01-18 ENCOUNTER — MEDICAL CORRESPONDENCE (OUTPATIENT)
Dept: HEALTH INFORMATION MANAGEMENT | Facility: CLINIC | Age: 77
End: 2022-01-18
Payer: MEDICARE

## 2022-01-19 DIAGNOSIS — E11.3491: Primary | ICD-10-CM

## 2022-01-19 NOTE — PROGRESS NOTES
Form has been completed by provider.     Form sent out via: Fax to [facility] at Fax Number: [phone]  Patient informed: n/a  Output date: January 19, 2022    Lauri Scott MA      **Please close the encounter**

## 2022-01-24 ENCOUNTER — OFFICE VISIT (OUTPATIENT)
Dept: OPHTHALMOLOGY | Facility: CLINIC | Age: 77
End: 2022-01-24
Attending: OPHTHALMOLOGY
Payer: MEDICARE

## 2022-01-24 DIAGNOSIS — E11.3491: ICD-10-CM

## 2022-01-24 PROCEDURE — 92134 CPTRZ OPH DX IMG PST SGM RTA: CPT | Performed by: OPHTHALMOLOGY

## 2022-01-24 PROCEDURE — 67228 TREATMENT X10SV RETINOPATHY: CPT | Mod: LT | Performed by: OPHTHALMOLOGY

## 2022-01-24 PROCEDURE — 99214 OFFICE O/P EST MOD 30 MIN: CPT | Mod: 25 | Performed by: OPHTHALMOLOGY

## 2022-01-24 PROCEDURE — G0463 HOSPITAL OUTPT CLINIC VISIT: HCPCS | Mod: 25

## 2022-01-24 ASSESSMENT — TONOMETRY
OS_IOP_MMHG: 22
IOP_METHOD: TONOPEN
OD_IOP_MMHG: 25

## 2022-01-24 ASSESSMENT — VISUAL ACUITY
OD_SC: 20/100
OD_SC+: -1
OD_PH_SC: 20/50
OS_PH_SC: 20/100
OD_PH_SC+: -2
METHOD: SNELLEN - LINEAR
OS_SC: 20/300

## 2022-01-24 ASSESSMENT — CUP TO DISC RATIO
OS_RATIO: 0.4
OD_RATIO: 0.4

## 2022-01-24 ASSESSMENT — CONF VISUAL FIELD
OS_INFERIOR_NASAL_RESTRICTION: 3
OD_INFERIOR_NASAL_RESTRICTION: 3
METHOD: COUNTING FINGERS

## 2022-01-24 ASSESSMENT — SLIT LAMP EXAM - LIDS
COMMENTS: NORMAL
COMMENTS: NORMAL

## 2022-01-24 ASSESSMENT — EXTERNAL EXAM - LEFT EYE: OS_EXAM: NORMAL

## 2022-01-24 ASSESSMENT — EXTERNAL EXAM - RIGHT EYE: OD_EXAM: NORMAL

## 2022-01-24 NOTE — PROGRESS NOTES
CC -   PDR    INTERVAL HISTORY - VA worse since BRANDY, trouble reading.  No new floaters  Has had a lot of hip surgeries since BRANDY with me  Plans large hip surgery with 3D printed prosthesis later in 2022    PMH -   Mikayla Timmons is a 76 year old  patient with PDR OS referred by Dr Lan.  Developed DM II ~ 2010, seen for eye exams by Dr. Nini Velasquez and Saida Zaragoza @ [facility] in 2011, then no eye exam until 2021.  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   - BP/BG control   - given asymmetry check carotid U/S    # PDR OS with DM II and DME   - PRP done 4/20/21 (350 spots) & 5/2021 (125 spots)     - fill-in laser OS today   - r/b/a PRP d/w patient (peripheral vision loss, night vision loss)   - recheck FA next visit     - r/b/a PRP d/w patient: peripheral /night vision loss, vision loss, resident/fellow performance    # DME OU   - very mild on OCT      # 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 Lan      # OAG suspect/OHT   - IOP > 20, moderate CDR, thick pachmetry   - will follow with Edyta      return to clinic:  1 month, OCT OU, DFE OU, FA both eyes transits OS Optos    ATTESTATION     Attending Attestation:     Complete documentation of historical and exam  elements from today's encounter can be found in the full encounter summary report (not reduplicated in this progress note).  I personally obtained the chief complaint(s) and history of present illness.  I confirmed and edited as necessary the review of systems, past medical/surgical history, family history, social history, and examination findings as documented by others; and I examined the patient myself.  I personally reviewed the relevant tests, images, and reports as documented above.  I formulated and edited as necessary the assessment and plan and discussed the findings and management plan with the patient and family    Chelsi Rangel MD, PhD  , Vitreoretinal Surgery  Department of Ophthalmology  [facility]

## 2022-01-24 NOTE — NURSING NOTE
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   She has had several hip displacements since last visit which is why she put off her visits here. She stays in her wheel chair for now.     Last A1C was 7.2 in December    Erick Dennis COT 9:51 AM January 24, 2022

## 2022-01-31 ENCOUNTER — DOCUMENTATION ONLY (OUTPATIENT)
Dept: FAMILY MEDICINE | Facility: CLINIC | Age: 77
End: 2022-01-31
Payer: MEDICARE

## 2022-02-02 NOTE — PROGRESS NOTES
Form has been completed by provider.     Form sent out via: [facility] at Fax Number: [phone]  Patient informed: n/a  Output date: February 2, 2022    Lauri Scott MA      **Please close the encounter**

## 2022-02-02 NOTE — PROGRESS NOTES
Form has been completed by provider.     Form sent out via: [facility] at Fax Number: [phone]  Patient informed: n/a  Output date: February 2, 2022    Lauri Scott MA      **Please close the encounter**

## 2022-02-22 DIAGNOSIS — E11.3491: Primary | ICD-10-CM

## 2022-03-21 ENCOUNTER — TELEPHONE (OUTPATIENT)
Dept: FAMILY MEDICINE | Facility: CLINIC | Age: 77
End: 2022-03-21
Payer: MEDICARE

## 2022-03-21 DIAGNOSIS — Z79.4 TYPE 2 DIABETES MELLITUS WITH HYPERGLYCEMIA, WITH LONG-TERM CURRENT USE OF INSULIN (H): Primary | ICD-10-CM

## 2022-03-21 DIAGNOSIS — E11.65 TYPE 2 DIABETES MELLITUS WITH HYPERGLYCEMIA, WITH LONG-TERM CURRENT USE OF INSULIN (H): Primary | ICD-10-CM

## 2022-03-21 NOTE — TELEPHONE ENCOUNTER
Patient requesting prescription for DexCom G6 to be sent to [facility]. Has follow up appt with Dr. Heaton on 3/30/22. RN routing to PCP to order new device and supplies if appropriate to fax to [facility].     Brady Jang RN

## 2022-03-21 NOTE — TELEPHONE ENCOUNTER
[facility] phone call message- medication clarification/question:    Full Medication Name: Dexcomg6    Question: Patient called and said she wants a new prescription for glucose monitor Dexcomg6. She said to send prescription to [facility] fax number [phone].     Pharmacy confirmed as [facility] [address] [place] [address] [id number]: Yes    OK to leave a message on voice mail? Yes    Primary language: English      needed? No    Call taken on March 21, 2022 at 2:31 PM by Irene Guan

## 2022-03-23 NOTE — TELEPHONE ENCOUNTER
Patient called requesting a call back once this prescription is approved and sent to pharmacy. Thanks.    [phone]

## 2022-03-25 RX ORDER — FLASH GLUCOSE SENSOR
1 KIT MISCELLANEOUS
Qty: 2 EACH | Refills: 11 | Status: SHIPPED | OUTPATIENT
Start: 2022-03-25 | End: 2022-10-17

## 2022-04-10 ENCOUNTER — HEALTH MAINTENANCE LETTER (OUTPATIENT)
Age: 77
End: 2022-04-10

## 2022-05-25 ENCOUNTER — TELEPHONE (OUTPATIENT)
Dept: FAMILY MEDICINE | Facility: CLINIC | Age: 77
End: 2022-05-25
Payer: MEDICARE

## 2022-05-25 DIAGNOSIS — Z79.4 TYPE 2 DIABETES MELLITUS WITH HYPERGLYCEMIA, WITH LONG-TERM CURRENT USE OF INSULIN (H): Primary | ICD-10-CM

## 2022-05-25 DIAGNOSIS — E11.65 TYPE 2 DIABETES MELLITUS WITH HYPERGLYCEMIA, WITH LONG-TERM CURRENT USE OF INSULIN (H): Primary | ICD-10-CM

## 2022-05-25 NOTE — TELEPHONE ENCOUNTER
[facility] phone call message- patient requesting a refill:    Full Medication Name: insulin  UNIT/ML vial      Pharmacy confirmed as     [id number] - [place] - [address]  [address] [place] [address]  Phone: [phone] Fax: [phone]    : Yes    Additional Comments: The patient states she only have about a week's worth of medication left. The patient is also asking for a call from the nurse concerning her upcoming appt on 6/8/22.    OK to leave a message on voice mail? Yes    Primary language: English      needed? No    Call taken on May 25, 2022 at 1:44 PM by Nuvia Bello

## 2022-05-25 NOTE — TELEPHONE ENCOUNTER
"Request for medication refill:  insulin  UNIT/ML vial  Unable to attach medication into encounter PCP needs to review does amount and brand of insulin.       Providers if patient needs an appointment and you are willing to give a one month supply please refill for one month and  send a letter/MyChart using \".SMILLIMITEDREFILL\" .smillimited and route chart to \"P SMI \" (Giving one month refill in non controlled medications is strongly recommended before denial)    If refill has been denied, meaning absolutely no refills without visit, please complete the smart phrase \".smirxrefuse\" and route it to the \"P SMI MED REFILLS\"  pool to inform the patient and the pharmacy.    Lauri Scott MA        "

## 2022-05-26 NOTE — TELEPHONE ENCOUNTER
RN calling patient is regards to questions she has prior to her 06/08 appointment. Patient did not answer phone call and mailbox is full. Will attempt to reach out to patient later, if she calls back it's okay to route to any available RN.     Genevieve Garcia, RN

## 2022-05-26 NOTE — TELEPHONE ENCOUNTER
Rx for NPH signed.     Messaged routed to Triage RN as patient stated she needed to speak with someone prior to her 6/8 appointment.     Chani Roman, DO

## 2022-06-05 ENCOUNTER — HEALTH MAINTENANCE LETTER (OUTPATIENT)
Age: 77
End: 2022-06-05

## 2022-06-16 ENCOUNTER — TELEPHONE (OUTPATIENT)
Dept: FAMILY MEDICINE | Facility: CLINIC | Age: 77
End: 2022-06-16

## 2022-06-16 NOTE — TELEPHONE ENCOUNTER
[facility] phone call message- patient requesting to speak with PCP or provider:    PCP: Nini Heaton    Additional Comments: Patient is in rehab post unexpected surgery, would like to touch base with you(and only you, no nurse or other provider) briefly via phone call before scheduled appointment on 7/15.    Is a call back needed? Yes    Patient informed that it may take up to 2 business days to hear back from PCP:Yes    OK to leave a message on voice mail? Yes    Primary language: English      needed? No    Call taken on June 16, 2022 at 10:30 AM by Murray Peters

## 2022-07-15 ENCOUNTER — VIRTUAL VISIT (OUTPATIENT)
Dept: FAMILY MEDICINE | Facility: CLINIC | Age: 77
End: 2022-07-15
Payer: MEDICARE

## 2022-07-15 DIAGNOSIS — S73.005D CLOSED DISLOCATION OF LEFT HIP, SUBSEQUENT ENCOUNTER: Primary | ICD-10-CM

## 2022-07-15 PROCEDURE — 99442 PR PHYSICIAN TELEPHONE EVALUATION 11-20 MIN: CPT | Mod: 95 | Performed by: FAMILY MEDICINE

## 2022-07-15 NOTE — PROGRESS NOTES
Mikayla is a 76 year old who is being evaluated via a billable telephone visit.      What phone number would you like to be contacted at? Mobile - [phone]  - DID NOT WORK and so ended up calling her on the room number: [phone]  How would you like to obtain your AVS? Not relevant    8:41 AM      Assessment & Plan     Closed dislocation of left hip, subsequent encounter  Is now in rehab post total hip removal.  Supporting her best we can from afar. Aware that we will need a discharge summary to guide med restart/renewal after she is discharged.  Also reviewed how MyChart works so that she is better able to communicate with me.                     No follow-ups on file.    Nini Heaton MD  [facility]   Mikayla is a 76 year old, presenting for the following health issues:  RECHECK (DM . Pt also want recheck in general.)      HPI     [facility]  Large facility with many options including LTC, AL, etc.   Is in rehab at this time, post 4 dislocations in a row.  After the 4th ones, they ended up removing her hip entirely.  Has been in rehab for a month and might be awhile.  Have discovered she has a fracture of her femur on that side which is affecting her therapy.  Now only can put in 25% on her left side now.  Plan is to not replace her hip.      Is having a really big struggle at rehab. Has no control over her medication.  DM - is on a different insulin (Lantus) and is only once a day and at night.  Worried about how she is going to pay for it.     Has tried to use MyChart, she can't seem to access a way to communicate with me.   Just wanting to connect with me since feeling very down and without control.                Review of Systems         Objective           Vitals:  No vitals were obtained today due to virtual visit.    Physical Exam     PSYCH: Alert and oriented times 3; coherent speech, normal   rate and  volume, able to articulate logical thoughts, able   to abstract reason, no tangential thoughts, no hallucinations   or delusions  Her affect is flat  RESP: No cough, no audible wheezing, able to talk in full sentences  Remainder of exam unable to be completed due to telephone visits              8:56 AM    Phone call duration:15 minutes    .  ..

## 2022-07-31 ENCOUNTER — HEALTH MAINTENANCE LETTER (OUTPATIENT)
Age: 77
End: 2022-07-31

## 2022-08-03 NOTE — TELEPHONE ENCOUNTER
Chief Complaint: Chest pain     HPI: Chao Quezada is a 64 year old male with a past medical history of dyslpidemia who was referred to me for evaluation of chest pain by Mr. Sony Combs. He came to the appointment with his wife today.     At baseline, Mr. Quezada is a .  His work involves walking up dozens of flights of stairs a day and spending most of the day on his feet.  He notes that he was able to perform this work in an unrestricted fashion until approximately 3 months ago.  At this time, he noted that he had onset of central chest tightness when he was walking.  The chest tightness has continued to recur with activity and, in fact, become more frequent, further limiting his activity tolerance.  He notes that in the last month he has only been able to walk one flight of stairs before he has the onset of the chest tightness.  He describes this as being central but, more recently, also radiating to his left arm.  He has been able to relieve self of the chest tightness by resting.  Briefly, Mr. Quezada saw Julieta Lauryn for evaluation of this chest tightness last month.  At the time, he was given as needed nitroglycerin tablets, started on atorvastatin, and referred for an exercise stress echo.  Mr. Quezada only achieved 69% of his target heart rate but he developed wall motion abnormalities in the LAD territory.  At the stage, he was referred to cardiology for further evaluation.  Of note, Mr. Quezada notes that he has had some relief with the nitroglycerin tablets, but he does sometimes have headache with them.    At the time of the consultation, he notes an absence of chest pain at rest, dyspnea at rest or with exertion, PND, orthopnea, peripheral edema, palpitations, hemoptysis, fever, cough, lightheadedness or syncope.     A comprehensive ROS was done and the details are included above in the HPI.    Interval History 08/04/2021:  Mr. Quezada underwent coronary angiography and PCI to the LAD and  RN spoke to patient to assess symptoms- reports she had cortisone injection this morning at 1030 with [facility]. States she got home around lunch and had 1/4 chicken breast and celery then took a nap. When she woke up she checked her Christophe and it showed BG as 255. States her average for last 14 days 145, last 30 days 135. Denies fever, chest pain, shortness of breath, HA, dizziness, nausea or vomiting. States only symptom right now is feeling hungry and thirsty. States she normally drinks 2.5-3L per day but hasn't had very much today as she was worried about having to use the bathroom when she was at her appointment. Patient able to confirm she is only on insulin NPH 15 units in the AM and 11 in the PM.      Patient very anxious about her level as she has been working hard to reach her goal of <140 BG. RN advised that elevated BGs are a common side effect of steroids. Patient requesting instructions and recommendations from a provider ASAP on how to manage tonight. Routing high priority to preceptor to advise.   Shelbie Hanson RN      Additional Information    Negative: Unconscious or difficult to awaken    Negative: Acting confused (e.g., disoriented, slurred speech)    Negative: Very weak (can't stand)    Negative: Sounds like a life-threatening emergency to the triager    Negative: Vomiting and signs of dehydration (e.g., very dry mouth, lightheaded, dark urine)    Negative: Blood glucose > 240 mg/dL (13.3 mmol/L) and rapid breathing    Negative: Blood glucose > 500 mg/dL (27.8 mmol/L)    Negative: Blood glucose > 240 mg/dL (13.3 mmol/L) AND urine ketones moderate-large (or more than 1+)    Negative: Blood glucose > 240 mg/dL (13.3 mmol/L) and blood ketones > 1.4 mmol/L    Negative: Blood glucose > 240 mg/dL (13.3 mmol/L) AND vomiting AND unable to check for ketones (in blood or urine)    Negative: Vomiting lasting > 4 hours    Negative: Patient sounds very sick or weak to the triager    Negative:  "Fever > 100.4 F (38.0 C)    Negative: Caller has URGENT medication or insulin pump question and triager unable to answer question    Negative: Blood glucose > 400 mg/dL (22.2 mmol/L)    Negative: Blood glucose > 300 mg/dL (16.7 mmol/L) AND two or more times in a row    Negative: Urine ketones moderate - large (or blood ketones > 1.4 mmol/L)    Answer Assessment - Initial Assessment Questions  1. BLOOD GLUCOSE: \"What is your blood glucose level?\"       255 at 1530  2. ONSET: \"When did you check the blood glucose?\"      1530  3. USUAL RANGE: \"What is your glucose level usually?\" (e.g., usual fasting morning value, usual evening value)      Average about 145 (has freestyle angie)  4. KETONES: \"Do you check for ketones (urine or blood test strips)?\" If yes, ask: \"What does the test show now?\"       Does not  5. TYPE 1 or 2:  \"Do you know what type of diabetes you have?\"  (e.g., Type 1, Type 2, Gestational; doesn't know)       Type 2  6. INSULIN: \"Do you take insulin?\" \"What type of insulin(s) do you use? What is the mode of delivery? (syringe, pen; injection or pump)?\"       Syringe NPH 15 units AM and 11 units in the PM  7. DIABETES PILLS: \"Do you take any pills for your diabetes?\" If yes, ask: \"Have you missed taking any pills recently?\"      Denies  8. OTHER SYMPTOMS: \"Do you have any symptoms?\" (e.g., fever, frequent urination, difficulty breathing, dizziness, weakness, vomiting)      Denies- just hungry   9. PREGNANCY: \"Is there any chance you are pregnant?\" \"When was your last menstrual period?\"      denies    Protocols used: DIABETES - HIGH BLOOD SUGAR-A-OH      " LCx on 07/23/21. Since then, he has not had a single episode of angina. He has been walking his dog and playing with his three grandchildren regularly and also pursuing formal cardiac rehab. No sequelae of bleeding. He has had multiple episodes of headache with ISMN and, while this has improved somewhat, this continues on a daily basis.     A comprehensive ROS was done and was otherwise negative.     Interval History 12/06/2021:  Since his last visit, Mr. Quezada has noted a complete cessation of his angina and headache.  He has returned full-time to work and has been able to take all the steps.  He had one episode of bleeding secondary to trauma from a bird bite while he was moving a bird feeder.  Other than that he has had no sequelae of bleeding.  Mr. Quezada notes that he had a dry cough and nightmares after starting lisinopril.    A comprehensive ROS was done and the details are included above in the HPI.    Interval History 8/3/22:    Since Mr. Quezada's last visit, he has completed 12 months of ticagrelor. He is feeling quite well.  He notes that he has occasional episodes of nonanginal chest pain plan he is stressed out or upset.  He describes pain as being different in nature from the anginal pain that he had prior to his PCI.  It lasts for few minutes at a time, has a left hemithoracic location, and is self resolving after few minutes.    Otherwise, Mr. Quezada continues to work full-time.  He is planning to move to [place] in the next year.    A comprehensive ROS was done and the details are included above in the HPI.      Past Medical History:  - Coronary artery disease with PCI to LAD and LCx for ACS (unstable angina) on 07/23/2021  - Hypertension  - Dyslipidemia, started on statin in 06/2021   - No prior history of T2DM, TIA/stroke, vascular aneurysms, PAD  Past Medical History:   Diagnosis Date     Backache, unspecified      Dermatophytosis of nail 1998     Headache        Past Surgical History:    Past Surgical  History:   Procedure Laterality Date     COLONOSCOPY WITH CO2 INSUFFLATION N/A 11/30/2016    Procedure: COLONOSCOPY WITH CO2 INSUFFLATION;  Surgeon: Duane, William Charles, MD;  Location: [facility] OR     CV CORONARY ANGIOGRAM N/A 7/23/2021    Procedure: CV CORONARY ANGIOGRAM;  Surgeon: Kale Singh MD;  Location:  HE[facility] CARDIAC CATH LAB     CV PCI ANGIOPLASTY N/A 7/23/2021    Procedure: Percutaneous Transluminal Angioplasty;  Surgeon: Kale Singh MD;  Location:  HEART CARDIAC CATH LAB     CV PCI STENT DRUG ELUTING N/A 7/23/2021    Procedure: Percutaneous Coronary Intervention Stent Drug Eluting;  Surgeon: Kale Singh MD;  Location:  HEART CARDIAC CATH LAB     HC EXPLORATION ANKLE JOINT  1994    right ankle fracture with pins and screws     ZC ANESTH,KNEE JOINT; NOS  1973    right knee, with hx of dislocaton of knee cap     [facility]  [facility]        Drug History:  Home cardiac meds: Aspirin 81 mg once daily, atorvastatin 80 mg once daily, losartan 25 mg once daily; stopped ticagrelor 90 mg BID  on 07/24/22  Current Outpatient Medications   Medication Sig Dispense Refill     aspirin (ASA) 81 MG EC tablet Take 1 tablet (81 mg) by mouth daily 90 tablet 1     atorvastatin (LIPITOR) 80 MG tablet Take 1 tablet (80 mg) by mouth daily 90 tablet 2     guaiFENesin-codeine (GUAIFENESIN AC) 100-10 MG/5ML syrup Take 5 mLs by mouth nightly as needed for congestion 118 mL 0     losartan (COZAAR) 25 MG tablet Take 1 tablet (25 mg) by mouth daily 90 tablet 3     nitroGLYcerin (NITROSTAT) 0.4 MG sublingual tablet For chest pain place 1 tablet under the tongue every 5 minutes for 3 doses. If symptoms persist 5 minutes after 1st dose call 911. 25 tablet 0     terbinafine (LAMISIL) 250 MG tablet Take 1 tablet (250 mg) by mouth daily 90 tablet 0     butalbital-acetaminophen-caffeine (ESGIC) -40 MG tablet Take 1 tablet by mouth every 4 hours as needed (Patient not taking: Reported on 8/3/2022)  28 tablet 1     ticagrelor (BRILINTA) 90 MG tablet Take 1 tablet (90 mg) by mouth 2 times daily Start tomorrow morning. (Patient not taking: Reported on 8/3/2022) 180 tablet 3         Family History:  - No family history of sudden cardiac death, premature CAD, valvular disorders  Family History   Problem Relation Age of Onset     Cancer Father         throat, prostate     Prostate Cancer Father      Diabetes Mother      Cancer Brother        Social History:    Social History     Tobacco Use     Smoking status: Never Smoker     Smokeless tobacco: Never Used     Tobacco comment: never smoked   Substance Use Topics     Alcohol use: Yes     Comment: rare       Allergies   Allergen Reactions     No Known Drug Allergies          Physical Examination:  Vitals: /80 (BP Location: Left arm, Patient Position: Sitting, Cuff Size: Adult Regular)   Pulse (!) 48   Wt 88 kg (194 lb 1.6 oz)   SpO2 98%   BMI 26.58 kg/m    BMI= Body mass index is 26.58 kg/m .    GENERAL: Healthy, alert and no distress  Eyes: No xanthelasmas.  ENT: Moist mucosal membranes.  RESPIRATORY: No signs of resp distress.  CTAB.  CARDIOVASCULAR:  No JVD.  Pulse regularly regular.  S1 plus S2 without any added heart sounds or murmurs.  ABDOMEN: ND, soft, non-tender, normal bowel sounds.  EXTREMITIES: Warm, well-perfused, no edema.  NEUROLOGY: GCS 15/15, no focal deficits.  PSYCH: Cooperative, pleasant affect.       Investigations:  I personally viewed and interpreted the following investigations:    Labs:    CMP RESULTS:  Lab Results   Component Value Date     08/03/2022     01/23/2018    POTASSIUM 4.2 08/03/2022    POTASSIUM 4.1 01/23/2018    CHLORIDE 108 08/03/2022    CHLORIDE 105 01/23/2018    CO2 30 08/03/2022    CO2 26 01/23/2018    ANIONGAP 4 08/03/2022    ANIONGAP 9 01/23/2018    GLC 92 08/03/2022    GLC 74 01/23/2018    BUN 14 08/03/2022    BUN 14 01/23/2018    CR 0.86 08/03/2022    CR 0.97 01/23/2018     GFRESTIMATED >90 08/03/2022    GFRESTIMATED 78 01/23/2018    GFRESTBLACK >90 01/23/2018    NICKY 8.9 08/03/2022    NICKY 9.0 01/23/2018    BILITOTAL 0.5 10/27/2016    ALBUMIN 4.1 10/27/2016    ALKPHOS 65 10/27/2016    ALT 23 10/27/2016    AST 19 10/27/2016          LIPIDS:  Lab Results   Component Value Date    CHOL 137 08/03/2022    CHOL 203 06/16/2021     Lab Results   Component Value Date    HDL 57 08/03/2022    HDL 52 06/16/2021     Lab Results   Component Value Date    LDL 68 08/03/2022     06/16/2021     Lab Results   Component Value Date    TRIG 58 08/03/2022    TRIG 49 06/16/2021     Lab Results   Component Value Date    CHOLHDLRATIO 3.9 09/04/2015         Recent Tests:    Electrocardiogram (07/16/2021):  Sinus bradycardia, nl axis/intervals, no ischemic changes.     Echocardiogram (07/12/2021):     Abnormal exercise stress echocardiogram.  Test terminated at 69% MPHR due to chest pain and wall motion abnormality.  Normal resting images with EF 55-60%. No resting wall motion abnormalities.  With stress no increase in LV EF,patient had chest pain and LAD territory  akinesis.  No EKG evidence for ischemia.  Blunted BP response to exercise.  Normal functional capacity.  Normal baseline screening echocardiogram with no significant valvular  abnormalities. Mild mascending aortc dilation.    Coronary Angiogram 07/23/2021:      >>LM: No significant lesion    >>LAD: Diffusely diseased. Proximal LAD has 95% lesion followed by a long 50-60% mid LAD lesion. D1 ostium has a 40% lesion.    >>LCX: 95% lesion in the proximal segment.    >>RCA: 20% lesion in the proximal segment.    S/p successful PCI of the proximal to mid LAD using 3 overlapping stents (Synergy JUSTINE 3.5*32 mm, 3.0*32 mm, 2.5*38 mm from proximal to distal) - Procedure details below    S/p successful PCI of the LCX using a Synergy JUSTINE 3.5*24 mm - Procedure details below    Assessment and Plan:   Chao Quezada is an extremely pleasant 64-year old male with  a past medical history of dyslipidemia who presented to me for evaluation of unstable angina.    Mr. Quezada continues to do well with his current medical regimen.  He is having nonanginal chest pain but does not describe any typical anginal symptoms.  In view of this and an excellent blood pressure (systolic less than 130 on average and diastolic less than 80 on average), I will continue his medical regimen as is.  He stopped his ticagrelor last week after 12 months of therapy.    Problems:  1. Coronary artery disease with PCI to LAD and LCx for ACS (unstable angina) on 07/23/2021, CCS 0   2. Dyslipidemia  3. Ascending aorta of 4.1 cm on TTE (indexed value of 1.9 cm/m2)  4. Hypertension, treated      Plan:  - Continue losartan 25 mg once daily  - Continue ASA 81 mg q24h lifelong   - Continue atorvastatin 80 mg q24h   - No further aortic surveillance needed given that the ascending aorta is normal in caliber when indexed to BSA  - RTC 12 mos       A total of 40 minutes were spent on the day of the visit for chart review, care coordination, face-to-face consultation with the patient, and documentation.         Angel Bansal [facility], [place]    Cardiovascular Division  Pager [phone]    CC  Patient Care Team:  Sony Combs PA-C as PCP - General (Physician Assistant)  Sony Combs PA-C as Assigned PCP  Angel Bansal MD as Assigned Heart and Vascular Provider

## 2022-08-09 ENCOUNTER — TELEPHONE (OUTPATIENT)
Dept: OPHTHALMOLOGY | Facility: CLINIC | Age: 77
End: 2022-08-09

## 2022-08-24 ENCOUNTER — OFFICE VISIT (OUTPATIENT)
Dept: ORTHOPEDICS | Facility: CLINIC | Age: 77
End: 2022-08-24
Payer: MEDICARE

## 2022-08-24 DIAGNOSIS — L60.3 NAIL DYSTROPHY: ICD-10-CM

## 2022-08-24 DIAGNOSIS — E11.49 TYPE II OR UNSPECIFIED TYPE DIABETES MELLITUS WITH NEUROLOGICAL MANIFESTATIONS, NOT STATED AS UNCONTROLLED(250.60) (H): Primary | ICD-10-CM

## 2022-08-24 PROCEDURE — 99213 OFFICE O/P EST LOW 20 MIN: CPT | Mod: 25 | Performed by: PODIATRIST

## 2022-08-24 PROCEDURE — 11719 TRIM NAIL(S) ANY NUMBER: CPT | Performed by: PODIATRIST

## 2022-08-24 NOTE — PROGRESS NOTES
Chief Complaint   Patient presents with     Follow Up     Diabetic foot care          HPI: Mikayla is a 77  year old female who presents today for a diabetic foot exam and management.  She was seen last year.  Since then, she has had multiple surgeries on her left hip.  She relates that she was in the hospital for about 4 months.  She is now living in [facility] transitional care unit.  She is doing quite well there.  She relates that her nails are long and they cause pain when she is in shoes.  This is inhibiting her from performing her physical therapy.    Review of Systems: No n/v/d/f/c/ns/sob/cp    PMH: LBP  Postpolio syndrome  Carpal Tunnel  T2DM  HTN    No past surgical history on file.      Allergies: Hydromorphone and Ketamine    SH:   Social History     Socioeconomic History     Marital status:      Spouse name: Not on file     Number of children: Not on file     Years of education: Not on file     Highest education level: Not on file   Occupational History     Not on file   Tobacco Use     Smoking status: Never Smoker     Smokeless tobacco: Never Used   Substance and Sexual Activity     Alcohol use: Yes     Comment: Lightly     Drug use: No     Sexual activity: Not on file   Other Topics Concern     Parent/sibling w/ CABG, MI or angioplasty before 65F 55M? Not Asked   Social History Narrative     Not on file     Social Determinants of Health     Financial Resource Strain: Not on file   Food Insecurity: Not on file   Transportation Needs: Not on file   Physical Activity: Not on file   Stress: Not on file   Social Connections: Not on file   Intimate Partner Violence: Not on file   Housing Stability: Not on file       FH:   Family History   Problem Relation Age of Onset     Cancer No family hx of      Diabetes No family hx of      Glaucoma No family hx of      Hypertension No family hx of      Macular Degeneration No family hx of      Cerebrovascular Disease No family hx of      Thyroid Disease No  family hx of        Objective:    Hemoglobin A1C   Date Value Ref Range Status   12/01/2021 7.3 (H) 0.0 - 5.6 % Final     Comment:     Normal <5.7%   Prediabetes 5.7-6.4%    Diabetes 6.5% or higher     Note: Adopted from ADA consensus guidelines.   03/10/2021 6.9 (H) 4.1 - 5.7 % Final   09/23/2020 7.5 (H) 4.1 - 5.7 % Final   05/01/2020 7.0 (H) 4.1 - 5.7 % Final         PT pulses are not palpable 2/2 edema and DP pulses are 2/4 bilaterally. CRT is WNL. Diminished pedal hair.   Gross sensation is intact bilaterally. Protective sensation is normal except at the plantar halluces at the last visit. Intact intact.   Equinus is noted bilaterally. No pain with active or passive ROM of the ankle, MTJ, 1st ray, or halluces bilaterally,.   Nails elongated and dystrophic bilaterally. No open lesions are noted.     Assessment: DM2 with neuropathy - controlled.   Nail dystrophy x 10.    Plan:  - Pt seen and evaluated.  - I discussed neuropathy in detail with her.   - Nails trimmed x 10.   - She cannot get any other shoes on except slides. Will defer diabetic shoes.  - See again in 3 months.

## 2022-08-24 NOTE — LETTER
8/24/2022         RE: Mikayla Timmons  [address]  [facility] [address]        Dear Colleague,    Thank you for referring your patient, Mikayla Timmons, to the [facility] [place]. Please see a copy of my visit note below.      Chief Complaint   Patient presents with     Follow Up     Diabetic foot care          HPI: Mikayla is a 77  year old female who presents today for a diabetic foot exam and management.  She was seen last year.  Since then, she has had multiple surgeries on her left hip.  She relates that she was in the hospital for about 4 months.  She is now living in [facility] transitional care [facility].  She is doing quite well there.  She relates that her nails are long and they cause pain when she is in shoes.  This is inhibiting her from performing her physical therapy.    Review of Systems: No n/v/d/f/c/ns/sob/cp    PMH: LBP  Postpolio syndrome  Carpal Tunnel  T2DM  HTN    No past surgical history on file.      Allergies: Hydromorphone and Ketamine    SH:   Social History     Socioeconomic History     Marital status:      Spouse name: Not on file     Number of children: Not on file     Years of education: Not on file     Highest education level: Not on file   Occupational History     Not on file   Tobacco Use     Smoking status: Never Smoker     Smokeless tobacco: Never Used   Substance and Sexual Activity     Alcohol use: Yes     Comment: Lightly     Drug use: No     Sexual activity: Not on file   Other Topics Concern     Parent/sibling w/ CABG, MI or angioplasty before 65F 55M? Not Asked   Social History Narrative     Not on file     Social Determinants of Health     Financial Resource Strain: Not on file   Food Insecurity: Not on file   Transportation Needs: Not on file   Physical Activity: Not on file   Stress: Not on file   Social Connections: Not on file   Intimate Partner Violence: Not on file   Housing Stability: Not on file       FH:    Family History   Problem Relation Age of Onset     Cancer No family hx of      Diabetes No family hx of      Glaucoma No family hx of      Hypertension No family hx of      Macular Degeneration No family hx of      Cerebrovascular Disease No family hx of      Thyroid Disease No family hx of        Objective:    Hemoglobin A1C   Date Value Ref Range Status   12/01/2021 7.3 (H) 0.0 - 5.6 % Final     Comment:     Normal <5.7%   Prediabetes 5.7-6.4%    Diabetes 6.5% or higher     Note: Adopted from ADA consensus guidelines.   03/10/2021 6.9 (H) 4.1 - 5.7 % Final   09/23/2020 7.5 (H) 4.1 - 5.7 % Final   05/01/2020 7.0 (H) 4.1 - 5.7 % Final         PT pulses are not palpable 2/2 edema and DP pulses are 2/4 bilaterally. CRT is WNL. Diminished pedal hair.   Gross sensation is intact bilaterally. Protective sensation is normal except at the plantar halluces at the last visit. Intact intact.   Equinus is noted bilaterally. No pain with active or passive ROM of the ankle, MTJ, 1st ray, or halluces bilaterally,.   Nails elongated and dystrophic bilaterally. No open lesions are noted.     Assessment: DM2 with neuropathy - controlled.   Nail dystrophy x 10.    Plan:  - Pt seen and evaluated.  - I discussed neuropathy in detail with her.   - Nails trimmed x 10.   - She cannot get any other shoes on except slides. Will defer diabetic shoes.  - See again in 3 months.           Swapnil Baez DPM

## 2022-09-15 ENCOUNTER — DOCUMENTATION ONLY (OUTPATIENT)
Dept: FAMILY MEDICINE | Facility: CLINIC | Age: 77
End: 2022-09-15

## 2022-09-15 DIAGNOSIS — E11.65 TYPE 2 DIABETES MELLITUS WITH HYPERGLYCEMIA, WITH LONG-TERM CURRENT USE OF INSULIN (H): Primary | ICD-10-CM

## 2022-09-15 DIAGNOSIS — Z79.4 TYPE 2 DIABETES MELLITUS WITH HYPERGLYCEMIA, WITH LONG-TERM CURRENT USE OF INSULIN (H): Primary | ICD-10-CM

## 2022-09-15 NOTE — PROGRESS NOTES
"When opening a documentation only encounter, be sure to enter in \"Chief Complaint\" Forms and in \" Comments\" Title of form, description if needed.    Mikayla is a 77 year old  female  Form received via: Fax  Form now resides in: Provider Lamberto Her                  "

## 2022-09-19 ENCOUNTER — TELEPHONE (OUTPATIENT)
Dept: FAMILY MEDICINE | Facility: CLINIC | Age: 77
End: 2022-09-19

## 2022-09-19 NOTE — TELEPHONE ENCOUNTER
RN called patient letting her know about her 40 min appointment that unfortunately 09/30 is 20 min appointments. Patient thinks she will be okay until her next appointment as she is feeling a lot better, the home health nurse was there today.     Genevieve Garcia RN

## 2022-09-19 NOTE — TELEPHONE ENCOUNTER
Chart reviewed. Follow up timing not mentioned in recent visit note. Has been many months since patient seen in person with what appears to be long TCU stay per chart. I would err on the side of maintaining 40 min appt timing requests as I cannot see a clear indication for an exception.     Lukas covering for Sudarshan.

## 2022-09-19 NOTE — TELEPHONE ENCOUNTER
[facility] phone call message- general phone call:    Reason for call: Patient called and mentioned that she has been in rehab for 3.5 months recovering from a surgery. She said that she has had some medication changes and was wondering if she could be seen sooner that 10/17. She mentioned that Genevieve said something about the possibility of getting her in on 09/30. I also see that she requires 40 minutes appts at [facility]. Please reach out to f/u with patient.     Return call needed: Yes    OK to leave a message on voice mail? Yes    Primary language: English      needed? No    Call taken on September 19, 2022 at 3:05 PM by Leeanna Hylton

## 2022-09-28 NOTE — PROGRESS NOTES
Form has been completed by provider.     Form sent out via: Fax to [facility] at Fax Number: [phone]  Patient informed: n/a  Output date: September 28, 2022    Lauri Scott MA      **Please close the encounter**

## 2022-10-15 ENCOUNTER — HEALTH MAINTENANCE LETTER (OUTPATIENT)
Age: 77
End: 2022-10-15

## 2022-10-17 ENCOUNTER — OFFICE VISIT (OUTPATIENT)
Dept: FAMILY MEDICINE | Facility: CLINIC | Age: 77
End: 2022-10-17
Payer: MEDICARE

## 2022-10-17 ENCOUNTER — OFFICE VISIT (OUTPATIENT)
Dept: PHARMACY | Facility: CLINIC | Age: 77
End: 2022-10-17
Payer: MEDICARE

## 2022-10-17 ENCOUNTER — TELEPHONE (OUTPATIENT)
Dept: FAMILY MEDICINE | Facility: CLINIC | Age: 77
End: 2022-10-17

## 2022-10-17 VITALS
SYSTOLIC BLOOD PRESSURE: 147 MMHG | HEART RATE: 59 BPM | RESPIRATION RATE: 16 BRPM | TEMPERATURE: 98.4 F | OXYGEN SATURATION: 98 % | DIASTOLIC BLOOD PRESSURE: 75 MMHG

## 2022-10-17 DIAGNOSIS — E03.9 HYPOTHYROIDISM, UNSPECIFIED TYPE: ICD-10-CM

## 2022-10-17 DIAGNOSIS — R01.1 HEART MURMUR: ICD-10-CM

## 2022-10-17 DIAGNOSIS — E11.65 TYPE 2 DIABETES MELLITUS WITH HYPERGLYCEMIA, WITH LONG-TERM CURRENT USE OF INSULIN (H): Primary | ICD-10-CM

## 2022-10-17 DIAGNOSIS — Z79.4 TYPE 2 DIABETES MELLITUS WITH HYPERGLYCEMIA, WITH LONG-TERM CURRENT USE OF INSULIN (H): Primary | ICD-10-CM

## 2022-10-17 DIAGNOSIS — E83.52 HYPERCALCEMIA: ICD-10-CM

## 2022-10-17 DIAGNOSIS — Z89.622: ICD-10-CM

## 2022-10-17 DIAGNOSIS — I10 ESSENTIAL HYPERTENSION: ICD-10-CM

## 2022-10-17 LAB
ALBUMIN SERPL BCG-MCNC: 3.7 G/DL (ref 3.5–5.2)
ALP SERPL-CCNC: 85 U/L (ref 35–104)
ALT SERPL W P-5'-P-CCNC: ABNORMAL U/L
ANION GAP SERPL CALCULATED.3IONS-SCNC: 8 MMOL/L (ref 7–15)
AST SERPL W P-5'-P-CCNC: ABNORMAL U/L
BILIRUB SERPL-MCNC: 0.2 MG/DL
BUN SERPL-MCNC: 20.8 MG/DL (ref 8–23)
CALCIUM SERPL-MCNC: 10.4 MG/DL (ref 8.8–10.2)
CHLORIDE SERPL-SCNC: 104 MMOL/L (ref 98–107)
CHOLEST SERPL-MCNC: 178 MG/DL
CREAT SERPL-MCNC: 0.57 MG/DL (ref 0.51–0.95)
DEPRECATED HCO3 PLAS-SCNC: 24 MMOL/L (ref 22–29)
GFR SERPL CREATININE-BSD FRML MDRD: >90 ML/MIN/1.73M2
GLUCOSE SERPL-MCNC: 151 MG/DL (ref 70–99)
HDLC SERPL-MCNC: 47 MG/DL
LDLC SERPL CALC-MCNC: 98 MG/DL
NONHDLC SERPL-MCNC: 131 MG/DL
POTASSIUM SERPL-SCNC: 4.2 MMOL/L (ref 3.4–5.3)
PROT SERPL-MCNC: 6.7 G/DL (ref 6.4–8.3)
SODIUM SERPL-SCNC: 136 MMOL/L (ref 136–145)
TRIGL SERPL-MCNC: 164 MG/DL

## 2022-10-17 PROCEDURE — 80048 BASIC METABOLIC PNL TOTAL CA: CPT | Performed by: FAMILY MEDICINE

## 2022-10-17 PROCEDURE — 80061 LIPID PANEL: CPT | Performed by: FAMILY MEDICINE

## 2022-10-17 PROCEDURE — 36415 COLL VENOUS BLD VENIPUNCTURE: CPT | Performed by: FAMILY MEDICINE

## 2022-10-17 PROCEDURE — 82040 ASSAY OF SERUM ALBUMIN: CPT | Performed by: FAMILY MEDICINE

## 2022-10-17 PROCEDURE — 99215 OFFICE O/P EST HI 40 MIN: CPT | Performed by: FAMILY MEDICINE

## 2022-10-17 PROCEDURE — 84075 ASSAY ALKALINE PHOSPHATASE: CPT | Performed by: FAMILY MEDICINE

## 2022-10-17 PROCEDURE — 99605 MTMS BY PHARM NP 15 MIN: CPT | Performed by: PHARMACIST

## 2022-10-17 PROCEDURE — 99607 MTMS BY PHARM ADDL 15 MIN: CPT | Performed by: PHARMACIST

## 2022-10-17 PROCEDURE — 82247 BILIRUBIN TOTAL: CPT | Performed by: FAMILY MEDICINE

## 2022-10-17 PROCEDURE — 84155 ASSAY OF PROTEIN SERUM: CPT | Performed by: FAMILY MEDICINE

## 2022-10-17 RX ORDER — PROCHLORPERAZINE 25 MG/1
SUPPOSITORY RECTAL
COMMUNITY

## 2022-10-17 RX ORDER — PROCHLORPERAZINE 25 MG/1
SUPPOSITORY RECTAL
COMMUNITY
End: 2023-08-14

## 2022-10-17 RX ORDER — LEVOTHYROXINE SODIUM 75 UG/1
75 TABLET ORAL DAILY
Qty: 30 TABLET | Refills: 11 | Status: SHIPPED | OUTPATIENT
Start: 2022-10-17 | End: 2023-09-22

## 2022-10-17 RX ORDER — ARNICA MONTANA 1 [HP_X]/G
GEL TOPICAL
COMMUNITY

## 2022-10-17 RX ORDER — HYDROCHLOROTHIAZIDE 12.5 MG/1
12.5 TABLET ORAL DAILY
Qty: 30 TABLET | Refills: 11 | Status: SHIPPED | OUTPATIENT
Start: 2022-10-17 | End: 2023-10-18

## 2022-10-17 NOTE — PROGRESS NOTES
"Medication Therapy Management (MTM) Encounter    ASSESSMENT:                            Medication Adherence/Access: {adherencechoices:075467}    ***: ***    ***: ***    ***: ***    PLAN:                            ***    Follow-up: {followuptest2:591550}    SUBJECTIVE/OBJECTIVE:                          Mikayla Timmons is a 77 year old female coming in for a transitions of care visit. She was discharged from [facility] on *** for ***. {mtisitdetails:945424}     Reason for visit: transitions of care     Allergies/ADRs: {1/2/3/4/5:670225}  Past Medical History: {1/2/3/4/5:771315}  Tobacco: She reports that she has never smoked. She has never used smokeless tobacco.  Alcohol: {ALCOHOL CONSUMPTION HX:602052}  {Social and Goals:807605}    Medication Adherence/Access: {fumedadherence:857973}    {DM Type:736216::\"Type 2 Diabetes\"}:      Lantus   {sideeffects:310493}  Blood sugar monitoring: {MTM self monitorin}.   Ranges {Pt report:425559}: {bgranges:653243}  Symptoms of low blood sugar? {HYPOGLYCEMIA SYMPTOMS:460464::\"none\"}  Symptoms of high blood sugar? {diabetessymptoms:593958}    Eye exam: {up to date:137171}  Foot exam: {up to date:987437}  Diet/Exercise: ***    Aspirin: {ASAyesno:153630}  Statin: {YES (EXPLAIN)/NO:338243}   ACEi/ARB: {YES (EXPLAIN)/NO:928092}.   Urine Albumin:   Lab Results   Component Value Date    UMALCR 28.42 (H) 2020      Lab Results   Component Value Date    A1C 7.3 2021    A1C 6.9 03/10/2021    A1C 7.5 2020    A1C 7.0 2020    A1C 6.6 2019    A1C 6.5 2019     Most Recent Immunizations   Administered Date(s) Administered     COVID-19,PF,Marichuy 04/10/2021     COVID-19,PF,Moderna 2022     COVID-19,PF,Moderna Booster 2021     Influenza (IIV3) PF 10/21/1997     TD (ADULT, 7+) 10/20/1995     TDAP Vaccine (Boostrix) 2019     Tdap (Adacel,Boostrix) 2007     Hypertension:     Hydrochlorothiazide 12.5 mg     ***: ***    Today's " "Vitals:   BP Readings from Last 1 Encounters:   10/17/22 (!) 147/75     Pulse Readings from Last 1 Encounters:   10/17/22 59     Wt Readings from Last 1 Encounters:   09/25/20 246 lb (111.6 kg)     Ht Readings from Last 1 Encounters:   04/22/20 5' 5\" (1.651 m)     Estimated body mass index is 40.94 kg/m  as calculated from the following:    Height as of 4/22/20: 5' 5\" (1.651 m).    Weight as of 9/25/20: 246 lb (111.6 kg).    Temp Readings from Last 1 Encounters:   10/17/22 98.4  F (36.9  C) (Oral)       {MUMTAZ?:711799}    I spent {mt total time 3:361744} with this patient today{MTMpartdbillingquestion:900648}. { :784220}. A copy of the visit note was provided to the patient's provider(s).    The patient {GIVEN/NOT GIVEN:884597::\"was given\"} a summary of these recommendations. {covisit:542200}    Lili Moreira, [place]       Medication Therapy Recommendations  No medication therapy recommendations to display     "

## 2022-10-17 NOTE — PATIENT INSTRUCTIONS
Patient Education   Here is the plan from today's visit    1. Type 2 diabetes mellitus with hyperglycemia, with long-term current use of insulin (H)  Glad to see your HgA1c is lower!! Well done.  I will NOT complete the Christophe paper work since you are using the other continuous monitor.  We'll see if you need to a refill for that. Unfortunately you don't qualify but we will see if we can argue for a continuous meter when we need to reorder it.   I did not address but I want to make sure you are getting diabetes eye care.    - Hemoglobin A1c; Future  - Comprehensive metabolic panel; Future  - Albumin Random Urine Quantitative with Creat Ratio; Future  - Lipid Profile; Future  - Comprehensive metabolic panel  - Albumin Random Urine Quantitative with Creat Ratio  - Lipid Profile    2. 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 Lots of hard work!    3. Hypothyroidism, unspecified type  Refilled  Tested your thyroid today  - levothyroxine (SYNTHROID/LEVOTHROID) 75 MCG tablet; Take 1 tablet (75 mcg) by mouth daily  Dispense: 30 tablet; Refill: 11  - TSH; Future    4. Essential hypertension  Please send me the results of your PT blood pressures.   - hydrochlorothiazide (HYDRODIURIL) 12.5 MG tablet; Take 1 tablet (12.5 mg) by mouth daily  Dispense: 30 tablet; Refill: 11    5. Heart murmur  I reviewed your chart and in 2019, I heard a murmur and recommend an Echo then. We did not do one at [facility] and I could not find one at [facility] either - but that doesn't mean they did not do one.   The murmur I heard today was a bit worse than the one you had in 2019, so I do think we should get an Echo.  Please get back to me on if you are OK with that.   - Hemoglobin; Future    6. Hypercalcemia  Your calcium has been slightly high for awhile now.  I have not had the chance to do the blood testing to figure out why until now - I added those test to the blood  work we did.  This is likely NOT a problem of your diet - so no need to change that right now.    - Vitamin D Level; Future  - Parathyroid Hormone Intact; Future        Please call or return to clinic if your symptoms don't go away.    Follow up plan  No follow-ups on file.    Thank you for coming to [facility] today.  Lab Testing:  **If you had lab testing today and your results are reassuring or normal they will be mailed to you or sent through [employer] within 7 days.   **If the lab tests need quick action we will call you with the results.  **If you are having labs done on a different day, please call [phone] to schedule at [facility] or [phone] for other [facility]patient [facility] locations. Labs do not offer walk-in appointments.  The phone number we will call with results is # [phone] (home) . If this is not the best number please call our clinic and change the number.  Medication Refills:  If you need any refills please call your pharmacy and they will contact us.   If you need to  your refill at a new pharmacy, please contact the new pharmacy directly. The new pharmacy will help you get your medications transferred faster.   Scheduling:  If you have any concerns about today's visit or wish to schedule another appointment please call our office during normal business hours [phone] (8-5:00 M-F)  If a referral was made to an [facility] specialty provider and you do not get a call from central scheduling, please refer to directions on your visit summary or call our office during normal business hours for assistance.   If a Mammogram was ordered for you at the Breast Center call [phone] to schedule or change your appointment.  If you had an XRay/CT/Ultrasound/MRI ordered the number is [phone] to schedule or change your radiology appointment.   [facility] has limited ultrasound appointments available on Wednesdays, if you would like your ultrasound at  [facility], please call [phone] to schedule.   Medical Concerns:  If you have urgent medical concerns please call [phone] at any time of the day.    Nini Heaton MD

## 2022-10-17 NOTE — PROGRESS NOTES
Assessment & Plan     Type 2 diabetes mellitus with hyperglycemia, with long-term current use of insulin (H)  Her HGA1c was much improved in rehab on a higher dose. She continues on higher as well and reports good control although I did not get to see her numbers.  Will continue on current regimen. Plan repeat in 2 months.  I did not talk to her about her eye and whether getting care - will do next time.  Again offered to start a statin but she is reluctant to try meds. Wants to manage all her health nutritionally where she can. Will review her lipids with her. Recommended Flu vaccine today.   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 Is making progress    Hypothyroidism, unspecified type  Refilled and check TSH  - levothyroxine (SYNTHROID/LEVOTHROID) 75 MCG tablet; Take 1 tablet (75 mcg) by mouth daily  - TSH; Future    Essential hypertension  Likely not optimally controlled. She notes it goes up and reviewed how with age, the BP is more labile.  I worry that the hydrochlorothiazide might be contributing to her elevated calcium. Will check PTH and if low, then consider stopping hydrochlorothiazide and staring Ace again.   - hydrochlorothiazide (HYDRODIURIL) 12.5 MG tablet; Take 1 tablet (12.5 mg) by mouth daily    Heart murmur  This was more pronounced than I remember. My thoughts, based on my notes in 2019, that she has some AORTIC STENOSIS that is getting worse. She does need an echo.  Will readdress via [employer] because did not have time to discuss.    - Hemoglobin; Future    Hypercalcemia  This has been lowgrade and persistent. Work up not done due to her being at [facility] for her hip most of the past  year.  Will do basic testing. Has had Vit D deficiency in the past so will recheck that too.   - Vitamin D Level; Future  - Parathyroid Hormone Intact; Future    Likely tinea pedis - will discuss oral meds in the future if not able to put on cream.       I spent a total of 62 minutes on the day of the visit.   Time spent doing chart review, history and exam, documentation and further activities per the note           No follow-ups on file.    Nini Heaton MD  [facility] LCAIRE Degroot is a 77 year old, presenting for the following health issues:  RECHECK (Pt present for Diabetes follow up ) and Refill Request (levothyroxine (SYNTHROID/LEVOTHROID) 75 MCG tablet, hydrochlorothiazide (HYDRODIURIL) 12.5 MG tablet)      HPI       DM -   Rehab - was on 35 Units of lantus at bedtime.  Sent home on what was left of that. Lots of pain there.   Home - 5 weeks ago.   Increased independently her NPH to 20 U in am and 15 U at night.  Checking her sugars before all meals and bedtime.  At least 4 times a day.   Sugar this am was 136. Averaging around 130 in the fastings. Usually 150 around bedtime. Has a Dexcom. I got a refill for her Christophe but she is not using that.    Known diabetic retinopathy    Beginning of September HgA1c was: 6.4 - done in Rehab.     Has a sliding scale algorithm in case her sugars are higher than 150 but has not required this for a long time.     BP - doesn't have a monitor at home. The one she had at home was giving her error messages.  Can't use the one with a strap since her carpal tunnel won't let her tighten the arm cuff. Wrist ones don't work well.   PT is coming to her home once a week or so, and Rn as well.  They take her BPs.   Very reluctant to add medications to her regimen. Taking hydrochlorothiazide 12.5 daily.   Note in the hospital discharge summary from 5/2022: Restarted on lisinopril but had mild hyperkalemia, therefore was transitioned to amlodipine at  discharge.      Edema:  Had much more erythema of her legs in the hospital and rehab but getting much better. Cannot reach her feet to put creams on. Edema for her is now also much better. Taking her synthroid.   Past visits no murmur noticed in [facility] notes.  Per my notes, had murmur in 2019 and I advised having an echo pre-op but I do not see that it was done at [facility]. Cannot find [facility] echo.  She reports having had one about that time.   EKG 6/2019 at [facility] noted to have possible septal MI.     Can see MyChart.     Flu - OK getting that done today    Left hip removal. Is now 1.25 inches shorter on the left and will be getting a shoe lift. Is able to walk a bit (using a walker at home).              Review of Systems         Objective    BP (!) 147/75 (BP Location: Left arm, Patient Position: Sitting, Cuff Size: Adult Large)   Pulse 59   Temp 98.4  F (36.9  C) (Oral)   Resp 16   LMP  (LMP Unknown)   SpO2 98%   There is no height or weight on file to calculate BMI.  Physical Exam   GENERAL: healthy, alert and no distress  RESP: lungs clear to auscultation - no rales, rhonchi or wheezes  CV: regular rate and rhythm, normal S1 S2, no S3 or S4, 3/6, harsh, systolic ejection murmur heard along the RSB, click or rub, no peripheral edema and peripheral pulses strong  MS: 1+ edema on the right, 2+ on the left, some area of pretibial erythema (mild) and some cobblestoning on the left.  Also feet with fungal changes.

## 2022-10-17 NOTE — TELEPHONE ENCOUNTER
Received script for Novolin N vials in my MSOT. Couldn't locate insurance information so called patient to get insurance information and see if they'd like to fill with [facility]. Mikayla said she doesn't have pharmacy insurance so she just pays out of pocked at [facility] for her Novolin N vials every month. I told Mikayla she would be a perfect candidate for the [employer] patient assistance program which offers free drug to patients who qualify. Mikayla would like to enroll. I sent Mikayla an application via e-mail. Provider form sent via e-mail to MD and JASON.

## 2022-10-17 NOTE — Clinical Note
Danielle Heaton,   Jonnathan THOMPSON from today's visit with Mikayla. Although she was discharged on amlodipine in 5/2022, she has not been taking, only hydrochlorothiazide.  The diabetes liaison group has connected with Mikayla to help her with the application process for patient assistance program for [employer]  (currently she has been paying for insulin out of pocket, if she qualifies, her insulin would be completely covered), they have sent the required paperwork. Please let me know if you have any questions.   Lili Moreira, EstelaD

## 2022-10-17 NOTE — PROGRESS NOTES
Medication Therapy Management (MTM) Encounter    ASSESSMENT:                            Medication Adherence/Access: See below for considerations    Left Hip Joint Prosthesis Removal: improving  Would benefit from continuing to ambulate as able with walker. With walker No medication recommendations.    Type 2 Diabetes: meeting A1c goal of < 8%.   Further CGM data needed to assess blood glucose control.    Due for updated diabetic foot exam. Called [employer] for updated CGM renewal forms for Dexcom CGM. Updated prescription of Novolin N sent to [facility] pharmacy. Diabetes liasion connected with Mikayla to help apply for [employer] patient assistance program for coverage of insulin.  Current regimen safe and effective to continue. No medication changes currently recommended.    Hypertension: not at goal of <130/80 mmHg.   Mikayla has not been taking amlodipine 2.5 mg daily. Would benefit from additional blood pressure lowering, but as patient reluctant to add medications to her regimen at this time, no changes recommended. Provider suspects hydrochlorothiazide contributing to elevated calcium levels and will check PTH, considering stopping hydrochlorothiazide and restarting ACEi.    Hypothyroidism: additional lab needed   Provider put in orders for future TSH.   Current regimen appropriate to continue for now. No medication changes currently recommended.    PLAN:                            1. Awaiting fax from [employer] for updated CGM renewal forms, provider to fill out.  2. PharmD or provider to fill out form and send back to diabetes liaison group to proceed with [employer] patient assistance program.   3. Insulin refills sent to [facility] pharmacy.  4. Continue taking your medications as prescribed.  5. Continue to check blood sugars using Dexcom G6.    Follow-up: 2 months for diabetes follow-up with Dr. Heaton (but sooner if able for further lab work)    SUBJECTIVE/OBJECTIVE:               "            Mikayla Timmons is a 77 year old female coming in for a transitions of care visit. She was discharged from [facility]  transitional care unit to home in mid-September following arthroplasty of left hip. Patient saw provider prior to our visit today.     Reason for visit: transitional care unit discharge follow-up    Allergies/ADRs: Reviewed in chart  Past Medical History: Reviewed in chart  Tobacco: She reports that she has never smoked. She has never used smokeless tobacco.    Medication Adherence/Access: Mikayla brought in her current medications in a bag (Novolin N insulin, levothyroxine, hydrochlorothiazide and Arnicare gel) and stated \"these are the only medications I am taking\".     Left Hip Joint Prosthesis Removal:    Arnicare gel     Mikayla is recovering well since discharge from [facility]. She has been up and ambulating a little with a walker at home, but presented to clinic in wheelchair.     Type 2 Diabetes:      Novolin N 20 units in AM and 15 units in PM    Novolog insulin - sliding scale     Was receiving Lantus insulin 35 units at rehab, but has since transitioned to current regimen of Novolin N therapy. Has not needed to use sliding scale insulin to correct. Requests for refill. A1C at beginning of Sept. 6.4 at rehab. Has been using Dexcom G6 to check blood sugars, prefers over Freestyle Christophe as she is able to put on the sensors herself now. Before with Freestyle Christophe, difficult to reach the back of her arm. Currently paying for Novolin N insulin without insurance coverage.    Patient is not experiencing side effects.  Blood sugar monitoring: Continuous Glucose Monitor.   Ranges (patient reported): Fasting- 136 this AM, did not have Dexcom  with her today in clinic.   Symptoms of low blood sugar? none  Symptoms of high blood sugar? none    Eye exam: up to date  Foot exam: due  Diet/Exercise: Unable to review due to limited time, Mikayla could not miss her metro-mobility ride " home.    Aspirin: No  Statin: No   ACEi/ARB: No.   Urine Albumin:   Lab Results   Component Value Date    UMALCR 28.42 (H) 09/23/2020      Lab Results   Component Value Date    A1C 7.3 12/01/2021    A1C 6.9 03/10/2021    A1C 7.5 09/23/2020    A1C 7.0 05/01/2020    A1C 6.6 09/04/2019    A1C 6.5 05/31/2019     Most Recent Immunizations   Administered Date(s) Administered     COVID-19,PF,Marichuy 04/10/2021     COVID-19,PF,Moderna 07/13/2022     COVID-19,PF,Moderna Booster 12/01/2021     Influenza (IIV3) PF 10/21/1997     TD (ADULT, 7+) 10/20/1995     TDAP Vaccine (Boostrix) 11/01/2019     Tdap (Adacel,Boostrix) 04/25/2007     Hypertension:     Hydrochlorothiazide 12.5 mg daily    BP Readings from Last 3 Encounters:   10/17/22 (!) 147/75   12/01/21 (!) 174/100   11/01/19 132/78     Last Comprehensive Metabolic Panel:  Sodium   Date Value Ref Range Status   10/17/2022 136 136 - 145 mmol/L Final   03/10/2021 141 133 - 144 mmol/L Final     Potassium   Date Value Ref Range Status   10/17/2022 4.2 3.4 - 5.3 mmol/L Final   12/01/2021 4.0 3.4 - 5.3 mmol/L Final   03/10/2021 4.4 3.4 - 5.3 mmol/L Final     Chloride   Date Value Ref Range Status   10/17/2022 104 98 - 107 mmol/L Final   12/01/2021 106 mmol/L Final   03/10/2021 110 (H) 94 - 109 mmol/L Final     Carbon Dioxide   Date Value Ref Range Status   03/10/2021 23 20 - 32 mmol/L Final     Carbon Dioxide (CO2)   Date Value Ref Range Status   10/17/2022 24 22 - 29 mmol/L Final   12/01/2021 26 20 - 32 mmol/L Final     Anion Gap   Date Value Ref Range Status   10/17/2022 8 7 - 15 mmol/L Final   12/01/2021 11 3 - 14 mmol/L Final   03/10/2021 7 3 - 14 mmol/L Final     Glucose   Date Value Ref Range Status   10/17/2022 151 (H) 70 - 99 mg/dL Final   12/01/2021 166 (H) 70 - 99 mg/dL Final   03/10/2021 120 (H) 70 - 99 mg/dL Final     Urea Nitrogen   Date Value Ref Range Status   10/17/2022 20.8 8.0 - 23.0 mg/dL Final   12/01/2021 18 7 - 30 mg/dL Final   03/10/2021 23 7 - 30 mg/dL  Final     Creatinine   Date Value Ref Range Status   10/17/2022 0.57 0.51 - 0.95 mg/dL Final   03/10/2021 0.60 0.52 - 1.04 mg/dL Final     GFR Estimate   Date Value Ref Range Status   10/17/2022 >90 >60 mL/min/1.73m2 Final     Comment:     Effective December 21, 2021 eGFRcr in adults is calculated using the 2021 CKD-EPI creatinine equation which includes age and gender (Kellen et al., NE, DOI: 10.1056/BKHMkh0363331)   03/10/2021 88 >60 mL/min/[1.73_m2] Final     Comment:     Non  GFR Calc  Starting 12/18/2018, serum creatinine based estimated GFR (eGFR) will be   calculated using the Chronic Kidney Disease Epidemiology Collaboration   (CKD-EPI) equation.       Calcium   Date Value Ref Range Status   10/17/2022 10.4 (H) 8.8 - 10.2 mg/dL Final   03/10/2021 10.3 (H) 8.5 - 10.1 mg/dL Final     Bilirubin Total   Date Value Ref Range Status   10/17/2022 0.2 <=1.2 mg/dL Final   07/01/2015 0.5 0.2 - 1.3 mg/dL Final     Alkaline Phosphatase   Date Value Ref Range Status   10/17/2022 85 35 - 104 U/L Final   07/01/2015 58.4 40.0 - 150.0 U/L Final     ALT   Date Value Ref Range Status   10/17/2022   Final     Comment:     Unsatisfactory specimen - lipemic    07/01/2015 19.6 0.0 - 45.0 U/L Final     AST   Date Value Ref Range Status   10/17/2022   Final     Comment:     Unsatisfactory specimen - lipemic    07/01/2015 15.5 0.0 - 45.0 U/L Final     At discharge from [facility] orthopedic, was discharged on amlodipine 2.5 mg daily and hydrochlorothiazide. Originally on lisinopril but had mild hyperkalemia. Dispense report showed last filled for 14-day supply when in rehab on 9/7/22. Has not been taking as of 9/19/22 since discharge from rehab.     Hypothyroidism:    Levothyroxine 75 mcg daily     Today's Vitals:   BP Readings from Last 1 Encounters:   10/17/22 (!) 147/75     Pulse Readings from Last 1 Encounters:   10/17/22 59     Wt Readings from Last 1 Encounters:   09/25/20 246 lb (111.6 kg)     Ht Readings from Last  "1 Encounters:   04/22/20 5' 5\" (1.651 m)     Estimated body mass index is 40.94 kg/m  as calculated from the following:    Height as of 4/22/20: 5' 5\" (1.651 m).    Weight as of 9/25/20: 246 lb (111.6 kg).    Temp Readings from Last 1 Encounters:   10/17/22 98.4  F (36.9  C) (Oral)     ----------------  Post Discharge Medication Reconciliation Status: discharge medications reconciled and changed, per note/orders.    I spent 20 minutes with this patient today. Dr. Heaton was provided the recommendations above via routed note and Dr. Heaton is the authorizing prescriber for this visit through the pharmacist collaborative practice agreement.. A copy of the visit note was provided to the patient's provider(s).    The patient was given a summary of these recommendations. See Provider note/AVS from today.     Lili Moreira, [place]     Medication Therapy Recommendations  No medication therapy recommendations to display         "

## 2022-10-17 NOTE — LETTER
October 17, 2022  Mikayla Timmons  [address]   [place] [address]    Dear Ms. Timmons, [facility]     Thank you for talking with me on Oct 17, 2022 about your health and medications. As a follow-up to our conversation, I have included two documents:      1. Your Recommended To-Do List has steps you should take to get the best results from your medications.  2. Your Medication List will help you keep track of your medications and how to take them.    If you want to talk about these documents, please call Jorge Luis Beckett RPH at phone: [phone], Monday-Friday 8-4:30pm.    I look forward to working with you and your doctors to make sure your medications work well for you.    Sincerely,  Jorge Luis Beckett RPH  [facility] Pharmacist, [facility]

## 2022-10-18 LAB
DEPRECATED CALCIDIOL+CALCIFEROL SERPL-MC: 31 UG/L (ref 20–75)
HGB BLD-MCNC: 11 G/DL (ref 11.7–15.7)
PTH-INTACT SERPL-MCNC: 81 PG/ML (ref 15–65)
TSH SERPL DL<=0.005 MIU/L-ACNC: 5.25 UIU/ML (ref 0.3–4.2)

## 2022-10-18 PROCEDURE — 85018 HEMOGLOBIN: CPT | Performed by: FAMILY MEDICINE

## 2022-10-18 PROCEDURE — 82306 VITAMIN D 25 HYDROXY: CPT | Performed by: FAMILY MEDICINE

## 2022-10-18 PROCEDURE — 36415 COLL VENOUS BLD VENIPUNCTURE: CPT | Performed by: FAMILY MEDICINE

## 2022-10-18 PROCEDURE — 83970 ASSAY OF PARATHORMONE: CPT | Performed by: FAMILY MEDICINE

## 2022-10-18 PROCEDURE — 84443 ASSAY THYROID STIM HORMONE: CPT | Performed by: FAMILY MEDICINE

## 2022-10-20 NOTE — PROGRESS NOTES
I have verified the content of the note, which accurately reflects my assessment of the patient and the plan of care.   Jorge Luis Beckett, [facility], PharmD

## 2022-10-28 NOTE — TELEPHONE ENCOUNTER
Patient called and left me a voice message that she had a question about her application. Called back and LM for Mikayla to let her know I'm available to answer any questions she has.

## 2022-10-31 NOTE — TELEPHONE ENCOUNTER
Patient called and asked if we could add Novolog onto the free drug program form. I do not see on med list. The provider application is now back and I will forward to MD and JASON with question about whether or not they want to add Novolog to the application as well.

## 2022-11-17 NOTE — TELEPHONE ENCOUNTER
Called and LM for patient to see if she completed her application or if she had any other questions or if there was anything else I could help with.

## 2022-11-23 NOTE — TELEPHONE ENCOUNTER
Called and left another message to see if there's anything I can help with and to see if she has submitted her application.

## 2022-11-27 ENCOUNTER — HEALTH MAINTENANCE LETTER (OUTPATIENT)
Age: 77
End: 2022-11-27

## 2022-12-06 NOTE — TELEPHONE ENCOUNTER
Called [employer] at [phone]. They haven't received patient application yet. I don't have the provider portion ready yet because Mikayla hasn't submitted her application and I'm unsure if the pharmacist or MD will be doing any medication changes (Mikayla asked about adding Novolog to the free drug application). I haven't heard back from Mikayla in over a month. Will call next week. I know she had a lot going on medically that she was trying to take care of.

## 2022-12-12 DIAGNOSIS — Z79.4 TYPE 2 DIABETES MELLITUS WITH HYPERGLYCEMIA, WITH LONG-TERM CURRENT USE OF INSULIN (H): ICD-10-CM

## 2022-12-12 DIAGNOSIS — E11.65 TYPE 2 DIABETES MELLITUS WITH HYPERGLYCEMIA, WITH LONG-TERM CURRENT USE OF INSULIN (H): ICD-10-CM

## 2022-12-13 RX ORDER — HUMAN INSULIN 100 [IU]/ML
INJECTION, SUSPENSION SUBCUTANEOUS
Qty: 20 ML | Refills: 0 | Status: SHIPPED | OUTPATIENT
Start: 2022-12-13 | End: 2023-05-05

## 2022-12-13 NOTE — TELEPHONE ENCOUNTER

## 2022-12-14 NOTE — TELEPHONE ENCOUNTER
Tried to contact patient via phone x3 and MyChart x1 with no response. Closing encounter. Will re-open if patient reaches out.

## 2022-12-20 NOTE — RESULT ENCOUNTER NOTE
Mychart result sent         [de-identified] : 14 year old female with malnutrition for f/u.\par \par Had period on 12/16/22. \par \par Doesn't like to drink water so doesn't think weight is increased due to water. Has been drinking other liquids like Yanique and kombucha.\par \par Denies headache, SOB, chest pain, swelling. \par \par Has been doing dance. \par \par Added in a vegan bagel once daily. \par \par Remains constipated. Has been having BMs that are less frequent and hard. \par \par Seeing therapist and psychiatrist this week.  [de-identified] : B: bagel, chobani flip, rice pudding\par S: crackers, dip\par S: yogurt\par S: vegan cookie, frozen grapes [de-identified] : 12/16/22

## 2022-12-28 NOTE — TELEPHONE ENCOUNTER
Free Drug Application Initiated  Medication: Novolin N  Sponsor: [employer] [employer]   Phone #:  [phone]  Fax #: [phone]  Additional Information: Submitted recent patient application and provider application that was completed 10/22

## 2023-01-03 NOTE — TELEPHONE ENCOUNTER
Called [employer] at [phone]. They have received the applications but they are needing patient page 3 (which wasn't previously completed). I asked Mikayla to please complete patient page 3 so we can send it in. They also wanted a picture of the front and back of her pharmacy insurance cards but she is un-insured. I will mention that to [employer] the next time I speak with them.

## 2023-01-05 NOTE — TELEPHONE ENCOUNTER
Called [employer] at [phone]. They are still needing patient page 3. Called and LM for Mikayla to see if she happens to still have that page or if she's needing me to send a new application to her.

## 2023-01-18 NOTE — TELEPHONE ENCOUNTER
Called and LUZ ELENA for patient informing her that we are needing to submit the second patient signature page to [employer] for approval.

## 2023-01-31 NOTE — TELEPHONE ENCOUNTER
Submitted patient page 3 into [employer] via fax via yesterday. Fax was confirmed sent. Called [employer] at [phone]. They haven't received quite yet. They said it can take up to 48 hours to receive in their system.

## 2023-02-01 NOTE — TELEPHONE ENCOUNTER
Called [email or web address] at [phone]. Spoke to Erum. She stated they still have not received/uploaded the missing information for the application. I asked for and sent the missing information to the [email or web address] urgent line, [phone]. Mikayla will be out of medication in approximately 7 days but should be able to get a free voucher for her medication to be filled once her application is accepted. Will check back tomorrow.

## 2023-02-02 ENCOUNTER — TELEPHONE (OUTPATIENT)
Dept: FAMILY MEDICINE | Facility: CLINIC | Age: 78
End: 2023-02-02
Payer: MEDICARE

## 2023-02-02 DIAGNOSIS — E11.65 TYPE 2 DIABETES MELLITUS WITH HYPERGLYCEMIA, WITH LONG-TERM CURRENT USE OF INSULIN (H): ICD-10-CM

## 2023-02-02 DIAGNOSIS — Z79.4 TYPE 2 DIABETES MELLITUS WITH HYPERGLYCEMIA, WITH LONG-TERM CURRENT USE OF INSULIN (H): ICD-10-CM

## 2023-02-02 NOTE — TELEPHONE ENCOUNTER
"Called [employer] back at [phone]. Spoke to Anita. Anita was able to provide me with the free voucher information.        Called [facility] to provide the information but they are needing a new prescription sent over for the Novolin N and they need it to just say \"Novolin N\" NOT \"Novolin N Relion\" in order to use the free voucher.    "

## 2023-02-02 NOTE — TELEPHONE ENCOUNTER
Free Drug Application Approved  Effective Dates: 1/1/23-12/31/23  Patient notified: Y  Additional Information: Application approved. Spoke to Alejandra. The application is approved. Shipment may take up to 30 days. Mikayla will be out of medication in a few days. Asked Alejandra for a free voucher while we wait for the first order to arrive. She said she would have to transfer me but wasn't able to because the wait was so long so she took my phone number down to have someone give me a call back with the voucher information.

## 2023-02-02 NOTE — TELEPHONE ENCOUNTER
"Patient is waiting on an order for her Novolin N vials from the [employer] Patient Assistance Program. I was able to obtain a free voucher for the patient to bring to her pharmacy for a free 30 day supply but the program doesn't have a prescription.     Please send over a prescription that says \"Novolin N\" if you send a script that includes Relion in the name, they won't be able to dispense just the plain Novolin N vials that [employer] provided a free voucher for. I have provided the pharmacy the free voucher info.     Thank you,     Mehran Huerta, [facility]  Pharmacy Clinic [facility]  [email or web address]   Phone: [phone]  Fax: [phone]  "

## 2023-02-03 NOTE — TELEPHONE ENCOUNTER
Called [facility] pharmacy to see if they had the order processed with the script and information sent yesterday. They have it processed through the free voucher and will be ordering in the Novolin N vials to fulfill the order on Monday. [facility] has been updated. Will check back in a few weeks with the program to see if the Novolin N has shipped to [facility] via [email or web address].

## 2023-02-21 NOTE — TELEPHONE ENCOUNTER
Called [employer] at [phone]. Spoke to Hu Perdomo. She said the order was sent to the processing on 2/13. Right now there is up to a 30 day shipping delay. I will call back on Friday and if it hasn't shipped I will obtain a free drug voucher. She should be needing another 30 day supply close to 3/3.

## 2023-02-28 NOTE — TELEPHONE ENCOUNTER
Called and LM for Mikayla to make sure she's needing a free voucher before I call and obtain one.

## 2023-03-01 NOTE — TELEPHONE ENCOUNTER
Patient e-mailed and said that [facility] called and scheduled a delivery and then never delivered the medication on 2/23. I called [facility] and they stated that they weren't able to deliver due to weather and that the order was cancelled and their pharmacy now requires a new prescription for the Novolin N vials and the process has to be started over. I obtained a free voucher (good for a 30 day supply) for Mikayla while we wait for this to be fixed. I have a meeting with the [employer] supervisor at 2 pm to discuss this.      Free voucher info:

## 2023-03-02 DIAGNOSIS — Z79.4 TYPE 2 DIABETES MELLITUS WITH HYPERGLYCEMIA, WITH LONG-TERM CURRENT USE OF INSULIN (H): Primary | ICD-10-CM

## 2023-03-02 DIAGNOSIS — E11.65 TYPE 2 DIABETES MELLITUS WITH HYPERGLYCEMIA, WITH LONG-TERM CURRENT USE OF INSULIN (H): Primary | ICD-10-CM

## 2023-03-02 RX ORDER — HUMAN INSULIN 100 [IU]/ML
INJECTION, SUSPENSION SUBCUTANEOUS
Qty: 40 ML | Refills: 0 | Status: SHIPPED | OUTPATIENT
Start: 2023-03-02 | End: 2023-05-05

## 2023-03-02 RX ORDER — HUMAN INSULIN 100 [IU]/ML
INJECTION, SUSPENSION SUBCUTANEOUS
Qty: 40 ML | Refills: 0 | Status: SHIPPED | OUTPATIENT
Start: 2023-03-02 | End: 2023-03-02

## 2023-03-02 NOTE — TELEPHONE ENCOUNTER
Script was sent to the pharmacy. Called [id number] [address] [place] - [address]   [address] [place] [address] at [phone]. Provided the 4 month voucher. Confirmed the voucher worked and was processed for the 4 month supply. Pharmacy stated that they would have to order the vials in for tomorrow and that it would be ready sometime tomorrow. I relayed this information to Mikayla.

## 2023-03-02 NOTE — TELEPHONE ENCOUNTER
"*Please send 4 month supply prescription to [facility] pharmacy. It has to be sent as \"Novolin N vials\" for the voucher to work.*    I was able to obtain a 4 month supply voucher instead of the 30 day supply voucher from [employer]. Please place order today as the pharmacy would have to order it in for tomorrow and the only transportation to the pharmacy the patient has is this Sunday and she will be out Sunday.    I will call and provide billing information to pharmacy once RX is sent.    Thank you,     Mehran Huerta, [facility]  Pharmacy Clinic [facility]  [email or web address]   Phone: [phone]  Fax: [phone]  "

## 2023-03-06 ENCOUNTER — TELEPHONE (OUTPATIENT)
Dept: FAMILY MEDICINE | Facility: CLINIC | Age: 78
End: 2023-03-06

## 2023-03-06 NOTE — TELEPHONE ENCOUNTER
Called patient to discuss questions regarding diabetic medications    She says that she has been woken up a few nights with her glucose monitor beeping that she was at low blood sugar, which she believes is set at 70. When she has checked her sugar she is between 72 and 80, but not below 70.    She wants to know if she should not take her insulin in the morning if her sugar is low. I let her know I would check with Pharm D to see what they recommend.    I spoke with Lili Pharm D who said unless patient is below 70 or has symptoms of hypoglycemia she should still take her insulin as prescribed.    She is scheduled on 3/13/23 with Dr. Heaton and can discuss possible dosage change at that time.     Called patient to inform of recommendation, she then told me she is only taking 15 units in AM, 11 units in PM because she felt that the other dose of 20 units and 15 units was too high.    Sending to provider to review    Adenike Sumner RN

## 2023-03-06 NOTE — TELEPHONE ENCOUNTER
[facility] phone call message- general phone call:    Reason for call: The patient would like to discuss diabetic medication.     Also, the patient asks if there's no answer, please call right back because it's hard to get around in her wheelchair.    Return call needed: Yes    OK to leave a message on voice mail? Yes    Primary language: English      needed? No    Call taken on March 6, 2023 at 1:19 PM by Nuvia Bello

## 2023-03-22 DIAGNOSIS — E11.3491: Primary | ICD-10-CM

## 2023-03-26 ENCOUNTER — HEALTH MAINTENANCE LETTER (OUTPATIENT)
Age: 78
End: 2023-03-26

## 2023-03-28 ENCOUNTER — TELEPHONE (OUTPATIENT)
Dept: OPHTHALMOLOGY | Facility: CLINIC | Age: 78
End: 2023-03-28
Payer: MEDICARE

## 2023-03-28 ENCOUNTER — TELEPHONE (OUTPATIENT)
Dept: FAMILY MEDICINE | Facility: CLINIC | Age: 78
End: 2023-03-28
Payer: MEDICARE

## 2023-03-28 NOTE — TELEPHONE ENCOUNTER
called Ray physical therapist from [facility] to speak more about patient getting an electric wheelchair. SW left voicemail for Ray. In voicemail, SW stated that waiver services are available to those with medical assistance, which patient does not have. Patient could look at applying for Medicare Savings Program or Alternative Care Program. SW asked for call back, gave direct number.    MIRIAM Soni

## 2023-03-28 NOTE — TELEPHONE ENCOUNTER
WILL received a call from Ray, physical therapist with [facility]. His direct line is [phone].    Ray states he has been working with patient and said she is eligible for an electric wheelchair. Patient has has Medicare, said the remaining balance she would need to pay is $5,000 for the chair.     Ray is curious if she is eligible for any county assistance such as [employer] or if there are any funding resources available.     [facility] WILL did send a message to [facility] for follow up on this.     Tanja Galdamez, [facility]  Behavioral Health Home- Social Work Care Coordinator

## 2023-03-31 ENCOUNTER — TELEPHONE (OUTPATIENT)
Dept: FAMILY MEDICINE | Facility: CLINIC | Age: 78
End: 2023-03-31
Payer: MEDICARE

## 2023-03-31 NOTE — TELEPHONE ENCOUNTER
received voicemail from patient asking for a call back about getting assistance with covering the co-pay for an electric wheelchair. Co-pay is $5,000. SW called patient back, left voicemail. SW will continue to assist as able.    MIRIAM Soni

## 2023-03-31 NOTE — TELEPHONE ENCOUNTER
received call back from patient regarding her electric wheelchair co-pay and how to get assistance covering it. SW spoke with patient about medical assistance, which patient reports she has already submitted the application for. SW then spoke to patient regarding waiver services, as waivers can often help pay for the costs of DME. Patient plans to notify SW when she receives notification that her MA is active and then SW will assist with waiver application.    MIRIAM Soni

## 2023-04-24 ENCOUNTER — OFFICE VISIT (OUTPATIENT)
Dept: OPHTHALMOLOGY | Facility: CLINIC | Age: 78
End: 2023-04-24
Attending: OPHTHALMOLOGY
Payer: MEDICARE

## 2023-04-24 DIAGNOSIS — E11.3491: ICD-10-CM

## 2023-04-24 PROCEDURE — 99214 OFFICE O/P EST MOD 30 MIN: CPT | Performed by: OPHTHALMOLOGY

## 2023-04-24 PROCEDURE — G0463 HOSPITAL OUTPT CLINIC VISIT: HCPCS | Mod: 25 | Performed by: OPHTHALMOLOGY

## 2023-04-24 PROCEDURE — 92134 CPTRZ OPH DX IMG PST SGM RTA: CPT | Performed by: OPHTHALMOLOGY

## 2023-04-24 PROCEDURE — 92235 FLUORESCEIN ANGRPH MLTIFRAME: CPT | Performed by: OPHTHALMOLOGY

## 2023-04-24 ASSESSMENT — VISUAL ACUITY
OS_SC: 20/400
METHOD: SNELLEN - LINEAR
OD_PH_SC: 20/70
OD_SC: 20/150
OS_PH_SC: 20/100

## 2023-04-24 ASSESSMENT — CONF VISUAL FIELD
OS_INFERIOR_TEMPORAL_RESTRICTION: 1
OD_SUPERIOR_TEMPORAL_RESTRICTION: 3
OD_SUPERIOR_NASAL_RESTRICTION: 3
OS_SUPERIOR_NASAL_RESTRICTION: 3
OD_INFERIOR_NASAL_RESTRICTION: 3
OS_SUPERIOR_TEMPORAL_RESTRICTION: 3
OS_INFERIOR_NASAL_RESTRICTION: 3

## 2023-04-24 ASSESSMENT — CUP TO DISC RATIO
OD_RATIO: 0.4
OS_RATIO: 0.4

## 2023-04-24 ASSESSMENT — SLIT LAMP EXAM - LIDS
COMMENTS: NORMAL
COMMENTS: NORMAL

## 2023-04-24 ASSESSMENT — TONOMETRY
OS_IOP_MMHG: 24
IOP_METHOD: TONOPEN
OD_IOP_MMHG: 23

## 2023-04-24 ASSESSMENT — EXTERNAL EXAM - LEFT EYE: OS_EXAM: NORMAL

## 2023-04-24 ASSESSMENT — EXTERNAL EXAM - RIGHT EYE: OD_EXAM: NORMAL

## 2023-04-24 NOTE — NURSING NOTE
Chief Complaints and History of Present Illnesses   Patient presents with     Diabetic Retinopathy Follow Up     1 year follow up both eyes     Chief Complaint(s) and History of Present Illness(es)     Diabetic Retinopathy Follow Up            Comments: 1 year follow up both eyes          Comments    Pt states vision is very poor. Pt only using reading glasses, nothing for distance.  No eye pain today. No redness or dryness. No flashes or floaters.    DM2 BS: 127 this morning per pt.  Lab Results       Component                Value               Date                  A1C: unknown to pt. Pt has follow up May 5th.  A1C                      7.3                 12/01/2021                 A1C                      6.9                 03/10/2021                 A1C                      7.5                 09/23/2020                 A1C                      7.0                 05/01/2020                 A1C                      6.6                 09/04/2019                 A1C                      6.5                 05/31/2019              DAIJA Garcia April 24, 2023 10:47 AM

## 2023-04-24 NOTE — PROGRESS NOTES
CC -   PDR OU    INTERVAL HISTORY - vision stable, BRANDY 1/2022, missed appt d/t multiple joint/hip problems  Had hip joint removal  VA worse      PMH -   Mikayla Timmons is a 77 year old  patient with PDR OS referred by Dr Lan.  Developed DM II ~ 2010, seen for eye exams by Dr. Nini Velasquez and Saida Zaragoza @ [facility] in 2011, then no eye exam until 2021.  VA had declined OU gradually progressively, patient discussed with PMD in 2021 and was referred to eye clinic    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   - BP/BG control   - given asymmetry advise check carotid U/S (4/2023)    # PDR OS with DM II and DME   - PRP done 4/20/21 (350 spots) & 5/2021 (125 spots)   - PRP done 1/2022 (402)     - stable today quiescent   - monitor    # DME OU   - very mild on OCT      # 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   - no RD, not threatenign fovea   - mild progreession 4/2023 c/t 1/2022   - observe today , montior      # VH OS   - mild in 2021   - resolved 2022        # NS/PSC OU   - visually significant   - refer for eval      # OAG suspect/OHT   - IOP > 20, moderate CDR, thick pachmetry   - refer for eval      # low vision   - unclear if d/t cataract      return to clinic:  6 months, DFE OU, OCT OU    ATTESTATION     Attending Attestation:     Complete documentation of historical and exam elements from today's encounter can be found in the full encounter summary report (not reduplicated in this progress note).  I  personally obtained the chief complaint(s) and history of present illness.  I confirmed and edited as necessary the review of systems, past medical/surgical history, family history, social history, and examination findings as documented by others; and I examined the patient myself.  I personally reviewed the relevant tests, images, and reports as documented above.  I formulated and edited as necessary the assessment and plan and discussed the findings and management plan with the patient and family    Chelsi Rangel MD, PhD  , Vitreoretinal Surgery  Department of Ophthalmology  [facility]

## 2023-04-24 NOTE — LETTER
4/24/2023       RE: Mikayla Timmons  [address]  [facility] [address]     Dear Colleague,    Thank you for referring your patient, iMkayla Timmons, to the [facility] at [facility]. Please see a copy of my visit note below.  I am hoping you can please order a carotid ultrasound due to significant asymmetry of her diabetic retinopathy, which can sometimes indicated carotid stenosis.      CC -   PDR OU    INTERVAL HISTORY - vision stable, BRANDY 1/2022, missed appt d/t multiple joint/hip problems  Had hip joint removal  VA worse      PMH -   Mikayla Timmons is a 77 year old  patient with PDR OS referred by Dr Lan.  Developed DM II ~ 2010, seen for eye exams by Dr. Nini Velasquez and Saida Zaragoza @ [facility] in 2011, then no eye exam until 2021.  VA had declined OU gradually progressively, patient discussed with PMD in 2021 and was referred to eye clinic.    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   - BP/BG control   - given asymmetry advise check carotid U/S (4/2023)    # PDR OS with DM II and DME   - PRP done 4/20/21 (350 spots) & 5/2021 (125 spots)   - PRP done 1/2022 (402)     - stable today quiescent   - monitor    # DME OU   - very mild on OCT      # 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  I personally obtained the chief complaint(s) and history of present illness.  I confirmed and edited as necessary the review of systems, past medical/surgical history, family history, social history, and examination findings as documented by others; and I examined the patient myself.  I personally reviewed the relevant tests, images, and reports as documented above.  I formulated and edited as necessary the assessment and plan and discussed the findings and management plan with the patient and family. - Chelsi Rangel MD, PhD          Base Eye Exam       Visual Acuity (Snellen - Linear)         Right Left    Dist sc 20/150 20/400    Dist ph sc 20/70 20/100              Tonometry (Tonopen, 10:56 AM)         Right Left    Pressure 23 24              Pupils         Dark Light APD    Right 6 5 None    Left 6 5 None              Visual Fields         Left Right    Restrictions Total inferior temporal deficiency; Partial outer superior temporal, superior nasal, inferior nasal deficiencies Partial outer superior temporal, superior nasal, inferior nasal deficiencies              Extraocular Movement         Right Left     Full, Ortho Full, Ortho              Neuro/Psych       Oriented x3: Yes    Mood/Affect: Normal              Dilation       Both eyes: 1.0% Mydriacyl, 2.5% Maurice Synephrine @ 10:57 AM                  Slit Lamp and Fundus Exam       External Exam         Right Left    External Normal Normal              Slit Lamp Exam         Right Left    Lids/Lashes Normal Normal     Conjunctiva/Sclera White and quiet White and quiet    Cornea Clear Clear    Anterior Chamber Deep and quiet Deep and quiet    Iris Dilated Dilated    Lens 2+ NS, 1+ PSC 2+ NS, 1+ PSC    Vitreous syneresis, clear syneresis, no heme              Fundus Exam         Right Left    Disc Normal regressed NVD    C/D Ratio 0.4 0.4    Macula 1+DBH 1+DBH    Vessels attenuated attenuated    Periphery 1-2+ DBH 1-2+ DBH, fibrosis with traction on IT arcade and nasal periphery, mod/light                   Refraction       Wearing Rx    OTC Readers +5.00 Sph                   Again, thank you for allowing me to participate in the care of your patient.      Sincerely,    Chelsi Rangel MD, PhD  , Vitreoretinal Surgery  Department of Ophthalmology  [facility]

## 2023-05-04 ENCOUNTER — DOCUMENTATION ONLY (OUTPATIENT)
Dept: FAMILY MEDICINE | Facility: CLINIC | Age: 78
End: 2023-05-04
Payer: MEDICARE

## 2023-05-05 ENCOUNTER — OFFICE VISIT (OUTPATIENT)
Dept: FAMILY MEDICINE | Facility: CLINIC | Age: 78
End: 2023-05-05
Payer: MEDICARE

## 2023-05-05 VITALS
OXYGEN SATURATION: 95 % | HEART RATE: 78 BPM | RESPIRATION RATE: 18 BRPM | SYSTOLIC BLOOD PRESSURE: 145 MMHG | DIASTOLIC BLOOD PRESSURE: 91 MMHG

## 2023-05-05 DIAGNOSIS — Z79.4 TYPE 2 DIABETES MELLITUS WITH HYPERGLYCEMIA, WITH LONG-TERM CURRENT USE OF INSULIN (H): Primary | ICD-10-CM

## 2023-05-05 DIAGNOSIS — E11.3491 SEVERE NONPROLIFERATIVE DIABETIC RETINOPATHY OF RIGHT EYE WITHOUT MACULAR EDEMA ASSOCIATED WITH TYPE 2 DIABETES MELLITUS (H): ICD-10-CM

## 2023-05-05 DIAGNOSIS — E11.65 TYPE 2 DIABETES MELLITUS WITH HYPERGLYCEMIA, WITH LONG-TERM CURRENT USE OF INSULIN (H): Primary | ICD-10-CM

## 2023-05-05 DIAGNOSIS — K76.0 FATTY LIVER: ICD-10-CM

## 2023-05-05 DIAGNOSIS — E83.52 HYPERCALCEMIA: ICD-10-CM

## 2023-05-05 DIAGNOSIS — Z29.9 ENCOUNTER FOR PREVENTIVE MEASURE: ICD-10-CM

## 2023-05-05 DIAGNOSIS — I10 ESSENTIAL HYPERTENSION: ICD-10-CM

## 2023-05-05 LAB
ANION GAP SERPL CALCULATED.3IONS-SCNC: 6 MMOL/L (ref 3–14)
BUN SERPL-MCNC: 15 MG/DL (ref 7–30)
CALCIUM SERPL-MCNC: 11.3 MG/DL (ref 8.5–10.1)
CHLORIDE BLD-SCNC: 108 MMOL/L (ref 94–109)
CO2 SERPL-SCNC: 29 MMOL/L (ref 20–32)
CREAT SERPL-MCNC: 0.6 MG/DL (ref 0.52–1.04)
GFR SERPL CREATININE-BSD FRML MDRD: >90 ML/MIN/1.73M2
GLUCOSE BLD-MCNC: 181 MG/DL (ref 70–99)
HBA1C MFR BLD: 7.8 % (ref 0–5.6)
HGB BLD-MCNC: 12.8 G/DL (ref 11.7–15.7)
POTASSIUM BLD-SCNC: 4.7 MMOL/L (ref 3.4–5.3)
PTH-INTACT SERPL-MCNC: 65 PG/ML (ref 15–65)
SODIUM SERPL-SCNC: 143 MMOL/L (ref 133–144)
TSH SERPL DL<=0.005 MIU/L-ACNC: 3.45 UIU/ML (ref 0.3–4.2)

## 2023-05-05 PROCEDURE — 82306 VITAMIN D 25 HYDROXY: CPT | Performed by: FAMILY MEDICINE

## 2023-05-05 PROCEDURE — 83970 ASSAY OF PARATHORMONE: CPT | Performed by: FAMILY MEDICINE

## 2023-05-05 PROCEDURE — 36415 COLL VENOUS BLD VENIPUNCTURE: CPT | Performed by: FAMILY MEDICINE

## 2023-05-05 PROCEDURE — 99215 OFFICE O/P EST HI 40 MIN: CPT | Performed by: FAMILY MEDICINE

## 2023-05-05 PROCEDURE — 83036 HEMOGLOBIN GLYCOSYLATED A1C: CPT | Performed by: FAMILY MEDICINE

## 2023-05-05 PROCEDURE — 80048 BASIC METABOLIC PNL TOTAL CA: CPT | Performed by: FAMILY MEDICINE

## 2023-05-05 PROCEDURE — 84443 ASSAY THYROID STIM HORMONE: CPT | Performed by: FAMILY MEDICINE

## 2023-05-05 PROCEDURE — 85018 HEMOGLOBIN: CPT | Performed by: FAMILY MEDICINE

## 2023-05-05 RX ORDER — PEN NEEDLE, DIABETIC 31 GX5/16"
NEEDLE, DISPOSABLE MISCELLANEOUS
Qty: 1 EACH | Status: SHIPPED | OUTPATIENT
Start: 2023-05-05

## 2023-05-05 NOTE — PROGRESS NOTES
Assessment & Plan     Type 2 diabetes mellitus with hyperglycemia, with long-term current use of insulin (H)  Better controlled than she expected. Agree with plan to add back rapid insulin. Will be adding in 4 U with meals if premeal BG is 150 or greater. Also working with our Pharm D on the Dexcom  - TSH; Future  - Basic metabolic panel  - Hemoglobin; Future  - Hemoglobin A1c  - TSH  - Hemoglobin  - insulin  UNIT/ML vial; Inject 20 Units Subcutaneous every morning and 15 Units in the evening  - insulin aspart (NOVOLOG PEN) 100 UNIT/ML pen; Inject 4 Units Subcutaneous 2 times daily (with meals)  - insulin pen needle (B-D U/F) 31G X 8 MM miscellaneous; Use 4 daily as directed.      Hypercalcemia  Revisited my thoughts that this is primary hyperparathyroidism and that we could consider visiting with endocrine to ensure we are managing appropriately. For now will check Vit D (has not taken much due to cost) and reviewed why I am worried. Noted that her bones are at risk.   - Vitamin D Level; Future  - Parathyroid Hormone Intact; Future  - Parathyroid Hormone Intact  - Vitamin D Level    Fatty liver  She noted this and wondered what to do. Reviewed that can lead to cirrhoses and cancer and that weight loss is critical. She is working on this and making slow progress    Essential hypertension  Reviewed that ideally her BP should be lower, particularly with her eye disease and possible stroke risk. Reviewed how the meds work and how adding another med would help. She anticipates that with further weight loss this will improve, which it likely will, and recommended to consider using now while not in control.  Will think about it.     DM with retinal disease  Discussion about the recommendation for carotid US to assess for stenosis, the why, what follow up options are. She was not interested in knowing, nor in increasing her medications in the meantime to optimize her vascular health. She will revisit this with her  ophthalmologist in October and I will ml my note in the meantime.     Also met with our  to discuss MA to assist with PCA placement, and with our Pharm D around insulin         I spent a total of 58 minutes on the day of the visit.   Time spent by me doing chart review, history and exam, documentation and further activities per the note           No follow-ups on file.    Nini Heaton MD  [facility] CLAIRE Degroot is a 77 year old, presenting for the following health issues:  RECHECK        5/5/2023    10:34 AM   Additional Questions   Roomed by steve   Accompanied by self     HPI     Hip:  Hip surgery was about a year ago. Was in rehab til 9/2023  Still struggling with walking - was up to 20 steps but then had two falls (no injuries) and now only able to walk 2 steps. Is now in special therapy at [facility].        Living at home.       DM:  Thinking it will be very high. Is not able to prepare her foods the way she used to (can't sit) and the choices she gets delivered are not that good. Also more fast fruit.    Fasting glucose today: 120  Post breakfast: 204  Goes down before lunch.   Now thinks she would benefit from rapid insulin since her sugars get higher with food.   Wants to change her insulin sensor - trouble with syringes and eye sight not great. Wondering if the Dexcom 7 would work better.   Carpal tunnel is getting in the way   Wondering about insulin pumps although not interested in this    BP:  They take it every time she goes to therapy before therapy. Runs around 140 all the time. No symptoms.   Post hospitalization had a lot of edema but now much improved.   Not interested in adding any more BP meds.   Is weighing herself at rehab - to work on weight loss. And working on exercise.     Thyroid:  Taking regularly         Retina:  Right eye detachment. And has cataracts. Vision has been bad and so happy to hear there is an option.   Reviewed the recommendation  around the [place] to evaluate for possible stenosis. Not interested in starting on meds to promote vascular health, nor interested in stenting or CEA. Aware that stroke is a concern.       Vitamin D - has not had the finances to get it.   Is not taking calcium supplements    PCA:   Can't do her laundry, can't clean.   Has applied for medicaid, months ago and they approved her on one level but were looking for possible disability standing. No one calling her back.              Review of Systems         Objective    BP (!) 145/91   Pulse 78   Resp 18   LMP  (LMP Unknown)   SpO2 95%   There is no height or weight on file to calculate BMI.  Physical Exam   GENERAL: in a wheelchair  NECK: no adenopathy, no asymmetry, masses, or scars and thyroid normal to palpation  RESP: lungs clear to auscultation - no rales, rhonchi or wheezes  CV: regular rate and rhythm, normal S1 S2, no S3 or S4, no murmur, click or rub, no peripheral edema and peripheral pulses strong  MS: 2+ edema bilaterally

## 2023-05-05 NOTE — Clinical Note
Danielle I met with Mikayla and reviewed your recommendation (which I support!) to get a carotid US. She has and continues to be very reluctant to take any meds, and do much testing. She will talk to you about this in October and in the meantime was not interested in statin, or increased BP meds. She is very focused on nutrition as health treatment.  We did up her insulin so that should help her retinopathy.  Just updating you :) Migdalia Heaton MD

## 2023-05-05 NOTE — PATIENT INSTRUCTIONS
Patient Education   Here is the plan from today's visit    1. Type 2 diabetes mellitus with hyperglycemia, with long-term current use of insulin (H)  You will be adding 4 Units of rapid with meals if your blood sugar is greater than 150.   Jorge Luis is working on Dexcom  - TSH; Future  - Basic metabolic panel  - Hemoglobin; Future  - Hemoglobin A1c  - TSH  - Hemoglobin  - insulin  UNIT/ML vial; Inject 20 Units Subcutaneous every morning and 15 Units in the evening  Dispense: 20 mL; Refill: 3  - insulin aspart (NOVOLOG PEN) 100 UNIT/ML pen; Inject 4 Units Subcutaneous 2 times daily (with meals)  Dispense: 15 mL; Refill: 5  - insulin pen needle (B-D U/F) 31G X 8 MM miscellaneous; Use 4 daily as directed.  Dispense: 1 each; Refill: PRN      3. Hypercalcemia  We are checking Vitamin D level.   Also the parathyroid hormone to see if that is still high. If it is, then consider talking to the endocrinologist in the future. Could be virtual   - Vitamin D Level; Future  - Parathyroid Hormone Intact; Future    4. Fatty liver  Keep working on your weight loss.  Keep going on that!!    5. Essential hypertension  We talked about how having higher blood pressure is stressful on the blood vessels, the heart, and increases stroke risk.  I would recommend adding another medication to help. You are right that weight loss can help too but for now, I would think about adding another med.     6. Eyes  We discussed what an ultrasound of the carotids would add, in helping possibly prevent strokes and you declined at this time. I will send to the eye doc and you will talk to him about this in October      Please call or return to clinic if your symptoms don't go away.    Follow up plan  No follow-ups on file.    Thank you for coming to [facility] today.  Lab Testing:  **If you had lab testing today and your results are reassuring or normal they will be mailed to you or sent through [employer] within 7 days.   **If the lab tests  need quick action we will call you with the results.  **If you are having labs done on a different day, please call [phone] to schedule at [facility] or [phone] for other [facility] locations. Labs do not offer walk-in appointments.  The phone number we will call with results is # [phone] (home) . If this is not the best number please call our clinic and change the number.  Medication Refills:  If you need any refills please call your pharmacy and they will contact us.   If you need to  your refill at a new pharmacy, please contact the new pharmacy directly. The new pharmacy will help you get your medications transferred faster.   Scheduling:  If you have any concerns about today's visit or wish to schedule another appointment please call our office during normal business hours [phone] (8-5:00 M-F). If you can no longer make a scheduled visit, please cancel via [employer] or call us to cancel.   If a referral was made to an [facility] specialty provider and you do not get a call from central scheduling, please refer to directions on your visit summary or call our office during normal business hours for assistance.   If a Mammogram was ordered for you at the Breast Center call [phone] to schedule or change your appointment.  If you had an XRay/CT/Ultrasound/MRI ordered the number is [phone] to schedule or change your radiology appointment.   [facility] has limited ultrasound appointments available on Wednesdays, if you would like your ultrasound at [facility], please call [phone] to schedule.   Medical Concerns:  If you have urgent medical concerns please call [phone] at any time of the day.    Nini Heaton MD

## 2023-05-06 LAB — DEPRECATED CALCIDIOL+CALCIFEROL SERPL-MC: 26 UG/L (ref 20–75)

## 2023-05-12 ENCOUNTER — DOCUMENTATION ONLY (OUTPATIENT)
Dept: FAMILY MEDICINE | Facility: CLINIC | Age: 78
End: 2023-05-12
Payer: MEDICARE

## 2023-05-12 NOTE — PROGRESS NOTES
"When opening a documentation only encounter, be sure to enter in \"Chief Complaint\" Forms and in \" Comments\" Title of form, description if needed.    Mikayla is a 77 year old  female  Form received via: Fax  Form now resides in: Provider Lamberto Odom                  "

## 2023-05-16 NOTE — PROGRESS NOTES
Form has been completed by provider.     Form sent out via: Fax to [facility] at Fax Number: [phone]  Patient informed: n/a  Output date: May 16, 2023    Sandra Odom      **Please close the encounter**

## 2023-05-19 ENCOUNTER — TELEPHONE (OUTPATIENT)
Dept: FAMILY MEDICINE | Facility: CLINIC | Age: 78
End: 2023-05-19
Payer: MEDICARE

## 2023-05-19 NOTE — TELEPHONE ENCOUNTER
called patient to follow up on medical assistance. Patient reports she applied for medical assistance and received a notice in the mail saying she was qualified as disabled but hasn't heard anything since getting that notice. SW asked that patient call SW when she is able.     MIRIAM Soni

## 2023-05-22 NOTE — TELEPHONE ENCOUNTER
received phone call from patient. Patient states she still has not heard from anyone regarding her medical assistance application after getting notice of initially being approved.    SW asked patient if she had a case number that was provided with the notice of approval. Patient states she does have a case number. SW asked patient to provide case number so SW can call to follow up on patient's MA status. Patient agrees to give SW case number, but states it might take her a few days to find the paperwork with the case number on it.     SW asked that patient call when she is able to find the case number. Patient has SW's direct number and is encouraged to leave a voicemail if SW does not answer.     MIRIAM Soni

## 2023-06-05 NOTE — TELEPHONE ENCOUNTER
"Patient is needing a refill of the Novolin vials and patient also would like the pen needles and Novolog Flexpen added to the program. I have uploaded a provider form (this is needed since we would be adding the Novolog) to the media tab. Please note that [employer] requires that the QTY say either \"120 days\" or \"4 months.\"     Once form is complete, please send to me via e-mail and I will submit to [employer]. Patient is down to her last vial so this is time sensitive please.    Thank you,     Mehran Huerta, [facility]  Pharmacy Clinic [facility]   [email or web address]   Phone: [phone]  Fax: [phone]    "

## 2023-06-07 ENCOUNTER — TELEPHONE (OUTPATIENT)
Dept: FAMILY MEDICINE | Facility: CLINIC | Age: 78
End: 2023-06-07

## 2023-06-07 NOTE — TELEPHONE ENCOUNTER
Please complete provider form as soon as possible as the patients daughter is moving out of [place], leaving Friday morning and is her only way of getting this medication from a pharmacy, which she will need to do because she needs by Friday but [employer] is needing the provider form to be submitted.     For now they recommended the patient calls [facility] at [phone] to request an emergency supply. I will inform patient but she doesn't seem to receive my calls, voicemail's or other communications.     Thank you,     Mehran Huerta [facility]  Pharmacy Clinic [facility]   [email or web address]   Phone: [phone]  Fax: [phone]

## 2023-06-07 NOTE — TELEPHONE ENCOUNTER
[facility] phone call message- patient requesting to speak with PCP or provider:    PCP: Nini Heaton    Additional Comments: Seeking callback regarding insulin/insulin management     Is a call back needed? Yes    Patient informed that it may take up to 2 business days to hear back from PCP:Yes    OK to leave a message on voice mail? Yes    Primary language: English      needed? No    Call taken on June 7, 2023 at 9:56 AM by Murray Peters

## 2023-06-07 NOTE — TELEPHONE ENCOUNTER
Met with "[employer] supervisor and was able to obtain a voucher for Mikayla for the Novokyle N. Provided information to [facility]. They will contact the patient when ready. Provider forms are still needed. Please see documentation below this documentation about the provider forms.     Free Voucher Info   RX BIN: [id number]  RX PCN: [id number]   RX ID: [id number]  RX GRP: [id number]     Thank you,     Mehran Huerta [facility]  Pharmacy Clinic [facility]   [email or web address]   Phone: [phone]  Fax: [phone]

## 2023-06-07 NOTE — TELEPHONE ENCOUNTER
Attempted to call pt to let pt know that pt should have refills available as shown below.    Disp Refills Start End   insulin  UNIT/ML vial  20 mL 3 5/5/2023      No answer, lvm. Called pharmacy to make sure pt has refills and pharmacy, staff stated that she does and that pt called them and asked for refill and they are currently working on it.     Jayleen Noland RN

## 2023-06-07 NOTE — TELEPHONE ENCOUNTER
[facility] phone call message- patient requesting a refill:    Full Medication Name: insulin  UNIT/ML vial    Dose: Inject 20 Units Subcutaneous every morning and 15 Units in the evening - Subcutaneous    Pharmacy confirmed as   [id number] - [place] - [address]  [address] [place] [address]  Phone: [phone] Fax: [phone]  : Yes    Additional Comments: Almost out of insulin.      OK to leave a message on voice mail? Yes    Primary language: English      needed? No    Call taken on June 7, 2023 at 9:52 AM by Juancarlos Corral

## 2023-06-09 NOTE — TELEPHONE ENCOUNTER
Sent provider form to [facility] via e-mail.     Thank you,     Mehran Huerta [facility]  Pharmacy Clinic [facility]  [email or web address]   Phone: [phone]  Fax: [phone]

## 2023-06-11 ENCOUNTER — HEALTH MAINTENANCE LETTER (OUTPATIENT)
Age: 78
End: 2023-06-11

## 2023-06-13 NOTE — TELEPHONE ENCOUNTER
"Patient is needing a refill of the Novolin vials and patient also would like the pen needles and Novolog Flexpen added to the program. I have uploaded a provider form (this is needed since we would be adding the Novolog) to the media tab. Please note that [employer] requires that the QTY say either \"120 days\" or \"4 months.\"      Once form is complete, please send to me via e-mail and I will submit to [employer]. Patient is down to her last vial so this is time sensitive please.     Thank you,     Mehran Huerta, [facility]  Pharmacy Clinic [facility]   [email or web address]   Phone: [phone]  Fax: [phone]     "

## 2023-06-18 NOTE — TELEPHONE ENCOUNTER
Provider refill form submitted via fax to [employer] at [phone]. Fax confirmed sent.       Thank you,     Mehran Huerta [facility]  Pharmacy Clinic [facility]  [email or web address]   Phone: [phone]  Fax: [phone]

## 2023-06-19 NOTE — TELEPHONE ENCOUNTER
Called [employer] at [phone]. They have received the provider application. The refill is being processed today and the Novolog and pen needles will be shipped directly to the patient as well as the Novolin N. It will take 10-14 business days from today for patient to receive. Nothing further is needed at this time.     Thank you,     Mehran Huerta, [facility]  Pharmacy Clinic [facility]  [email or web address]   Phone: [phone]  Fax: [phone]

## 2023-06-20 ENCOUNTER — OFFICE VISIT (OUTPATIENT)
Dept: ORTHOPEDICS | Facility: CLINIC | Age: 78
End: 2023-06-20
Payer: MEDICARE

## 2023-06-20 DIAGNOSIS — L60.3 NAIL DYSTROPHY: ICD-10-CM

## 2023-06-20 DIAGNOSIS — E11.49 TYPE II OR UNSPECIFIED TYPE DIABETES MELLITUS WITH NEUROLOGICAL MANIFESTATIONS, NOT STATED AS UNCONTROLLED(250.60) (H): Primary | ICD-10-CM

## 2023-06-20 PROCEDURE — 99213 OFFICE O/P EST LOW 20 MIN: CPT | Performed by: PODIATRIST

## 2023-06-20 NOTE — LETTER
6/20/2023         RE: Mikayla Timmons  [address]  [place] [address]        Dear Colleague,    Thank you for referring your patient, Mikayla Timmons, to the [facility] [place]. Please see a copy of my visit note below.      Chief Complaint   Patient presents with    Follow Up     Diabetic foot care.           HPI: Mikayla is a 77  year old female who presents today for a diabetic foot exam and management.  She was seen last year.  She has moved back into her house. No new LE complaints. She sees Dr. Heaton for her diabetic care and was last seen 5/5/23.    Review of Systems: No n/v/d/f/c/ns/sob/cp    PMH: LBP  Postpolio syndrome  Carpal Tunnel  T2DM  HTN    No past surgical history on file.      Allergies: Hydromorphone and Ketamine    SH:   Social History     Socioeconomic History    Marital status:      Spouse name: Not on file    Number of children: Not on file    Years of education: Not on file    Highest education level: Not on file   Occupational History    Not on file   Tobacco Use    Smoking status: Never    Smokeless tobacco: Never   Vaping Use    Vaping status: Not on file   Substance and Sexual Activity    Alcohol use: Yes     Comment: Lightly    Drug use: No    Sexual activity: Not on file   Other Topics Concern    Parent/sibling w/ CABG, MI or angioplasty before 65F 55M? Not Asked   Social History Narrative    Not on file     Social Determinants of Health     Financial Resource Strain: Not on file   Food Insecurity: Not on file   Transportation Needs: Unmet Transportation Needs (11/1/2019)    PRAPARE - Transportation     Lack of Transportation (Medical): Yes     Lack of Transportation (Non-Medical): Yes   Physical Activity: Not on file   Stress: Not on file   Social Connections: Not on file   Intimate Partner Violence: Not on file   Housing Stability: Not on file       FH:   Family History   Problem Relation Age of Onset    Cancer No family hx of      Diabetes No family hx of     Glaucoma No family hx of     Hypertension No family hx of     Macular Degeneration No family hx of     Cerebrovascular Disease No family hx of     Thyroid Disease No family hx of        Objective:    Hemoglobin A1C   Date Value Ref Range Status   05/05/2023 7.8 (H) 0.0 - 5.6 % Final     Comment:     Normal <5.7%   Prediabetes 5.7-6.4%    Diabetes 6.5% or higher     Note: Adopted from ADA consensus guidelines.   12/01/2021 7.3 (H) 0.0 - 5.6 % Final     Comment:     Normal <5.7%   Prediabetes 5.7-6.4%    Diabetes 6.5% or higher     Note: Adopted from ADA consensus guidelines.   03/10/2021 6.9 (H) 4.1 - 5.7 % Final   09/23/2020 7.5 (H) 4.1 - 5.7 % Final   05/01/2020 7.0 (H) 4.1 - 5.7 % Final         PT pulses are not palpable 2/2 edema and DP pulses are 2/4 bilaterally. CRT is WNL. Diminished pedal hair.   Gross sensation is intact bilaterally. Protective sensation is normal except at the plantar halluces at the last visit. Intact intact.   Equinus is noted bilaterally. No pain with active or passive ROM of the ankle, MTJ, 1st ray, or halluces bilaterally,.   Nails elongated and dystrophic bilaterally. No open lesions are noted.     Assessment: DM2 with neuropathy - controlled.   Nail dystrophy x 10.    Plan:  - Pt seen and evaluated.  - Nails trimmed x 10.   - See again in 3 months.             Swapnil Baez DPM

## 2023-06-20 NOTE — PROGRESS NOTES
Chief Complaint   Patient presents with     Follow Up     Diabetic foot care.           HPI: Mikayla is a 77  year old female who presents today for a diabetic foot exam and management.  She was seen last year.  She has moved back into her house. No new LE complaints. She sees Dr. Heaton for her diabetic care and was last seen 5/5/23.    Review of Systems: No n/v/d/f/c/ns/sob/cp    PMH: LBP  Postpolio syndrome  Carpal Tunnel  T2DM  HTN    No past surgical history on file.      Allergies: Hydromorphone and Ketamine    SH:   Social History     Socioeconomic History     Marital status:      Spouse name: Not on file     Number of children: Not on file     Years of education: Not on file     Highest education level: Not on file   Occupational History     Not on file   Tobacco Use     Smoking status: Never     Smokeless tobacco: Never   Vaping Use     Vaping status: Not on file   Substance and Sexual Activity     Alcohol use: Yes     Comment: Lightly     Drug use: No     Sexual activity: Not on file   Other Topics Concern     Parent/sibling w/ CABG, MI or angioplasty before 65F 55M? Not Asked   Social History Narrative     Not on file     Social Determinants of Health     Financial Resource Strain: Not on file   Food Insecurity: Not on file   Transportation Needs: Unmet Transportation Needs (11/1/2019)    PRAPARE - Transportation      Lack of Transportation (Medical): Yes      Lack of Transportation (Non-Medical): Yes   Physical Activity: Not on file   Stress: Not on file   Social Connections: Not on file   Intimate Partner Violence: Not on file   Housing Stability: Not on file       FH:   Family History   Problem Relation Age of Onset     Cancer No family hx of      Diabetes No family hx of      Glaucoma No family hx of      Hypertension No family hx of      Macular Degeneration No family hx of      Cerebrovascular Disease No family hx of      Thyroid Disease No family hx of        Objective:    Hemoglobin A1C    Date Value Ref Range Status   05/05/2023 7.8 (H) 0.0 - 5.6 % Final     Comment:     Normal <5.7%   Prediabetes 5.7-6.4%    Diabetes 6.5% or higher     Note: Adopted from ADA consensus guidelines.   12/01/2021 7.3 (H) 0.0 - 5.6 % Final     Comment:     Normal <5.7%   Prediabetes 5.7-6.4%    Diabetes 6.5% or higher     Note: Adopted from ADA consensus guidelines.   03/10/2021 6.9 (H) 4.1 - 5.7 % Final   09/23/2020 7.5 (H) 4.1 - 5.7 % Final   05/01/2020 7.0 (H) 4.1 - 5.7 % Final         PT pulses are not palpable 2/2 edema and DP pulses are 2/4 bilaterally. CRT is WNL. Diminished pedal hair.   Gross sensation is intact bilaterally. Protective sensation is normal except at the plantar halluces at the last visit. Intact intact.   Equinus is noted bilaterally. No pain with active or passive ROM of the ankle, MTJ, 1st ray, or halluces bilaterally,.   Nails elongated and dystrophic bilaterally. No open lesions are noted.     Assessment: DM2 with neuropathy - controlled.   Nail dystrophy x 10.    Plan:  - Pt seen and evaluated.  - Nails trimmed x 10.   - See again in 3 months.

## 2023-06-27 ENCOUNTER — TELEPHONE (OUTPATIENT)
Dept: FAMILY MEDICINE | Facility: CLINIC | Age: 78
End: 2023-06-27
Payer: MEDICARE

## 2023-06-27 NOTE — TELEPHONE ENCOUNTER
received voicemail from patient regarding her medical assistance application. Patient reported that she was denied medical assistance due to having too high of income.     Patient reports her monthly income is $1854 and the max amount she can have is $1133 per the  she spoke with on Friday, 6/23/23.     Patient stated she then spoke with [employer] to see what other options are available. Patient asked that SW call her back and leave a voicemail with SW's work hours and schedule. SW did so and asked that patient call back when she is able.    MIRIAM Soni

## 2023-07-18 ENCOUNTER — OFFICE VISIT (OUTPATIENT)
Dept: PHARMACY | Facility: CLINIC | Age: 78
End: 2023-07-18

## 2023-07-18 DIAGNOSIS — Z79.4 TYPE 2 DIABETES MELLITUS WITH HYPERGLYCEMIA, WITH LONG-TERM CURRENT USE OF INSULIN (H): Primary | ICD-10-CM

## 2023-07-18 DIAGNOSIS — E11.65 TYPE 2 DIABETES MELLITUS WITH HYPERGLYCEMIA, WITH LONG-TERM CURRENT USE OF INSULIN (H): Primary | ICD-10-CM

## 2023-07-18 PROCEDURE — 99207 PR NO CHARGE LOS: CPT | Performed by: PHARMACIST

## 2023-07-18 NOTE — PROGRESS NOTES
Clinical Pharmacy Consult:                                                    Mikayla Timmons is a 77 year old female coming in for a clinical pharmacist consult.  She was referred to me from Dr. Heaton.     Reason for Consult: dexcom G7 education   Novolog Pen education     Mikayla is here today for education on how to use her new CGM and insulin injection device.  She has not yet started to use the Dexcom G7 glucometer as she has been unable to install the Benjy and unable to apply the sensor.      Mikayla was prescribed Novolog before meals, but has not started to use this insulin because 1) not yet needed and 2) she was not comfortable using the insulin pen.      Discussion:   Dexcom G7  CGM sensor was applied to the patient's arm.  Mikayla has difficulty in upper body flexibility and she was shown technique today that allowed her to place the sensor beneath her tricep.  She is concerned at the potential of this sensor showing and allowing public view of her condition.  My understanding is that she fears that expose her diabetes so the public and feels stigmatized as a result.  We worked today to allow placement of the sensor further upper arm to decrease the view of the sensor.  Patient was ultimately satisfied with the location of the sensor and aware that it may be visible to others.    You are unable to upload the G7 benjy to her cell phone.  Patient will retrieve her password to allow her phone to upload new applications.  Her phone appears to be compatible with the [employer] benjy.  Patient plans to complete this today.    Novolog Pen  Patient has not started using NovoLog pen as her Premeall blood sugars do not exceed 150 mg/dL.  Today she expresses anxiety of using the pen as she is unable to read the numbers clearly.  She also feels that adding the insulin pen needle is a fine motor skill that she may not have.  After practicing today she is more comfortable in sending the correct dose to the NovoLog pen as well as applying  the pen needle to the device.    Plan:  1. Work with family to have Dexcom G7 robbi installed onto phone  2. Dexcom G7 sensor was applied to right arm  3. May begin to use Novolog Pen    Jorge Luis Beckett Pharm.D.

## 2023-07-26 ENCOUNTER — TELEPHONE (OUTPATIENT)
Dept: FAMILY MEDICINE | Facility: CLINIC | Age: 78
End: 2023-07-26
Payer: MEDICARE

## 2023-07-26 NOTE — TELEPHONE ENCOUNTER
[facility] phone call message- patient requesting to speak with PCP or provider:    PCP: Nini Heaton    Additional Comments: The patient is requesting a call back from PCP to discuss bump on foot that's growing continuously.    Is a call back needed? Yes    Patient informed that it may take up to 2 business days to hear back from PCP:Yes    OK to leave a message on voice mail? Yes    Primary language: English      needed? No    Call taken on July 26, 2023 at 10:20 AM by Nuvia Bello

## 2023-08-11 ENCOUNTER — DOCUMENTATION ONLY (OUTPATIENT)
Dept: FAMILY MEDICINE | Facility: CLINIC | Age: 78
End: 2023-08-11
Payer: MEDICARE

## 2023-08-11 NOTE — PROGRESS NOTES
"When opening a documentation only encounter, be sure to enter in \"Chief Complaint\" Forms and in \" Comments\" Title of form, description if needed.    Mikayla is a 78 year old  female  Form received via: Fax  Form now resides in: Provider Lamberto SUAREZ                "

## 2023-08-14 ENCOUNTER — OFFICE VISIT (OUTPATIENT)
Dept: FAMILY MEDICINE | Facility: CLINIC | Age: 78
End: 2023-08-14
Payer: MEDICARE

## 2023-08-14 VITALS
RESPIRATION RATE: 16 BRPM | TEMPERATURE: 98.3 F | BODY MASS INDEX: 40.98 KG/M2 | SYSTOLIC BLOOD PRESSURE: 162 MMHG | WEIGHT: 246 LBS | OXYGEN SATURATION: 96 % | DIASTOLIC BLOOD PRESSURE: 77 MMHG | HEIGHT: 65 IN | HEART RATE: 69 BPM

## 2023-08-14 DIAGNOSIS — E11.65 TYPE 2 DIABETES MELLITUS WITH HYPERGLYCEMIA, WITH LONG-TERM CURRENT USE OF INSULIN (H): Primary | ICD-10-CM

## 2023-08-14 DIAGNOSIS — E66.813 CLASS 3 OBESITY: ICD-10-CM

## 2023-08-14 DIAGNOSIS — L98.9 SKIN LESION: ICD-10-CM

## 2023-08-14 DIAGNOSIS — L03.115 CELLULITIS OF RIGHT LOWER EXTREMITY: ICD-10-CM

## 2023-08-14 DIAGNOSIS — Z79.4 TYPE 2 DIABETES MELLITUS WITH HYPERGLYCEMIA, WITH LONG-TERM CURRENT USE OF INSULIN (H): Primary | ICD-10-CM

## 2023-08-14 LAB
ALBUMIN SERPL BCG-MCNC: 3.8 G/DL (ref 3.5–5.2)
ALP SERPL-CCNC: 60 U/L (ref 35–104)
ALT SERPL W P-5'-P-CCNC: 13 U/L (ref 0–50)
ANION GAP SERPL CALCULATED.3IONS-SCNC: 11 MMOL/L (ref 7–15)
AST SERPL W P-5'-P-CCNC: 19 U/L (ref 0–45)
BASOPHILS # BLD AUTO: 0 10E3/UL (ref 0–0.2)
BASOPHILS NFR BLD AUTO: 1 %
BILIRUB SERPL-MCNC: 0.3 MG/DL
BUN SERPL-MCNC: 14.9 MG/DL (ref 8–23)
CALCIUM SERPL-MCNC: 10.5 MG/DL (ref 8.8–10.2)
CHLORIDE SERPL-SCNC: 102 MMOL/L (ref 98–107)
CREAT SERPL-MCNC: 0.54 MG/DL (ref 0.51–0.95)
DEPRECATED HCO3 PLAS-SCNC: 25 MMOL/L (ref 22–29)
EOSINOPHIL # BLD AUTO: 0.2 10E3/UL (ref 0–0.7)
EOSINOPHIL NFR BLD AUTO: 3 %
ERYTHROCYTE [DISTWIDTH] IN BLOOD BY AUTOMATED COUNT: 15.3 % (ref 10–15)
GFR SERPL CREATININE-BSD FRML MDRD: >90 ML/MIN/1.73M2
GLUCOSE SERPL-MCNC: 179 MG/DL (ref 70–99)
HBA1C MFR BLD: 8 % (ref 0–5.6)
HCT VFR BLD AUTO: 36.6 % (ref 35–47)
HGB BLD-MCNC: 11.4 G/DL (ref 11.7–15.7)
IMM GRANULOCYTES # BLD: 0 10E3/UL
IMM GRANULOCYTES NFR BLD: 0 %
LYMPHOCYTES # BLD AUTO: 2.3 10E3/UL (ref 0.8–5.3)
LYMPHOCYTES NFR BLD AUTO: 39 %
MCH RBC QN AUTO: 29.8 PG (ref 26.5–33)
MCHC RBC AUTO-ENTMCNC: 31.1 G/DL (ref 31.5–36.5)
MCV RBC AUTO: 96 FL (ref 78–100)
MONOCYTES # BLD AUTO: 0.5 10E3/UL (ref 0–1.3)
MONOCYTES NFR BLD AUTO: 8 %
NEUTROPHILS # BLD AUTO: 2.9 10E3/UL (ref 1.6–8.3)
NEUTROPHILS NFR BLD AUTO: 49 %
PLATELET # BLD AUTO: 219 10E3/UL (ref 150–450)
POTASSIUM SERPL-SCNC: 4 MMOL/L (ref 3.4–5.3)
PROT SERPL-MCNC: 7.1 G/DL (ref 6.4–8.3)
PTH-INTACT SERPL-MCNC: 42 PG/ML (ref 15–65)
RBC # BLD AUTO: 3.82 10E6/UL (ref 3.8–5.2)
SODIUM SERPL-SCNC: 138 MMOL/L (ref 136–145)
WBC # BLD AUTO: 6 10E3/UL (ref 4–11)

## 2023-08-14 PROCEDURE — 87186 SC STD MICRODIL/AGAR DIL: CPT | Performed by: FAMILY MEDICINE

## 2023-08-14 PROCEDURE — 99214 OFFICE O/P EST MOD 30 MIN: CPT | Performed by: FAMILY MEDICINE

## 2023-08-14 PROCEDURE — 83036 HEMOGLOBIN GLYCOSYLATED A1C: CPT | Performed by: FAMILY MEDICINE

## 2023-08-14 PROCEDURE — 83970 ASSAY OF PARATHORMONE: CPT | Performed by: FAMILY MEDICINE

## 2023-08-14 PROCEDURE — 87147 CULTURE TYPE IMMUNOLOGIC: CPT | Performed by: FAMILY MEDICINE

## 2023-08-14 PROCEDURE — 87077 CULTURE AEROBIC IDENTIFY: CPT | Performed by: FAMILY MEDICINE

## 2023-08-14 PROCEDURE — 85025 COMPLETE CBC W/AUTO DIFF WBC: CPT | Performed by: FAMILY MEDICINE

## 2023-08-14 PROCEDURE — 80053 COMPREHEN METABOLIC PANEL: CPT | Performed by: FAMILY MEDICINE

## 2023-08-14 PROCEDURE — 87070 CULTURE OTHR SPECIMN AEROBIC: CPT | Performed by: FAMILY MEDICINE

## 2023-08-14 PROCEDURE — 82306 VITAMIN D 25 HYDROXY: CPT | Performed by: FAMILY MEDICINE

## 2023-08-14 PROCEDURE — 36415 COLL VENOUS BLD VENIPUNCTURE: CPT | Performed by: FAMILY MEDICINE

## 2023-08-14 RX ORDER — PROCHLORPERAZINE 25 MG/1
1 SUPPOSITORY RECTAL
Qty: 1 EACH | Refills: 0 | COMMUNITY
Start: 2023-08-14

## 2023-08-14 RX ORDER — PROCHLORPERAZINE 25 MG/1
1 SUPPOSITORY RECTAL
Qty: 3 EACH | Refills: 11 | COMMUNITY
Start: 2023-08-14

## 2023-08-14 RX ORDER — DOXYCYCLINE 100 MG/1
100 CAPSULE ORAL 2 TIMES DAILY
Qty: 14 CAPSULE | Refills: 0 | Status: SHIPPED | OUTPATIENT
Start: 2023-08-14 | End: 2023-12-29

## 2023-08-14 ASSESSMENT — PAIN SCALES - GENERAL: PAINLEVEL: NO PAIN (0)

## 2023-08-14 NOTE — PROGRESS NOTES
"  Assessment & Plan     Type 2 diabetes mellitus with hyperglycemia, with long-term current use of insulin (H)  Reviewed that her HgA1c is a bit higher which does not surprise her. Without her daughter, her food choices are more limited. Has worked with SW.  Is planning on getting food delivered.   Also will start the 4 U Aspartate with her two larger meals. Spent time explaining why the Aspartate is key.  BP normal at home. Revisit in 3 months.   - Hemoglobin A1c  - Comprehensive metabolic panel; Future  - Albumin Random Urine Quantitative with Creat Ratio; Future  - Comprehensive metabolic panel  - Albumin Random Urine Quantitative with Creat Ratio  - Continuous Blood Gluc Transmit (DEXCOM G6 TRANSMITTER) MISC; 1 Units 7 times daily Change every 3 months.  - Continuous Blood Gluc  (DEXCOM G6 ) BERTHA; 1 Units 7 times daily Use to read blood sugars as per 's instructions.      Cellulitis of right lower extremity  I am concerned that she has a smoldering cellulitis and is at risk with her DM and edema.  Cultured and will start empirically on Doxy.  WBC normal so no concern for deeper or extensive infection.  Will check in with her in 1 week to ensure getting better. DId not start lasix but could do so if limited response.   - doxycycline hyclate (VIBRAMYCIN) 100 MG capsule; Take 1 capsule (100 mg) by mouth 2 times daily  - Skin Aerobic Bacterial Culture Routine  - CBC with platelets differential    Class 3 Obesity - she is aware that her weight is up and attributes to difficulty with nutrition. Is doing her best with no family resources and inability to walk much. Continue to monitor. She wants to avoid meds at all cost.     32 minutes spent by me on the date of the encounter doing chart review, review of test results, patient visit, and documentation        BMI:   Estimated body mass index is 40.94 kg/m  as calculated from the following:    Height as of this encounter: 1.651 m (5' 5\").    " "Weight as of this encounter: 111.6 kg (246 lb).           No follow-ups on file.    Nini Heaton MD  [facility] CLAIRE Degroto is a 78 year old, presenting for the following health issues:  Sore (Right leg /)      HPI     Overall - her right leg is doing better - less crusting. Review of picture from earlier, the area was similarly red.   Pain only when she walks and not bad. Also when tries to pump her feet, it hurts for a few minutes.    Was using Eucerin on her lower legs. Ran out and using cocunut cream.      DM:  Continues on the NPH BID (20 U and 15 U).  Is not taking the 4 U Aspartate  Will go as low as 72 or 81. This scares her which is why she has avoided the Aspartate  Knows it is worse since doesn't have the resources she did before. Subscribed to a delivery service.     Radha is not in town anymore which has been difficult for her.  Too much money for a lot of services.     HTN: at home is OK.  Around 130/80s.                 Review of Systems         Objective    BP (!) 162/77   Pulse 69   Temp 98.3  F (36.8  C)   Resp 16   Ht 1.651 m (5' 5\")   Wt 111.6 kg (246 lb)   LMP  (LMP Unknown)   SpO2 96%   BMI 40.94 kg/m    Body mass index is 40.94 kg/m .  Physical Exam   GENERAL: healthy, alert and no distress  RESP: lungs clear to auscultation - no rales, rhonchi or wheezes  CV: regular rate and rhythm, normal S1 S2, no S3 or S4, no murmur, click or rub, no peripheral edema and peripheral pulses strong  MS: no gross musculoskeletal defects noted, no edema                        "

## 2023-08-14 NOTE — PATIENT INSTRUCTIONS
Here is the plan from today's visit    1. Type 2 diabetes mellitus with hyperglycemia, with long-term current use of insulin (H)  Plan to add the Aspartate to your meals.      - Hemoglobin A1c  - Comprehensive metabolic panel; Future  - Albumin Random Urine Quantitative with Creat Ratio; Future  - Comprehensive metabolic panel  - Albumin Random Urine Quantitative with Creat Ratio  - Continuous Blood Gluc Transmit (DEXCOM G6 TRANSMITTER) MISC; 1 Units 7 times daily Change every 3 months.  Dispense: 1 each; Refill: 0  - Continuous Blood Gluc  (DEXCOM G6 ) BERTHA; 1 Units 7 times daily Use to read blood sugars as per 's instructions.  Dispense: 3 each; Refill: 11    2. Cellulitis of right lower extremity  Start the Doxycline twice a day and get back to me with how it is going.   - doxycycline hyclate (VIBRAMYCIN) 100 MG capsule; Take 1 capsule (100 mg) by mouth 2 times daily  Dispense: 14 capsule; Refill: 0  - Skin Aerobic Bacterial Culture Routine        Please call or return to clinic if your symptoms don't go away.    Follow up plan    Thank you for coming to [facility] today.  Lab Testing:  **If you had lab testing today and your results are reassuring or normal they will be mailed to you or sent through [employer] within 7 days.   **If the lab tests need quick action we will call you with the results.  The phone number we will call with results is # [phone] (home) . If this is not the best number please call our clinic and change the number.  Medication Refills:  If you need any refills please call your pharmacy and they will contact us.   If you need to  your refill at a new pharmacy, please contact the new pharmacy directly. The new pharmacy will help you get your medications transferred faster.   Scheduling:  If you have any concerns about today's visit or wish to schedule another appointment please call our office during normal business hours [phone] (8-5:00  M-F)  If a referral was made to a [facility] and you don't get a call from central scheduling please call [phone].  If a Mammogram was ordered for you at [facility] call [phone] to schedule or change your appointment.  If you had an XRay/CT/Ultrasound/MRI ordered the number is [phone] to schedule or change your radiology appointment.   Medical Concerns:  If you have urgent medical concerns please call [phone] at any time of the day.  If you have a medical emergency please call [phone].

## 2023-08-15 LAB — DEPRECATED CALCIDIOL+CALCIFEROL SERPL-MC: 51 UG/L (ref 20–75)

## 2023-08-17 ENCOUNTER — TELEPHONE (OUTPATIENT)
Dept: FAMILY MEDICINE | Facility: CLINIC | Age: 78
End: 2023-08-17
Payer: MEDICARE

## 2023-08-17 NOTE — RESULT ENCOUNTER NOTE
I tried to call Mikayla but not available so left VM.   Plan is to find out how her leg is.  If the REDNESS (she was thinking it was getting better before because the crusting was less) is shrinking and the area is not leaking as much, then we should continue on the Doxy. If the redness is not much better, then we should switch to Septra which would treat both bacteria that she is growing.      I asked her to call back so hoping you can help figure out if we should change the medication or not.        Thanks!    Nini Heaton MD

## 2023-08-17 NOTE — TELEPHONE ENCOUNTER
----- Message from Nini Heaton MD sent at 8/17/2023  9:48 AM CDT -----  I tried to call Mikayla but not available so left VM.   Plan is to find out how her leg is.  If the REDNESS (she was thinking it was getting better before because the crusting was less) is shrinking and the area is not leaking as much, then we should continue on the Doxy. If the redness is not much better, then we should switch to Septra which would treat both bacteria that she is growing.      I asked her to call back so hoping you can help figure out if we should change the medication or not.        Thanks!    Nini Heaton MD

## 2023-08-17 NOTE — TELEPHONE ENCOUNTER
I called patient back and scheduled for a wound check on Monday 8/21/23 with Dr. Heaton at 10am. She did say she had a neighbor come and look at the wound and they feel the redness has decreased.    Adenike Sumner RN      Patient called back, she was at PT when Dr. Rodas called.    Patient is unable to see the wound due to vision issues, she said pain is less but it does still feel moist and that does not seem to have changed.    Unfortunately patient had to pay out of pocket for any medication ordered, she paid for the doxy out of pocket.     Patient will not have any money until the 1st to pay for a new script.     I let patient know that she should continue with the doxy for now and I will call once I hear from dr. Heaton.    Adenike Sumner RN

## 2023-08-18 NOTE — TELEPHONE ENCOUNTER
Thank you!  I agree. Doxy for now and if things do not improve (so encourage her to have her neighbor check the redness regularly and if she can, upload an image in [employer]) then we would need to switch to Bactrim,     Migdalia

## 2023-08-19 LAB
BACTERIA SKIN AEROBE CULT: ABNORMAL

## 2023-08-21 ENCOUNTER — OFFICE VISIT (OUTPATIENT)
Dept: FAMILY MEDICINE | Facility: CLINIC | Age: 78
End: 2023-08-21
Payer: MEDICARE

## 2023-08-21 VITALS
WEIGHT: 246 LBS | TEMPERATURE: 97.2 F | SYSTOLIC BLOOD PRESSURE: 148 MMHG | BODY MASS INDEX: 40.98 KG/M2 | RESPIRATION RATE: 17 BRPM | DIASTOLIC BLOOD PRESSURE: 75 MMHG | HEIGHT: 65 IN | HEART RATE: 66 BPM | OXYGEN SATURATION: 97 %

## 2023-08-21 DIAGNOSIS — E11.65 TYPE 2 DIABETES MELLITUS WITH HYPERGLYCEMIA, WITH LONG-TERM CURRENT USE OF INSULIN (H): ICD-10-CM

## 2023-08-21 DIAGNOSIS — Z79.4 TYPE 2 DIABETES MELLITUS WITH HYPERGLYCEMIA, WITH LONG-TERM CURRENT USE OF INSULIN (H): ICD-10-CM

## 2023-08-21 DIAGNOSIS — L03.115 CELLULITIS OF RIGHT LOWER EXTREMITY: Primary | ICD-10-CM

## 2023-08-21 DIAGNOSIS — E03.9 HYPOTHYROIDISM, UNSPECIFIED TYPE: ICD-10-CM

## 2023-08-21 PROCEDURE — 99214 OFFICE O/P EST MOD 30 MIN: CPT | Performed by: FAMILY MEDICINE

## 2023-08-21 RX ORDER — SULFAMETHOXAZOLE/TRIMETHOPRIM 800-160 MG
1 TABLET ORAL 2 TIMES DAILY
Qty: 14 TABLET | Refills: 0 | Status: SHIPPED | OUTPATIENT
Start: 2023-08-21 | End: 2023-12-29

## 2023-08-21 RX ORDER — FUROSEMIDE 20 MG
20 TABLET ORAL DAILY
Qty: 14 TABLET | Refills: 0 | Status: SHIPPED | OUTPATIENT
Start: 2023-08-21 | End: 2023-12-29

## 2023-08-21 RX ORDER — CHOLECALCIFEROL (VITAMIN D3) 50 MCG
3 TABLET ORAL DAILY
COMMUNITY
End: 2024-06-21

## 2023-08-21 RX ORDER — LEVOTHYROXINE SODIUM 88 UG/1
88 TABLET ORAL DAILY
Qty: 90 TABLET | Refills: 0 | Status: SHIPPED | OUTPATIENT
Start: 2023-08-21 | End: 2023-09-22

## 2023-08-21 ASSESSMENT — PAIN SCALES - GENERAL: PAINLEVEL: NO PAIN (0)

## 2023-08-21 NOTE — PATIENT INSTRUCTIONS
Here is the plan from today's visit    1. Cellulitis of right lower extremity  Go to [facility] to see if you can get a cell phone.   Start the antibiotics and the lasix as soon as you can.    I will send out the home health wound person to see if that can help.     - sulfamethoxazole-trimethoprim (BACTRIM DS) 800-160 MG tablet; Take 1 tablet by mouth 2 times daily  Dispense: 14 tablet; Refill: 0    2. Hypothyroidism  Will increase to 88 mcg daily - that is just a bit of an increase that should get your level (TSH) to 2 which is the best place for it to be.     3. Diabetes - keep up the strong work!    Please call or return to clinic if your symptoms don't go away.    Follow up plan      Thank you for coming to [facility] today.  Lab Testing:  **If you had lab testing today and your results are reassuring or normal they will be mailed to you or sent through [employer] within 7 days.   **If the lab tests need quick action we will call you with the results.  The phone number we will call with results is # [phone] (home) . If this is not the best number please call our clinic and change the number.  Medication Refills:  If you need any refills please call your pharmacy and they will contact us.   If you need to  your refill at a new pharmacy, please contact the new pharmacy directly. The new pharmacy will help you get your medications transferred faster.   Scheduling:  If you have any concerns about today's visit or wish to schedule another appointment please call our office during normal business hours [phone] (8-5:00 M-F)  If a referral was made to a [facility] and you don't get a call from central scheduling please call [phone].  If a Mammogram was ordered for you at [facility] call [phone] to schedule or change your appointment.  If you had an XRay/CT/Ultrasound/MRI ordered the number is [phone] to schedule or change your radiology appointment.   Medical  Concerns:  If you have urgent medical concerns please call [phone] at any time of the day.  If you have a medical emergency please call [phone].       Home Care referral was successfully faxed to [employer] [facility] [employer] [employer] ([phone]). They will reach out directly to patient to set up.

## 2023-08-21 NOTE — PROGRESS NOTES
"  Assessment & Plan     Cellulitis of right lower extremity  Patient had some improvement, decreased area of redness, following 7 day course of doxycyline but there are still signs of persistent cellulitis on exam today. Plan to start TMP-SMX. Additionally, we discussed a short-course of Lasix may aid in getting rid of some extra fluid from her legs to help the antibiotic have a better response and to aid in healing. Care is limited by financial constraints; we looked into options like [facility] to reduce prescription cost. Additionally, she uses [employer] for transportation and may not be able to  the medications for two days - two weeks depending on finances and ride availability. Consider Unna boot once infection improves.   - sulfamethoxazole-trimethoprim (BACTRIM DS) 800-160 MG tablet  Dispense: 14 tablet; Refill: 0  - furosemide (LASIX) 20 MG tablet  Dispense: 14 tablets  - Home Care Referral for wound cares and dressing     Diabetes - meet with our Pharm D. Problem solving her sensor issues. Is using her Asparate and going well. 63% within goal.     Hypothyroid - she is very frustrated by her increasing weight and aware her immobility is contributing. May also be water weight. Will attempt a slightly higher synthroid dose since TSH is not at 2. Will use 88 mcg tablets daily and recheck in the future     I spent a total of 36 minutes on the day of the visit.   Time spent by me doing chart review, history and exam, documentation and further activities per the note       BMI:   Estimated body mass index is 40.94 kg/m  as calculated from the following:    Height as of this encounter: 1.651 m (5' 5\").    Weight as of this encounter: 111.6 kg (246 lb).   Weight management plan: Discussed healthy diet and exercise guidelines    FUTURE APPOINTMENTS:       - Follow-up visit in 2 weeks    Erinn Berg, MS3  Nini Heaton MD  [facility]    Preceptor Attestation:   I was present with the " "medical student who participated in the service and in the documentation of this note. I have verified the history and personally performed the physical exam and medical decision making. I have verified the content of the note, which accurately reflects my assessment of the patient and the plan of care.   Supervising Physician:  Nini Heaton MD.   Subjective   Mikayla is a 78 year old with a past medical history of postpolio syndrome, HTN, HLD, type II DM with diabetic retinopathy on insulin, presenting for the following health issues:  Follow Up        5/5/2023    10:34 AM   Additional Questions   Roomed by steve   Accompanied by self     HPI     Most recently, Mikayla was seen on 8/14 for cellulitis of the right lower extremity, started empirically on doxycyline. Cultures came back positive for Aeromonas hydrophila, Klebsiella oxytoca, MRSA, Corynebacterium striatum, and Myroides species.     She finished her 7 day course of doxycyline this morning and was taking this as prescribed, with meals. Mikayla has noticed there is less pain and redness but persistent weeping from her right leg wound. She has taken acetaminophen a couple of times for the pain. No fevers or chills.     Mikayla reports concerns of being able to afford her medications. She has 20 dollars left for her to manage the rest of the month. Can't get to pharmacies since can't drive.     DM:  Feels like her nutrition was not great due to her having more fruit. Food brought to her.   Per her glucometer she is in range 63%. Is using her fast acting sugar.  No lows.   Is working on improving  the sensor which doesn't last the whole two weeks.     Review of Systems   Constitutional, HEENT, cardiovascular, pulmonary, gi and gu systems are negative, except as otherwise noted.      Objective    BP (!) 148/75   Pulse 66   Temp 97.2  F (36.2  C)   Resp 17   Ht 1.651 m (5' 5\")   Wt 111.6 kg (246 lb)   LMP  (LMP Unknown)   SpO2 97%   BMI 40.94 kg/m    Body " mass index is 40.94 kg/m .  Physical Exam   GENERAL: healthy, alert and no distress  RESP: lungs clear to auscultation - no rales, rhonchi or wheezes  CV: regular rate and rhythm, normal S1 S2, no S3 or S4, no murmur, click or rub, no peripheral edema and peripheral pulses strong  Extremities: Bilateral lower extremity edema.   Skin: Findings documented below:        ----- Services Performed by a MEDICAL STUDENT in Presence of ATTENDING Physician-------

## 2023-08-21 NOTE — PROGRESS NOTES
Continuous Glucose Monitoring Results:     Time in Target Last 7 Days:  Above 180 mg/dL: 37%  In target  mg/dL: 63%  Below 70 mg/dL: 0%

## 2023-08-22 ENCOUNTER — OFFICE VISIT (OUTPATIENT)
Dept: OPHTHALMOLOGY | Facility: CLINIC | Age: 78
End: 2023-08-22
Attending: OPHTHALMOLOGY
Payer: MEDICARE

## 2023-08-22 DIAGNOSIS — H25.13 NUCLEAR SENILE CATARACT OF BOTH EYES: ICD-10-CM

## 2023-08-22 DIAGNOSIS — E11.3491: Primary | ICD-10-CM

## 2023-08-22 DIAGNOSIS — H40.003 GLAUCOMA SUSPECT OF BOTH EYES: ICD-10-CM

## 2023-08-22 DIAGNOSIS — E66.813 CLASS 3 OBESITY: ICD-10-CM

## 2023-08-22 PROCEDURE — 99214 OFFICE O/P EST MOD 30 MIN: CPT | Mod: GC | Performed by: OPHTHALMOLOGY

## 2023-08-22 PROCEDURE — 92083 EXTENDED VISUAL FIELD XM: CPT | Performed by: OPHTHALMOLOGY

## 2023-08-22 PROCEDURE — 92015 DETERMINE REFRACTIVE STATE: CPT | Mod: GY

## 2023-08-22 PROCEDURE — 76514 ECHO EXAM OF EYE THICKNESS: CPT | Performed by: OPHTHALMOLOGY

## 2023-08-22 PROCEDURE — 76519 ECHO EXAM OF EYE: CPT | Performed by: OPHTHALMOLOGY

## 2023-08-22 PROCEDURE — G0463 HOSPITAL OUTPT CLINIC VISIT: HCPCS | Performed by: OPHTHALMOLOGY

## 2023-08-22 PROCEDURE — 92025 CPTRIZED CORNEAL TOPOGRAPHY: CPT | Performed by: OPHTHALMOLOGY

## 2023-08-22 PROCEDURE — 92133 CPTRZD OPH DX IMG PST SGM ON: CPT | Performed by: OPHTHALMOLOGY

## 2023-08-22 RX ORDER — TIMOLOL MALEATE 5 MG/ML
1 SOLUTION/ DROPS OPHTHALMIC DAILY
Qty: 10 ML | Refills: 3 | Status: SHIPPED | OUTPATIENT
Start: 2023-08-22

## 2023-08-22 ASSESSMENT — REFRACTION_MANIFEST
OD_CYLINDER: +1.00
OS_AXIS: 170
OD_SPHERE: +1.75
OD_AXIS: 145
OD_ADD: +2.75
OS_CYLINDER: +1.25
OS_SPHERE: +2.00
OS_ADD: +2.75

## 2023-08-22 ASSESSMENT — SLIT LAMP EXAM - LIDS
COMMENTS: NORMAL
COMMENTS: NORMAL

## 2023-08-22 ASSESSMENT — VISUAL ACUITY
OS_PH_SC: 20/70
OS_SC: 20/150
METHOD: SNELLEN - LINEAR
OD_SC: 20/150
OD_PH_SC: 20/80

## 2023-08-22 ASSESSMENT — PACHYMETRY
EXAM_DATE: 8/22/2023
OD_CT(UM): 587
OS_CT(UM): 586

## 2023-08-22 ASSESSMENT — EXTERNAL EXAM - LEFT EYE: OS_EXAM: NORMAL

## 2023-08-22 ASSESSMENT — REFRACTION_WEARINGRX
OD_SPHERE: +1.00
OD_AXIS: 151
OS_CYLINDER: +1.50
OS_SPHERE: +1.25
OD_CYLINDER: +1.25
OD_ADD: +2.50
OS_ADD: +2.50
OS_AXIS: 173

## 2023-08-22 ASSESSMENT — CONF VISUAL FIELD
OD_INFERIOR_TEMPORAL_RESTRICTION: 3
OS_SUPERIOR_TEMPORAL_RESTRICTION: 3
OD_SUPERIOR_TEMPORAL_RESTRICTION: 3
OD_INFERIOR_NASAL_RESTRICTION: 3
OS_SUPERIOR_NASAL_RESTRICTION: 0
OS_INFERIOR_TEMPORAL_RESTRICTION: 3
OS_INFERIOR_NASAL_RESTRICTION: 3
OD_SUPERIOR_NASAL_RESTRICTION: 0

## 2023-08-22 ASSESSMENT — TONOMETRY
OD_IOP_MMHG: 22
OS_IOP_MMHG: 24
IOP_METHOD: TONOPEN

## 2023-08-22 ASSESSMENT — CUP TO DISC RATIO
OD_RATIO: 0.4
OS_RATIO: 0.6

## 2023-08-22 ASSESSMENT — EXTERNAL EXAM - RIGHT EYE: OD_EXAM: NORMAL

## 2023-08-22 NOTE — PROGRESS NOTES
Chief Complaint(s) and History of Present Illness(es)     Cataract Evaluation            Laterality: both eyes          Comments    Pt here for cataract eval both eyes. Pt states vision is worse in both   eyes, harder to see both distance and near.  Pt currently using +4.50 OTC readers to see up close. No eye pain today.   No new flashes or floaters. No redness or dryness.  DM2 BS: 134 this morning per pt.  Lab Results       Component                Value               Date                       A1C                      8.0                 08/14/2023                 A1C                      7.8                 05/05/2023                 A1C                      7.3                 12/01/2021                 A1C                      6.9                 03/10/2021                 A1C                      7.5                 09/23/2020                 A1C                      7.0                 05/01/2020                 A1C                      6.6                 09/04/2019                 A1C                      6.5                 05/31/2019              Abdias Salomon  [facility] August 22, 2023 9:45 AM    Poor vision, especially in low lit conditions have made daily activities very difficult.         Review of systems for the eyes was negative other than the pertinent positives/negatives listed in the HPI.      Assessment & Plan      Mikayla Timmons is a 78 year old female with the following diagnoses:   1. 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 Glaucoma suspect of both eyes    3. Nuclear senile cataract of both eyes    4. Class 3 obesity (H)      Multifactorial vision loss including diabetic retinopathy, likely glaucoma as well as cataracts in both eyes.    Cataracts each eye: She has visually significant cataracts in both eyes, however she also has diabetic retinopathy and possible glaucoma which  could be contributing to her degree of vision loss. Discussed the multifactorial nature of her poor vision and that cataract surgery would be one way to possibly address this, however given her co-morbidities and difficulty with mobility that is expected to improve, will defer surgery until 6 month follow up when her mobility is expected to have improved.     - Follow up in 6 months; sooner as needed     Glaucoma suspect: It will be difficult to definitively say her current degree of vision loss is solely due to glaucoma given retinal ischemia, however given possibility of permanent vision loss if untreated, would like to treat with goal IOP in mid-teens. No known family history, has never been on IOP lowering medications.   - Start timolol once per day both eyes   - IOP max 30/26  - Pachymetry 587/586  - OCT RNFL   - right eye: Poor tracing    - left eye: Poor tracing due to VMT  - OVF: Overall significant progression since last VF in 2010   - right eye: High fixation losses, superior arcuate with early inferior arcuate    - left eye: Reliable study, superior arcuate with inferior nasal step       Patient disposition:   Return in about 6 months (around 2/22/2024) for Follow Up, OCT NFL, 24-2 Dynamic VF.           Attending Physician Attestation:  Complete documentation of historical and exam elements from today's encounter can be found in the full encounter summary report (not reduplicated in this progress note).  I personally obtained the chief complaint(s) and history of present illness.  I confirmed and edited as necessary the review of systems, past medical/surgical history, family history, social history, and examination findings as documented by others; and I examined the patient myself.  I personally reviewed the relevant tests, images, and reports as documented above.  I formulated and edited as necessary the assessment and plan and discussed the findings and management plan with the patient and family.  .Attending Physician Image/Tesing Attestation: I personally reviewed the ophthalmic test(s) associated with this encounter, agree with the interpretation(s) as documented by the resident/fellow, and have edited the corresponding report(s) as necessary.   - Inderjit Conway MD

## 2023-08-22 NOTE — NURSING NOTE
Chief Complaints and History of Present Illnesses   Patient presents with    Cataract Evaluation     Chief Complaint(s) and History of Present Illness(es)       Cataract Evaluation              Laterality: both eyes              Comments    Pt here for cataract eval both eyes. Pt states vision is worse in both eyes, harder to see both distance and near.  Pt currently using +4.50 OTC readers to see up close. No eye pain today.   No new flashes or floaters. No redness or dryness.  DM2 BS: 134 this morning per pt.  Lab Results       Component                Value               Date                       A1C                      8.0                 08/14/2023                 A1C                      7.8                 05/05/2023                 A1C                      7.3                 12/01/2021                 A1C                      6.9                 03/10/2021                 A1C                      7.5                 09/23/2020                 A1C                      7.0                 05/01/2020                 A1C                      6.6                 09/04/2019                 A1C                      6.5                 05/31/2019              DAIJA Garcia August 22, 2023 9:45 AM

## 2023-08-24 ENCOUNTER — TELEPHONE (OUTPATIENT)
Dept: FAMILY MEDICINE | Facility: CLINIC | Age: 78
End: 2023-08-24
Payer: MEDICARE

## 2023-08-24 NOTE — TELEPHONE ENCOUNTER
[facility] reached out to Care Coordinator asking for frequency of dressing changes, checked the note from visit with provider on 8/21/23 and I do not see anything about frequency of dressing change.    Please advise    Adenike Sumner RN

## 2023-08-28 NOTE — TELEPHONE ENCOUNTER
"8/28/23 Care Coordinator e-mailed Elzia from [employer] back letting her know that per , she would \"ideally like daily dressing changes\".    Marimar Camp  Care Coordinator  [facility]  [phone]    "

## 2023-08-29 ENCOUNTER — DOCUMENTATION ONLY (OUTPATIENT)
Dept: FAMILY MEDICINE | Facility: CLINIC | Age: 78
End: 2023-08-29

## 2023-08-29 NOTE — PROGRESS NOTES
"When opening a documentation only encounter, be sure to enter in \"Chief Complaint\" Forms and in \" Comments\" Title of form, description if needed.    Mikayla is a 78 year old  female  Form received via: Fax  Form now resides in: Provider Ready    Tomeka Santos MA               "

## 2023-08-30 ENCOUNTER — TELEPHONE (OUTPATIENT)
Dept: FAMILY MEDICINE | Facility: CLINIC | Age: 78
End: 2023-08-30
Payer: MEDICARE

## 2023-08-30 DIAGNOSIS — L03.115 CELLULITIS AND ABSCESS OF RIGHT LEG: Primary | ICD-10-CM

## 2023-08-30 DIAGNOSIS — L02.415 CELLULITIS AND ABSCESS OF RIGHT LEG: Primary | ICD-10-CM

## 2023-08-30 NOTE — TELEPHONE ENCOUNTER
[facility] phone call message- general phone call:    Reason for call: Patient has been working with a specialist at [facility] for possible surgery on eyes. Their are some restrictions that she would need to comply with and Ray would like to discuss these.    Return call needed: Yes    OK to leave a message on voice mail? Yes    Primary language: English      needed? No    Call taken on August 30, 2023 at 3:03 PM by Juancarlos Corral

## 2023-08-31 ENCOUNTER — TELEPHONE (OUTPATIENT)
Dept: FAMILY MEDICINE | Facility: CLINIC | Age: 78
End: 2023-08-31
Payer: MEDICARE

## 2023-08-31 ENCOUNTER — TELEPHONE (OUTPATIENT)
Dept: WOUND CARE | Facility: CLINIC | Age: 78
End: 2023-08-31
Payer: MEDICARE

## 2023-08-31 ENCOUNTER — PATIENT OUTREACH (OUTPATIENT)
Dept: OTHER | Facility: CLINIC | Age: 78
End: 2023-08-31
Payer: MEDICARE

## 2023-08-31 NOTE — TELEPHONE ENCOUNTER
"8/28/23 Niya from [facility], along with patient contacted Care Coordinator stating that \"[facility] can not go out to patient home for wound care as patient is already seeing out patient PT, it's a Medicare stipulation\". Niya did leave other options: \"Referral to wound care, [facility] and [facility], however this would be private pay\".    sent message to  with above information      8/31/23  completed referrals for [facility] and wound clinic. [facility] contacted patient and will be going out to  patient home on 9/6 @ 12:30p.m. Wound care referral is in [facility]'s work queue and will be scheduled.  Patient will see  in person on 9/22/23 @ 11:00a.m.      Marimar Camp  Care Coordinator  [facility]  [phone]    "

## 2023-08-31 NOTE — TELEPHONE ENCOUNTER
"[facility], sent [facility] to schedule the following appointment:    Per a referral from Dr. Heaton for \"Cellulitis and abscess of right leg [L03.115, L02.415] \", schedule an appointment with Dr. Baez in his Wound Care clinic. Pt has seen Dr. Baez in Orthopedics so schedule patient as a return. Left CC#  "

## 2023-08-31 NOTE — PROGRESS NOTES
I spoke to the pt and a [facility] visit was scheduled for 9/6/23@1230.    Yasmeen Oleary NRP, CP  [facility]  Phone number [phone]  [email or web address]

## 2023-09-01 NOTE — TELEPHONE ENCOUNTER
There is no call back number for Jose. Attempted to call pt. No answer, lmtcb.    Jayleen Noland RN

## 2023-09-06 ENCOUNTER — ALLIED HEALTH/NURSE VISIT (OUTPATIENT)
Dept: OTHER | Facility: CLINIC | Age: 78
End: 2023-09-06
Payer: MEDICARE

## 2023-09-06 VITALS
DIASTOLIC BLOOD PRESSURE: 90 MMHG | TEMPERATURE: 97.2 F | OXYGEN SATURATION: 98 % | HEART RATE: 64 BPM | SYSTOLIC BLOOD PRESSURE: 162 MMHG

## 2023-09-06 DIAGNOSIS — E66.01 MORBID OBESITY (H): Primary | ICD-10-CM

## 2023-09-06 PROCEDURE — 99207 PR COMMUNITY PARAMEDIC - PATIENT NOT BILLABLE: CPT

## 2023-09-06 ASSESSMENT — ACTIVITIES OF DAILY LIVING (ADL): DEPENDENT_IADLS:: TRANSPORTATION;SHOPPING

## 2023-09-06 NOTE — PROGRESS NOTES
[facility] Initial Visit  September 6, 2023 TIME:1200    Mikayla Timmons is a 78 year old female being seen at home for a [facility] visit.    Present at appointment: Patient         Chief Complaint   Patient presents with    Wound Check       Universal Utilization:   Clinic Utilization  Difficulty keeping appointments:: Yes  Compliance Concerns: No  No-Show Concerns: No  No PCP office visit in Past Year: No  Utilization      Hospital Admissions  0             ED Visits  0             No Show Count (past year)  2                    Current as of: 9/6/2023  3:51 PM                Clinical Concerns:  Current Medical Concerns: lower leg wound  Current Behavioral Concerns: no    Education Provided to patient: no     Vitals: BP (!) 162/90   Pulse 64   Temp 97.2  F (36.2  C)   LMP  (LMP Unknown)   SpO2 98%   BMI: There is no height or weight on file to calculate BMI.         Clinical Pathway: None    Review of Symptoms/PE    Skin: positive for Scabbed over wound on R shin.  No obvious signs of infection.  Eyes: negative  Ears/Nose/Throat: negative  Respiratory: No shortness of breath, dyspnea on exertion, cough, or hemoptysis  Cardiovascular: negative  Gastrointestinal: negative  Genitourinary: negative  Musculoskeletal: muscular weakness  Neurologic: negative  Psychiatric: negative    Medication Management:    Medications need to be refilled:unknown     Medications set up: No  Pill Box issued: No  Scale issued: No  Flu Shot given: No  COVID Vaccine Given: No  Lab draw or specimen collection: No  Food resource given: Yes  Collaborative visit with PCP: No  Wound Care: Yes  Health Maintenance Addressed Yes    Functional Status:  Dependent ADLs:: Ambulation-walker  Dependent IADLs:: Transportation, Shopping  Bed or wheelchair confined:: No  Mobility Status: Independent w/Device    Living Situation:  Current living arrangement:: I live alone  Type of residence:: Independent Senior  [facility] Home Safety Assessment  Floors:  Are there throw rugs on the floor?: No  Are there papers, books, towels, shoes, magazines on the floor?: No  Are there loose wires and cords you need to walk around? : No     Kitchen:  Are the things you use often on high shelves?: No (only items on bottom shelf or counter)  Is your step stool unsteady?: No  Bathrooms:  Is the tub or shower floor slippery?: No  Do you need support when you get in and out of the tub?: No  Bedrooms:  Is the light near the bed hard to reach?: No (sleeps in recliner)  Is the path from your bed to the bathroom dark?: No       Diet/Exercise/Sleep:  Diet:: Regular  Inadequate nutrition (GOAL):: No  Tube Feeding: No  Inadequate activity/exercise (GOAL):: No  Significant changes in sleep pattern (GOAL): No    Transportation:     Transportation means:: [employer] mobility     Psychosocial:  Roman Catholic or spiritual beliefs that impact treatment:: No  Mental health DX:: No  Mental health management concern (GOAL):: No  Informal Support system:: Children, Family, Friends         No  Face to Face in Home / Community    Today's PHQ-2 Score:      Today's PHQ-9 Score:        No data to display                 Today's GAD7 score:        No data to display                     Financial/Insurance:           Resources and Interventions:  Current Resources:      Community Resources: None  Supplies Currently Used at Home: Diabetic Supplies, Wound Care Supplies, Chux  Equipment Currently Used at Home: wheelchair, manual, raised toilet seat, glucometer, grab bar, toilet, walker, standard         Referrals Placed: None         Patient Active Problem List   Diagnosis    Avitaminosis D    Type 2 diabetes mellitus with hyperglycemia, with long-term current use of insulin (H)    Essential hypertension    Postpolio syndrome    Status post THR (total hip replacement)    Carpal tunnel syndrome    Hyperlipidemia LDL goal <100    Hypothyroidism    Decreased  "sensation of foot    Right-sided low back pain without sciatica    Closed fracture of scaphoid of left wrist with routine healing, unspecified portion of scaphoid, subsequent encounter    Closed dislocation of hip (H)    Hypercalcemia    Lumbar radiculopathy    Severe nonproliferative 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 macular edema associated with type 2 diabetes mellitus (H)    Class 3 obesity (H)         Current Outpatient Medications:     Nan Protect (EUCERIN) external cream, Apply topically 2 times daily as needed for dry skin or other, Disp: 142 g, Rfl: 4    Continuous Blood Gluc  (DEXCOM G6 ) BERTHA, 1 Units 7 times daily Use to read blood sugars as per 's instructions., Disp: 3 each, Rfl: 11    Continuous Blood Gluc Sensor (DEXCOM G6 SENSOR) MISC, Change every 10 days., Disp: , Rfl:     Continuous Blood Gluc Transmit (DEXCOM G6 TRANSMITTER) MISC, 1 Units 7 times daily Change every 3 months., Disp: 1 each, Rfl: 0    doxycycline hyclate (VIBRAMYCIN) 100 MG capsule, Take 1 capsule (100 mg) by mouth 2 times daily, Disp: 14 capsule, Rfl: 0    furosemide (LASIX) 20 MG tablet, Take 1 tablet (20 mg) by mouth daily, Disp: 14 tablet, Rfl: 0    Homeopathic Products (ARNICARE) GEL, Apply topically to affected area(s) as needed for pain., Disp: , Rfl:     hydrochlorothiazide (HYDRODIURIL) 12.5 MG tablet, Take 1 tablet (12.5 mg) by mouth daily, Disp: 30 tablet, Rfl: 11    insulin aspart (NOVOLOG PEN) 100 UNIT/ML pen, Inject 4 Units Subcutaneous 2 times daily (with meals), Disp: 15 mL, Rfl: 5    insulin  UNIT/ML vial, Inject 20 Units Subcutaneous every morning and 15 Units in the evening, Disp: 20 mL, Rfl: 3    insulin pen needle (32G X 4 MM) 32G X 4 MM miscellaneous, Use 4 pen needles daily or as directed., Disp: 100 each, Rfl: 11    insulin pen needle (B-D U/F) 31G X 8 MM miscellaneous, Use 4 daily as directed., Disp: 1 each, Rfl: PRN    insulin syringe-needle U-100 (31G X 5/16\" " "0.3 ML) 31G X 5/16\" 0.3 ML miscellaneous, Use 2 syringes daily or as directed., Disp: 100 each, Rfl: 11    levothyroxine (SYNTHROID/LEVOTHROID) 75 MCG tablet, Take 1 tablet (75 mcg) by mouth daily, Disp: 30 tablet, Rfl: 11    levothyroxine (SYNTHROID/LEVOTHROID) 88 MCG tablet, Take 1 tablet (88 mcg) by mouth daily, Disp: 90 tablet, Rfl: 0    olopatadine (PATANOL) 0.1 % ophthalmic solution, Place 1 drop into both eyes 2 times daily, Disp: 5 mL, Rfl: 4    STATIN NOT PRESCRIBED (INTENTIONAL), Please choose reason not prescribed, below, Disp: , Rfl:     sulfamethoxazole-trimethoprim (BACTRIM DS) 800-160 MG tablet, Take 1 tablet by mouth 2 times daily, Disp: 14 tablet, Rfl: 0    timolol maleate (TIMOPTIC) 0.5 % ophthalmic solution, Place 1 drop into both eyes daily, Disp: 10 mL, Rfl: 3    vitamin D3 (CHOLECALCIFEROL) 50 mcg (2000 units) tablet, Take 3 tablets by mouth daily, Disp: , Rfl:         Time spent with patient: 45    The patient meets one or more of the following criteria:  * Requires services to prevent readmission to a nursing home or hospital        Next CP visit scheduled: none    Issues for Provider to follow up on: Pt has wound on front R shin.  Wound is scabbed over with no obvious signs of infection.  Surrounding skin red, warm and dry.  Wound cleaned with provided wound  and dresses with a nonstick adhesive bandage provided by Mikayla.  A photo of the wound in the media tab.      A food packet with a $40.00 gift certificate was left, she was signed up for the [employer] program.      Mikayla feels like she is able to manage her wound on her own.  Due to no skilled needs we agreed to graduation from the [facility] program.  Please place a new referral if needs arise in the future.    Provider follow up visit needed: multiple upcoming    "

## 2023-09-08 ENCOUNTER — TELEPHONE (OUTPATIENT)
Dept: FAMILY MEDICINE | Facility: CLINIC | Age: 78
End: 2023-09-08
Payer: MEDICARE

## 2023-09-08 NOTE — TELEPHONE ENCOUNTER
RN attempted to reach out to pt to see who Jose is and if she has his contact number. No answer, tcb.    Jayleen Noland RN

## 2023-09-08 NOTE — CONFIDENTIAL NOTE
[facility] phone call message- general phone call:    Reason for call: [employer] requesting visit notes from 7/18. Needing documentation for upcoming insurance renewal for continued use of glucose meter for patient- needs documentation so insurance will continue to pay for equipment. Best call back number if more information is needed: [phone] opt. 2  Best fax number: [phone]    Return call needed: No    OK to leave a message on voice mail? Yes    Primary language: English      needed? No    Call taken on September 8, 2023 at 11:42 AM by Evelyn Carrillo

## 2023-09-08 NOTE — TELEPHONE ENCOUNTER
Care Coordinator printed office clinic notes with  on 7/18/23 and successfully faxed to [phone], per [facility] request.      Marimar Camp  Care Coordinator  [facility]  [phone]

## 2023-09-11 NOTE — TELEPHONE ENCOUNTER
3rd attempt to contact pt to see who Jose is. No indication in chart on who Jose is. No answer, lmtcb. Closing encounter.    Jayleen Noland RN

## 2023-09-12 ENCOUNTER — TELEPHONE (OUTPATIENT)
Dept: FAMILY MEDICINE | Facility: CLINIC | Age: 78
End: 2023-09-12
Payer: MEDICARE

## 2023-09-12 NOTE — TELEPHONE ENCOUNTER
[facility] Patient Assistance Program is requesting refills of the Novolog, Novolin and Novofine pen needles. Patient sees Dr. Nini Heaton at [facility] clinic.          Thank you,     Mehran [facility]  Pharmacy Clinic [facility]  [email or web address]   Phone: [phone]  Fax: [phone]

## 2023-09-14 ENCOUNTER — PATIENT OUTREACH (OUTPATIENT)
Dept: CARE COORDINATION | Facility: CLINIC | Age: 78
End: 2023-09-14
Payer: MEDICARE

## 2023-09-14 NOTE — PROGRESS NOTES
[facility] Program  Community Health Worker Follow Up    Intervention and Education during outreach:     Spoke with pt. She referenced her CP visit last week and said she was trying to get in touch with the CP because she had questions about the [employer] program. Pt asked if I could confirm whether or not the CP signed her up for the program, which I did confirm, per the 9/6 visit note. Pt said the sale in her neighborhood is tomorrow and she wanted to make sure she could attend and use her vouchers to purchase food.    With pt's permission, briefly described the [employer] program and gave pt examples of what to expect during the sale. Shared that she'll need to provide her identifying information so the  can look her up, and suggested that she mention she's an [facility] patient and we signed her up for the program. Pt shared that the CP did leave a folder with the schedules and [employer] information; she declined my offer to look up the details on the [employer] web site today during our call.     Pt said she's currently in a wheelchair and unable to walk due to a hip dislocation. She asked if someone else could go to the sale on her behalf and shop for her. I confirmed that yes, we've escalated this question to our food programming contacts and they've validated this. Shared that whoever she sends will need to provide the information I reviewed with her, and she said she understood.    Encouraged pt to reach out to me again with any other questions about the [employer] program. She confirmed that she had my phone number written down after she heard it on the CP's outgoing message. She expressed appreciation for the information today; I let her know I'll update the CP and she agreed.    CHW Plan:   Pt will find someone to attend the upcoming [employer] sale in her area to shop on her behalf and use her monthly vouchers to buy food.  Pt will call the CP CHW with questions about the [employer]  program.      Drea Christianson  Community Health Worker  [facility] Paramedic program  [phone]  [email or web address]

## 2023-09-19 ENCOUNTER — OFFICE VISIT (OUTPATIENT)
Dept: ORTHOPEDICS | Facility: CLINIC | Age: 78
End: 2023-09-19
Payer: MEDICARE

## 2023-09-19 VITALS — HEIGHT: 65 IN | WEIGHT: 246 LBS | BODY MASS INDEX: 40.98 KG/M2

## 2023-09-19 DIAGNOSIS — L97.911 ULCER OF RIGHT LEG, LIMITED TO BREAKDOWN OF SKIN (H): Primary | ICD-10-CM

## 2023-09-19 DIAGNOSIS — I73.9 PVD (PERIPHERAL VASCULAR DISEASE) (H): ICD-10-CM

## 2023-09-19 DIAGNOSIS — I73.89 OTHER SPECIFIED PERIPHERAL VASCULAR DISEASES (H): ICD-10-CM

## 2023-09-19 DIAGNOSIS — L60.3 NAIL DYSTROPHY: ICD-10-CM

## 2023-09-19 PROCEDURE — 99213 OFFICE O/P EST LOW 20 MIN: CPT | Performed by: PODIATRIST

## 2023-09-19 NOTE — PATIENT INSTRUCTIONS
A compression stocking referral has been entered for you. There is an attached copy included. Please stop by the scheduling pd in lobby 4E to schedule Follow-up in three months with Dr. Baez

## 2023-09-19 NOTE — PROGRESS NOTES
Chief Complaint   Patient presents with    RECHECK     Diabetic Foot Care           HPI: Mikayla is a 77  year old female who presents today for a diabetic foot exam and management.  She was seen last year.  She has moved back into her house. No new LE complaints. She sees Dr. Heaton for her diabetic care and was last seen 8/14//23.  Interval history: She notes that she had a wound that was on the right leg.  She has been putting a dressing on this.  Over the last couple weeks, the wound is healed.  There is no more weeping.  She notes that she does have compression socks however these are very difficult for her to get on.    Review of Systems: No n/v/d/f/c/ns/sob/cp    PMH: LBP  Postpolio syndrome  Carpal Tunnel  T2DM  HTN    No past surgical history on file.      Allergies: Hydromorphone and Ketamine    SH:   Social History     Socioeconomic History    Marital status:      Spouse name: Not on file    Number of children: Not on file    Years of education: Not on file    Highest education level: Not on file   Occupational History    Not on file   Tobacco Use    Smoking status: Never    Smokeless tobacco: Never   Substance and Sexual Activity    Alcohol use: Yes     Comment: Lightly    Drug use: Never    Sexual activity: Not on file   Other Topics Concern    Parent/sibling w/ CABG, MI or angioplasty before 65F 55M? Not Asked   Social History Narrative    Not on file     Social Determinants of Health     Financial Resource Strain: Not on file   Food Insecurity: Not on file   Transportation Needs: Unmet Transportation Needs (11/1/2019)    PRAPARE - Transportation     Lack of Transportation (Medical): Yes     Lack of Transportation (Non-Medical): Yes   Physical Activity: Not on file   Stress: Not on file   Social Connections: Not on file   Intimate Partner Violence: Not on file   Housing Stability: Not on file       FH:   Family History   Problem Relation Age of Onset    Cancer No family hx of     Diabetes No  family hx of     Glaucoma No family hx of     Hypertension No family hx of     Macular Degeneration No family hx of     Cerebrovascular Disease No family hx of     Thyroid Disease No family hx of        Objective:    Hemoglobin A1C   Date Value Ref Range Status   08/14/2023 8.0 (H) 0.0 - 5.6 % Final     Comment:     Normal <5.7%   Prediabetes 5.7-6.4%    Diabetes 6.5% or higher     Note: Adopted from ADA consensus guidelines.   05/05/2023 7.8 (H) 0.0 - 5.6 % Final     Comment:     Normal <5.7%   Prediabetes 5.7-6.4%    Diabetes 6.5% or higher     Note: Adopted from ADA consensus guidelines.   12/01/2021 7.3 (H) 0.0 - 5.6 % Final     Comment:     Normal <5.7%   Prediabetes 5.7-6.4%    Diabetes 6.5% or higher     Note: Adopted from ADA consensus guidelines.   03/10/2021 6.9 (H) 4.1 - 5.7 % Final   09/23/2020 7.5 (H) 4.1 - 5.7 % Final   05/01/2020 7.0 (H) 4.1 - 5.7 % Final         PT pulses are not palpable 2/2 edema and DP pulses are 2/4 bilaterally. CRT is WNL. Diminished pedal hair.   Gross sensation is intact bilaterally. Protective sensation is normal except at the plantar halluces at the last visit. Intact intact.   Equinus is noted bilaterally. No pain with active or passive ROM of the ankle, MTJ, 1st ray, or halluces bilaterally,.   Nails elongated and dystrophic bilaterally. No open lesions are noted.  Hemosiderin deposit noted bilateral legs.  Peau d'orange noted to bilateral legs.  No jeffrey open lesions noted.    Assessment: DM2 with neuropathy - controlled.   Nail dystrophy x 10.    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    Plan:  - Pt seen and evaluated.  - Nails trimmed x 10. .  -Orders written for custom compression socks with zippers.  These to be easier for her to get on.  - See again in 3 months.

## 2023-09-19 NOTE — LETTER
9/19/2023         RE: Mikayla Timmons  [address]  [place] [address]        Dear Colleague,    Thank you for referring your patient, Mikayla Timmons, to the [facility] [place]. Please see a copy of my visit note below.      Chief Complaint   Patient presents with    RECHECK     Diabetic Foot Care           HPI: Mikayla is a 77  year old female who presents today for a diabetic foot exam and management.  She was seen last year.  She has moved back into her house. No new LE complaints. She sees Dr. Heaton for her diabetic care and was last seen 8/14//23.  Interval history: She notes that she had a wound that was on the right leg.  She has been putting a dressing on this.  Over the last couple weeks, the wound is healed.  There is no more weeping.  She notes that she does have compression socks however these are very difficult for her to get on.    Review of Systems: No n/v/d/f/c/ns/sob/cp    PMH: LBP  Postpolio syndrome  Carpal Tunnel  T2DM  HTN    No past surgical history on file.      Allergies: Hydromorphone and Ketamine    SH:   Social History     Socioeconomic History    Marital status:      Spouse name: Not on file    Number of children: Not on file    Years of education: Not on file    Highest education level: Not on file   Occupational History    Not on file   Tobacco Use    Smoking status: Never    Smokeless tobacco: Never   Substance and Sexual Activity    Alcohol use: Yes     Comment: Lightly    Drug use: Never    Sexual activity: Not on file   Other Topics Concern    Parent/sibling w/ CABG, MI or angioplasty before 65F 55M? Not Asked   Social History Narrative    Not on file     Social Determinants of Health     Financial Resource Strain: Not on file   Food Insecurity: Not on file   Transportation Needs: Unmet Transportation Needs (11/1/2019)    PRAPARE - Transportation     Lack of Transportation (Medical): Yes     Lack of Transportation  (Non-Medical): Yes   Physical Activity: Not on file   Stress: Not on file   Social Connections: Not on file   Intimate Partner Violence: Not on file   Housing Stability: Not on file       FH:   Family History   Problem Relation Age of Onset    Cancer No family hx of     Diabetes No family hx of     Glaucoma No family hx of     Hypertension No family hx of     Macular Degeneration No family hx of     Cerebrovascular Disease No family hx of     Thyroid Disease No family hx of        Objective:    Hemoglobin A1C   Date Value Ref Range Status   08/14/2023 8.0 (H) 0.0 - 5.6 % Final     Comment:     Normal <5.7%   Prediabetes 5.7-6.4%    Diabetes 6.5% or higher     Note: Adopted from ADA consensus guidelines.   05/05/2023 7.8 (H) 0.0 - 5.6 % Final     Comment:     Normal <5.7%   Prediabetes 5.7-6.4%    Diabetes 6.5% or higher     Note: Adopted from ADA consensus guidelines.   12/01/2021 7.3 (H) 0.0 - 5.6 % Final     Comment:     Normal <5.7%   Prediabetes 5.7-6.4%    Diabetes 6.5% or higher     Note: Adopted from ADA consensus guidelines.   03/10/2021 6.9 (H) 4.1 - 5.7 % Final   09/23/2020 7.5 (H) 4.1 - 5.7 % Final   05/01/2020 7.0 (H) 4.1 - 5.7 % Final         PT pulses are not palpable 2/2 edema and DP pulses are 2/4 bilaterally. CRT is WNL. Diminished pedal hair.   Gross sensation is intact bilaterally. Protective sensation is normal except at the plantar halluces at the last visit. Intact intact.   Equinus is noted bilaterally. No pain with active or passive ROM of the ankle, MTJ, 1st ray, or halluces bilaterally,.   Nails elongated and dystrophic bilaterally. No open lesions are noted.  Hemosiderin deposit noted bilateral legs.  Peau d'orange noted to bilateral legs.  No jeffrey open lesions noted.    Assessment: DM2 with neuropathy - controlled.   Nail dystrophy x 10.    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    Plan:  - Pt seen and evaluated.  - Nails trimmed x 10. .  -Orders written for custom  compression socks with zippers.  These to be easier for her to get on.  - See again in 3 months.         THAI MirelesM

## 2023-09-19 NOTE — NURSING NOTE
"Reason For Visit:   Chief Complaint   Patient presents with    RECHECK     Diabetic Foot Care        Ht 1.651 m (5' 5\")   Wt 111.6 kg (246 lb)   LMP  (LMP Unknown)   BMI 40.94 kg/m      Pain Assessment  Patient Currently in Pain: Tdies    Rakesh Paz, EMT    "

## 2023-09-22 ENCOUNTER — OFFICE VISIT (OUTPATIENT)
Dept: FAMILY MEDICINE | Facility: CLINIC | Age: 78
End: 2023-09-22
Payer: MEDICARE

## 2023-09-22 VITALS
RESPIRATION RATE: 18 BRPM | OXYGEN SATURATION: 100 % | DIASTOLIC BLOOD PRESSURE: 71 MMHG | TEMPERATURE: 97.3 F | HEART RATE: 67 BPM | SYSTOLIC BLOOD PRESSURE: 129 MMHG

## 2023-09-22 DIAGNOSIS — E03.9 HYPOTHYROIDISM, UNSPECIFIED TYPE: ICD-10-CM

## 2023-09-22 DIAGNOSIS — I87.2 VENOUS STASIS DERMATITIS OF BOTH LOWER EXTREMITIES: Primary | ICD-10-CM

## 2023-09-22 DIAGNOSIS — E66.813 CLASS 3 OBESITY: ICD-10-CM

## 2023-09-22 PROBLEM — I10 ESSENTIAL (PRIMARY) HYPERTENSION: Status: ACTIVE | Noted: 2022-06-14

## 2023-09-22 PROBLEM — T84.021A: Status: ACTIVE | Noted: 2022-06-01

## 2023-09-22 PROBLEM — E11.65 TYPE 2 DIABETES MELLITUS WITH HYPERGLYCEMIA (H): Status: ACTIVE | Noted: 2022-06-14

## 2023-09-22 PROBLEM — R01.1 SYSTOLIC EJECTION MURMUR: Status: ACTIVE | Noted: 2019-09-06

## 2023-09-22 PROBLEM — G56.00 CARPAL TUNNEL SYNDROME, UNSPECIFIED UPPER LIMB: Status: ACTIVE | Noted: 2022-06-14

## 2023-09-22 PROBLEM — E11.3491: Status: ACTIVE | Noted: 2022-06-14

## 2023-09-22 PROBLEM — M51.369 DEGENERATIVE DISC DISEASE, LUMBAR: Status: ACTIVE | Noted: 2017-08-02

## 2023-09-22 PROBLEM — M25.531 CHRONIC PAIN OF RIGHT WRIST: Status: ACTIVE | Noted: 2017-08-02

## 2023-09-22 PROBLEM — D50.0 IRON DEFICIENCY ANEMIA SECONDARY TO BLOOD LOSS (CHRONIC): Status: ACTIVE | Noted: 2022-06-14

## 2023-09-22 PROBLEM — S62.102A CLOSED FRACTURE OF LEFT WRIST: Status: ACTIVE | Noted: 2017-08-02

## 2023-09-22 PROBLEM — T84.021D: Status: ACTIVE | Noted: 2022-06-14

## 2023-09-22 PROBLEM — R26.2 DIFFICULTY IN WALKING, NOT ELSEWHERE CLASSIFIED: Status: ACTIVE | Noted: 2022-06-14

## 2023-09-22 PROBLEM — D72.829 ELEVATED WHITE BLOOD CELL COUNT, UNSPECIFIED: Status: ACTIVE | Noted: 2022-06-14

## 2023-09-22 PROBLEM — R33.8 OTHER RETENTION OF URINE: Status: ACTIVE | Noted: 2022-06-14

## 2023-09-22 PROBLEM — M54.16 RADICULOPATHY, LUMBAR REGION: Status: ACTIVE | Noted: 2022-06-14

## 2023-09-22 PROBLEM — R26.89 OTHER ABNORMALITIES OF GAIT AND MOBILITY: Status: ACTIVE | Noted: 2022-06-14

## 2023-09-22 PROBLEM — R01.1 CARDIAC MURMUR, UNSPECIFIED: Status: ACTIVE | Noted: 2022-06-14

## 2023-09-22 PROBLEM — Z79.4 LONG TERM (CURRENT) USE OF INSULIN (H): Status: RESOLVED | Noted: 2022-06-14 | Resolved: 2023-09-22
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
## 2023-09-22 PROBLEM — M24.452 RECURRENT DISLOCATION, LEFT HIP: Status: ACTIVE | Noted: 2022-06-14

## 2023-09-22 PROBLEM — E55.9 VITAMIN D DEFICIENCY, UNSPECIFIED: Status: ACTIVE | Noted: 2022-06-14

## 2023-09-22 PROBLEM — S73.005D: Status: ACTIVE | Noted: 2022-06-14

## 2023-09-22 PROBLEM — G89.29 CHRONIC PAIN OF RIGHT WRIST: Status: ACTIVE | Noted: 2017-08-02

## 2023-09-22 PROBLEM — M62.81 MUSCLE WEAKNESS (GENERALIZED): Status: ACTIVE | Noted: 2022-06-14

## 2023-09-22 PROBLEM — M17.11 PRIMARY OSTEOARTHRITIS OF RIGHT KNEE: Status: ACTIVE | Noted: 2021-10-20

## 2023-09-22 PROBLEM — D62 ACUTE POSTHEMORRHAGIC ANEMIA: Status: ACTIVE | Noted: 2022-06-14

## 2023-09-22 PROBLEM — Z89.622: Status: ACTIVE | Noted: 2022-06-14

## 2023-09-22 PROBLEM — R27.8 OTHER LACK OF COORDINATION: Status: ACTIVE | Noted: 2022-06-14

## 2023-09-22 PROBLEM — G89.29 OTHER CHRONIC PAIN: Status: ACTIVE | Noted: 2022-06-14

## 2023-09-22 PROBLEM — L89.212: Status: ACTIVE | Noted: 2021-08-13

## 2023-09-22 PROCEDURE — 99214 OFFICE O/P EST MOD 30 MIN: CPT | Performed by: FAMILY MEDICINE

## 2023-09-22 RX ORDER — LIDOCAINE HYDROCHLORIDE 10 MG/ML
1 INJECTION, SOLUTION INFILTRATION; PERINEURAL
COMMUNITY
Start: 2022-10-22 | End: 2024-01-26

## 2023-09-22 RX ORDER — LEVOTHYROXINE SODIUM 75 UG/1
75 TABLET ORAL DAILY
Qty: 30 TABLET | Refills: 11 | COMMUNITY
Start: 2023-09-22 | End: 2023-10-18

## 2023-09-22 NOTE — PROGRESS NOTES
Assessment & Plan     Hypothyroidism, unspecified type  Will resume her original Rx. Very limited in her resources to pay for her meds and so rather stay with current dose. Is therapeutic./   - levothyroxine (SYNTHROID/LEVOTHROID) 75 MCG tablet; Take 1 tablet (75 mcg) by mouth daily    Venous stasis dermatitis of both lower extremities  Compression should help a lot.  Will continue to follow.     Class 3 obesity (H)  Reviewed that options unfortunately are limited. She could do more upper body exercises in her chair. She also has foot pumpers that she could use which would help her edema and also have her move more. She will continue to work to be able to walk.                   Return in about 3 months (around 12/22/2023) for with me, diabetes.    Nini Heaton MD  [facility] CLAIRE Degroot is a 78 year old, presenting for the following health issues:  RECHECK        9/22/2023    11:14 AM   Additional Questions   Roomed by steve   Accompanied by self       HPI     Her wound is much improved - managed on her own.   Is now working on getting custom compression stockings made. 12 pair a year.     Blood pressure - notes it is better today. Took lasix for about a month and stopped about a week ago.   Lasix - has to to the bathroom much more and has doubled the protective gear for her incontinence. Used all the lasix.  Didn't notice a difference in her leg circumference. Weeping had improved prior.  Last time she took a lasix was a week ago.     Wt Readings from Last 4 Encounters:   09/19/23 111.6 kg (246 lb)   08/21/23 111.6 kg (246 lb)   08/14/23 111.6 kg (246 lb)   09/25/20 111.6 kg (246 lb)     Right knee cortisone injection last week. A bit better. Goal is to walk.     Flu vcaccine, nor pneumonia vaccine. Never going to get any vaccines.     Didn't start the synthroid. We considered it due to her trouble dropping her weight. She is not able to move due to her orthopedic issues. She  eats about 1200 KCal a day.  Wondering what else she can do;.                     Review of Systems         Objective    /71   Pulse 67   Temp 97.3  F (36.3  C)   Resp 18   LMP  (LMP Unknown)   SpO2 100%   There is no height or weight on file to calculate BMI.  Physical Exam   LE: right LE with improved scaling and healing over. Bilateral LE erythema

## 2023-09-22 NOTE — PATIENT INSTRUCTIONS
Here is the plan from today's visit    1.Right leg infection - much improved!!    2. Diabetes - will see you back in 2 months       Please call or return to clinic if your symptoms don't go away.    Follow up plan  2 months    Thank you for coming to [facility] today.  Lab Testing:  **If you had lab testing today and your results are reassuring or normal they will be mailed to you or sent through [employer] within 7 days.   **If the lab tests need quick action we will call you with the results.  The phone number we will call with results is # [phone] (home) . If this is not the best number please call our clinic and change the number.  Medication Refills:  If you need any refills please call your pharmacy and they will contact us.   If you need to  your refill at a new pharmacy, please contact the new pharmacy directly. The new pharmacy will help you get your medications transferred faster.   Scheduling:  If you have any concerns about today's visit or wish to schedule another appointment please call our office during normal business hours [phone] (8-5:00 M-F)  If a referral was made to a [facility] and you don't get a call from central scheduling please call [phone].  If a Mammogram was ordered for you at [facility] call [phone] to schedule or change your appointment.  If you had an XRay/CT/Ultrasound/MRI ordered the number is [phone] to schedule or change your radiology appointment.   Medical Concerns:  If you have urgent medical concerns please call [phone] at any time of the day.  If you have a medical emergency please call [phone].

## 2023-10-03 ENCOUNTER — TELEPHONE (OUTPATIENT)
Dept: FAMILY MEDICINE | Facility: CLINIC | Age: 78
End: 2023-10-03
Payer: MEDICARE

## 2023-10-03 NOTE — TELEPHONE ENCOUNTER
I have submitted Mikayla's refill request for Novolog pens, Novlolin vials and pen needles to the [employer] assistance program.    [facility] will contact Mikayla for delivery to her home.    Romelia Rollins  Prescription Assistance Supervisor  Pharmacy Assistance

## 2023-10-09 ENCOUNTER — PATIENT OUTREACH (OUTPATIENT)
Dept: CARE COORDINATION | Facility: CLINIC | Age: 78
End: 2023-10-09
Payer: MEDICARE

## 2023-10-09 NOTE — PROGRESS NOTES
"[facility] Par[facility]ic Program  Community Health Worker Follow Up    Intervention and Education during outreach:     Returned pt's call. She said her friend Yolande will be going to [employer] and [employer] sales to choose and buy food for her, using the $80 in monthly vouchers that she's signed up for through the [employer] program. Pt said she doesn't have the most up to date sale schedules and asked if I could send them to her today via email. Pt said Yolande will be going to the sale tomorrow in [place] and they want to make sure they have the correct address and time. I confirmed pt's email address on file ([email or web address]), which pt said is correct.     Pt also asked if her friend can use a debit or credit card to pay for a balance over $80. I confirmed. Pt said she doesn't have SNAP benefits when I mentioned that those are accepted at both sales as well.    Before ending our conversation, pt said she never received a voucher card in the mail. She said she'd like to get one, if possible, so her friend has something \"easy to show at sales instead of having to say my name and all of my information.\" I agreed to pass this request along to Niya Sheldon, our food programming contact with the Community Advancement Department with [facility].     Pt expressed appreciation for the information and call back today. I encouraged her to reach out again if she or her friend have any more questions or difficulties using the [employer] vouchers. Pt said she would be back in touch if needed.    CHW Plan:   Pt's friend will attend an upcoming [employer] and/or [employer] sale to pick out and purchase food for pt using pt's $80 monthly vouchers.  Pt will contact the CP CHW if she has any further questions about the [employer] program, using her vouchers, etc.  CP CHW will ask that pt be mailed a voucher card that her friend can show at upcoming sales.     Drea Christianson  [facility]" Worker  [facility]ic [facility]  [phone]  [email or web address]

## 2023-10-16 ENCOUNTER — TELEPHONE (OUTPATIENT)
Dept: FAMILY MEDICINE | Facility: CLINIC | Age: 78
End: 2023-10-16
Payer: MEDICARE

## 2023-10-16 NOTE — TELEPHONE ENCOUNTER
[facility] phone call message- general phone call:    Reason for call: The patient would like a call back to discuss a 'sore' on her leg that's not getting any better. The patient states she was treated for this before with antibiotics.     Return call needed: Yes    OK to leave a message on voice mail? Yes    Primary language: English      needed? No    Call taken on October 16, 2023 at 3:45 PM by Nuvia Bello

## 2023-10-16 NOTE — TELEPHONE ENCOUNTER
Spoke with patient who states that her first wound has healed but a second one has opened up near where the first one is.  Denies any fever, leg is not hot to tough or tender. Patient is wondering if she needs a new referral to se the wound doctor. Patient is seeing Dr. Tamez but does not have an appointment until mid November.  Patient does not want to come to our clinic to look at the wound. Advised patient to call call Dr. Douglas office to see what they suggest as they are the ones dealing with the wound.     Patient wanted to know if she could wait until her appointment with PCP 11/27 to get covid vaccine. Let patient know that she can make an appointment at pharmacy to get the vaccine. Patient declines saying she doesn't go anywhere and she wants to wait until her appointment as it is hard to get out.     Advised patient to call back if she runs into any issues getting scheduled with the wound clinic.     Monique Pavon RN

## 2023-10-17 ENCOUNTER — NURSE TRIAGE (OUTPATIENT)
Dept: NURSING | Facility: CLINIC | Age: 78
End: 2023-10-17
Payer: MEDICARE

## 2023-10-17 DIAGNOSIS — E11.3491: Primary | ICD-10-CM

## 2023-10-17 DIAGNOSIS — I10 ESSENTIAL HYPERTENSION: ICD-10-CM

## 2023-10-17 NOTE — TELEPHONE ENCOUNTER
Nurse Triage SBAR    Is this a 2nd Level Triage? YES, LICENSED PRACTITIONER REVIEW IS REQUIRED    Situation: Patient with open sore on right shin    Background: Patient states she has an open weeping sore on her right shin that she noted after a previous one had healed. Discharge is clear, tender to touch, no fever. She had received a cleanser from a home care nurse in the past which she is now using on the wound. She cleans it and dresses it with guaze every other day. She wants to know if she should continue this until she has her appointment on 11/2/23    Assessment: Skin wound    Protocol Recommended Disposition:   Home Care    Recommendation: Please call patient at [phone] with dressing instructions. I asked her to get the bottle of cleanser and have it available to share when the clinic called back.  She has an appt on 11/2/23    Routed to provider/team    Jessica Garza RN      Reason for Disposition   1 or 2 small sores    Additional Information   Negative: Sounds like a life-threatening emergency to the triager   Negative: Followed a burn   Negative: Followed an injury to the skin (such as a cut or scrape)   Negative: Boil (abscess) suspected (painful red lump)   Negative: Widespread rash or redness   Negative: Cold sore suspected (i.e., fever blister sore on the outer lip)   Negative: Insect bite(s) suspected   Negative: Poison ivy, oak, or sumac rash suspected (e.g., itchy rash after contact with poison ivy)   Negative: Sore(s) on female genital area  (e.g., labia, vagina, vulva)   Negative: Sore(s) on male genital area (e.g., penis, scrotum)   Negative: Wound infection suspected (in traumatic or surgical wound)   Negative: Black (necrotic), dark purple, or blisters develop in area of sore   Negative: Patient sounds very sick or weak to the triager   Negative: Looks infected (e.g., spreading redness, pus) and fever   Negative: Looks infected and large red area (> 2 inches or 5 cm) or streak    "Negative: Ulcer with black scab that is spreading, painless and cause unknown   Negative: Looks infected (e.g., spreading redness, pus) and no fever   Negative: Unexplained sores and 3 or more   Negative: Large sore (> 1 inch or 2.5 cm across)   Negative: Looks like a boil, deep ulcer or is very painful   Negative: Multiple small blisters grouped together in one area of body (i.e., dermatomal distribution or 'band' or 'stripe') and painful   Negative: New or worsening foot sore (e.g., ulcer, wound, blister, red area) and has diabetes   Negative: Sores and crusts are around openings to nose, or anywhere else on face   Negative: Any other family members with similar sores   Negative: New pressure ulcer suspected   Negative: Using antibiotic ointment > 24 hours and new sore occurs   Negative: Using antibiotic ointment > 48 hours and sore increases in size   Negative: Using antibiotic ointment > 1 week and sore not completely healed   Negative: Lower leg sore and venous ulcer suspected (e.g., brownish pigment around sore, leg edema, varicose veins)   Negative: Patient wants to be seen    Answer Assessment - Initial Assessment Questions  1. APPEARANCE of SORES: \"What do the sores look like?\"        2. NUMBER: \"How many sores are there?\"      one  3. SIZE: \"How big is the largest sore?\"      grape  4. LOCATION: \"Where are the sores located?\"      Right shin  5. ONSET: \"When did the sores begin?\"      Just appeared, no injury  6. TENDER: \"Does it hurt when you touch it?\"  (Scale 1-10; or mild, moderate, severe)    Tender, 1/10  7. CAUSE: \"What do you think is causing the sores?\"      May have bumped it on the wheelchair.   8. OTHER SYMPTOMS: \"Do you have any other symptoms?\" (e.g., fever, new weakness)      No fever    Protocols used: Sores-A-OH    "

## 2023-10-18 ENCOUNTER — TELEPHONE (OUTPATIENT)
Dept: FAMILY MEDICINE | Facility: CLINIC | Age: 78
End: 2023-10-18
Payer: MEDICARE

## 2023-10-18 DIAGNOSIS — E03.9 HYPOTHYROIDISM, UNSPECIFIED TYPE: ICD-10-CM

## 2023-10-18 RX ORDER — LEVOTHYROXINE SODIUM 75 UG/1
75 TABLET ORAL DAILY
Qty: 30 TABLET | Refills: 11 | Status: SHIPPED | OUTPATIENT
Start: 2023-10-18 | End: 2023-12-13

## 2023-10-18 RX ORDER — HYDROCHLOROTHIAZIDE 12.5 MG/1
12.5 TABLET ORAL DAILY
Qty: 30 TABLET | Refills: 0 | Status: SHIPPED | OUTPATIENT
Start: 2023-10-18 | End: 2023-11-17

## 2023-10-18 NOTE — TELEPHONE ENCOUNTER
Spoke with patient who states that she cannot  her levothyroxine. Levothyroxine is marked as discontinued and historical but last visit says to continue taking. Patient stated at her last visit there were talks to increase her levothyroxine dose but she does not want to increase it. Wants to stay at 75mcg. Also has an issue with [facility] and wants to move prescription to [facility]. Will reach out to provider.    Monique Pavon RN

## 2023-10-18 NOTE — TELEPHONE ENCOUNTER
Yes - stay on 75 mcg.  I had discontinued the med and so to not reorder the med (since had some at home), I made it historical.     Migdalia

## 2023-10-18 NOTE — TELEPHONE ENCOUNTER
"Request for medication refill:  levothyroxine (SYNTHROID/LEVOTHROID) 75 MCG     Providers if patient needs an appointment and you are willing to give a one month supply please refill for one month and  send a letter/MyChart using \".SMILLIMITEDREFILL\" .smillimited and route chart to \"P [facility] \" (Giving one month refill in non controlled medications is strongly recommended before denial)    If refill has been denied, meaning absolutely no refills without visit, please complete the smart phrase \".smirxrefuse\" and route it to the \"P SMI MED REFILLS\"  pool to inform the patient and the pharmacy.    Monique Pavon RN        "

## 2023-10-18 NOTE — TELEPHONE ENCOUNTER
[facility] phone call message- medication clarification/question:    Full Medication Name: levothyroxine (SYNTHROID/LEVOTHROID) 75 MCG tablet       Question: This medication have been discontinued and the patient in need of a refill. The patient would like a call back.    Pharmacy confirmed as        [facility] [address] [place], [place] - [address]    : Yes    OK to leave a message on voice mail? Yes    Primary language: English      needed? No    Call taken on October 18, 2023 at 8:30 AM by Nuvia Bello

## 2023-10-18 NOTE — TELEPHONE ENCOUNTER
"Request for medication refill:  hydrochlorothiazide (HYDRODIURIL) 12.5 MG tablet     Providers if patient needs an appointment and you are willing to give a one month supply please refill for one month and  send a letter/MyChart using \".SMILLIMITEDREFILL\" .smillimited and route chart to \"P [facility] \" (Giving one month refill in non controlled medications is strongly recommended before denial)    If refill has been denied, meaning absolutely no refills without visit, please complete the smart phrase \".smirxrefuse\" and route it to the \"P [facility] MED REFILLS\"  pool to inform the patient and the pharmacy.    Danielle Mayen, [facility]      "

## 2023-10-27 ENCOUNTER — DOCUMENTATION ONLY (OUTPATIENT)
Dept: FAMILY MEDICINE | Facility: CLINIC | Age: 78
End: 2023-10-27
Payer: MEDICARE

## 2023-10-27 NOTE — PROGRESS NOTES
"When opening a documentation only encounter, be sure to enter in \"Chief Complaint\" Forms and in \" Comments\" Title of form, description if needed.    Mikayla is a 78 year old  female  Form received via: Fax  Form now resides in: Provider Ready    JALEN ERICK        Form has been completed by provider.     Form sent out via: Fax to [facility] at Fax Number: [phone]  Patient informed: N/A  Output date: December 14, 2023    Betty Sheppard MA      **Please close the encounter**         "

## 2023-11-02 ENCOUNTER — OFFICE VISIT (OUTPATIENT)
Dept: WOUND CARE | Facility: CLINIC | Age: 78
End: 2023-11-02
Payer: MEDICARE

## 2023-11-02 DIAGNOSIS — I73.89 OTHER SPECIFIED PERIPHERAL VASCULAR DISEASES (H): Primary | ICD-10-CM

## 2023-11-02 DIAGNOSIS — I73.9 PVD (PERIPHERAL VASCULAR DISEASE) (H): ICD-10-CM

## 2023-11-02 PROCEDURE — 99213 OFFICE O/P EST LOW 20 MIN: CPT | Performed by: PODIATRIST

## 2023-11-02 ASSESSMENT — PAIN SCALES - GENERAL: PAINLEVEL: NO PAIN (0)

## 2023-11-02 NOTE — NURSING NOTE
Chief Complaint   Patient presents with    HIWOT Degroot, is being seen today for a wound follow up right shin.       There were no vitals filed for this visit.    There is no height or weight on file to calculate BMI.    Per provider    Birgit Heaton LPN

## 2023-11-02 NOTE — NURSING NOTE
Chief Complaint   Patient presents with    HIWOT Degroot, is being seen today for a wound follow up right shin.       There were no vitals filed for this visit.    There is no height or weight on file to calculate BMI.      Birgit Heaton LPN

## 2023-11-03 NOTE — PROGRESS NOTES
Chief Complaint:   Chief Complaint   Patient presents with    HIWOT Degroot, is being seen today for a wound follow up right shin.          Allergies   Allergen Reactions    Hydromorphone Itching    Ketamine Other (See Comments)         Subjective: Mikayla is a 78 year old female who presents to the clinic today for a follow up of right leg wound.  I usually see her in orthopedics for her foot care.  She notes that over the last couple weeks, she had blistering on the right leg.  This does sometimes cause her pain.  She has compression socks, but these are very difficult for her to get on.    Objective  Data Unavailable Data Unavailable Data Unavailable Data Unavailable Data Unavailable 0 lbs 0 oz  There is a tense bulla noted to the right anterior leg.  No signs or symptoms of infection noted.  Hemosiderin deposit noted bilateral legs.  Brawny edema noted to bilateral legs.    Assessment:   Encounter Diagnoses   Name Primary?    Other specified peripheral vascular diseases (H24) Yes    PVD (peripheral vascular disease) (H24)          Plan:   - Pt seen and evaluated  -She does have venous stasis.  I recommended she go into a right Unna's boot, however she does have some incontinence.  Because this, and her inability to get on compression socks, I recommend that she just watch the blister for now.  She can cover with a Mepilex.  She is getting new Velcro compression garments in January.  - Pt to return to clinic in 2 weeks to see how she is doing.

## 2023-11-16 ENCOUNTER — TELEPHONE (OUTPATIENT)
Dept: FAMILY MEDICINE | Facility: CLINIC | Age: 78
End: 2023-11-16

## 2023-11-16 DIAGNOSIS — I10 ESSENTIAL HYPERTENSION: ICD-10-CM

## 2023-11-16 NOTE — TELEPHONE ENCOUNTER
[facility] phone call message- patient requesting to speak with PCP or provider:    PCP: Nini Heaton    Additional Comments: Ray would like to discuss the upcoming eye surgery for the patient and some post-op needs the patient may have.    Is a call back needed? Yes    Patient informed that it may take up to 2 business days to hear back from PCP:Yes    OK to leave a message on voice mail? Yes    Primary language: English      needed? No    Call taken on November 16, 2023 at 2:33 PM by Nuvia Bello

## 2023-11-16 NOTE — TELEPHONE ENCOUNTER
11/17/2023:   [place]af[place] from Dr. Inderjit Conway,    The discussion I had with Mikayla was regarding the inaccuracy of our in-clinic testing due to her obesity/immobility. She has high expectations for the visual/refractive outcome of cataract surgery, which requires us to get high quality pre-operative imaging.  This was unable to be done at my initial evaluation due to her limited mobility.      She reflected to me that she was working with her medical team on these issues and anticipated improvement in the coming months.  We did not discuss a specific weight target, but rather a physical ability to transfer and be seated with stability at our instrumentation in clinic.  If this can not be achieved, the benefit she is hoping to achieve by cataract surgery is lessened.      I appreciate you continuing to work with her to help improve these medical comorbidities.  I look forward to seeing her in the coming months to re-evaluate the cataracts.        Plan - will revisit with her PT team to help determine if she can transfer into a chair (the one used for testing eyes) and then be able to sit there. I expect that she will need to sit straight up with her head forward, chin resting on the machine.  Please call Ray back to make him aware.  Thanks, Migdalia      11/15/2023: Called Ray back (PT). Been working with her for a year, trying to get her power wheelchair - still a problem with financial barriers.   Calling that she is hoping to get cataract surgery done. [facility] - he had expectations for her to get it done. Needs to lose a lot of weight. 20 - 25 pounds. Wondering the why behind this recommendation.  Reviewed Dr. Conway's note - does not mention weight gain but does note severe diabetic retinopathy.   Notes that her transfer is a lot better and is likely not 10 pounds or less but she is using techniques to limit her valsalva to move (using her upper body to push down). Hoping to get power  chair to help with this. Ray thinks she can likely manage. Can lay down now.  Getting her body up to sitting would increase her need to valsalva.   Plan - to reach out to her ophthalmologist to better understand their plan so I can help with her navigating next steps.

## 2023-11-17 RX ORDER — HYDROCHLOROTHIAZIDE 12.5 MG/1
12.5 TABLET ORAL DAILY
Qty: 30 TABLET | Refills: 0 | Status: SHIPPED | OUTPATIENT
Start: 2023-11-17 | End: 2023-12-28

## 2023-11-17 NOTE — TELEPHONE ENCOUNTER
"Request for medication refill:  hydrochlorothiazide (HYDRODIURIL) 12.5 MG tablet     Providers if patient needs an appointment and you are willing to give a one month supply please refill for one month and  send a letter/MyChart using \".SMILLIMITEDREFILL\" .smillimited and route chart to \"P [facility] \" (Giving one month refill in non controlled medications is strongly recommended before denial)    If refill has been denied, meaning absolutely no refills without visit, please complete the smart phrase \".smirxrefuse\" and route it to the \"P [facility] MED REFILLS\"  pool to inform the patient and the pharmacy.    Danielle Mayen, [facility]      "

## 2023-11-17 NOTE — TELEPHONE ENCOUNTER
Attempted to call Ray but got his voicemail. Unable to leave a message as it is not clear if it is confidential or not.     Monique Pavon RN

## 2023-11-24 NOTE — TELEPHONE ENCOUNTER
3rd attempt to reach physical therapist. Patient active in [employer]. Sent message via [employer].   Monique Pavon RN

## 2023-12-13 ENCOUNTER — TELEPHONE (OUTPATIENT)
Dept: FAMILY MEDICINE | Facility: CLINIC | Age: 78
End: 2023-12-13
Payer: MEDICARE

## 2023-12-13 DIAGNOSIS — Z79.4 TYPE 2 DIABETES MELLITUS WITH HYPERGLYCEMIA, WITH LONG-TERM CURRENT USE OF INSULIN (H): ICD-10-CM

## 2023-12-13 DIAGNOSIS — E11.65 TYPE 2 DIABETES MELLITUS WITH HYPERGLYCEMIA, WITH LONG-TERM CURRENT USE OF INSULIN (H): ICD-10-CM

## 2023-12-13 DIAGNOSIS — E03.9 HYPOTHYROIDISM, UNSPECIFIED TYPE: ICD-10-CM

## 2023-12-13 RX ORDER — LEVOTHYROXINE SODIUM 75 UG/1
75 TABLET ORAL DAILY
Qty: 30 TABLET | Refills: 11 | Status: SHIPPED | OUTPATIENT
Start: 2023-12-13 | End: 2023-12-29

## 2023-12-13 NOTE — TELEPHONE ENCOUNTER
[facility] phone call message- general phone call:    Reason for call: The patient would like to discuss refills on her diabetic medications.    Return call needed: Yes    OK to leave a message on voice mail? Yes    Primary language: English      needed? No    Call taken on December 13, 2023 at 11:05 AM by Nuvia Bello

## 2023-12-13 NOTE — TELEPHONE ENCOUNTER
Spoke with patient who says she is part of a [place] program where they cover her diabetes medication for three months at a time. States she can no longer have her medications at [facility] because [facility] won't take her to [facility]. They are requesting the medication go to the [facility] in [facility]  On [address] [facility]    Patient states she doesn't need the prescription transferred but they need the prescription sent to new pharmacy. Patient is due for refills at this time. Requesting short acting and long acting insulin along with needles.     Monique Pavon, RN

## 2023-12-18 DIAGNOSIS — Z79.4 TYPE 2 DIABETES MELLITUS WITH HYPERGLYCEMIA, WITH LONG-TERM CURRENT USE OF INSULIN (H): ICD-10-CM

## 2023-12-18 DIAGNOSIS — E11.65 TYPE 2 DIABETES MELLITUS WITH HYPERGLYCEMIA, WITH LONG-TERM CURRENT USE OF INSULIN (H): ICD-10-CM

## 2023-12-19 RX ORDER — HUMAN INSULIN 100 [IU]/ML
INJECTION, SUSPENSION SUBCUTANEOUS
Qty: 40 ML | Refills: 0 | OUTPATIENT
Start: 2023-12-19

## 2023-12-26 ENCOUNTER — TELEPHONE (OUTPATIENT)
Dept: FAMILY MEDICINE | Facility: CLINIC | Age: 78
End: 2023-12-26
Payer: MEDICARE

## 2023-12-26 DIAGNOSIS — I10 ESSENTIAL HYPERTENSION: ICD-10-CM

## 2023-12-26 DIAGNOSIS — Z79.4 TYPE 2 DIABETES MELLITUS WITH HYPERGLYCEMIA, WITH LONG-TERM CURRENT USE OF INSULIN (H): ICD-10-CM

## 2023-12-26 DIAGNOSIS — E11.65 TYPE 2 DIABETES MELLITUS WITH HYPERGLYCEMIA, WITH LONG-TERM CURRENT USE OF INSULIN (H): ICD-10-CM

## 2023-12-26 NOTE — TELEPHONE ENCOUNTER
[facility] phone call message- medication clarification/question:    Full Medication Name: levothyroxine (SYNTHROID/LEVOTHROID) 75 MCG tablet, hydrochlorothiazide (HYDRODIURIL) 12.5 MG tablet     Dose:  Route: Take 1 tablet (75 mcg) by mouth daily - Oral, Route: Take 1 tablet by mouth once daily - Oral      Question: Pt wants to change pharmacy location from  [facility] pharmacy [address] [place] [address] to [facility] for both medications. They were also wondering about the refills for levothyroxine - they said the pharmacy did not have any.     Pharmacy confirmed as [facility] [address] [place] [address] [id number]: Yes    OK to leave a message on voice mail? Yes    Primary language: English      needed? No    Call taken on December 26, 2023 at 9:31 AM by Varinder Hopkins

## 2023-12-27 NOTE — TELEPHONE ENCOUNTER
"Last seen: 9/22/23    Request for medication refill:    insulin  UNIT/ML vial       hydrochlorothiazide (HYDRODIURIL) 12.5 MG tablet     Providers if patient needs an appointment and you are willing to give a one month supply please refill for one month and  send a letter/MyChart using \".SMILLIMITEDREFILL\" .smillimited and route chart to \"P [facility] \" (Giving one month refill in non controlled medications is strongly recommended before denial)    If refill has been denied, meaning absolutely no refills without visit, please complete the smart phrase \".smirxrefuse\" and route it to the \"P SMI MED REFILLS\"  pool to inform the patient and the pharmacy.    Iris Bello RN      "
The patient is requesting a refill as soon as possible b/c she's completely out.  
24-Nov-2021 14:53

## 2023-12-28 RX ORDER — HUMAN INSULIN 100 [IU]/ML
INJECTION, SUSPENSION SUBCUTANEOUS
Qty: 40 ML | Refills: 0 | Status: SHIPPED | OUTPATIENT
Start: 2023-12-28 | End: 2023-12-29

## 2023-12-28 RX ORDER — HYDROCHLOROTHIAZIDE 12.5 MG/1
12.5 TABLET ORAL DAILY
Qty: 30 TABLET | Refills: 0 | Status: SHIPPED | OUTPATIENT
Start: 2023-12-28 | End: 2023-12-29

## 2023-12-29 DIAGNOSIS — E03.9 HYPOTHYROIDISM, UNSPECIFIED TYPE: ICD-10-CM

## 2023-12-29 DIAGNOSIS — Z79.4 TYPE 2 DIABETES MELLITUS WITH HYPERGLYCEMIA, WITH LONG-TERM CURRENT USE OF INSULIN (H): ICD-10-CM

## 2023-12-29 DIAGNOSIS — E11.65 TYPE 2 DIABETES MELLITUS WITH HYPERGLYCEMIA, WITH LONG-TERM CURRENT USE OF INSULIN (H): ICD-10-CM

## 2023-12-29 DIAGNOSIS — I10 ESSENTIAL HYPERTENSION: ICD-10-CM

## 2023-12-29 RX ORDER — HYDROCHLOROTHIAZIDE 12.5 MG/1
12.5 TABLET ORAL DAILY
Qty: 30 TABLET | Refills: 0 | Status: SHIPPED | OUTPATIENT
Start: 2023-12-29 | End: 2024-01-26

## 2023-12-29 RX ORDER — LEVOTHYROXINE SODIUM 75 UG/1
75 TABLET ORAL DAILY
Qty: 30 TABLET | Refills: 11 | Status: SHIPPED | OUTPATIENT
Start: 2023-12-29 | End: 2024-06-21

## 2023-12-29 RX ORDER — HUMAN INSULIN 100 [IU]/ML
INJECTION, SUSPENSION SUBCUTANEOUS
Qty: 40 ML | Refills: 3 | Status: SHIPPED | OUTPATIENT
Start: 2023-12-29 | End: 2024-01-26

## 2023-12-29 NOTE — TELEPHONE ENCOUNTER
"Request for medication refill:      levothyroxine (SYNTHROID/LEVOTHROID) 75 MCG tablet       Providers if patient needs an appointment and you are willing to give a one month supply please refill for one month and  send a letter/MyChart using \".SMILLIMITEDREFILL\" .smillimited and route chart to \"P SMI \" (Giving one month refill in non controlled medications is strongly recommended before denial)    If refill has been denied, meaning absolutely no refills without visit, please complete the smart phrase \".smirxrefuse\" and route it to the \"P SMI MED REFILLS\"  pool to inform the patient and the pharmacy.    Radha Thomas MA      "

## 2023-12-29 NOTE — TELEPHONE ENCOUNTER
"Request for medication refill:    NOVOLIN N RELION 100 UNIT/ML susp     hydrochlorothiazide (HYDRODIURIL) 12.5 MG tablet     Providers if patient needs an appointment and you are willing to give a one month supply please refill for one month and  send a letter/MyChart using \".SMILLIMITEDREFILL\" .smillimited and route chart to \"P SMI \" (Giving one month refill in non controlled medications is strongly recommended before denial)    If refill has been denied, meaning absolutely no refills without visit, please complete the smart phrase \".smirxrefuse\" and route it to the \"P SMI MED REFILLS\"  pool to inform the patient and the pharmacy.    Radha Thomas MA      "

## 2024-01-07 ENCOUNTER — HEALTH MAINTENANCE LETTER (OUTPATIENT)
Age: 79
End: 2024-01-07

## 2024-01-10 ENCOUNTER — DOCUMENTATION ONLY (OUTPATIENT)
Dept: FAMILY MEDICINE | Facility: CLINIC | Age: 79
End: 2024-01-10

## 2024-01-10 NOTE — PROGRESS NOTES
"When opening a documentation only encounter, be sure to enter in \"Chief Complaint\" Forms and in \" Comments\" Title of form, description if needed.    Mikayla is a 78 year old  female  Form received via: Fax  Form now resides in: Provider Ready    Danielle Mayen CMA                "

## 2024-01-12 PROBLEM — Z99.3 DEPENDENT FOR WHEELCHAIR MOBILITY: Status: ACTIVE | Noted: 2024-01-12

## 2024-01-12 NOTE — COMMUNITY RESOURCES LIST (ENGLISH)
01/12/2024   [facility]  N/A  For questions about this resource list or additional care needs, please contact your primary care clinic or care manager.  Phone: [phone]   Email: N/A   Address: [facility] [place] [address]   Hours: N/A        Transportation       Free or low-cost transportation  1  [facility] Distance: 2.6 miles      In-Person   [address] [place] [address]  Language: English  Hours: Mon - Wed 9:30 AM - 12:00 PM , Mon - Wed 1:00 PM - 2:00 PM Appt. Only  Fees: Free   Phone: (542) 424-9038 Email: info@saintolaf.org Website: http://www.saintolaf.iRule/     2  [facility] - Bus [facility] - Free or low-cost transportation Distance: 3.09 miles      In-Person   [address] [place] [address]  Language: English  Hours: Tue 9:30 AM - 11:30 AM , Thu 9:30 AM - 11:30 AM  Fees: Free   Phone: (990) 697-4975 Email: info@[employer].iRule Website: http://www.[employer]/     Transportation to medical appointments  3  [facility] -  [facility] ([facility]) Distance: 5.42 miles      In-Person   [address] [place] [address]  Language: Kyrgyz, Welsh, English, Gujarati, Christiano, Georgian, Setswana, English, Turkmen, Bulgarian  Hours: Mon - Wed 9:00 AM - 5:00 PM , Thu 12:00 PM - 6:00 PM , Fri 9:00 AM - 5:00 PM , Sun 10:30 AM - 2:00 PM Appt. Only  Fees: Free   Phone: (876) 349-7021 Email: info@sewa-aifw.org Website: https://www.sewa-aifw.org/     4  [facility] Distance: 5.46 miles      In-Person   [address] [place] [address]  Language: English  Hours: Mon - Fri 9:00 AM - 4:00 PM  Fees: Free   Phone: (650) 984-1248 Email: neighborhoodnetworkforseniors@Cell[employer].com Website: http://www.[facility] Cent[facility]etCal[facility] Hospitalforseniors.org/          Important Numbers & Websites       Emergency Services   911  [facility] Services   [facility]  Poison Control   (808) 809-4368  Suicide Prevention Lifeline   (340) 752-1498 (TALK)  Child Abuse Hotline    (203) 895-5900 (4-A-Child)  Sexual Assault Hotline   (501) 225-4693 (HOPE)  National Runaway Safeline   (767) 342-4980 (RUNAWAY)  All-Options Talkline   (753) 869-6900  Substance Abuse Referral   (573) 505-4094 (HELP)

## 2024-01-12 NOTE — COMMUNITY RESOURCES LIST (ENGLISH)
01/12/2024   [facility]  N/A  For questions about this resource list or additional care needs, please contact your primary care clinic or care manager.  Phone: [phone]   Email: N/A   Address: [facility] [place] [address]   Hours: N/A        Transportation       Free or low-cost transportation  1  [facility] Distance: 2.6 miles      In-Person   [address] [place] [address]  Language: English  Hours: Mon - Wed 9:30 AM - 12:00 PM , Mon - Wed 1:00 PM - 2:00 PM Appt. Only  Fees: Free   Phone: (392) 709-2969 Email: info@saintolaf.org Website: http://www.saintolaf.Leinentausch/     2  [facility] - Bus Pass[facility] - Free or low-cost transportation Distance: 3.09 miles      In-Person   [address] [place] [address]  Language: English  Hours: Tue 9:30 AM - 11:30 AM , Thu 9:30 AM - 11:30 AM  Fees: Free   Phone: (555) 948-5333 Email: info@[employer].Leinentausch Website: http://www.[employer]/     Transportation to medical appointments  3  [facility] -  [facility] ([facility]) Distance: 5.42 miles      In-Person   [address] [place] [address]  Language: Romansh, Tamazight, English, Gujarati, Christiano, Mongolian, Polish, Nepali, Persian, Sinhala  Hours: Mon - Wed 9:00 AM - 5:00 PM , Thu 12:00 PM - 6:00 PM , Fri 9:00 AM - 5:00 PM , Sun 10:30 AM - 2:00 PM Appt. Only  Fees: Free   Phone: (194) 348-6176 Email: info@sewa-aifw.org Website: https://www.sewa-aifw.org/     4  [facility] Distance: 5.46 miles      In-Person   [address] [place] [address]  Language: English  Hours: Mon - Fri 9:00 AM - 4:00 PM  Fees: Free   Phone: (704) 453-8998 Email: neighborhoodnetworkforseniors@[employer].com Website: http://www.[facility] Hospitalforseniors.org/          Important Numbers & Websites       Emergency Services   911  [facility] Services   [facility]  Poison Control   (949) 349-4451  Suicide Prevention Lifeline   (388) 675-8156 (TALK)  Child Abuse Hotline    (873) 364-6951 (4-A-Child)  Sexual Assault Hotline   (114) 233-5378 (HOPE)  National Runaway Safeline   (124) 409-3909 (RUNAWAY)  All-Options Talkline   (237) 604-8325  Substance Abuse Referral   (307) 715-6751 (HELP)     190

## 2024-01-16 ENCOUNTER — DOCUMENTATION ONLY (OUTPATIENT)
Dept: FAMILY MEDICINE | Facility: CLINIC | Age: 79
End: 2024-01-16
Payer: MEDICARE

## 2024-01-17 ENCOUNTER — OFFICE VISIT (OUTPATIENT)
Dept: ORTHOPEDICS | Facility: CLINIC | Age: 79
End: 2024-01-17
Payer: MEDICARE

## 2024-01-17 DIAGNOSIS — I73.9 PVD (PERIPHERAL VASCULAR DISEASE) (H): ICD-10-CM

## 2024-01-17 DIAGNOSIS — L60.3 NAIL DYSTROPHY: ICD-10-CM

## 2024-01-17 DIAGNOSIS — I73.89 OTHER SPECIFIED PERIPHERAL VASCULAR DISEASES (H): Primary | ICD-10-CM

## 2024-01-17 PROCEDURE — 99213 OFFICE O/P EST LOW 20 MIN: CPT | Performed by: PODIATRIST

## 2024-01-17 NOTE — LETTER
1/17/2024         RE: Mikayla Timmons  [address]  [facility] [address]        Dear Colleague,    Thank you for referring your patient, Mikayla Timmons, to the [facility] [place]. Please see a copy of my visit note below.    Chief Complaint   Patient presents with    Follow Up     Diabetic foot care.          HPI: Mikayla is a 77  year old female who presents today for a diabetic foot exam and management.  She was seen last year.  She has moved back into her house. No new LE complaints. She sees Dr. Heaton for her diabetic care and was last seen 1/12/24. She has had no ulcers on her legs. Awaiting compression garments from [employer].     Review of Systems: No n/v/d/f/c/ns/sob/cp    PMH: LBP  Postpolio syndrome  Carpal Tunnel  T2DM  HTN    No past surgical history on file.      Allergies: Hydromorphone and Ketamine    SH:   Social History     Socioeconomic History    Marital status:      Spouse name: Not on file    Number of children: Not on file    Years of education: Not on file    Highest education level: Not on file   Occupational History    Not on file   Tobacco Use    Smoking status: Never    Smokeless tobacco: Never   Substance and Sexual Activity    Alcohol use: Yes     Comment: Lightly    Drug use: Never    Sexual activity: Not on file   Other Topics Concern    Parent/sibling w/ CABG, MI or angioplasty before 65F 55M? Not Asked   Social History Narrative    Not on file     Social Determinants of Health     Financial Resource Strain: Low Risk  (1/11/2024)    Financial Resource Strain     Within the past 12 months, have you or your family members you live with been unable to get utilities (heat, electricity) when it was really needed?: No   Food Insecurity: Low Risk  (1/11/2024)    Food Insecurity     Within the past 12 months, did you worry that your food would run out before you got money to buy more?: No     Within the past 12 months, did the food you bought  just not last and you didn t have money to get more?: No   Transportation Needs: High Risk (1/11/2024)    Transportation Needs     Within the past 12 months, has lack of transportation kept you from medical appointments, getting your medicines, non-medical meetings or appointments, work, or from getting things that you need?: Yes   Physical Activity: Not on file   Stress: Not on file   Social Connections: Not on file   Interpersonal Safety: Low Risk  (9/22/2023)    Interpersonal Safety     Do you feel physically and emotionally safe where you currently live?: Yes     Within the past 12 months, have you been hit, slapped, kicked or otherwise physically hurt by someone?: No     Within the past 12 months, have you been humiliated or emotionally abused in other ways by your partner or ex-partner?: No   Housing Stability: Low Risk  (1/11/2024)    Housing Stability     Do you have housing? : Yes     Are you worried about losing your housing?: No       FH:   Family History   Problem Relation Age of Onset    Cancer No family hx of     Diabetes No family hx of     Glaucoma No family hx of     Hypertension No family hx of     Macular Degeneration No family hx of     Cerebrovascular Disease No family hx of     Thyroid Disease No family hx of        Objective:    Hemoglobin A1C   Date Value Ref Range Status   08/14/2023 8.0 (H) 0.0 - 5.6 % Final     Comment:     Normal <5.7%   Prediabetes 5.7-6.4%    Diabetes 6.5% or higher     Note: Adopted from ADA consensus guidelines.   05/05/2023 7.8 (H) 0.0 - 5.6 % Final     Comment:     Normal <5.7%   Prediabetes 5.7-6.4%    Diabetes 6.5% or higher     Note: Adopted from ADA consensus guidelines.   12/01/2021 7.3 (H) 0.0 - 5.6 % Final     Comment:     Normal <5.7%   Prediabetes 5.7-6.4%    Diabetes 6.5% or higher     Note: Adopted from ADA consensus guidelines.   03/10/2021 6.9 (H) 4.1 - 5.7 % Final   09/23/2020 7.5 (H) 4.1 - 5.7 % Final   05/01/2020 7.0 (H) 4.1 - 5.7 % Final         PT  pulses are not palpable 2/2 edema and DP pulses are 2/4 bilaterally. Hemosiderin deposits BL legs.  CRT is WNL. Diminished pedal hair.   Gross sensation is intact bilaterally. Protective sensation is normal except at the plantar halluces at the last visit. Intact intact.   Equinus is noted bilaterally. No pain with active or passive ROM of the ankle, MTJ, 1st ray, or halluces bilaterally,.   Nails elongated and dystrophic bilaterally. No open lesions are noted.     Assessment: DM2 with neuropathy - controlled.   Nail dystrophy x 10.    Plan:  - Pt seen and evaluated.  - Nails trimmed x 10.   - See again in 3 months.         Swapnil Baez, NAS

## 2024-01-17 NOTE — PROGRESS NOTES
Chief Complaint   Patient presents with    Follow Up     Diabetic foot care.          HPI: Mikayla is a 77  year old female who presents today for a diabetic foot exam and management.  She was seen last year.  She has moved back into her house. No new LE complaints. She sees Dr. Heaton for her diabetic care and was last seen 1/12/24. She has had no ulcers on her legs. Awaiting compression garments from [employer].     Review of Systems: No n/v/d/f/c/ns/sob/cp    PMH: LBP  Postpolio syndrome  Carpal Tunnel  T2DM  HTN    No past surgical history on file.      Allergies: Hydromorphone and Ketamine    SH:   Social History     Socioeconomic History    Marital status:      Spouse name: Not on file    Number of children: Not on file    Years of education: Not on file    Highest education level: Not on file   Occupational History    Not on file   Tobacco Use    Smoking status: Never    Smokeless tobacco: Never   Substance and Sexual Activity    Alcohol use: Yes     Comment: Lightly    Drug use: Never    Sexual activity: Not on file   Other Topics Concern    Parent/sibling w/ CABG, MI or angioplasty before 65F 55M? Not Asked   Social History Narrative    Not on file     Social Determinants of Health     Financial Resource Strain: Low Risk  (1/11/2024)    Financial Resource Strain     Within the past 12 months, have you or your family members you live with been unable to get utilities (heat, electricity) when it was really needed?: No   Food Insecurity: Low Risk  (1/11/2024)    Food Insecurity     Within the past 12 months, did you worry that your food would run out before you got money to buy more?: No     Within the past 12 months, did the food you bought just not last and you didn t have money to get more?: No   Transportation Needs: High Risk (1/11/2024)    Transportation Needs     Within the past 12 months, has lack of transportation kept you from medical appointments, getting your medicines, non-medical meetings or  appointments, work, or from getting things that you need?: Yes   Physical Activity: Not on file   Stress: Not on file   Social Connections: Not on file   Interpersonal Safety: Low Risk  (9/22/2023)    Interpersonal Safety     Do you feel physically and emotionally safe where you currently live?: Yes     Within the past 12 months, have you been hit, slapped, kicked or otherwise physically hurt by someone?: No     Within the past 12 months, have you been humiliated or emotionally abused in other ways by your partner or ex-partner?: No   Housing Stability: Low Risk  (1/11/2024)    Housing Stability     Do you have housing? : Yes     Are you worried about losing your housing?: No       FH:   Family History   Problem Relation Age of Onset    Cancer No family hx of     Diabetes No family hx of     Glaucoma No family hx of     Hypertension No family hx of     Macular Degeneration No family hx of     Cerebrovascular Disease No family hx of     Thyroid Disease No family hx of        Objective:    Hemoglobin A1C   Date Value Ref Range Status   08/14/2023 8.0 (H) 0.0 - 5.6 % Final     Comment:     Normal <5.7%   Prediabetes 5.7-6.4%    Diabetes 6.5% or higher     Note: Adopted from ADA consensus guidelines.   05/05/2023 7.8 (H) 0.0 - 5.6 % Final     Comment:     Normal <5.7%   Prediabetes 5.7-6.4%    Diabetes 6.5% or higher     Note: Adopted from ADA consensus guidelines.   12/01/2021 7.3 (H) 0.0 - 5.6 % Final     Comment:     Normal <5.7%   Prediabetes 5.7-6.4%    Diabetes 6.5% or higher     Note: Adopted from ADA consensus guidelines.   03/10/2021 6.9 (H) 4.1 - 5.7 % Final   09/23/2020 7.5 (H) 4.1 - 5.7 % Final   05/01/2020 7.0 (H) 4.1 - 5.7 % Final         PT pulses are not palpable 2/2 edema and DP pulses are 2/4 bilaterally. Hemosiderin deposits BL legs.  CRT is WNL. Diminished pedal hair.   Gross sensation is intact bilaterally. Protective sensation is normal except at the plantar halluces at the last visit. Intact  intact.   Equinus is noted bilaterally. No pain with active or passive ROM of the ankle, MTJ, 1st ray, or halluces bilaterally,.   Nails elongated and dystrophic bilaterally. No open lesions are noted.     Assessment: DM2 with neuropathy - controlled.   Nail dystrophy x 10.    Plan:  - Pt seen and evaluated.  - Nails trimmed x 10.   - See again in 3 months.

## 2024-01-25 NOTE — PROGRESS NOTES
Form has been completed by provider.     Form sent out via: Fax to [employer] at Fax Number: [phone]  Patient informed: NA. Received confirmation fax went through. Forms placed in scan bin.   Output date: January 25, 2024    Monique Pavon RN      **Please close the encounter**

## 2024-01-25 NOTE — COMMUNITY RESOURCES LIST (ENGLISH)
01/25/2024   [facility]  N/A  For questions about this resource list or additional care needs, please contact your primary care clinic or care manager.  Phone: [phone]   Email: N/A   Address: [facility] [place] [address]   Hours: N/A        Transportation       Free or low-cost transportation  1  [facility] Distance: 2.6 miles      In-Person   [address] [place] [address]  Language: English  Hours: Mon - Wed 9:30 AM - 12:00 PM , Mon - Wed 1:00 PM - 2:00 PM Appt. Only  Fees: Free   Phone: (455) 372-3391 Email: info@saintolaf.org Website: http://www.saintolaf.InternetArray/     2  [facility] - Bus Passes - Free or low-cost transportation Distance: 3.09 miles      In-Person   [address] [place] [address]  Language: English  Hours: Tue 9:30 AM - 11:30 AM , Thu 9:30 AM - 11:30 AM  Fees: Free   Phone: (570) 646-4917 Email: info@RoomRe[employer].InternetArray Website: http://www.Skylines/     Transportation to medical appointments  3  [facility] -  [facility] ([facility]) Distance: 5.42 miles      In-Person   [address] [place] [address]  Language: Kiswahili, Polish, English, Gujarati, Christiano, Hungarian, Mohawk, Vietnamese, Belarusian, Estonian  Hours: Mon - Wed 9:00 AM - 5:00 PM , Thu 12:00 PM - 6:00 PM , Fri 9:00 AM - 5:00 PM , Sun 10:30 AM - 2:00 PM Appt. Only  Fees: Free   Phone: (969) 361-6996 Email: info@sewa-aifw.org Website: https://www.sewa-aifw.org/     4  [facility] Distance: 5.46 miles      In-Person   [address] [place] [address]  Language: English  Hours: Mon - Fri 9:00 AM - 4:00 PM  Fees: Free   Phone: (836) 558-7849 Email: neighborhoodnetworkforseniors@[employer].com Website: http://www.[facility] Hospitalnet[facility]our[facility] Memorial Hospitalforseniors.org/          Important Numbers & Websites       Emergency Services   911  [facility] Services   [facility]  Poison Control   (954) 155-6718  Suicide Prevention Lifeline   (569) 773-5932 (TALK)  Child Abuse Hotline    (874) 541-3840 (4-A-Child)  Sexual Assault Hotline   (354) 713-7069 (HOPE)  National Runaway Safeline   (829) 552-5187 (RUNAWAY)  All-Options Talkline   (624) 683-6621  Substance Abuse Referral   (542) 210-8584 (HELP)

## 2024-01-26 ENCOUNTER — OFFICE VISIT (OUTPATIENT)
Dept: FAMILY MEDICINE | Facility: CLINIC | Age: 79
End: 2024-01-26
Payer: MEDICARE

## 2024-01-26 VITALS
DIASTOLIC BLOOD PRESSURE: 72 MMHG | HEART RATE: 57 BPM | RESPIRATION RATE: 17 BRPM | SYSTOLIC BLOOD PRESSURE: 135 MMHG | OXYGEN SATURATION: 95 %

## 2024-01-26 DIAGNOSIS — E83.52 HYPERCALCEMIA: ICD-10-CM

## 2024-01-26 DIAGNOSIS — Z79.4 TYPE 2 DIABETES MELLITUS WITH HYPERGLYCEMIA, WITH LONG-TERM CURRENT USE OF INSULIN (H): Primary | ICD-10-CM

## 2024-01-26 DIAGNOSIS — Z79.4 TYPE 2 DIABETES MELLITUS WITH RIGHT EYE AFFECTED BY MODERATE NONPROLIFERATIVE RETINOPATHY AND MACULAR EDEMA, WITH LONG-TERM CURRENT USE OF INSULIN (H): ICD-10-CM

## 2024-01-26 DIAGNOSIS — E66.813 CLASS 3 OBESITY: ICD-10-CM

## 2024-01-26 DIAGNOSIS — E11.3311 TYPE 2 DIABETES MELLITUS WITH RIGHT EYE AFFECTED BY MODERATE NONPROLIFERATIVE RETINOPATHY AND MACULAR EDEMA, WITH LONG-TERM CURRENT USE OF INSULIN (H): ICD-10-CM

## 2024-01-26 DIAGNOSIS — Z79.4 TYPE 2 DIABETES MELLITUS WITH LEFT EYE AFFECTED BY PROLIFERATIVE RETINOPATHY AND MACULAR EDEMA, WITH LONG-TERM CURRENT USE OF INSULIN (H): ICD-10-CM

## 2024-01-26 DIAGNOSIS — E11.65 TYPE 2 DIABETES MELLITUS WITH HYPERGLYCEMIA, WITH LONG-TERM CURRENT USE OF INSULIN (H): Primary | ICD-10-CM

## 2024-01-26 DIAGNOSIS — G14 POST-POLIOMYELITIS MUSCULAR ATROPHY (H): ICD-10-CM

## 2024-01-26 DIAGNOSIS — I10 ESSENTIAL HYPERTENSION: ICD-10-CM

## 2024-01-26 DIAGNOSIS — E11.3512 TYPE 2 DIABETES MELLITUS WITH LEFT EYE AFFECTED BY PROLIFERATIVE RETINOPATHY AND MACULAR EDEMA, WITH LONG-TERM CURRENT USE OF INSULIN (H): ICD-10-CM

## 2024-01-26 PROBLEM — S73.005D: Status: RESOLVED | Noted: 2022-06-14 | Resolved: 2024-01-26

## 2024-01-26 PROBLEM — S62.002D: Status: RESOLVED | Noted: 2017-08-10 | Resolved: 2024-01-26

## 2024-01-26 PROBLEM — L89.212: Status: RESOLVED | Noted: 2021-08-13 | Resolved: 2024-01-26

## 2024-01-26 PROBLEM — R01.1 CARDIAC MURMUR, UNSPECIFIED: Status: RESOLVED | Noted: 2022-06-14 | Resolved: 2024-01-26

## 2024-01-26 LAB
CHOLEST SERPL-MCNC: 240 MG/DL
FASTING STATUS PATIENT QL REPORTED: ABNORMAL
HDLC SERPL-MCNC: 47 MG/DL
LDLC SERPL CALC-MCNC: 151 MG/DL
NONHDLC SERPL-MCNC: 193 MG/DL
TRIGL SERPL-MCNC: 211 MG/DL

## 2024-01-26 PROCEDURE — 80061 LIPID PANEL: CPT | Performed by: FAMILY MEDICINE

## 2024-01-26 PROCEDURE — 99215 OFFICE O/P EST HI 40 MIN: CPT | Performed by: FAMILY MEDICINE

## 2024-01-26 PROCEDURE — 36415 COLL VENOUS BLD VENIPUNCTURE: CPT | Performed by: FAMILY MEDICINE

## 2024-01-26 PROCEDURE — 99417 PROLNG OP E/M EACH 15 MIN: CPT | Performed by: FAMILY MEDICINE

## 2024-01-26 RX ORDER — HUMAN INSULIN 100 [IU]/ML
INJECTION, SUSPENSION SUBCUTANEOUS
Qty: 40 ML | Refills: 3 | Status: SHIPPED | OUTPATIENT
Start: 2024-01-26 | End: 2024-04-16

## 2024-01-26 RX ORDER — HYDROCHLOROTHIAZIDE 12.5 MG/1
12.5 TABLET ORAL DAILY
Qty: 90 TABLET | Refills: 1 | Status: SHIPPED | OUTPATIENT
Start: 2024-01-26 | End: 2024-04-18

## 2024-01-26 NOTE — PROGRESS NOTES
Assessment & Plan     Type 2 diabetes mellitus with hyperglycemia, with long-term current use of insulin (H)  Very upset With her marked increase in blood glucose level.  Long discussion around how she could manage the food she is being given by using carb counting.  She could also minimize risk of weight gain by managing the calories of the food that she is provided and possibly not eating all of it.  She cannot travel so hoping that she could see CDE virtually.  Absolutely necessary that she has a CGM particularly because she is on insulin, and we are changing her dosing, and it would help her manage her variable food options.  In the meantime we will increase her NPH to 24 units in a.m. and 18 in the p.m. (fasting is 130) and increase her to meal allotment to 6 units.  Anticipate that this will all change as she sees CDE.  Rest of blood work reasonable.   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  - Adult Diabetes Education  Referral; Future    Post-poliomyelitis muscular atrophy (H28)  This is ongoing.  Is now status post left hip fusion.  Ongoing PT.    Class 3 obesity (H)  This is gotten worse, with her lack of control over food intake.  She cannot exercise.  She is doing her best.  She has no money for meds, and so at this point we do not have her on a GLP-1 although that may be an option in the future.    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  Is seeing ophthalmology.  - Adult Diabetes Education   Referral; Future    Essential hypertension  Controlled and refilled today.  - hydrochlorothiazide (HYDRODIURIL) 12.5 MG tablet; Take 1 tablet (12.5 mg) by mouth daily    Hypercalcemia  Stable. Continue to monitor      I spent a total of 68 minutes on the day of the visit.   Time spent by me doing chart review, history and exam, documentation and further activities per the note          No follow-ups on file.    Subjective   Mikayla is a 78 year old, presenting for the following health issues:  RECHECK (Follow-up on blood work )      1/26/2024    10:31 AM   Additional Questions   Roomed by toñito   Accompanied by self         1/26/2024    10:31 AM   Patient Reported Additional Medications   Patient reports taking the following new medications none     HPI     Diabetes:   BG: Up to 400 at times - lowest it will go is 130 or 136.  Goes up as the day goes along.    NPH - 20 in the am and 15 in the pm  Flexpen - 4 units twice a day with her two largest meals.  People are bringing in food that is hard carbohydrate and her daughter, Radha moved - away but coming back this summer   Believes that her increase in BG is from her being given foods she cannot control. Has no other options. Completed DM ed awhile back but doesn't know how to carb count or how to  quantity. Is very aware that with increased calories and insulin she is increasing her weight.   Has the G6 Dexcom.  Working much better now that she has the older model - needing the sensor on her belly so she can reach it.       PT - can now stand up, pivot and move one step. Is getting PT weekly .     Venous insufficiency - stable                    Objective    /72 (BP Location: Right arm, Patient Position: Sitting, Cuff Size: Adult Large)   Pulse 57   Resp 17   LMP  (LMP Unknown)   SpO2 95%   There is no height or weight on file to calculate BMI.  Physical Exam   GENERAL: alert and no distress  MS: 1+ edema bilaterally with venous stasis  changes on both shins (erythema and a bit shiny, with some scale). No skin breakdown.             Signed Electronically by: Nini Heaton MD

## 2024-02-01 ENCOUNTER — TELEPHONE (OUTPATIENT)
Dept: FAMILY MEDICINE | Facility: CLINIC | Age: 79
End: 2024-02-01
Payer: MEDICARE

## 2024-02-01 NOTE — TELEPHONE ENCOUNTER
assistance applications for Novolog pens, pen needles, Novolin N vials & Synthroid have been interoffice mailed to Dr Rodas for review, signature and return to me.    I have included an addressed return interoffice envelope for your convenience.    These applications have been mailed to Mikayla for her to review, sign and return to me with required documents.    I have contacted Mikayla to let her know these are in the mail.    Romelia Rollins  Prescription Assistance Supervisor  Pharmacy Assistance

## 2024-02-27 ENCOUNTER — DOCUMENTATION ONLY (OUTPATIENT)
Dept: FAMILY MEDICINE | Facility: CLINIC | Age: 79
End: 2024-02-27

## 2024-02-27 PROBLEM — N39.41 URGE INCONTINENCE OF URINE: Status: ACTIVE | Noted: 2024-02-27

## 2024-03-05 ENCOUNTER — TELEPHONE (OUTPATIENT)
Dept: ORTHOPEDICS | Facility: CLINIC | Age: 79
End: 2024-03-05
Payer: MEDICARE

## 2024-03-05 DIAGNOSIS — I73.89 OTHER SPECIFIED PERIPHERAL VASCULAR DISEASES (H): ICD-10-CM

## 2024-03-05 DIAGNOSIS — I89.0 LYMPHEDEMA: Primary | ICD-10-CM

## 2024-03-05 NOTE — TELEPHONE ENCOUNTER
[facility] Call Center    Phone Message    May a detailed message be left on voicemail: no     Reason for Call: Prescription for compress socks- needs to state she has a need for them due to lymphedema in order for insurance to cover. C/b when done

## 2024-03-05 NOTE — TELEPHONE ENCOUNTER
New order entered. Called pt and notified her. [facility] is part of [facility]. She should be able to see the order. Also called and notified her.         -TRACEY Vincent- [facility]

## 2024-04-03 ENCOUNTER — VIRTUAL VISIT (OUTPATIENT)
Dept: EDUCATION SERVICES | Facility: CLINIC | Age: 79
End: 2024-04-03
Payer: MEDICARE

## 2024-04-03 DIAGNOSIS — Z79.4 TYPE 2 DIABETES MELLITUS WITH HYPERGLYCEMIA, WITH LONG-TERM CURRENT USE OF INSULIN (H): Primary | ICD-10-CM

## 2024-04-03 DIAGNOSIS — E11.65 TYPE 2 DIABETES MELLITUS WITH HYPERGLYCEMIA, WITH LONG-TERM CURRENT USE OF INSULIN (H): Primary | ICD-10-CM

## 2024-04-03 PROCEDURE — 97802 MEDICAL NUTRITION INDIV IN: CPT | Mod: 93 | Performed by: DIETITIAN, REGISTERED

## 2024-04-03 NOTE — PROGRESS NOTES
Diabetes Self-Management Education & Support    Presents for: Individual review    Type of Service: Telephone Visit    Originating Location (Patient Location): Home  Distant Location (Provider Location): Offsite  Mode of Communication:  Telephone    Telephone Visit Start Time:  11:00  Telephone Visit End Time (telephone visit stop time): 11:06    How would patient like to obtain AVS? Mail a copy      ASSESSMENT:  Mikayla is concerned regarding her A1C worsening. She was started on Novolog insulin and feels since doing this she has gained weight. She is willing to take it to manage her DM but ideally wants to be off all insulin. We discussed other diabetes medication options including GLP-1 but she is not interested in this d/t side effects, also has heard Metformin is bad. She does have financial struggle,  I do see [employer] application was filled out for her, she has not heard anything back but hopefully she will soon. Provided education today on when to give Novolog insulin and how Novolin N works, recommended eating lunch ~5 hours after taking Novolin N (she is doing this already). She rarely has low BG maybe 4x overnight in the last 3-4 months. She wears a Dexcom and will get hooked up to Clarity so I can see her numbers. Reviewed diet, when she is able to get healthy food she eats this, is very conscious of where her food comes from, prefers organic, etc. Requested she track carbohydrates at meals for 3 days prior to our next visit so we can review how this looks with BG and current insulin doses. We have follow-up next month.     Patient's most recent   Lab Results   Component Value Date    A1C 10.4 01/26/2024    A1C 6.9 03/10/2021     is not meeting goal of <8%    Diabetes knowledge and skills assessment:   Patient is knowledgeable in diabetes management concepts related to: Taking Medication, Problem Solving, and Reducing Risks    Continue education with the following diabetes management concepts: Healthy  Eating, Monitoring, Taking Medication, and Problem Solving    Based on learning assessment above, most appropriate setting for further diabetes education would be: Individual setting.      PLAN  Continue with healthy diet including whole foods, minimally processed as much as possible.   Keep track of carbohydrates in meals for 3 days prior to our next appointment.  Download Dexcom clarity benjy and follow instructions to get hooked up so Kerry can see your numbers.  Here are the steps for creating a Dexcom sharing code:     Go to your Clarity Benjy OR download if you do not have this, it is different than the G6 or G7 benjy.   Tap Profile (bottom right)-->manage data sharing-->continue--> type in: MHFVDiabetesEd       Continue taking insulin as prescribed.  Please reach out to Kerry via [employer] if things come up before our next appt, such as unexplained low blood sugars.   Follow up with Kerry in May!    Topics to cover at upcoming visits: Healthy Eating, Monitoring, Taking Medication, and Problem Solving    Follow-up: May    See Care Plan for co-developed, patient-state behavior change goals.  AVS provided for patient today.    Education Materials Provided:  No new materials provided today      SUBJECTIVE/OBJECTIVE:  Presents for: Individual review  Accompanied by: Self  Focus of Visit: Healthy Eating  Diabetes type: Type 2  Date of diagnosis: 8-9 years ago  Disease course: Other (see Comments)  Please elaborate:: My insulin dosage recently was increased and also added a Novolog flexpen. I am unsure if usage and benefit.  Cultural Influences/Ethnic Background:  Not  or     Diabetes Symptoms & Complications:  Diabetes Related Symptoms: Polydipsia (increased thirst), Polyuria (increased urination), Visual change  Weight trend: Increasing  Disease course: Other (see Comments)  Please elaborate:: My insulin dosage recently was increased and also added a Novolog flexpen. I am unsure if usage and  "benefit.  Complications assessed today?: No    Patient Problem List and Family Medical History reviewed for relevant medical history, current medical status, and diabetes risk factors.    Vitals:  LMP  (LMP Unknown)   Estimated body mass index is 40.94 kg/m  as calculated from the following:    Height as of 9/19/23: 1.651 m (5' 5\").    Weight as of 9/19/23: 111.6 kg (246 lb).   Last 3 BP:   BP Readings from Last 3 Encounters:   01/26/24 135/72   09/22/23 129/71   09/06/23 (!) 162/90       History   Smoking Status    Never   Smokeless Tobacco    Never       Labs:  Lab Results   Component Value Date    A1C 10.4 01/26/2024    A1C 6.9 03/10/2021     Lab Results   Component Value Date     01/26/2024     03/10/2021     Lab Results   Component Value Date     01/26/2024     03/10/2021     HDL Cholesterol   Date Value Ref Range Status   03/10/2021 54 >49 mg/dL Final     Direct Measure HDL   Date Value Ref Range Status   01/26/2024 47 (L) >=50 mg/dL Final   ]  GFR Estimate   Date Value Ref Range Status   01/26/2024 >90 >60 mL/min/1.73m2 Final   03/10/2021 88 >60 mL/min/[1.73_m2] Final     Comment:     Non  GFR Calc  Starting 12/18/2018, serum creatinine based estimated GFR (eGFR) will be   calculated using the Chronic Kidney Disease Epidemiology Collaboration   (CKD-EPI) equation.       GFR Estimate If Black   Date Value Ref Range Status   03/10/2021 >90 >60 mL/min/[1.73_m2] Final     Comment:      GFR Calc  Starting 12/18/2018, serum creatinine based estimated GFR (eGFR) will be   calculated using the Chronic Kidney Disease Epidemiology Collaboration   (CKD-EPI) equation.       Lab Results   Component Value Date    CR 0.50 01/26/2024    CR 0.60 03/10/2021     No results found for: \"MICROALBUMIN\"    Healthy Eating:  Healthy Eating Assessed Today: Yes  Meal planning/habits: Avoiding sweets, Carb counting, Low carb, Keeps food records  Who cooks/prepares meals for " you?: Self  Who purchases food in  your home?: Self, Other ([employer] through [place]. Didnt have a lot of options d/t finances. Friend comes every saturday to shop, has acct at co-op. Daughter set up delivery service at [facility], buys food from 2 farms (gets egg/chicken/milk/lamb).)  Meals include: Breakfast, Lunch, Dinner  Breakfast: 8AM-egg + britt + 1/2 avocado + coffee + unsweetened coconut milk OR 2 oz cheese + cucumbers/onion + nina lettuce  Lunch: 1PM-salad wtih pumpkin seeds +chicken breast + milk  Dinner: 6PM-(didn't get to this time)  Snacks: minimal snacking- may have granny apple.  has collagen protein to add to meals too if needed  Other: [facility] through [employer]. Didnt have a lot of options d/t finances. Friend comes every saturday to shop, has acct at co-op. Daughter set up delivery service at [facility], buys food from 2 farms (gets egg/chicken/milk/lamb). Not eating pasta right now. Eating too much volume.  Beverages: Water, Tea, Coffee, Milk, Other (see Comments)  Please elaborate:: Tete  Has patient met with a dietitian in the past?: No    Being Active:  Being Active Assessed Today: Yes  Exercise:: Currently not exercising  Barrier to exercise: Physical limitation    Monitoring:  Monitoring Assessed Today: Yes  Did patient bring glucose meter to appointment? : No (needs to get hooked up to clarity robbi)  Blood Glucose Meter: CGM  Times checking blood sugar at home (number): 5+  Times checking blood sugar at home (per): Day      Taking Medications:  Diabetes Medication(s)       Insulin       insulin aspart (NOVOLOG PEN) 100 UNIT/ML pen 6 Units twice daily with meals     insulin NPH (NOVOLIN N RELION) 100 UNIT/ML vial INJECT 24 UNITS SUBCUTANEOUSLY IN THE MORNING AND 18 IN THE EVENING            Taking Medication Assessed Today: Yes  Current Treatments: Diet, Insulin Injections  Dose schedule: Pre-breakfast, Pre-dinner  Problems taking diabetes medications regularly?: No  Diabetes  medication side effects?: Yes (weight gain)    Problem Solving:  Problem Solving Assessed Today: Yes  Is the patient at risk for hypoglycemia?: Yes  Hypoglycemia Frequency: Rarely  Patient carries a carbohydrate source: Not Needed    Hypoglycemia Symptoms  Hypoglycemia: None    Hypoglycemia Complications  Hypoglycemia Complications: None    Reducing Risks:  Reducing Risks Assessed Today: No    Healthy Coping:  Healthy Coping Assessed Today: Yes  Emotional response to diabetes: Ready to learn  Stage of change: ACTION (Actively working towards change)  Patient Activation Measure Survey Score:       No data to display                  Care Plan and Education Provided:  Care Plan: Diabetes   Updates made by Kerry Patterson RD since 4/3/2024 12:00 AM        Problem: HbA1C Not In Goal         Goal: Establish Regular Follow-Ups with PCP         Task: Discuss with PCP the recommended timing for patient's next follow up visit(s)    Responsible User: Kerry Patterson RD        Task: Discuss schedule for PCP visits with patient    Responsible User: Kerry Patterson RD        Goal: Get HbA1C Level in Goal         Task: Educate patient on diabetes education self-management topics    Responsible User: Kerry Patterson RD        Task: Educate patient on benefits of regular glucose monitoring    Responsible User: Kerry Patterson RD        Task: Refer patient to appropriate extended care team member, as needed (Medication Therapy Management, Behavioral Health, Physical Therapy, etc.)    Responsible User: Kerry Patterson RD        Task: Discuss diabetes treatment plan with patient    Responsible User: Kerry Patterson RD        Problem: Diabetes Self-Management Education Needed to Optimize Self-Care Behaviors         Goal: Understand diabetes pathophysiology and disease progression         Task: Provide education on diabetes pathophysiology and disease progression specfic to patient's diabetes type    Responsible User: Leonardo  LEON Payne        Goal: Healthy Eating - follow a healthy eating pattern for diabetes         Task: Provide education on portion control and consistency in amount, composition and timing of food intake    Responsible User: Kerry Patterson RD        Task: Provide education on managing carbohydrate intake (carbohydrate counting, plate planning method, etc.)    Responsible User: Kerry Patterson RD        Task: Provide education on weight management    Responsible User: Kerry Patterson RD        Task: Provide education on heart healthy eating    Responsible User: Kerry Patterson RD        Task: Provide education on eating out    Responsible User: Kerry Patterson RD        Task: Develop individualized healthy eating plan with patient    Responsible User: Kerry Patterson RD        Goal: Being Active - get regular physical activity, working up to at least 150 minutes per week         Task: Provide education on relationship of activity to glucose and precautions to take if at risk for low glucose    Responsible User: Kerry Patterson RD        Task: Discuss barriers to physical activity with patient    Responsible User: Kerry Patterson RD        Task: Develop physical activity plan with patient    Responsible User: Kerry Patterson RD        Task: Explore community resources including walking groups, assistance programs, and home videos    Responsible User: Kerry Patterson RD        Goal: Monitoring - monitor glucose and ketones as directed         Task: Provide education on blood glucose monitoring (purpose, proper technique, frequency, glucose targets, interpreting results, when to use glucose control solution, sharps disposal)    Responsible User: Kerry Patterson RD        Task: Provide education on continuous glucose monitoring (sensor placement, use of robbi or /reader, understanding glucose trends, alerts and alarms, differences between sensor glucose and blood glucose) Completed 4/3/2024   Responsible  User: Kerry Patterson RD        Task: Provide education on ketone monitoring (when to monitor, frequency, etc.)    Responsible User: Kerry Patterson RD        Goal: Taking Medication - patient is consistently taking medications as directed         Task: Provide education on action of prescribed medication, including when to take and possible side effects Completed 4/3/2024   Responsible User: Kerry Patterson RD        Task: Provide education on insulin and injectable diabetes medications, including administration, storage, site selection and rotation for injection sites    Responsible User: Kerry Patterson RD        Task: Discuss barriers to medication adherence with patient and provide management technique ideas as appropriate    Responsible User: Kerry Patterson RD        Task: Provide education on frequency and refill details of medications    Responsible User: Kerry Patterson RD        Goal: Problem Solving - know how to prevent and manage short-term diabetes complications         Task: Provide education on high blood glucose - causes, signs/symptoms, prevention and treatment    Responsible User: Kerry Patterson RD        Task: Provide education on low blood glucose - causes, signs/symptoms, prevention, treatment, carrying a carbohydrate source at all times, and medical identification Completed 4/3/2024   Responsible User: Kerry Patterson RD        Task: Provide education on safe travel with diabetes    Responsible User: Kerry Patterson RD        Task: Provide education on how to care for diabetes on sick days    Responsible User: Kerry Patterson RD        Task: Provide education on when to call a health care provider    Responsible User: Kerry Patterson RD        Goal: Reducing Risks - know how to prevent and treat long-term diabetes complications         Task: Provide education on major complications of diabetes, prevention, early diagnostic measures and treatment of complications    Responsible  User: Kerry Patterson RD        Task: Provide education on recommended care for dental, eye and foot health    Responsible User: Kerry Patterson RD        Task: Provide education on Hemoglobin A1c - goals and relationship to blood glucose levels    Responsible User: Kerry Patterson RD        Task: Provide education on recommendations for heart health - lipid levels and goals, blood pressure and goals, and aspirin therapy, if indicated    Responsible User: Kerry Patterson RD        Task: Provide education on tobacco cessation    Responsible User: Kerry Patterson RD        Goal: Healthy Coping - use available resources to cope with the challenges of managing diabetes         Task: Discuss recognizing feelings about having diabetes    Responsible User: Kerry Patterson RD        Task: Provide education on the benefits of making appropriate lifestyle changes    Responsible User: Kerry Patterson RD        Task: Provide education on benefits of utilizing support systems    Responsible User: Kerry Patterson RD        Task: Discuss methods for coping with stress    Responsible User: Kerry Patterson RD        Task: Provide education on when to seek professional counseling    Responsible User: Kerry Patterson RD Lindsey Rogers RD, SEBASTIAN, [facility]      Time Spent: 60+ minutes  Encounter Type: Individual    Any diabetes medication dose changes were made via the CDE Protocol per the patient's primary care provider. A copy of this encounter was shared with the provider.

## 2024-04-03 NOTE — PATIENT INSTRUCTIONS
PLAN  Continue with healthy diet including whole foods, minimally processed as much as possible.   Keep track of carbohydrates in meals for 3 days prior to our next appointment.  Download Dexcom clarity benjy and follow instructions to get hooked up so Kerry can see your numbers.  Here are the steps for creating a Dexcom sharing code:     Go to your Clarity Benjy OR download if you do not have this, it is different than the G6 or G7 benjy.   Tap Profile (bottom right)-->manage data sharing-->continue--> type in: MHFVDiabetesEd       Continue taking insulin as prescribed.  Please reach out to Kerry via [employer] if things come up before our next appt, such as unexplained low blood sugars.   Follow up with Kerry in May!

## 2024-04-03 NOTE — LETTER
4/3/2024         RE: Mikayla Timmons  [address]  [facility] [address]        Dear Colleague,    Thank you for referring your patient, Mikayla Timmons, to the [facility]. Please see a copy of my visit note below.    Diabetes Self-Management Education & Support    Presents for: Individual review    Type of Service: Telephone Visit    Originating Location (Patient Location): Home  Distant Location (Provider Location): Offsite  Mode of Communication:  Telephone    Telephone Visit Start Time:  11:00  Telephone Visit End Time (telephone visit stop time): 11:06    How would patient like to obtain AVS? Mail a copy      ASSESSMENT:  Mikayla is concerned regarding her A1C worsening. She was started on Novolog insulin and feels since doing this she has gained weight. She is willing to take it to manage her DM but ideally wants to be off all insulin. We discussed other diabetes medication options including GLP-1 but she is not interested in this d/t side effects, also has heard Metformin is bad. She does have financial struggle,  I do see [employer] application was filled out for her, she has not heard anything back but hopefully she will soon. Provided education today on when to give Novolog insulin and how Novolin N works, recommended eating lunch ~5 hours after taking Novolin N (she is doing this already). She rarely has low BG maybe 4x overnight in the last 3-4 months. She wears a Dexcom and will get hooked up to Clarity so I can see her numbers. Reviewed diet, when she is able to get healthy food she eats this, is very conscious of where her food comes from, prefers organic, etc. Requested she track carbohydrates at meals for 3 days prior to our next visit so we can review how this looks with BG and current insulin doses. We have follow-up next month.     Patient's most recent   Lab Results   Component Value Date    A1C 10.4 01/26/2024    A1C 6.9 03/10/2021     is not meeting  goal of <8%    Diabetes knowledge and skills assessment:   Patient is knowledgeable in diabetes management concepts related to: Taking Medication, Problem Solving, and Reducing Risks    Continue education with the following diabetes management concepts: Healthy Eating, Monitoring, Taking Medication, and Problem Solving    Based on learning assessment above, most appropriate setting for further diabetes education would be: Individual setting.      PLAN  Continue with healthy diet including whole foods, minimally processed as much as possible.   Keep track of carbohydrates in meals for 3 days prior to our next appointment.  Download Dexcom clarity benjy and follow instructions to get hooked up so Kerry can see your numbers.  Here are the steps for creating a Dexcom sharing code:     Go to your Clarity Benjy OR download if you do not have this, it is different than the G6 or G7 benjy.   Tap Profile (bottom right)-->manage data sharing-->continue--> type in: MHFVDiabetesEd       Continue taking insulin as prescribed.  Please reach out to Kerry via [employer] if things come up before our next appt, such as unexplained low blood sugars.   Follow up with Kerry in May!    Topics to cover at upcoming visits: Healthy Eating, Monitoring, Taking Medication, and Problem Solving    Follow-up: May    See Care Plan for co-developed, patient-state behavior change goals.  AVS provided for patient today.    Education Materials Provided:  No new materials provided today      SUBJECTIVE/OBJECTIVE:  Presents for: Individual review  Accompanied by: Self  Focus of Visit: Healthy Eating  Diabetes type: Type 2  Date of diagnosis: 8-9 years ago  Disease course: Other (see Comments)  Please elaborate:: My insulin dosage recently was increased and also added a Novolog flexpen. I am unsure if usage and benefit.  Cultural Influences/Ethnic Background:  Not  or     Diabetes Symptoms & Complications:  Diabetes Related Symptoms:  "Polydipsia (increased thirst), Polyuria (increased urination), Visual change  Weight trend: Increasing  Disease course: Other (see Comments)  Please elaborate:: My insulin dosage recently was increased and also added a Novolog flexpen. I am unsure if usage and benefit.  Complications assessed today?: No    Patient Problem List and Family Medical History reviewed for relevant medical history, current medical status, and diabetes risk factors.    Vitals:  LMP  (LMP Unknown)   Estimated body mass index is 40.94 kg/m  as calculated from the following:    Height as of 9/19/23: 1.651 m (5' 5\").    Weight as of 9/19/23: 111.6 kg (246 lb).   Last 3 BP:   BP Readings from Last 3 Encounters:   01/26/24 135/72   09/22/23 129/71   09/06/23 (!) 162/90       History   Smoking Status     Never   Smokeless Tobacco     Never       Labs:  Lab Results   Component Value Date    A1C 10.4 01/26/2024    A1C 6.9 03/10/2021     Lab Results   Component Value Date     01/26/2024     03/10/2021     Lab Results   Component Value Date     01/26/2024     03/10/2021     HDL Cholesterol   Date Value Ref Range Status   03/10/2021 54 >49 mg/dL Final     Direct Measure HDL   Date Value Ref Range Status   01/26/2024 47 (L) >=50 mg/dL Final   ]  GFR Estimate   Date Value Ref Range Status   01/26/2024 >90 >60 mL/min/1.73m2 Final   03/10/2021 88 >60 mL/min/[1.73_m2] Final     Comment:     Non  GFR Calc  Starting 12/18/2018, serum creatinine based estimated GFR (eGFR) will be   calculated using the Chronic Kidney Disease Epidemiology Collaboration   (CKD-EPI) equation.       GFR Estimate If Black   Date Value Ref Range Status   03/10/2021 >90 >60 mL/min/[1.73_m2] Final     Comment:      GFR Calc  Starting 12/18/2018, serum creatinine based estimated GFR (eGFR) will be   calculated using the Chronic Kidney Disease Epidemiology Collaboration   (CKD-EPI) equation.       Lab Results   Component Value " "Date    CR 0.50 01/26/2024    CR 0.60 03/10/2021     No results found for: \"MICROALBUMIN\"    Healthy Eating:  Healthy Eating Assessed Today: Yes  Meal planning/habits: Avoiding sweets, Carb counting, Low carb, Keeps food records  Who cooks/prepares meals for you?: Self  Who purchases food in  your home?: Self, Other ([employer] through [employer]. Didnt have a lot of options d/t finances. Friend comes every saturday to shop, has acct at co-op. Daughter set up delivery service at [employer], buys food from 2 farms (gets egg/chicken/milk/lamb).)  Meals include: Breakfast, Lunch, Dinner  Breakfast: 8AM-egg + britt + 1/2 avocado + coffee + unsweetened coconut milk OR 2 oz cheese + cucumbers/onion + nina lettuce  Lunch: 1PM-salad wtih pumpkin seeds +chicken breast + milk  Dinner: 6PM-(didn't get to this time)  Snacks: minimal snacking- may have granny apple.  has collagen protein to add to meals too if needed  Other: [employer] through [employer]. Didnt have a lot of options d/t finances. Friend comes every saturday to shop, has acct at co-op. Daughter set up delivery service at [employer], buys food from 2 farms (gets egg/chicken/milk/lamb). Not eating pasta right now. Eating too much volume.  Beverages: Water, Tea, Coffee, Milk, Other (see Comments)  Please elaborate:: Tete  Has patient met with a dietitian in the past?: No    Being Active:  Being Active Assessed Today: Yes  Exercise:: Currently not exercising  Barrier to exercise: Physical limitation    Monitoring:  Monitoring Assessed Today: Yes  Did patient bring glucose meter to appointment? : No (needs to get hooked up to clarity robbi)  Blood Glucose Meter: CGM  Times checking blood sugar at home (number): 5+  Times checking blood sugar at home (per): Day      Taking Medications:  Diabetes Medication(s)       Insulin       insulin aspart (NOVOLOG PEN) 100 UNIT/ML pen 6 Units twice daily with meals     insulin NPH (NOVOLIN N RELION) 100 UNIT/ML vial INJECT 24 " UNITS SUBCUTANEOUSLY IN THE MORNING AND 18 IN THE EVENING            Taking Medication Assessed Today: Yes  Current Treatments: Diet, Insulin Injections  Dose schedule: Pre-breakfast, Pre-dinner  Problems taking diabetes medications regularly?: No  Diabetes medication side effects?: Yes (weight gain)    Problem Solving:  Problem Solving Assessed Today: Yes  Is the patient at risk for hypoglycemia?: Yes  Hypoglycemia Frequency: Rarely  Patient carries a carbohydrate source: Not Needed    Hypoglycemia Symptoms  Hypoglycemia: None    Hypoglycemia Complications  Hypoglycemia Complications: None    Reducing Risks:  Reducing Risks Assessed Today: No    Healthy Coping:  Healthy Coping Assessed Today: Yes  Emotional response to diabetes: Ready to learn  Stage of change: ACTION (Actively working towards change)  Patient Activation Measure Survey Score:       No data to display                  Care Plan and Education Provided:  Care Plan: Diabetes   Updates made by Kerry Patterson RD since 4/3/2024 12:00 AM        Problem: HbA1C Not In Goal         Goal: Establish Regular Follow-Ups with PCP         Task: Discuss with PCP the recommended timing for patient's next follow up visit(s)    Responsible User: Kerry Patterson RD        Task: Discuss schedule for PCP visits with patient    Responsible User: Kerry Patterson RD        Goal: Get HbA1C Level in Goal         Task: Educate patient on diabetes education self-management topics    Responsible User: Kerry Patterson RD        Task: Educate patient on benefits of regular glucose monitoring    Responsible User: Kerry Patterson RD        Task: Refer patient to appropriate extended care team member, as needed (Medication Therapy Management, Behavioral Health, Physical Therapy, etc.)    Responsible User: Kerry Patterson RD        Task: Discuss diabetes treatment plan with patient    Responsible User: Kerry Patterson RD        Problem: Diabetes Self-Management Education Needed  to Optimize Self-Care Behaviors         Goal: Understand diabetes pathophysiology and disease progression         Task: Provide education on diabetes pathophysiology and disease progression specfic to patient's diabetes type    Responsible User: Kerry Patterson RD        Goal: Healthy Eating - follow a healthy eating pattern for diabetes         Task: Provide education on portion control and consistency in amount, composition and timing of food intake    Responsible User: Kerry Patterson RD        Task: Provide education on managing carbohydrate intake (carbohydrate counting, plate planning method, etc.)    Responsible User: Kerry Patterson RD        Task: Provide education on weight management    Responsible User: Kerry Patterson RD        Task: Provide education on heart healthy eating    Responsible User: Kerry Patterson RD        Task: Provide education on eating out    Responsible User: Kerry Patterson RD        Task: Develop individualized healthy eating plan with patient    Responsible User: Kerry Patterson RD        Goal: Being Active - get regular physical activity, working up to at least 150 minutes per week         Task: Provide education on relationship of activity to glucose and precautions to take if at risk for low glucose    Responsible User: Kerry Patterson RD        Task: Discuss barriers to physical activity with patient    Responsible User: Kerry Patterson RD        Task: Develop physical activity plan with patient    Responsible User: Kerry Patterson RD        Task: Explore community resources including walking groups, assistance programs, and home videos    Responsible User: Kerry Patterson RD        Goal: Monitoring - monitor glucose and ketones as directed         Task: Provide education on blood glucose monitoring (purpose, proper technique, frequency, glucose targets, interpreting results, when to use glucose control solution, sharps disposal)    Responsible User: Leonardo  LEON Payne        Task: Provide education on continuous glucose monitoring (sensor placement, use of robbi or /reader, understanding glucose trends, alerts and alarms, differences between sensor glucose and blood glucose) Completed 4/3/2024   Responsible User: Kerry Patterson RD        Task: Provide education on ketone monitoring (when to monitor, frequency, etc.)    Responsible User: Kerry Patterson RD        Goal: Taking Medication - patient is consistently taking medications as directed         Task: Provide education on action of prescribed medication, including when to take and possible side effects Completed 4/3/2024   Responsible User: Kerry Patterson RD        Task: Provide education on insulin and injectable diabetes medications, including administration, storage, site selection and rotation for injection sites    Responsible User: Kerry Patterson RD        Task: Discuss barriers to medication adherence with patient and provide management technique ideas as appropriate    Responsible User: Kerry Patterson RD        Task: Provide education on frequency and refill details of medications    Responsible User: Kerry Patterson RD        Goal: Problem Solving - know how to prevent and manage short-term diabetes complications         Task: Provide education on high blood glucose - causes, signs/symptoms, prevention and treatment    Responsible User: Kerry Patterson RD        Task: Provide education on low blood glucose - causes, signs/symptoms, prevention, treatment, carrying a carbohydrate source at all times, and medical identification Completed 4/3/2024   Responsible User: Kerry Patterson RD        Task: Provide education on safe travel with diabetes    Responsible User: Kerry Patterson RD        Task: Provide education on how to care for diabetes on sick days    Responsible User: Kerry Patterson RD        Task: Provide education on when to call a health care provider    Responsible User:  Kerry Patterson RD        Goal: Reducing Risks - know how to prevent and treat long-term diabetes complications         Task: Provide education on major complications of diabetes, prevention, early diagnostic measures and treatment of complications    Responsible User: Kerry Patterson RD        Task: Provide education on recommended care for dental, eye and foot health    Responsible User: Kerry Patterson RD        Task: Provide education on Hemoglobin A1c - goals and relationship to blood glucose levels    Responsible User: Kerry Patterson RD        Task: Provide education on recommendations for heart health - lipid levels and goals, blood pressure and goals, and aspirin therapy, if indicated    Responsible User: Kerry Patterson RD        Task: Provide education on tobacco cessation    Responsible User: Kerry Patterson RD        Goal: Healthy Coping - use available resources to cope with the challenges of managing diabetes         Task: Discuss recognizing feelings about having diabetes    Responsible User: Kerry Patterson RD        Task: Provide education on the benefits of making appropriate lifestyle changes    Responsible User: Kerry Patterson RD        Task: Provide education on benefits of utilizing support systems    Responsible User: Kerry Patterson RD        Task: Discuss methods for coping with stress    Responsible User: Kerry Patterson RD        Task: Provide education on when to seek professional counseling    Responsible User: Kerry Patterson RD Lindsey Rogers RD, LD, [facility]      Time Spent: 60+ minutes  Encounter Type: Individual    Any diabetes medication dose changes were made via the CDE Protocol per the patient's primary care provider. A copy of this encounter was shared with the provider.

## 2024-04-16 DIAGNOSIS — Z79.4 TYPE 2 DIABETES MELLITUS WITH HYPERGLYCEMIA, WITH LONG-TERM CURRENT USE OF INSULIN (H): ICD-10-CM

## 2024-04-16 DIAGNOSIS — E11.65 TYPE 2 DIABETES MELLITUS WITH HYPERGLYCEMIA, WITH LONG-TERM CURRENT USE OF INSULIN (H): ICD-10-CM

## 2024-04-16 RX ORDER — HUMAN INSULIN 100 [IU]/ML
INJECTION, SUSPENSION SUBCUTANEOUS
Qty: 40 ML | Refills: 3 | Status: SHIPPED | OUTPATIENT
Start: 2024-04-16 | End: 2024-04-17

## 2024-04-16 NOTE — TELEPHONE ENCOUNTER
"Request for medication refill:  insulin NPH (NOVOLIN N RELION) 100 UNIT/ML vial   Providers if patient needs an appointment and you are willing to give a one month supply please refill for one month and  send a letter/MyChart using \".SMILLIMITEDREFILL\" .smillimited and route chart to \"P SMI \" (Giving one month refill in non controlled medications is strongly recommended before denial)    If refill has been denied, meaning absolutely no refills without visit, please complete the smart phrase \".smirxrefuse\" and route it to the \"P SMI MED REFILLS\"  pool to inform the patient and the pharmacy.    Gabriella Gallagher MA     "

## 2024-04-17 DIAGNOSIS — E11.65 TYPE 2 DIABETES MELLITUS WITH HYPERGLYCEMIA, WITH LONG-TERM CURRENT USE OF INSULIN (H): ICD-10-CM

## 2024-04-17 DIAGNOSIS — Z79.4 TYPE 2 DIABETES MELLITUS WITH HYPERGLYCEMIA, WITH LONG-TERM CURRENT USE OF INSULIN (H): ICD-10-CM

## 2024-04-17 RX ORDER — HUMAN INSULIN 100 [IU]/ML
INJECTION, SUSPENSION SUBCUTANEOUS
Qty: 40 ML | Refills: 3 | Status: SHIPPED | OUTPATIENT
Start: 2024-04-17 | End: 2024-06-21

## 2024-04-17 NOTE — TELEPHONE ENCOUNTER
"Request for medication refill:insulin NPH (NOVOLIN N RELION) 100 UNIT/ML vial     Providers if patient needs an appointment and you are willing to give a one month supply please refill for one month and  send a letter/MyChart using \".SMILLIMITEDREFILL\" .smillimited and route chart to \"P SMI \" (Giving one month refill in non controlled medications is strongly recommended before denial)    If refill has been denied, meaning absolutely no refills without visit, please complete the smart phrase \".smirxrefuse\" and route it to the \"P SMI MED REFILLS\"  pool to inform the patient and the pharmacy.    Jaison Conklin MA      "

## 2024-04-18 DIAGNOSIS — I10 ESSENTIAL HYPERTENSION: ICD-10-CM

## 2024-04-18 RX ORDER — HYDROCHLOROTHIAZIDE 12.5 MG/1
12.5 TABLET ORAL DAILY
Qty: 90 TABLET | Refills: 1 | Status: SHIPPED | OUTPATIENT
Start: 2024-04-18 | End: 2024-06-21

## 2024-04-18 NOTE — TELEPHONE ENCOUNTER
"Request for medication refill:    hydrochlorothiazide (HYDRODIURIL) 12.5 MG tablet     Providers if patient needs an appointment and you are willing to give a one month supply please refill for one month and  send a letter/MyChart using \".SMILLIMITEDREFILL\" .smillimited and route chart to \"P [facility] \" (Giving one month refill in non controlled medications is strongly recommended before denial)    If refill has been denied, meaning absolutely no refills without visit, please complete the smart phrase \".smirxrefuse\" and route it to the \"P SMI MED REFILLS\"  pool to inform the patient and the pharmacy.    Gabriella Gallagher MA     "

## 2024-04-19 ENCOUNTER — OFFICE VISIT (OUTPATIENT)
Dept: ORTHOPEDICS | Facility: CLINIC | Age: 79
End: 2024-04-19
Payer: MEDICARE

## 2024-04-19 DIAGNOSIS — I89.0 LYMPHEDEMA: Primary | ICD-10-CM

## 2024-04-19 DIAGNOSIS — I73.9 PVD (PERIPHERAL VASCULAR DISEASE) (H): ICD-10-CM

## 2024-04-19 DIAGNOSIS — L60.3 NAIL DYSTROPHY: ICD-10-CM

## 2024-04-19 DIAGNOSIS — I73.89 OTHER SPECIFIED PERIPHERAL VASCULAR DISEASES (H): ICD-10-CM

## 2024-04-19 PROCEDURE — G0127 TRIM NAIL(S): HCPCS | Performed by: PODIATRIST

## 2024-04-19 PROCEDURE — 99213 OFFICE O/P EST LOW 20 MIN: CPT | Mod: 25 | Performed by: PODIATRIST

## 2024-04-19 NOTE — LETTER
4/19/2024         RE: Mikayla Timmons  [address]  [facility] [address]        Dear Colleague,    Thank you for referring your patient, Mikayla Timmons, to the [facility] [place]. Please see a copy of my visit note below.    Chief Complaint   Patient presents with    Follow Up     Diabetic foot care.        HPI: Mikayla is a 77  year old female who presents today for a diabetic foot exam and management.  She was seen last year.  She has moved back into her house. No new LE complaints. She sees Dr. Heaton for her diabetic care and was last seen 1/12/24. She has had no ulcers on her legs. She got her compression garments.     Review of Systems: No n/v/d/f/c/ns/sob/cp    PMH: LBP  Postpolio syndrome  Carpal Tunnel  T2DM  HTN    No past surgical history on file.      Allergies: Hydromorphone and Ketamine    SH:   Social History     Socioeconomic History    Marital status:      Spouse name: Not on file    Number of children: Not on file    Years of education: Not on file    Highest education level: Not on file   Occupational History    Not on file   Tobacco Use    Smoking status: Never    Smokeless tobacco: Never   Substance and Sexual Activity    Alcohol use: Yes     Comment: Lightly    Drug use: Never    Sexual activity: Not on file   Other Topics Concern    Parent/sibling w/ CABG, MI or angioplasty before 65F 55M? Not Asked   Social History Narrative    Not on file     Social Determinants of Health     Financial Resource Strain: Low Risk  (1/25/2024)    Financial Resource Strain     Within the past 12 months, have you or your family members you live with been unable to get utilities (heat, electricity) when it was really needed?: No   Food Insecurity: Low Risk  (1/25/2024)    Food Insecurity     Within the past 12 months, did you worry that your food would run out before you got money to buy more?: No     Within the past 12 months, did the food you bought just not  last and you didn t have money to get more?: No   Transportation Needs: High Risk (1/25/2024)    Transportation Needs     Within the past 12 months, has lack of transportation kept you from medical appointments, getting your medicines, non-medical meetings or appointments, work, or from getting things that you need?: Yes   Physical Activity: Not on file   Stress: Not on file   Social Connections: Not on file   Interpersonal Safety: Low Risk  (9/22/2023)    Interpersonal Safety     Do you feel physically and emotionally safe where you currently live?: Yes     Within the past 12 months, have you been hit, slapped, kicked or otherwise physically hurt by someone?: No     Within the past 12 months, have you been humiliated or emotionally abused in other ways by your partner or ex-partner?: No   Housing Stability: Low Risk  (1/25/2024)    Housing Stability     Do you have housing? : Yes     Are you worried about losing your housing?: No       FH:   Family History   Problem Relation Age of Onset    Cancer No family hx of     Diabetes No family hx of     Glaucoma No family hx of     Hypertension No family hx of     Macular Degeneration No family hx of     Cerebrovascular Disease No family hx of     Thyroid Disease No family hx of        Objective:    Hemoglobin A1C   Date Value Ref Range Status   01/26/2024 10.4 (H) 0.0 - 5.6 % Final     Comment:     Normal <5.7%   Prediabetes 5.7-6.4%    Diabetes 6.5% or higher     Note: Adopted from ADA consensus guidelines.   08/14/2023 8.0 (H) 0.0 - 5.6 % Final     Comment:     Normal <5.7%   Prediabetes 5.7-6.4%    Diabetes 6.5% or higher     Note: Adopted from ADA consensus guidelines.   05/05/2023 7.8 (H) 0.0 - 5.6 % Final     Comment:     Normal <5.7%   Prediabetes 5.7-6.4%    Diabetes 6.5% or higher     Note: Adopted from ADA consensus guidelines.   03/10/2021 6.9 (H) 4.1 - 5.7 % Final   09/23/2020 7.5 (H) 4.1 - 5.7 % Final   05/01/2020 7.0 (H) 4.1 - 5.7 % Final         PT pulses  are not palpable 2/2 edema and DP pulses are 2/4 bilaterally. Hemosiderin deposits BL legs.  CRT is WNL. Diminished pedal hair.   Gross sensation is intact bilaterally. Protective sensation is normal except at the plantar halluces at the last visit. Intact intact.   Equinus is noted bilaterally. No pain with active or passive ROM of the ankle, MTJ, 1st ray, or halluces bilaterally,.   Nails elongated and dystrophic bilaterally. No open lesions are noted.     Assessment: DM2 with neuropathy - controlled.   Nail dystrophy x 10.    Plan:  - Pt seen and evaluated.  - Nails trimmed x 10.   - See again in 3 months.         Swapnil Baez, NAS

## 2024-04-19 NOTE — PROGRESS NOTES
Chief Complaint   Patient presents with    Follow Up     Diabetic foot care.        HPI: Mikayla is a 77  year old female who presents today for a diabetic foot exam and management.  She was seen last year.  She has moved back into her house. No new LE complaints. She sees Dr. Heaton for her diabetic care and was last seen 1/12/24. She has had no ulcers on her legs. She got her compression garments.     Review of Systems: No n/v/d/f/c/ns/sob/cp    PMH: LBP  Postpolio syndrome  Carpal Tunnel  T2DM  HTN    No past surgical history on file.      Allergies: Hydromorphone and Ketamine    SH:   Social History     Socioeconomic History    Marital status:      Spouse name: Not on file    Number of children: Not on file    Years of education: Not on file    Highest education level: Not on file   Occupational History    Not on file   Tobacco Use    Smoking status: Never    Smokeless tobacco: Never   Substance and Sexual Activity    Alcohol use: Yes     Comment: Lightly    Drug use: Never    Sexual activity: Not on file   Other Topics Concern    Parent/sibling w/ CABG, MI or angioplasty before 65F 55M? Not Asked   Social History Narrative    Not on file     Social Determinants of Health     Financial Resource Strain: Low Risk  (1/25/2024)    Financial Resource Strain     Within the past 12 months, have you or your family members you live with been unable to get utilities (heat, electricity) when it was really needed?: No   Food Insecurity: Low Risk  (1/25/2024)    Food Insecurity     Within the past 12 months, did you worry that your food would run out before you got money to buy more?: No     Within the past 12 months, did the food you bought just not last and you didn t have money to get more?: No   Transportation Needs: High Risk (1/25/2024)    Transportation Needs     Within the past 12 months, has lack of transportation kept you from medical appointments, getting your medicines, non-medical meetings or appointments,  work, or from getting things that you need?: Yes   Physical Activity: Not on file   Stress: Not on file   Social Connections: Not on file   Interpersonal Safety: Low Risk  (9/22/2023)    Interpersonal Safety     Do you feel physically and emotionally safe where you currently live?: Yes     Within the past 12 months, have you been hit, slapped, kicked or otherwise physically hurt by someone?: No     Within the past 12 months, have you been humiliated or emotionally abused in other ways by your partner or ex-partner?: No   Housing Stability: Low Risk  (1/25/2024)    Housing Stability     Do you have housing? : Yes     Are you worried about losing your housing?: No       FH:   Family History   Problem Relation Age of Onset    Cancer No family hx of     Diabetes No family hx of     Glaucoma No family hx of     Hypertension No family hx of     Macular Degeneration No family hx of     Cerebrovascular Disease No family hx of     Thyroid Disease No family hx of        Objective:    Hemoglobin A1C   Date Value Ref Range Status   01/26/2024 10.4 (H) 0.0 - 5.6 % Final     Comment:     Normal <5.7%   Prediabetes 5.7-6.4%    Diabetes 6.5% or higher     Note: Adopted from ADA consensus guidelines.   08/14/2023 8.0 (H) 0.0 - 5.6 % Final     Comment:     Normal <5.7%   Prediabetes 5.7-6.4%    Diabetes 6.5% or higher     Note: Adopted from ADA consensus guidelines.   05/05/2023 7.8 (H) 0.0 - 5.6 % Final     Comment:     Normal <5.7%   Prediabetes 5.7-6.4%    Diabetes 6.5% or higher     Note: Adopted from ADA consensus guidelines.   03/10/2021 6.9 (H) 4.1 - 5.7 % Final   09/23/2020 7.5 (H) 4.1 - 5.7 % Final   05/01/2020 7.0 (H) 4.1 - 5.7 % Final         PT pulses are not palpable 2/2 edema and DP pulses are 2/4 bilaterally. Hemosiderin deposits BL legs.  CRT is WNL. Diminished pedal hair.   Gross sensation is intact bilaterally. Protective sensation is normal except at the plantar halluces at the last visit. Intact intact.   Equinus  is noted bilaterally. No pain with active or passive ROM of the ankle, MTJ, 1st ray, or halluces bilaterally,.   Nails elongated and dystrophic bilaterally. No open lesions are noted.     Assessment: DM2 with neuropathy - controlled.   Nail dystrophy x 10.    Plan:  - Pt seen and evaluated.  - Nails trimmed x 10.   - See again in 3 months.

## 2024-04-19 NOTE — PATIENT INSTRUCTIONS
Lipodermatosclerosis - this is the bumpy skin changes that are on both of your legs.    Venous stasis dermatitis - Discoloration on your legs.   Both of these are NOT contagious.     Toenail fungus - Nails are thickened. Fungal cells get under the nail and makes spores, which thicken the nail.

## 2024-04-23 ENCOUNTER — TELEPHONE (OUTPATIENT)
Dept: OPHTHALMOLOGY | Facility: CLINIC | Age: 79
End: 2024-04-23
Payer: MEDICARE

## 2024-04-23 NOTE — TELEPHONE ENCOUNTER
Spoke to pt at 1650    Pt would like to speak to Dr. Conway to discuss last appt/treatment options.    Last visit in August 2022 and not able to perform virtual eye visits.    Asked in particular questions pt had and pt states would like to review increasing timolol use from once in morning to 2/day for dryness.    Reviewed would recommend continuing timolol eye drops as prescribed and ok to use OTC lubricating eye drops for dryness-- refresh/systane.    Pt wanting to speak to Dr. Conway and reviewed would likely agree to information discussed.    Asked about additional questions and pt declining-- states will contact clinic when receives new wheelchair for rescheduling.    Note to Dr. Conway for review.    Soy Simon, RN 4:57 PM 04/23/24

## 2024-04-23 NOTE — TELEPHONE ENCOUNTER
[facility] Call Center    Phone Message    May a detailed message be left on voicemail: yes     Reason for Call: Other: Patient called having to cancel her appointment with Dr. Conway on 5/7 due to her power wheelchair not working. She is working on getting a new one. She is wondering if her follow up appointment can get virtual. She states she just has questions for Dr. Conway. Please call to advise. Thanks.      Action Taken: Other: eye    Travel Screening: Not Applicable

## 2024-04-29 ENCOUNTER — DOCUMENTATION ONLY (OUTPATIENT)
Dept: FAMILY MEDICINE | Facility: CLINIC | Age: 79
End: 2024-04-29
Payer: MEDICARE

## 2024-04-29 NOTE — PROGRESS NOTES
"When opening a documentation only encounter, be sure to enter in \"Chief Complaint\" Forms and in \" Comments\" Title of form, description if needed.    Mikayla is a 78 year old  female  Form received via: Fax  Form now resides in: Provider Ready    Lizzette Price      Form has been completed by provider.     Form sent out via: Fax to [phone] at Fax Number: [facility]  Patient informed: No, Reason:  Output date: May 1, 2024    Lizzette Price      **Please close the encounter**          "

## 2024-04-30 PROBLEM — E11.3491: Chronic | Status: ACTIVE | Noted: 2021-04-11

## 2024-05-26 ENCOUNTER — HEALTH MAINTENANCE LETTER (OUTPATIENT)
Age: 79
End: 2024-05-26

## 2024-05-28 ENCOUNTER — DOCUMENTATION ONLY (OUTPATIENT)
Dept: FAMILY MEDICINE | Facility: CLINIC | Age: 79
End: 2024-05-28
Payer: MEDICARE

## 2024-05-28 NOTE — PROGRESS NOTES
"When opening a documentation only encounter, be sure to enter in \"Chief Complaint\" Forms and in \" Comments\" Title of form, description if needed.    Mikayla is a 78 year old  female  Form received via: Fax  Form now resides in: Provider Lamberto Price               "

## 2024-05-30 ENCOUNTER — DOCUMENTATION ONLY (OUTPATIENT)
Dept: FAMILY MEDICINE | Facility: CLINIC | Age: 79
End: 2024-05-30
Payer: MEDICARE

## 2024-06-05 ENCOUNTER — TELEPHONE (OUTPATIENT)
Dept: FAMILY MEDICINE | Facility: CLINIC | Age: 79
End: 2024-06-05
Payer: MEDICARE

## 2024-06-05 NOTE — TELEPHONE ENCOUNTER
"CC did call patient back after speaking to PCS Supervisor, Joy.   \"Unfortunately, at this time we do not have a floor scale\". Joy also spoke to Clinic Manager who stated that \"It is a risk if patient's do not know how to properly use a walker, we do not have one in clinic for patient use\".  "

## 2024-06-05 NOTE — TELEPHONE ENCOUNTER
"Per . \"Patient needs an appointment with me every six months for her CGM\". \"Please call and schedule before 7/26.  Care Coordinator reached out to patient to schedule a six month follow up CGM for 6/21/24.   \"Patient would like to know if we have a weight scale that takes wheelchairs and if we have a walker for patient use in the clinic\".  Patient \"can't walk on regular scale, and also states that she needs to leave a urine sample for the lab but, can't get on the toilet without a walker\". CC did not have the answers but, informed patient that I would talk to the PCS Supervisor for answers and call her back. Patient is \"ok with that\".  One last thing patient mentioned was that because she \"sits all day, I have a pressure sore on my bottom\". CC asked how long patient has had it. Patient states \"it's been there awhile and want to make sure of what exactly it is\". Patient mentioned it earlier as a \"pressure sore\". CC to send message to triage [place] regarding \"pressure sore\".      Marimar Camp  Lead Care Coordinator  [facility]  [phone]    "

## 2024-06-05 NOTE — TELEPHONE ENCOUNTER
Attempted to call pt, no answer, unable to leave a message. Pt does have an appt on 6/21    Jayleen Noland RN

## 2024-06-06 ENCOUNTER — DOCUMENTATION ONLY (OUTPATIENT)
Dept: FAMILY MEDICINE | Facility: CLINIC | Age: 79
End: 2024-06-06
Payer: MEDICARE

## 2024-06-06 NOTE — PROGRESS NOTES
"When opening a documentation only encounter, be sure to enter in \"Chief Complaint\" Forms and in \" Comments\" Title of form, description if needed.    Mikayla is a 78 year old  female  Form received via: Fax  Form now resides in: Provider Ready    Lizzette Price      Form has been completed by provider.     Form sent out via: Fax to [facility] at Fax Number: [phone]  Patient informed: No, Reason:  Output date: June 24, 2024    Lizzette Price      **Please close the encounter**          "

## 2024-06-10 NOTE — TELEPHONE ENCOUNTER
"Spoke with patient who reports she has a pressure sore on her buttocks. States that it has been getting better or at least doesn't feel as sore as it has. Patient denies seeing any drainage. She wants to know what she can do to care for it. Advised patient to change positions every two hours, to get out of her wheelchair and transfer in to bed. Also to put a pillow under one buttock when in chair to relieve some pressure. Patient states that she cannot do that as she has limitations. Unable to get a pillow underneath her, also unable to get into bed. She does sleep in a recliner. Advised patient to sit in recliner and change positions in there from sitting to lying down. Patient also has Roho cushion for both her wheelchair and recliner that she is using. States that she has to sit in it \"just right\" in order to not feel like there is pressure on her chair.     Patient upset stating that she already knows everything that she was told to try. Told patient she can also apply barrier cream to the site as well. Patient agreed with that suggestion remembering that was something they did in the TCU. She does not have any left but will buy some off of [employer]. She said she remembers which one she was using that was working really well. Patient agrees to call back if the wound starts to drain. She does have an appointment in clinic on 6/21.       Patient also wanted to discuss her appointment on 6/21 as she has a UA that needs to be completed. She states that she needs to use a walker to transfer to the toilet and was told that it is not something we can do in clinic. Explained to patient that we do not have one. Asked patient if she could bring her own. Patient upset with the suggestion stating that she is very limited and although the walker is light and folds up that is not something she can do. Patient states that she is able to use grab bars in other clinics but our clinic bathroom is not set up for that. States she " needs a long grab bar to hold on to. She needs to pull herself up with the grab bar so she can pivot and take one step back to get to the the toilet. Informed patient that RN will look at all clinic bathrooms to see if something could work and call her back.     Called patient back to inform her that we do have some bathrooms that could work but cannot guarantee  that it will work. Patient thankful that the bathrooms were at least looked at. Patient will need to use the bathrooms within the clinic and not use the lab or public bathroom as they are not set up properly. Patient will let the staff know once she is roomed that she needs to use a bathroom that is close to her room as all bathrooms appear to have the proper setup. Patient is agreeable to having someone help transfer but states it is embarrassing and does not know if she will accept it when the day comes. She is willing to give it a try and will ask for help if needed.     Monique Pavon RN

## 2024-06-21 ENCOUNTER — OFFICE VISIT (OUTPATIENT)
Dept: FAMILY MEDICINE | Facility: CLINIC | Age: 79
End: 2024-06-21
Payer: MEDICARE

## 2024-06-21 VITALS
TEMPERATURE: 97.5 F | DIASTOLIC BLOOD PRESSURE: 88 MMHG | BODY MASS INDEX: 40.94 KG/M2 | RESPIRATION RATE: 16 BRPM | SYSTOLIC BLOOD PRESSURE: 143 MMHG | HEART RATE: 66 BPM | OXYGEN SATURATION: 95 % | HEIGHT: 65 IN

## 2024-06-21 DIAGNOSIS — Z29.11 NEED FOR VACCINATION AGAINST RESPIRATORY SYNCYTIAL VIRUS: ICD-10-CM

## 2024-06-21 DIAGNOSIS — E83.52 HYPERCALCEMIA: ICD-10-CM

## 2024-06-21 DIAGNOSIS — E03.9 HYPOTHYROIDISM, UNSPECIFIED TYPE: ICD-10-CM

## 2024-06-21 DIAGNOSIS — Z79.4 TYPE 2 DIABETES MELLITUS WITH HYPERGLYCEMIA, WITH LONG-TERM CURRENT USE OF INSULIN (H): Primary | ICD-10-CM

## 2024-06-21 DIAGNOSIS — E11.65 TYPE 2 DIABETES MELLITUS WITH HYPERGLYCEMIA, WITHOUT LONG-TERM CURRENT USE OF INSULIN (H): ICD-10-CM

## 2024-06-21 DIAGNOSIS — R01.1 SYSTOLIC MURMUR: ICD-10-CM

## 2024-06-21 DIAGNOSIS — E11.65 TYPE 2 DIABETES MELLITUS WITH HYPERGLYCEMIA, WITH LONG-TERM CURRENT USE OF INSULIN (H): Primary | ICD-10-CM

## 2024-06-21 DIAGNOSIS — I10 ESSENTIAL HYPERTENSION: ICD-10-CM

## 2024-06-21 DIAGNOSIS — B35.3 TINEA PEDIS OF BOTH FEET: ICD-10-CM

## 2024-06-21 LAB
ANION GAP SERPL CALCULATED.3IONS-SCNC: 7 MMOL/L (ref 3–14)
BUN SERPL-MCNC: 17 MG/DL (ref 7–30)
CALCIUM SERPL-MCNC: 10.2 MG/DL (ref 8.5–10.1)
CHLORIDE BLD-SCNC: 109 MMOL/L (ref 94–109)
CO2 SERPL-SCNC: 27 MMOL/L (ref 20–32)
CREAT SERPL-MCNC: 0.7 MG/DL (ref 0.52–1.04)
EGFRCR SERPLBLD CKD-EPI 2021: 87 ML/MIN/1.73M2
GLUCOSE BLD-MCNC: 204 MG/DL (ref 70–99)
HBA1C MFR BLD: 7.8 % (ref 0–5.6)
HGB BLD-MCNC: 12.1 G/DL (ref 11.7–15.7)
POTASSIUM BLD-SCNC: 3.9 MMOL/L (ref 3.4–5.3)
SODIUM SERPL-SCNC: 143 MMOL/L (ref 135–145)
T4 FREE SERPL-MCNC: 0.91 NG/DL (ref 0.9–1.7)
TSH SERPL DL<=0.005 MIU/L-ACNC: 5.35 UIU/ML (ref 0.3–4.2)
VIT D+METAB SERPL-MCNC: 55 NG/ML (ref 20–50)

## 2024-06-21 PROCEDURE — 84443 ASSAY THYROID STIM HORMONE: CPT | Performed by: FAMILY MEDICINE

## 2024-06-21 PROCEDURE — 99417 PROLNG OP E/M EACH 15 MIN: CPT | Performed by: FAMILY MEDICINE

## 2024-06-21 PROCEDURE — 36415 COLL VENOUS BLD VENIPUNCTURE: CPT | Performed by: FAMILY MEDICINE

## 2024-06-21 PROCEDURE — 83036 HEMOGLOBIN GLYCOSYLATED A1C: CPT | Performed by: FAMILY MEDICINE

## 2024-06-21 PROCEDURE — 90480 ADMN SARSCOV2 VAC 1/ONLY CMP: CPT | Performed by: FAMILY MEDICINE

## 2024-06-21 PROCEDURE — 85018 HEMOGLOBIN: CPT | Performed by: FAMILY MEDICINE

## 2024-06-21 PROCEDURE — 84439 ASSAY OF FREE THYROXINE: CPT | Performed by: FAMILY MEDICINE

## 2024-06-21 PROCEDURE — 80048 BASIC METABOLIC PNL TOTAL CA: CPT | Performed by: FAMILY MEDICINE

## 2024-06-21 PROCEDURE — 82306 VITAMIN D 25 HYDROXY: CPT | Performed by: FAMILY MEDICINE

## 2024-06-21 PROCEDURE — 91320 SARSCV2 VAC 30MCG TRS-SUC IM: CPT | Performed by: FAMILY MEDICINE

## 2024-06-21 PROCEDURE — 99215 OFFICE O/P EST HI 40 MIN: CPT | Mod: 25 | Performed by: FAMILY MEDICINE

## 2024-06-21 RX ORDER — HUMAN INSULIN 100 [IU]/ML
INJECTION, SUSPENSION SUBCUTANEOUS
Qty: 40 ML | Refills: 3 | Status: SHIPPED | OUTPATIENT
Start: 2024-06-21 | End: 2024-06-21

## 2024-06-21 RX ORDER — LEVOTHYROXINE SODIUM 75 UG/1
75 TABLET ORAL DAILY
Qty: 30 TABLET | Refills: 11 | Status: SHIPPED | OUTPATIENT
Start: 2024-06-21

## 2024-06-21 RX ORDER — HUMAN INSULIN 100 [IU]/ML
INJECTION, SUSPENSION SUBCUTANEOUS
Qty: 40 ML | Refills: 3 | Status: SHIPPED | OUTPATIENT
Start: 2024-06-21

## 2024-06-21 RX ORDER — HYDROCHLOROTHIAZIDE 12.5 MG/1
12.5 TABLET ORAL DAILY
Qty: 90 TABLET | Refills: 1 | Status: SHIPPED | OUTPATIENT
Start: 2024-06-21

## 2024-06-21 NOTE — PROGRESS NOTES
Assessment & Plan     Type 2 diabetes mellitus with hyperglycemia, with long-term current use of insulin (H)  Much improved.  Wants to see RD but lost their number so will try to reconnect them.  Will be seeing optho once has her power wheelchair.  She is starting to move more through PT and so hoping to continue to lose weight (lost 4 pounds in the last month).  Also wants to avoid rapid insulin since finds it increases her appetite and weight. She has managed for a week with stable sugars, without, so will continue to not use rapid as long as her sugars stay in goal.  Plan to return in 3 months.   CGM certification letter sent today   - Hemoglobin  - insulin aspart (NOVOLOG PEN) 100 UNIT/ML pen; 6 Units twice daily with meals  - insulin NPH (NOVOLIN N RELION) 100 UNIT/ML vial; INJECT 25 UNITS SUBCUTANEOUSLY IN THE MORNING AND 18 IN THE EVENING    Hypothyroidism, unspecified type  Revisited what this is, why on meds. Ok to continue. Annual level check today   - TSH with free T4 reflex  - levothyroxine (SYNTHROID/LEVOTHROID) 75 MCG tablet; Take 1 tablet (75 mcg) by mouth daily      Tinea pedis of both feet  Is unable to reach her left foot and do foot cares overall.  Evidence of some intertrigo maceration and so will try micatin powder. Might not be able to apply effectively to her left foot but the changes there are minimal.  Has been seeing podiatry  - miconazole (MICATIN) 2 % external powder; Apply topically 2 times daily    Hypercalcemia  Reviewed what this is and that is better. PTH have been in middle normal range and her Ca is within reason  - Vitamin D Level; Future    Essential hypertension  Continue. Reviewed goal. Has a wrist cuff at home and reviewed that these are not accurate but what we have.  She will be trying to get BPs at PT.  Continue to monitor.  Renal function has been relatively stable   - hydroCHLOROthiazide 12.5 MG tablet; Take 1 tablet (12.5 mg) by mouth daily    Type 2 diabetes mellitus  "with hyperglycemia, without long-term current use of insulin (H)  As above.     Systolic murmur  Reviewed what this might be from, possible AORTIC STENOSIS. She is OK having this done.  She will call   - Echocardiogram Complete; Future      I spent a total of 87 minutes on the day of the visit.   Time spent by me doing chart review, history and exam, documentation and further activities per the note  The longitudinal plan of care for the diagnosis(es)/condition(s) as documented were addressed during this visit. Due to the added complexity in care, I will continue to support Mikayla in the subsequent management and with ongoing continuity of care.    BMI  Estimated body mass index is 40.94 kg/m  as calculated from the following:    Height as of this encounter: 1.651 m (5' 5\").    Weight as of 9/19/23: 111.6 kg (246 lb).   Weight management plan: Discussed healthy diet and exercise guidelines          No follow-ups on file.    Subjective   Mikayla is a 78 year old, presenting for the following health issues:  RECHECK (CGM, covid booster -/)      6/21/2024    10:43 AM   Additional Questions   Roomed by stewart   Accompanied by Self         6/21/2024    Information    services provided? No        HPI     DM:   This am was - 143 fasting, due to her eating higher calories last night. There was a parade in her neighborhood and was able to eat foods she normally eats.   Usually her fastings are 110 - 140.  Will go up to 160 - 180.    Has CGM and checks: first thing in am, before every meal and before she goes to bed and any time she feels she needs to. Is using the values to guide her nutritional decisions (working with diabetes ed) and for when needs rapid insulin.     Insulin - increases her appetite and weight   Novolin N, syringe and needles, 25 in am and 18 in pm  Prior was using the   Novolog flex pen (fast acting) and was taking 6 U. Ran out a week ago. Has not noted a real change in her sugars but has " "also been watching her nutrition.   Eats between 1200 and 1600 Kcals per day    Eye care - has not been able to get there due to difficulty with positioning for the testing she needs. She will, once has her chair, go get her eye surgery.     Weights - gets weighed at PT and thinks she has gained 24 pounds in the last few months    BP - is 130/80 in the ams when she gets up - has the wrist ones at her home.     PT - is walking.  Can't get up.      In 3 days will get a power chair.     Legs - she has been managing with podiatry - feels like they are going well.          Wound care: has been managing a wound on her bottom and has been treating it herself at home and is getting better. Right buttock. Not draining ever.      Can't stand without a walker, so can't do that                       Objective    BP (!) 143/88   Pulse 66   Temp 97.5  F (36.4  C) (Oral)   Resp 16   Ht 1.651 m (5' 5\")   LMP  (LMP Unknown)   SpO2 95%   BMI 40.94 kg/m    Body mass index is 40.94 kg/m .  Physical Exam   GENERAL: alert and no distress  NECK: no adenopathy, no asymmetry, masses, or scars  RESP: lungs clear to auscultation - no rales, rhonchi or wheezes  CV: regular rate and rhythm, 3/6 ejection murmur, click or rub,   MS: noted,lymphedema stable. Thickened cobblestoning of skin on the anterior left shin. Right shin with smoother skin and chronic erythema. Has some excoriations just distal to right knee. Toes with some maceration between them on the right, no skin breakdown. Darkened hyperkeratotic tissue around her toe webs.             Signed Electronically by: Nini Heaton MD    "

## 2024-06-21 NOTE — PATIENT INSTRUCTIONS
"Patient Education   Here is the plan from today's visit    1. Type 2 diabetes mellitus with hyperglycemia, with long-term current use of insulin (H)  Much better!!! Hemoglobin A1c was 7.4.   Ok to hold on the rapid acting as long as your sugars are staying where they are.  I will try to get ahold of your nutritionist to find out how to connect with her.    - Hemoglobin  - insulin NPH (NOVOLIN N RELION) 100 UNIT/ML vial; INJECT 25 UNITS SUBCUTANEOUSLY IN THE MORNING AND 18 IN THE EVENING  Dispense: 40 mL; Refill: 3  - insulin aspart (NOVOLOG PEN) 100 UNIT/ML pen; 6 Units twice daily with meals  Dispense: 15 mL; Refill: 5    2. Hypothyroidism, unspecified type  I will get back to you on your test level. I think we will likely not need to change the dose but are making sure  - TSH with free T4 reflex  - levothyroxine (SYNTHROID/LEVOTHROID) 75 MCG tablet; Take 1 tablet (75 mcg) by mouth daily  Dispense: 30 tablet; Refill: 11    4. Tinea pedis of both feet  Do your best to sprinkle between your toes - for sure on the right - twice a day for two weeks   - miconazole (MICATIN) 2 % external powder; Apply topically 2 times daily  Dispense: 85 g; Refill: 0    5. Hypercalcemia  We discussed this and it is doing a bit better. We will keep monitoring   - Vitamin D Level; Future    6. Essential hypertension  Is doing \"ok\" as best we can tell. See if they can check your blood pressure at the physical therapist  - hydroCHLOROthiazide 12.5 MG tablet; Take 1 tablet (12.5 mg) by mouth daily  Dispense: 90 tablet; Refill: 1    7. Type 2 diabetes mellitus with hyperglycemia, without long-term current use of insulin (H)  REfilled your meds. Get back to me if you need syringe and needles    8. Systolic murmur  This may be from calcification of your aortic valve and it is important to assess that and how tight the valve might be.  Please call the number and work with them to find the closest site. - Echocardiogram Complete; " Future          Please call or return to clinic if your symptoms don't go away.    Follow up plan  No follow-ups on file.    Thank you for coming to [facility] today.  Lab Testing:  **If you had lab testing today and your results are reassuring or normal they will be mailed to you or sent through [facility] within 7 days.   **If the lab tests need quick action we will call you with the results.  **If you are having labs done on a different day, please call [phone] to schedule at [facility] or [phone] for other [facility] locations. Labs do not offer walk-in appointments.  The phone number we will call with results is # [phone] (home) . If this is not the best number please call our clinic and change the number.  Medication Refills:  If you need any refills please call your pharmacy and they will contact us.   If you need to  your refill at a new pharmacy, please contact the new pharmacy directly. The new pharmacy will help you get your medications transferred faster.   Scheduling:  If you have any concerns about today's visit or wish to schedule another appointment please call our office during normal business hours [phone] (8-5:00 M-F). If you can no longer make a scheduled visit, please cancel via [facility] or call us to cancel.   If a referral was made to an [facility] specialty provider and you do not get a call from central scheduling, please refer to directions on your visit summary or call our office during normal business hours for assistance.   If a Mammogram was ordered for you at the [facility] call [phone] to schedule or change your appointment.  If you had an XRay/CT/Ultrasound/MRI ordered the number is [phone] to schedule or change your radiology appointment.   [facility] has limited ultrasound appointments available on Wednesdays, if you would like your ultrasound at [facility], please call [phone] to schedule.    Medical Concerns:  If you have urgent medical concerns please call [phone] at any time of the day.    Nini Heaton MD

## 2024-06-24 ENCOUNTER — TELEPHONE (OUTPATIENT)
Dept: EDUCATION SERVICES | Facility: CLINIC | Age: 79
End: 2024-06-24
Payer: MEDICARE

## 2024-06-26 ENCOUNTER — TELEPHONE (OUTPATIENT)
Dept: FAMILY MEDICINE | Facility: CLINIC | Age: 79
End: 2024-06-26
Payer: MEDICARE

## 2024-06-26 NOTE — TELEPHONE ENCOUNTER
[facility] phone call message- general phone call:    Reason for call: Martha from [employer] is looking for the office notes from patient's 06/21 appointment to be faxed to [phone].    Return call needed: No    OK to leave a message on voice mail? Yes    Primary language: English      needed? No    Call taken on June 26, 2024 at 4:59 PM by Nikki Renae

## 2024-06-28 NOTE — TELEPHONE ENCOUNTER
Requested documents faxed to [employer]. Received confirmation fax went through.   Monique Pavon RN

## 2024-07-31 ENCOUNTER — VIRTUAL VISIT (OUTPATIENT)
Dept: EDUCATION SERVICES | Facility: CLINIC | Age: 79
End: 2024-07-31
Payer: MEDICARE

## 2024-07-31 DIAGNOSIS — Z79.4 TYPE 2 DIABETES MELLITUS WITH HYPERGLYCEMIA, WITH LONG-TERM CURRENT USE OF INSULIN (H): Primary | ICD-10-CM

## 2024-07-31 DIAGNOSIS — E11.65 TYPE 2 DIABETES MELLITUS WITH HYPERGLYCEMIA, WITH LONG-TERM CURRENT USE OF INSULIN (H): Primary | ICD-10-CM

## 2024-07-31 PROCEDURE — G0108 DIAB MANAGE TRN  PER INDIV: HCPCS | Mod: 93 | Performed by: DIETITIAN, REGISTERED

## 2024-07-31 RX ORDER — BLOOD SUGAR DIAGNOSTIC
STRIP MISCELLANEOUS
Qty: 50 STRIP | Refills: 11 | Status: SHIPPED | OUTPATIENT
Start: 2024-07-31 | End: 2024-08-05 | Stop reason: ALTCHOICE

## 2024-07-31 RX ORDER — LANCETS
EACH MISCELLANEOUS
Qty: 100 EACH | Refills: 4 | Status: SHIPPED | OUTPATIENT
Start: 2024-07-31 | End: 2024-08-05 | Stop reason: ALTCHOICE

## 2024-07-31 NOTE — LETTER
7/31/2024         RE: Mikayla Timmons  [address]  [facility] [address]        Dear Colleague,    Thank you for referring your patient, Mikayla Timmons, to the [facility]. Please see a copy of my visit note below.    Diabetes Self-Management Education & Support    Presents for:      Type of Service: Telephone Visit    Originating Location (Patient Location): Home  Distant Location (Provider Location): Offsite  Mode of Communication:  Telephone    Telephone Visit Start Time:  9:02  Telephone Visit End Time (telephone visit stop time): 9:50    How would patient like to obtain AVS? MyChart      ASSESSMENT:  Mikayla's A1C has come down to 7.8%, previously 10.4%. She is taking NPH but stopped Novolog d/t cost and weight gain as side effect. She is concerned about low BG but has not had any lows that I can see, she reports 1x event at therapy, she ate some crackers with cheese. We reviewed how to correct using rule of 15 and how CGM can be slower to respond to correction so should use a meter. She notes no coverage for meter, had pharmacy liaison run insurance and it looks like she can get Accucheck strips/lancets for $4/$1 through [facility] order [facility].   I will send her Accucheck meter in the mail d/t financial concerns. Will also order glucose tabs for her to have on hand. She also notes she will be out of Dexcom sensor before the next one is coming by 4 days, I was able to find one that we can send to her as well. Discussed diet again, she knows how to eat and does a really good job. There are some instances when it is hard for her to stick with diet, like when she goes to social gatherings, discussed possibly adding back Novolog for those occasions, however she does not have any Novolog now but will get it once she gets some information to the PAP program. We are going to keep her NPH the same, suggested increasing the AM dose slightly but she did not want to do this  "and her BG have been well controlled the last several days. We will follow-up next month.     Patient's most recent   Lab Results   Component Value Date    A1C 7.8 06/21/2024    A1C 6.9 03/10/2021     is not meeting goal of  <7.5%    Diabetes knowledge and skills assessment:   Patient is knowledgeable in diabetes management concepts related to: Healthy Eating, Being Active, Monitoring, Taking Medication, Problem Solving, Reducing Risks, and Healthy Coping    Continue education with the following diabetes management concepts: Healthy Eating, Monitoring, Taking Medication, and Problem Solving    Based on learning assessment above, most appropriate setting for further diabetes education would be: Individual setting.      PLAN  Continue current dose of NPH 25/18  Always have something with you to treat low BG to reduce anxiety around low BG  Work on getting information to PAP program  Follow-up next month    Topics to cover at upcoming visits: Healthy Eating, Monitoring, Taking Medication, and Problem Solving    Follow-up: next month    See Care Plan for co-developed, patient-state behavior change goals.  AVS provided for patient today.    Education Materials Provided:  No new materials provided today      SUBJECTIVE/OBJECTIVE:     Cultural Influences/Ethnic Background:  Not  or       Diabetes Symptoms & Complications:   Patient Problem List and Family Medical History reviewed for relevant medical history, current medical status, and diabetes risk factors.    Vitals:  LMP  (LMP Unknown)   Estimated body mass index is 40.94 kg/m  as calculated from the following:    Height as of 6/21/24: 1.651 m (5' 5\").    Weight as of 9/19/23: 111.6 kg (246 lb).   Last 3 BP:   BP Readings from Last 3 Encounters:   06/21/24 (!) 143/88   01/26/24 135/72   09/22/23 129/71       History   Smoking Status     Never   Smokeless Tobacco     Never       Labs:  Lab Results   Component Value Date    A1C 7.8 06/21/2024    A1C 6.9 " "03/10/2021     Lab Results   Component Value Date     06/21/2024     03/10/2021     Lab Results   Component Value Date     01/26/2024     03/10/2021     HDL Cholesterol   Date Value Ref Range Status   03/10/2021 54 >49 mg/dL Final     Direct Measure HDL   Date Value Ref Range Status   01/26/2024 47 (L) >=50 mg/dL Final   ]  GFR Estimate   Date Value Ref Range Status   06/21/2024 87 >60 mL/min/1.73m2 Final   03/10/2021 88 >60 mL/min/[1.73_m2] Final     Comment:     Non  GFR Calc  Starting 12/18/2018, serum creatinine based estimated GFR (eGFR) will be   calculated using the Chronic Kidney Disease Epidemiology Collaboration   (CKD-EPI) equation.       GFR Estimate If Black   Date Value Ref Range Status   03/10/2021 >90 >60 mL/min/[1.73_m2] Final     Comment:      GFR Calc  Starting 12/18/2018, serum creatinine based estimated GFR (eGFR) will be   calculated using the Chronic Kidney Disease Epidemiology Collaboration   (CKD-EPI) equation.       Lab Results   Component Value Date    CR 0.70 06/21/2024    CR 0.60 03/10/2021     No results found for: \"MICROALBUMIN\"    Healthy Eating:  Healthy Eating Assessed Today: Yes  Meal planning/habits: Avoiding sweets, Carb counting, Low carb, Keeps food records  Who cooks/prepares meals for you?: Self  Who purchases food in  your home?: Self, Other ([employer] through [employer]. Didnt have a lot of options d/t finances. Friend comes every saturday to shop, has acct at co-op. Daughter set up delivery service at [facility], buys food from 2 farms (gets egg/chicken/milk/lamb).)  Meals include: Breakfast, Lunch, Dinner  Breakfast: 8AM-egg + britt + 1/2 avocado + coffee + unsweetened coconut milk + egg wrap OR 2 oz cheese + cucumbers/onion + nina lettuce + sometimes apple  Lunch: 1PM-salad wtih pumpkin seeds +chicken breast + milk  Dinner: 6PM-salad + tomato + avocado + meat  Snacks: ON sunday manager hosted ice cream social. " Generally eats very well but has time when she socializes and dessert is around so she eats it  Other: [employer] through [employer]. Didnt have a lot of options d/t finances. Friend comes every saturday to shop, has acct at co-op. Daughter set up delivery service at [employer], buys food from 2 farms (gets egg/chicken/milk/lamb). Not eating pasta right now. Eating too much volume.  Beverages: Water, Tea, Coffee, Milk, Other (see Comments)  Please elaborate:: Tete  Has patient met with a dietitian in the past?: No    Being Active:  Being Active Assessed Today: Yes  Exercise:: Currently not exercising (walks 2-3 steps at a time)  Barrier to exercise: Physical limitation    Monitoring:  Monitoring Assessed Today: Yes  Did patient bring glucose meter to appointment? : Yes   Blood Glucose Meter: CGM  Times checking blood sugar at home (number): 5+  Times checking blood sugar at home (per): Day  Blood glucose trend: Decreasing              Taking Medications:  Diabetes Medication(s)       Insulin       insulin aspart (NOVOLOG PEN) 100 UNIT/ML pen 6 Units twice daily with meals     insulin NPH (NOVOLIN N RELION) 100 UNIT/ML vial INJECT 25 UNITS SUBCUTANEOUSLY IN THE MORNING AND 18 IN THE EVENING            Taking Medication Assessed Today: Yes  Current Treatments: Diet, Insulin Injections  Dose schedule: Pre-breakfast, Pre-dinner  Problems taking diabetes medications regularly?: No  Diabetes medication side effects?: Yes (weight gain)    Problem Solving:  Problem Solving Assessed Today: Yes  Is the patient at risk for hypoglycemia?: Yes  Hypoglycemia Frequency: Rarely    Hypoglycemia Symptoms  Hypoglycemia: None    Hypoglycemia Complications  Hypoglycemia Complications: None    Reducing Risks:  Reducing Risks Assessed Today: No    Healthy Coping:  Healthy Coping Assessed Today: Yes  Emotional response to diabetes: Ready to learn  Stage of change: ACTION (Actively working towards change)  Patient Activation Measure  Survey Score:       No data to display                  Care Plan and Education Provided:  Healthy Eating: Carbohydrate Counting and Portion control, Monitoring: Frequency of monitoring, Individual glucose targets, Log and interpret results, and Purpose, Taking Medication: When to take medication(s), and Problem Solving: Low glucose - causes, signs/symptoms, treatment and prevention and Rule of 15 and carrying a carbohydrate source at all times in case of low glucose    Kerry Patterson RD, SEBASTIAN, [facility]      Time Spent: 48 minutes  Encounter Type: Individual    Any diabetes medication dose changes were made via the CDE Protocol per the patient's primary care provider. A copy of this encounter was shared with the provider.

## 2024-07-31 NOTE — PATIENT INSTRUCTIONS
PLAN  Continue current dose of NPH 25/18  Always have something with you to treat low BG to reduce anxiety around low BG  Work on getting information to PAP program  Follow-up next month

## 2024-07-31 NOTE — PROGRESS NOTES
Diabetes Self-Management Education & Support    Presents for:      Type of Service: Telephone Visit    Originating Location (Patient Location): Home  Distant Location (Provider Location): Offsite  Mode of Communication:  Telephone    Telephone Visit Start Time:  9:02  Telephone Visit End Time (telephone visit stop time): 9:50    How would patient like to obtain AVS? Nitish      ASSESSMENT:  Mikayla's A1C has come down to 7.8%, previously 10.4%. She is taking NPH but stopped Novolog d/t cost and weight gain as side effect. She is concerned about low BG but has not had any lows that I can see, she reports 1x event at therapy, she ate some crackers with cheese. We reviewed how to correct using rule of 15 and how CGM can be slower to respond to correction so should use a meter. She notes no coverage for meter, had pharmacy liaison run insurance and it looks like she can get Accucheck strips/lancets for $4/$1 through [facility] order [facility].   I will send her Accucheck meter in the mail d/t financial concerns. Will also order glucose tabs for her to have on hand. She also notes she will be out of Dexcom sensor before the next one is coming by 4 days, I was able to find one that we can send to her as well. Discussed diet again, she knows how to eat and does a really good job. There are some instances when it is hard for her to stick with diet, like when she goes to social gatherings, discussed possibly adding back Novolog for those occasions, however she does not have any Novolog now but will get it once she gets some information to the PAP program. We are going to keep her NPH the same, suggested increasing the AM dose slightly but she did not want to do this and her BG have been well controlled the last several days. We will follow-up next month.     Patient's most recent   Lab Results   Component Value Date    A1C 7.8 06/21/2024    A1C 6.9 03/10/2021     is not meeting goal of  <7.5%    Diabetes knowledge and skills  "assessment:   Patient is knowledgeable in diabetes management concepts related to: Healthy Eating, Being Active, Monitoring, Taking Medication, Problem Solving, Reducing Risks, and Healthy Coping    Continue education with the following diabetes management concepts: Healthy Eating, Monitoring, Taking Medication, and Problem Solving    Based on learning assessment above, most appropriate setting for further diabetes education would be: Individual setting.      PLAN  Continue current dose of NPH 25/18  Always have something with you to treat low BG to reduce anxiety around low BG  Work on getting information to PAP program  Follow-up next month    Topics to cover at upcoming visits: Healthy Eating, Monitoring, Taking Medication, and Problem Solving    Follow-up: next month    See Care Plan for co-developed, patient-state behavior change goals.  AVS provided for patient today.    Education Materials Provided:  No new materials provided today      SUBJECTIVE/OBJECTIVE:     Cultural Influences/Ethnic Background:  Not  or       Diabetes Symptoms & Complications:   Patient Problem List and Family Medical History reviewed for relevant medical history, current medical status, and diabetes risk factors.    Vitals:  LMP  (LMP Unknown)   Estimated body mass index is 40.94 kg/m  as calculated from the following:    Height as of 6/21/24: 1.651 m (5' 5\").    Weight as of 9/19/23: 111.6 kg (246 lb).   Last 3 BP:   BP Readings from Last 3 Encounters:   06/21/24 (!) 143/88   01/26/24 135/72   09/22/23 129/71       History   Smoking Status    Never   Smokeless Tobacco    Never       Labs:  Lab Results   Component Value Date    A1C 7.8 06/21/2024    A1C 6.9 03/10/2021     Lab Results   Component Value Date     06/21/2024     03/10/2021     Lab Results   Component Value Date     01/26/2024     03/10/2021     HDL Cholesterol   Date Value Ref Range Status   03/10/2021 54 >49 mg/dL Final     Direct " "Measure HDL   Date Value Ref Range Status   01/26/2024 47 (L) >=50 mg/dL Final   ]  GFR Estimate   Date Value Ref Range Status   06/21/2024 87 >60 mL/min/1.73m2 Final   03/10/2021 88 >60 mL/min/[1.73_m2] Final     Comment:     Non  GFR Calc  Starting 12/18/2018, serum creatinine based estimated GFR (eGFR) will be   calculated using the Chronic Kidney Disease Epidemiology Collaboration   (CKD-EPI) equation.       GFR Estimate If Black   Date Value Ref Range Status   03/10/2021 >90 >60 mL/min/[1.73_m2] Final     Comment:      GFR Calc  Starting 12/18/2018, serum creatinine based estimated GFR (eGFR) will be   calculated using the Chronic Kidney Disease Epidemiology Collaboration   (CKD-EPI) equation.       Lab Results   Component Value Date    CR 0.70 06/21/2024    CR 0.60 03/10/2021     No results found for: \"MICROALBUMIN\"    Healthy Eating:  Healthy Eating Assessed Today: Yes  Meal planning/habits: Avoiding sweets, Carb counting, Low carb, Keeps food records  Who cooks/prepares meals for you?: Self  Who purchases food in  your home?: Self, Other ([employer] through [employer]. Didnt have a lot of options d/t finances. Friend comes every saturday to shop, has acct at co-op. Daughter set up delivery service at [employer], buys food from 2 farms (gets egg/chicken/milk/lamb).)  Meals include: Breakfast, Lunch, Dinner  Breakfast: 8AM-egg + britt + 1/2 avocado + coffee + unsweetened coconut milk + egg wrap OR 2 oz cheese + cucumbers/onion + nina lettuce + sometimes apple  Lunch: 1PM-salad wtih pumpkin seeds +chicken breast + milk  Dinner: 6PM-salad + tomato + avocado + meat  Snacks: ON sunday manager hosted ice cream social. Generally eats very well but has time when she socializes and dessert is around so she eats it  Other: [employer] through [employer]. Didnt have a lot of options d/t finances. Friend comes every saturday to shop, has acct at co-op. Daughter set up delivery service at " [employer], buys food from 2 farms (gets egg/chicken/milk/lamb). Not eating pasta right now. Eating too much volume.  Beverages: Water, Tea, Coffee, Milk, Other (see Comments)  Please elaborate:: Tete  Has patient met with a dietitian in the past?: No    Being Active:  Being Active Assessed Today: Yes  Exercise:: Currently not exercising (walks 2-3 steps at a time)  Barrier to exercise: Physical limitation    Monitoring:  Monitoring Assessed Today: Yes  Did patient bring glucose meter to appointment? : Yes   Blood Glucose Meter: CGM  Times checking blood sugar at home (number): 5+  Times checking blood sugar at home (per): Day  Blood glucose trend: Decreasing              Taking Medications:  Diabetes Medication(s)       Insulin       insulin aspart (NOVOLOG PEN) 100 UNIT/ML pen 6 Units twice daily with meals     insulin NPH (NOVOLIN N RELION) 100 UNIT/ML vial INJECT 25 UNITS SUBCUTANEOUSLY IN THE MORNING AND 18 IN THE EVENING            Taking Medication Assessed Today: Yes  Current Treatments: Diet, Insulin Injections  Dose schedule: Pre-breakfast, Pre-dinner  Problems taking diabetes medications regularly?: No  Diabetes medication side effects?: Yes (weight gain)    Problem Solving:  Problem Solving Assessed Today: Yes  Is the patient at risk for hypoglycemia?: Yes  Hypoglycemia Frequency: Rarely    Hypoglycemia Symptoms  Hypoglycemia: None    Hypoglycemia Complications  Hypoglycemia Complications: None    Reducing Risks:  Reducing Risks Assessed Today: No    Healthy Coping:  Healthy Coping Assessed Today: Yes  Emotional response to diabetes: Ready to learn  Stage of change: ACTION (Actively working towards change)  Patient Activation Measure Survey Score:       No data to display                  Care Plan and Education Provided:  Healthy Eating: Carbohydrate Counting and Portion control, Monitoring: Frequency of monitoring, Individual glucose targets, Log and interpret results, and Purpose, Taking Medication:  When to take medication(s), and Problem Solving: Low glucose - causes, signs/symptoms, treatment and prevention and Rule of 15 and carrying a carbohydrate source at all times in case of low glucose    Kerry Patterson RD, SEBASTIAN, [facility]      Time Spent: 48 minutes  Encounter Type: Individual    Any diabetes medication dose changes were made via the CDE Protocol per the patient's primary care provider. A copy of this encounter was shared with the provider.

## 2024-08-04 ENCOUNTER — HEALTH MAINTENANCE LETTER (OUTPATIENT)
Age: 79
End: 2024-08-04

## 2024-08-05 ENCOUNTER — TELEPHONE (OUTPATIENT)
Dept: EDUCATION SERVICES | Facility: CLINIC | Age: 79
End: 2024-08-05
Payer: MEDICARE

## 2024-08-05 DIAGNOSIS — E11.65 TYPE 2 DIABETES MELLITUS WITH HYPERGLYCEMIA, WITH LONG-TERM CURRENT USE OF INSULIN (H): Primary | ICD-10-CM

## 2024-08-05 DIAGNOSIS — Z79.4 TYPE 2 DIABETES MELLITUS WITH HYPERGLYCEMIA, WITH LONG-TERM CURRENT USE OF INSULIN (H): Primary | ICD-10-CM

## 2024-08-05 NOTE — TELEPHONE ENCOUNTER
Working to figure out plan for Mikayla for glucometer and testing supplies for back up to CGM. We can get free Akray/Glucocard meter with purchase of test strips/lancets OTC from [facility]. Then could have her sign ABN with [facility] mail order pharmacy to get future supplies sent to her for purchase. Pharm tech noted 50 strips for $8.26 today.     Called her today to discuss plan but she did not answer. Will try again at later time.     Kerry Patterson RD, LD, [facility]

## 2024-08-06 ENCOUNTER — TELEPHONE (OUTPATIENT)
Dept: FAMILY MEDICINE | Facility: CLINIC | Age: 79
End: 2024-08-06
Payer: MEDICARE

## 2024-08-06 RX ORDER — LANCETS
EACH MISCELLANEOUS
Qty: 100 EACH | Refills: 11 | Status: SHIPPED | OUTPATIENT
Start: 2024-08-06

## 2024-08-06 NOTE — TELEPHONE ENCOUNTER
Spoke with Mikayla, she would like to get the Arkray Glucocard meter. I will get this for her and send in the mail. I will also order for refills of test strips and lancets PRN to mail order pharmacy.     Kerry Patterson RD, LD, [facility]

## 2024-08-06 NOTE — TELEPHONE ENCOUNTER
I have interoffice mailed to Mikayla Moore's Synthroid, Novolog pens, Novolin N vials & needles assistance applications.     Please review, complete, sign and return to me in the addressed interoffice envelope.    I have mailed to Mikayla her sections of each application.    These are updated applications, as Mikayla doesn't have the applications that were sent out in January 2024.    Thanks so much for your help!    Romelia Rollins  Prescription Assistance Supervisor  Pharmacy Assistance

## 2024-08-30 ENCOUNTER — VIRTUAL VISIT (OUTPATIENT)
Dept: EDUCATION SERVICES | Facility: CLINIC | Age: 79
End: 2024-08-30
Payer: MEDICARE

## 2024-08-30 DIAGNOSIS — Z79.4 TYPE 2 DIABETES MELLITUS WITH HYPERGLYCEMIA, WITH LONG-TERM CURRENT USE OF INSULIN (H): Primary | ICD-10-CM

## 2024-08-30 DIAGNOSIS — E11.65 TYPE 2 DIABETES MELLITUS WITH HYPERGLYCEMIA, WITH LONG-TERM CURRENT USE OF INSULIN (H): Primary | ICD-10-CM

## 2024-08-30 PROCEDURE — G0108 DIAB MANAGE TRN  PER INDIV: HCPCS | Mod: 93 | Performed by: DIETITIAN, REGISTERED

## 2024-08-30 NOTE — LETTER
8/30/2024         RE: Mikayla Timmons  [address]  [facility] [address]        Dear Colleague,    Thank you for referring your patient, Mikayla Timmons, to the [facility]. Please see a copy of my visit note below.    Diabetes Self-Management Education & Support    Presents for:      Type of Service: Telephone Visit    Originating Location (Patient Location): Home  Distant Location (Provider Location): [facility]  Mode of Communication:  Telephone    Telephone Visit Start Time:  8:01  Telephone Visit End Time (telephone visit stop time): 8:41    How would patient like to obtain AVS? MyChart      ASSESSMENT:  Mikayla reports having a tough last few weeks. Her wheelchair diet and the fair with her family and is still not working, also he friend who usually brings her food is out of town so she has been living on whatever food she can get such as leftover pizza from her grand kids. Her friend is coming back tomorrow though and can get her food. She does the fare for all program through [facility], I am not sure if there are any other options for her so will send message to [facility] medicine group to have them take a look. Her current TIR is 49%, it was 33% last time we talked, and I did note that 2 weeks ago it was at 84%!!! Because of this we discussed continuing with the same plan for insulin as I suspect when she gets her own usual food back numbers will improve and she also noted fasting was 149 this AM (goal 150 or less). We did discuss that once the PAP goes through she can use Novolog insulin as needed when unable to eat her usual way (more cho food) and BG goes up. She received the Glucocard meter, test strips and lancets in the mail. I put in an order for [facility] mail order to reach out regarding refills of test strips and lancets but they have not so provided her with the number. We are going to follow up in October but she will reach out  "sooner if needed.     Patient's most recent   Lab Results   Component Value Date    A1C 7.8 06/21/2024    A1C 6.9 03/10/2021     is not meeting goal of  <7.5%    Diabetes knowledge and skills assessment:   Patient is knowledgeable in diabetes management concepts related to: Healthy Eating, Being Active, Monitoring, Taking Medication, Problem Solving, Reducing Risks, and Healthy Coping    Continue education with the following diabetes management concepts: Healthy Eating, Monitoring, Taking Medication, and Problem Solving    Based on learning assessment above, most appropriate setting for further diabetes education would be: Individual   setting.      PLAN  Continue current NPH doses (25/18)  Call [facility] mail order pharmacy to get test strips/lancet orders figured out, you need to sign an ABN so they don't bill Medicare (at least that is what they told me :))   Kerry will look into if there are any other food support options through [facility] and someone will get back to you on this.   Follow-up in October but reach out sooner if needed!    Topics to cover at upcoming visits: Healthy Eating, Monitoring, Taking Medication, Problem Solving, and Reducing Risks    Follow-up: October    See Care Plan for co-developed, patient-state behavior change goals.  AVS provided for patient today.    Education Materials Provided:  No new materials provided today      SUBJECTIVE/OBJECTIVE:     Cultural Influences/Ethnic Background:  Not  or     Diabetes Symptoms & Complications:   Patient Problem List and Family Medical History reviewed for relevant medical history, current medical status, and diabetes risk factors.    Vitals:  LMP  (LMP Unknown)   Estimated body mass index is 40.94 kg/m  as calculated from the following:    Height as of 6/21/24: 1.651 m (5' 5\").    Weight as of 9/19/23: 111.6 kg (246 lb).   Last 3 BP:   BP Readings from Last 3 Encounters:   06/21/24 (!) 143/88   01/26/24 135/72   09/22/23 129/71 " "      History   Smoking Status     Never   Smokeless Tobacco     Never       Labs:  Lab Results   Component Value Date    A1C 7.8 06/21/2024    A1C 6.9 03/10/2021     Lab Results   Component Value Date     06/21/2024     03/10/2021     Lab Results   Component Value Date     01/26/2024     03/10/2021     HDL Cholesterol   Date Value Ref Range Status   03/10/2021 54 >49 mg/dL Final     Direct Measure HDL   Date Value Ref Range Status   01/26/2024 47 (L) >=50 mg/dL Final   ]  GFR Estimate   Date Value Ref Range Status   06/21/2024 87 >60 mL/min/1.73m2 Final   03/10/2021 88 >60 mL/min/[1.73_m2] Final     Comment:     Non  GFR Calc  Starting 12/18/2018, serum creatinine based estimated GFR (eGFR) will be   calculated using the Chronic Kidney Disease Epidemiology Collaboration   (CKD-EPI) equation.       GFR Estimate If Black   Date Value Ref Range Status   03/10/2021 >90 >60 mL/min/[1.73_m2] Final     Comment:      GFR Calc  Starting 12/18/2018, serum creatinine based estimated GFR (eGFR) will be   calculated using the Chronic Kidney Disease Epidemiology Collaboration   (CKD-EPI) equation.       Lab Results   Component Value Date    CR 0.70 06/21/2024    CR 0.60 03/10/2021     No results found for: \"MICROALBUMIN\"    Healthy Eating:  Healthy Eating Assessed Today: Yes  Meal planning/habits: Avoiding sweets, Carb counting, Low carb, Keeps food records  Who cooks/prepares meals for you?: Self  Who purchases food in  your home?: Self, Other ([employer] side RX through [employer]. Didnt have a lot of options d/t finances. Friend comes every saturday to shop, has acct at co-op. Daughter set up delivery service at [employer], buys food from 2 farms (gets egg/chicken/milk/lamb).)  Meals include: Breakfast, Lunch, Dinner  Breakfast: 8AM-egg + britt + 1/2 avocado + coffee + unsweetened coconut milk + egg wrap OR 2 oz cheese + cucumbers/onion + nina lettuce + sometimes " apple  Lunch: 1PM-salad wtih pumpkin seeds +chicken breast + milk  Dinner: 6PM-salad + tomato + avocado + meat OR susie salad + 1/2 peach  Other: *has had harder time getting around d/t wheelchair being broken and also friend out of town so not able to get the foods she wants  Beverages: Water, Tea, Coffee, Milk, Other (see Comments)  Please elaborate:: Tete  Has patient met with a dietitian in the past?: No    Being Active:  Being Active Assessed Today: Yes  Exercise:: Unable to exercise (walks 2-3 steps at a time)  Barrier to exercise: Physical limitation    Monitoring:  Monitoring Assessed Today: Yes (Fast  this morning)  Did patient bring glucose meter to appointment? : Yes (needs to get hooked up to [facility])  Blood Glucose Meter: CGM  Times checking blood sugar at home (number): 5+  Times checking blood sugar at home (per): Day  Blood glucose trend: Fluctuating      Taking Medications:  Diabetes Medication(s)       Diabetic Other       glucose (BD GLUCOSE) 4 g chewable tablet Take 1 tablet by mouth every hour as needed for low blood sugar       Insulin       insulin aspart (NOVOLOG PEN) 100 UNIT/ML pen 6 Units twice daily with meals     insulin NPH (NOVOLIN N RELION) 100 UNIT/ML vial INJECT 25 UNITS SUBCUTANEOUSLY IN THE MORNING AND 18 IN THE EVENING            Taking Medication Assessed Today: Yes  Current Treatments: Diet, Insulin Injections  Dose schedule: Pre-breakfast, Pre-dinner  Problems taking diabetes medications regularly?: No  Diabetes medication side effects?: Yes (weight gain)    Problem Solving:  Problem Solving Assessed Today: Yes  Is the patient at risk for hypoglycemia?: Yes  Hypoglycemia Frequency: Rarely    Hypoglycemia Symptoms  Hypoglycemia: None    Hypoglycemia Complications  Hypoglycemia Complications: None    Reducing Risks:  Reducing Risks Assessed Today: No    Healthy Coping:  Healthy Coping Assessed Today: Yes  Emotional response to diabetes: Ready to learn  Stage of  change: ACTION (Actively working towards change)  Patient Activation Measure Survey Score:       No data to display                  Care Plan and Education Provided:  Healthy Eating: whole food diet as much as possible, discussed that her eating philosophy of organic, local farm very good for her body, Monitoring: Individual glucose targets, Taking Medication: Action of prescribed medication(s) and When to take medication(s), Problem Solving: When to call a health care provider    Kerry Patterson RD, SEBASTIAN, [facility]      Time Spent: 30+ minutes  Encounter Type: Individual    Any diabetes medication dose changes were made via the CDE Protocol per the patient's primary care provider. A copy of this encounter was shared with the provider.

## 2024-08-30 NOTE — PROGRESS NOTES
Diabetes Self-Management Education & Support    Presents for:      Type of Service: Telephone Visit    Originating Location (Patient Location): Home  Distant Location (Provider Location): [facility]  Mode of Communication:  Telephone    Telephone Visit Start Time:  8:01  Telephone Visit End Time (telephone visit stop time): 8:41    How would patient like to obtain AVS? Nitish      ASSESSMENT:  Mikayla reports having a tough last few weeks. Her wheelchair diet and the fair with her family and is still not working, also he friend who usually brings her food is out of town so she has been living on whatever food she can get such as leftover pizza from her grand kids. Her friend is coming back tomorrow though and can get her food. She does the fare for all program through [facility], I am not sure if there are any other options for her so will send message to [facility] to have them take a look. Her current TIR is 49%, it was 33% last time we talked, and I did note that 2 weeks ago it was at 84%!!! Because of this we discussed continuing with the same plan for insulin as I suspect when she gets her own usual food back numbers will improve and she also noted fasting was 149 this AM (goal 150 or less). We did discuss that once the PAP goes through she can use Novolog insulin as needed when unable to eat her usual way (more cho food) and BG goes up. She received the Glucocard meter, test strips and lancets in the mail. I put in an order for [facility] mail order to reach out regarding refills of test strips and lancets but they have not so provided her with the number. We are going to follow up in October but she will reach out sooner if needed.     Patient's most recent   Lab Results   Component Value Date    A1C 7.8 06/21/2024    A1C 6.9 03/10/2021     is not meeting goal of  <7.5%    Diabetes knowledge and skills assessment:   Patient is knowledgeable in diabetes management concepts  "related to: Healthy Eating, Being Active, Monitoring, Taking Medication, Problem Solving, Reducing Risks, and Healthy Coping    Continue education with the following diabetes management concepts: Healthy Eating, Monitoring, Taking Medication, and Problem Solving    Based on learning assessment above, most appropriate setting for further diabetes education would be: Individual   setting.      PLAN  Continue current NPH doses (25/18)  Call [facility] mail order pharmacy to get test strips/lancet orders figured out, you need to sign an ABN so they don't bill Medicare (at least that is what they told me :))   Kerry will look into if there are any other food support options through [facility] and someone will get back to you on this.   Follow-up in October but reach out sooner if needed!    Topics to cover at upcoming visits: Healthy Eating, Monitoring, Taking Medication, Problem Solving, and Reducing Risks    Follow-up: October    See Care Plan for co-developed, patient-state behavior change goals.  AVS provided for patient today.    Education Materials Provided:  No new materials provided today      SUBJECTIVE/OBJECTIVE:     Cultural Influences/Ethnic Background:  Not  or     Diabetes Symptoms & Complications:   Patient Problem List and Family Medical History reviewed for relevant medical history, current medical status, and diabetes risk factors.    Vitals:  LMP  (LMP Unknown)   Estimated body mass index is 40.94 kg/m  as calculated from the following:    Height as of 6/21/24: 1.651 m (5' 5\").    Weight as of 9/19/23: 111.6 kg (246 lb).   Last 3 BP:   BP Readings from Last 3 Encounters:   06/21/24 (!) 143/88   01/26/24 135/72   09/22/23 129/71       History   Smoking Status    Never   Smokeless Tobacco    Never       Labs:  Lab Results   Component Value Date    A1C 7.8 06/21/2024    A1C 6.9 03/10/2021     Lab Results   Component Value Date     06/21/2024     03/10/2021     Lab Results " "  Component Value Date     01/26/2024     03/10/2021     HDL Cholesterol   Date Value Ref Range Status   03/10/2021 54 >49 mg/dL Final     Direct Measure HDL   Date Value Ref Range Status   01/26/2024 47 (L) >=50 mg/dL Final   ]  GFR Estimate   Date Value Ref Range Status   06/21/2024 87 >60 mL/min/1.73m2 Final   03/10/2021 88 >60 mL/min/[1.73_m2] Final     Comment:     Non  GFR Calc  Starting 12/18/2018, serum creatinine based estimated GFR (eGFR) will be   calculated using the Chronic Kidney Disease Epidemiology Collaboration   (CKD-EPI) equation.       GFR Estimate If Black   Date Value Ref Range Status   03/10/2021 >90 >60 mL/min/[1.73_m2] Final     Comment:      GFR Calc  Starting 12/18/2018, serum creatinine based estimated GFR (eGFR) will be   calculated using the Chronic Kidney Disease Epidemiology Collaboration   (CKD-EPI) equation.       Lab Results   Component Value Date    CR 0.70 06/21/2024    CR 0.60 03/10/2021     No results found for: \"MICROALBUMIN\"    Healthy Eating:  Healthy Eating Assessed Today: Yes  Meal planning/habits: Avoiding sweets, Carb counting, Low carb, Keeps food records  Who cooks/prepares meals for you?: Self  Who purchases food in  your home?: Self, Other ([employer] side RX through [employer] Didnt have a lot of options d/t finances. Friend comes every saturday to shop, has acct at co-op. Daughter set up delivery service at [employer], buys food from 2 farms (gets egg/chicken/milk/lamb).)  Meals include: Breakfast, Lunch, Dinner  Breakfast: 8AM-egg + britt + 1/2 avocado + coffee + unsweetened coconut milk + egg wrap OR 2 oz cheese + cucumbers/onion + nina lettuce + sometimes apple  Lunch: 1PM-salad wtih pumpkin seeds +chicken breast + milk  Dinner: 6PM-salad + tomato + avocado + meat OR susie salad + 1/2 peach  Other: *has had harder time getting around d/t wheelchair being broken and also friend out of town so not able to get the foods she " wants  Beverages: Water, Tea, Coffee, Milk, Other (see Comments)  Please elaborate:: Tete  Has patient met with a dietitian in the past?: No    Being Active:  Being Active Assessed Today: Yes  Exercise:: Unable to exercise (walks 2-3 steps at a time)  Barrier to exercise: Physical limitation    Monitoring:  Monitoring Assessed Today: Yes (Fast  this morning)  Did patient bring glucose meter to appointment? : Yes (needs to get hooked up to [facility])  Blood Glucose Meter: CGM  Times checking blood sugar at home (number): 5+  Times checking blood sugar at home (per): Day  Blood glucose trend: Fluctuating      Taking Medications:  Diabetes Medication(s)       Diabetic Other       glucose (BD GLUCOSE) 4 g chewable tablet Take 1 tablet by mouth every hour as needed for low blood sugar       Insulin       insulin aspart (NOVOLOG PEN) 100 UNIT/ML pen 6 Units twice daily with meals     insulin NPH (NOVOLIN N RELION) 100 UNIT/ML vial INJECT 25 UNITS SUBCUTANEOUSLY IN THE MORNING AND 18 IN THE EVENING            Taking Medication Assessed Today: Yes  Current Treatments: Diet, Insulin Injections  Dose schedule: Pre-breakfast, Pre-dinner  Problems taking diabetes medications regularly?: No  Diabetes medication side effects?: Yes (weight gain)    Problem Solving:  Problem Solving Assessed Today: Yes  Is the patient at risk for hypoglycemia?: Yes  Hypoglycemia Frequency: Rarely    Hypoglycemia Symptoms  Hypoglycemia: None    Hypoglycemia Complications  Hypoglycemia Complications: None    Reducing Risks:  Reducing Risks Assessed Today: No    Healthy Coping:  Healthy Coping Assessed Today: Yes  Emotional response to diabetes: Ready to learn  Stage of change: ACTION (Actively working towards change)  Patient Activation Measure Survey Score:       No data to display                  Care Plan and Education Provided:  Healthy Eating: whole food diet as much as possible, discussed that her eating philosophy of organic,  local farm very good for her body, Monitoring: Individual glucose targets, Taking Medication: Action of prescribed medication(s) and When to take medication(s), Problem Solving: When to call a health care provider    Kerry Patterson RD, ESBASTIAN, [facility] [place]      Time Spent: 30+ minutes  Encounter Type: Individual    Any diabetes medication dose changes were made via the CDE Protocol per the patient's primary care provider. A copy of this encounter was shared with the provider.

## 2024-08-30 NOTE — PATIENT INSTRUCTIONS
Goals for Diabetes:    Continue current NPH doses (25/18)  Call [facility] mail order pharmacy to get test strips/lancet orders figured out, you need to sign an ABN so they don't bill Medicare (at least that is what they told me :))   Kerry will look into if there are any other food support options through [facility] and someone will get back to you on this.   Follow-up in October but reach out sooner if needed!     Call or Mychart with any questions.      Thank you!  Kerry Patterson RD, LD, [facility]  Certified Diabetes Care &   Outpatient [facility] Diabetes Education and Nutrition Services for the [facility] Area:  For Your Diabetes Education or Nutrition Appointments Call:  [phone]   For Diabetes Education and Nutrition Related Questions:   Phone: [phone]  Send [employer] Message   If you need a medication refill please contact your pharmacy. Please allow 3 business days for your refills to be completed.

## 2024-09-04 ENCOUNTER — OFFICE VISIT (OUTPATIENT)
Dept: ORTHOPEDICS | Facility: CLINIC | Age: 79
End: 2024-09-04
Payer: MEDICARE

## 2024-09-04 DIAGNOSIS — L60.3 NAIL DYSTROPHY: ICD-10-CM

## 2024-09-04 DIAGNOSIS — I73.89 OTHER SPECIFIED PERIPHERAL VASCULAR DISEASES (H): Primary | ICD-10-CM

## 2024-09-04 DIAGNOSIS — I73.9 PVD (PERIPHERAL VASCULAR DISEASE) (H): ICD-10-CM

## 2024-09-04 DIAGNOSIS — I89.0 LYMPHEDEMA: ICD-10-CM

## 2024-09-04 PROCEDURE — G0127 TRIM NAIL(S): HCPCS | Mod: Q8 | Performed by: PODIATRIST

## 2024-09-04 PROCEDURE — 99213 OFFICE O/P EST LOW 20 MIN: CPT | Mod: 25 | Performed by: PODIATRIST

## 2024-09-04 RX ORDER — TRIAMCINOLONE ACETONIDE 0.25 MG/G
OINTMENT TOPICAL
Qty: 80 G | Refills: 1 | Status: SHIPPED | OUTPATIENT
Start: 2024-09-04

## 2024-09-04 NOTE — PROGRESS NOTES
Chief Complaint   Patient presents with    Follow Up     Diabetic foot care.        HPI: Mikayla is a 79 year old female who presents today for a diabetic foot exam and management.  She was seen last year.  She has moved back into her house. No new LE complaints. She sees Dr. Heaton for her diabetic care and was last seen 6/21/24. She has had no ulcers on her legs. She got her compression garments. These are velcro, but she still cannot get these on. She thinks she may have broken the right 5th toe, but the pain is getting better.     Review of Systems: No n/v/d/f/c/ns/sob/cp    PMH: LBP  Postpolio syndrome  Carpal Tunnel  T2DM  HTN    No past surgical history on file.      Allergies: Hydromorphone and Ketamine    SH:   Social History     Socioeconomic History    Marital status:      Spouse name: Not on file    Number of children: Not on file    Years of education: Not on file    Highest education level: Not on file   Occupational History    Not on file   Tobacco Use    Smoking status: Never    Smokeless tobacco: Never   Substance and Sexual Activity    Alcohol use: Yes     Comment: Lightly    Drug use: Never    Sexual activity: Not on file   Other Topics Concern    Parent/sibling w/ CABG, MI or angioplasty before 65F 55M? Not Asked   Social History Narrative    Not on file     Social Determinants of Health     Financial Resource Strain: Low Risk  (1/25/2024)    Financial Resource Strain     Within the past 12 months, have you or your family members you live with been unable to get utilities (heat, electricity) when it was really needed?: No   Food Insecurity: Low Risk  (1/25/2024)    Food Insecurity     Within the past 12 months, did you worry that your food would run out before you got money to buy more?: No     Within the past 12 months, did the food you bought just not last and you didn t have money to get more?: No   Transportation Needs: High Risk (1/25/2024)    Transportation Needs     Within the past 12  months, has lack of transportation kept you from medical appointments, getting your medicines, non-medical meetings or appointments, work, or from getting things that you need?: Yes   Physical Activity: Not on file   Stress: Not on file   Social Connections: Not on file   Interpersonal Safety: Low Risk  (6/21/2024)    Interpersonal Safety     Do you feel physically and emotionally safe where you currently live?: Yes     Within the past 12 months, have you been hit, slapped, kicked or otherwise physically hurt by someone?: No     Within the past 12 months, have you been humiliated or emotionally abused in other ways by your partner or ex-partner?: No   Housing Stability: Low Risk  (1/25/2024)    Housing Stability     Do you have housing? : Yes     Are you worried about losing your housing?: No       FH:   Family History   Problem Relation Age of Onset    Cancer No family hx of     Diabetes No family hx of     Glaucoma No family hx of     Hypertension No family hx of     Macular Degeneration No family hx of     Cerebrovascular Disease No family hx of     Thyroid Disease No family hx of        Objective:    Hemoglobin A1C   Date Value Ref Range Status   06/21/2024 7.8 (H) 0.0 - 5.6 % Final     Comment:     Normal <5.7%   Prediabetes 5.7-6.4%    Diabetes 6.5% or higher     Note: Adopted from ADA consensus guidelines.   01/26/2024 10.4 (H) 0.0 - 5.6 % Final     Comment:     Normal <5.7%   Prediabetes 5.7-6.4%    Diabetes 6.5% or higher     Note: Adopted from ADA consensus guidelines.   08/14/2023 8.0 (H) 0.0 - 5.6 % Final     Comment:     Normal <5.7%   Prediabetes 5.7-6.4%    Diabetes 6.5% or higher     Note: Adopted from ADA consensus guidelines.   03/10/2021 6.9 (H) 4.1 - 5.7 % Final   09/23/2020 7.5 (H) 4.1 - 5.7 % Final   05/01/2020 7.0 (H) 4.1 - 5.7 % Final         PT pulses are not palpable 2/2 edema and DP pulses are 2/4 bilaterally. Hemosiderin deposits BL legs.  CRT is WNL. Diminished pedal hair.   Gross  sensation is intact bilaterally. Protective sensation is normal except at the plantar halluces at the last visit. Intact intact.   Equinus is noted bilaterally. No pain with active or passive ROM of the ankle, MTJ, 1st ray, or halluces bilaterally,.   Nails elongated and dystrophic bilaterally. No open lesions are noted.     Assessment: DM2 with neuropathy - controlled.   Nail dystrophy x 10.    Plan:  - Pt seen and evaluated.  - Nails trimmed x 10.   - See again in 3 months.

## 2024-09-04 NOTE — LETTER
9/4/2024      Mikayla Timmons  [address]  [place] [address]      Dear Colleague,    Thank you for referring your patient, Mikayla Timmons, to the [facility] [place]. Please see a copy of my visit note below.    Chief Complaint   Patient presents with     Follow Up     Diabetic foot care.        HPI: Mikayla is a 79 year old female who presents today for a diabetic foot exam and management.  She was seen last year.  She has moved back into her house. No new LE complaints. She sees Dr. Heaton for her diabetic care and was last seen 6/21/24. She has had no ulcers on her legs. She got her compression garments. These are velcro, but she still cannot get these on. She thinks she may have broken the right 5th toe, but the pain is getting better.     Review of Systems: No n/v/d/f/c/ns/sob/cp    PMH: LBP  Postpolio syndrome  Carpal Tunnel  T2DM  HTN    No past surgical history on file.      Allergies: Hydromorphone and Ketamine    SH:   Social History     Socioeconomic History     Marital status:      Spouse name: Not on file     Number of children: Not on file     Years of education: Not on file     Highest education level: Not on file   Occupational History     Not on file   Tobacco Use     Smoking status: Never     Smokeless tobacco: Never   Substance and Sexual Activity     Alcohol use: Yes     Comment: Lightly     Drug use: Never     Sexual activity: Not on file   Other Topics Concern     Parent/sibling w/ CABG, MI or angioplasty before 65F 55M? Not Asked   Social History Narrative     Not on file     Social Determinants of Health     Financial Resource Strain: Low Risk  (1/25/2024)    Financial Resource Strain      Within the past 12 months, have you or your family members you live with been unable to get utilities (heat, electricity) when it was really needed?: No   Food Insecurity: Low Risk  (1/25/2024)    Food Insecurity      Within the past 12 months, did you  worry that your food would run out before you got money to buy more?: No      Within the past 12 months, did the food you bought just not last and you didn t have money to get more?: No   Transportation Needs: High Risk (1/25/2024)    Transportation Needs      Within the past 12 months, has lack of transportation kept you from medical appointments, getting your medicines, non-medical meetings or appointments, work, or from getting things that you need?: Yes   Physical Activity: Not on file   Stress: Not on file   Social Connections: Not on file   Interpersonal Safety: Low Risk  (6/21/2024)    Interpersonal Safety      Do you feel physically and emotionally safe where you currently live?: Yes      Within the past 12 months, have you been hit, slapped, kicked or otherwise physically hurt by someone?: No      Within the past 12 months, have you been humiliated or emotionally abused in other ways by your partner or ex-partner?: No   Housing Stability: Low Risk  (1/25/2024)    Housing Stability      Do you have housing? : Yes      Are you worried about losing your housing?: No       FH:   Family History   Problem Relation Age of Onset     Cancer No family hx of      Diabetes No family hx of      Glaucoma No family hx of      Hypertension No family hx of      Macular Degeneration No family hx of      Cerebrovascular Disease No family hx of      Thyroid Disease No family hx of        Objective:    Hemoglobin A1C   Date Value Ref Range Status   06/21/2024 7.8 (H) 0.0 - 5.6 % Final     Comment:     Normal <5.7%   Prediabetes 5.7-6.4%    Diabetes 6.5% or higher     Note: Adopted from ADA consensus guidelines.   01/26/2024 10.4 (H) 0.0 - 5.6 % Final     Comment:     Normal <5.7%   Prediabetes 5.7-6.4%    Diabetes 6.5% or higher     Note: Adopted from ADA consensus guidelines.   08/14/2023 8.0 (H) 0.0 - 5.6 % Final     Comment:     Normal <5.7%   Prediabetes 5.7-6.4%    Diabetes 6.5% or higher     Note: Adopted from ADA  consensus guidelines.   03/10/2021 6.9 (H) 4.1 - 5.7 % Final   09/23/2020 7.5 (H) 4.1 - 5.7 % Final   05/01/2020 7.0 (H) 4.1 - 5.7 % Final         PT pulses are not palpable 2/2 edema and DP pulses are 2/4 bilaterally. Hemosiderin deposits BL legs.  CRT is WNL. Diminished pedal hair.   Gross sensation is intact bilaterally. Protective sensation is normal except at the plantar halluces at the last visit. Intact intact.   Equinus is noted bilaterally. No pain with active or passive ROM of the ankle, MTJ, 1st ray, or halluces bilaterally,.   Nails elongated and dystrophic bilaterally. No open lesions are noted.     Assessment: DM2 with neuropathy - controlled.   Nail dystrophy x 10.    Plan:  - Pt seen and evaluated.  - Nails trimmed x 10.   - See again in 3 months.           Again, thank you for allowing me to participate in the care of your patient.        Sincerely,        Swapnil Baez DPM

## 2024-10-13 ENCOUNTER — HEALTH MAINTENANCE LETTER (OUTPATIENT)
Age: 79
End: 2024-10-13

## 2024-10-16 ENCOUNTER — VIRTUAL VISIT (OUTPATIENT)
Dept: EDUCATION SERVICES | Facility: CLINIC | Age: 79
End: 2024-10-16
Payer: MEDICARE

## 2024-10-16 DIAGNOSIS — Z79.4 TYPE 2 DIABETES MELLITUS WITH HYPERGLYCEMIA, WITH LONG-TERM CURRENT USE OF INSULIN (H): Primary | ICD-10-CM

## 2024-10-16 DIAGNOSIS — E11.65 TYPE 2 DIABETES MELLITUS WITH HYPERGLYCEMIA, WITH LONG-TERM CURRENT USE OF INSULIN (H): Primary | ICD-10-CM

## 2024-10-16 PROCEDURE — G0108 DIAB MANAGE TRN  PER INDIV: HCPCS | Mod: 93 | Performed by: DIETITIAN, REGISTERED
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
## 2024-10-16 NOTE — PATIENT INSTRUCTIONS
"Goals for Diabetes:    1. Check blood sugars at varying times: before meals, after meals and bedtime  Blood Glucose Targets:  -Fasting and before meal target is 80 - 130  -2 hours after a meal target is < 180  -Time in range for continuous glucose monitor (Christophe OR Dexcom): at least 70% BG numbers between .  Always remember to bring meter and/or log book to all appointments.    2.  Activity really helps improve blood sugars. Try to Incorporate 30 minutes activity into each day - does not need to be all at one time & walking counts!    3. Eat three balanced meals each day, think of plate method, aiming for multiple food groups including 1/4 plate carbohydrates. Focus on \"high quality\" carbohydrates (whole grains, starchy vegetables,fruits, beans/legumes,etc). Limit added sugar as much as possible, read labels to find this, will see \"includes #grams added sugar. Goal daily intake <25g for women and <35g for men.  -Aim to be consistent with meals. Because you usually eat lower carbohydrates 15-45g per meal. Because of the way the insulin works, it peaks about 4 hours after taking it so lunch meal could have higher end of carbohydrates.     4. Take diabetes medications as prescribed     5. Acetaminophen Precaution with Dexcom-Taking higher than the maximum dose (e.g. > 1 gram every 6?hours in adults) of acetaminophen, commonly known as Tylenol, may affect the sensor readings and make them look higher than they really are. However, with the Dexcom G6 and Dexcom G7 CGM System, you can take a standard or maximum acetaminophen dose of 1 gram (1,000 mg) every 6?hours and still use the sensor readings to make treatment decisions.    Follow up with your Diabetic Educator as needed to assess BG targets and need for modifications to medications and/or lifestyle.    Call or Mychart with any questions.      Thank you!  Kerry Patterson RD, SEBASTIAN, [facility]  Certified Diabetes Care &   Outpatient [facility] " [facility] Diabetes Education and Nutrition Services for the [facility]:  For Your Diabetes Education or Nutrition Appointments Call:  [phone]   For Diabetes Education and Nutrition Related Questions:   Phone: [phone]  Send [employer] Message   If you need a medication refill please contact your pharmacy. Please allow 3 business days for your refills to be completed.

## 2024-10-17 ENCOUNTER — PATIENT OUTREACH (OUTPATIENT)
Dept: CARE COORDINATION | Facility: CLINIC | Age: 79
End: 2024-10-17
Payer: MEDICARE

## 2024-10-17 NOTE — PROGRESS NOTES
Food Resource Navigator Contact    FRN - Initial Outreach    Reason for call: Type 2 Diabetes  Other: food insecurity     Food Insecurity: Low Risk  (1/25/2024)    Food Insecurity     Within the past 12 months, did you worry that your food would run out before you got money to buy more?: No     Within the past 12 months, did the food you bought just not last and you didn t have money to get more?: No     Housing Stability: Low Risk  (1/25/2024)    Housing Stability     Do you have housing? : Yes     Are you worried about losing your housing?: No     Financial Resource Strain: Low Risk  (1/25/2024)    Financial Resource Strain     Within the past 12 months, have you or your family members you live with been unable to get utilities (heat, electricity) when it was really needed?: No     Transportation Needs: High Risk (1/25/2024)    Transportation Needs     Within the past 12 months, has lack of transportation kept you from medical appointments, getting your medicines, non-medical meetings or appointments, work, or from getting things that you need?: Yes       The patient was provided with the following food resources: [employer] 2 blocks from her home accessible by motorized wheelchair, senior food shelf + delivery and title 3 nutrition risk assessment meal delivery through [employer].   [facility] [employer]  [employer]  [employer]     The patient was provided the following community resources:  None    I have discussed the following goals with the patient: Mikayla to reach out to [employer] and [facility] [employer] to start meal/grocery delivery and use the [employer] in her neighborhood to improve food access.     Spent 25 minutes in consult with the patient.     Dyan Aguilar   [facility] Food Resource Navigator  Food is Medicine   [phone]

## 2024-10-21 ENCOUNTER — TELEPHONE (OUTPATIENT)
Dept: EDUCATION SERVICES | Facility: CLINIC | Age: 79
End: 2024-10-21
Payer: MEDICARE

## 2024-10-21 NOTE — TELEPHONE ENCOUNTER
I returned  Mikayla's call from this weekend. I reviewed that we are not here on wknds, so in the future she should contact her PCP clinic because they will be able to get her connected with triage nurses.  She ended up giving her breakfast dose about an hour before her dinner dose.  I reviewed with her that if she misses a dose by about an hour she should skip that dose altogether and just get back on track for the next meal.  I told her giving the 2 doses close together could cause a low blood sugar.  She shares she is very fearful of low blood sugars and thankfully giving the 2 doses did not cause her to go low.  She has questions about blood sugars and food.  She reports that this morning her blood sugar was 152 before breakfast and she had Rey bread with cheese and coffee with cream.  Her blood sugar is now 208.  We reviewed how carbs affect the blood glucose level.  We also discussed how not eating enough carbs can affect the blood glucose levels.  She notes that she is fearful of having to increase insulin doses as that can cause weight gain.  She has been working closely with Kerry Patterson on her diabetes management plan and is very grateful for all the help she has provided so far.  We also discussed the CGM values and what the arrows mean.    Time spent on call: 35 minutes    Jennifer Sanders RN, [facility]

## 2024-10-21 NOTE — TELEPHONE ENCOUNTER
Patient left voice mail on 10/19 at 4:00pm. She missed her morning insulin injection at 7:30am, took it at 4pm ( 25 units). She is unsure what to do about her evening injection(18 units) .   She requests a call back at [phone].    May CAMEJON, RN, PHN, [facility]

## 2024-10-24 ENCOUNTER — TELEPHONE (OUTPATIENT)
Dept: FAMILY MEDICINE | Facility: CLINIC | Age: 79
End: 2024-10-24
Payer: MEDICARE

## 2024-10-24 NOTE — TELEPHONE ENCOUNTER
[facility] phone call message- general phone call:    Reason for call: Perfecto from [employer] is looking to speak to a nurse on  team to get office notes from patient's last visit. He states that they provide patient with diabetes supplies like freestyle angie.    Return call needed: Yes    OK to leave a message on voice mail? Yes    Primary language: English      needed? No    Call taken on October 24, 2024 at 11:23 AM by Nikki Renae

## 2024-10-25 ENCOUNTER — DOCUMENTATION ONLY (OUTPATIENT)
Dept: FAMILY MEDICINE | Facility: CLINIC | Age: 79
End: 2024-10-25
Payer: MEDICARE

## 2024-10-25 NOTE — PROGRESS NOTES
"When opening a documentation only encounter, be sure to enter in \"Chief Complaint\" Forms and in \" Comments\" Title of form, description if needed.    Mikayla is a 79 year old  female  Form received via: Fax  Form now resides in: Provider Ready    Gabriella Gallagher MA               "

## 2024-10-28 NOTE — TELEPHONE ENCOUNTER
Received form from [employer]. Patient requires an in person appointment every 6 months in order to get continuous glucose monitor. Will ask  to schedule follow up appointment as last clinic appointment was 6/21/24.     Monique Pavon RN

## 2024-10-28 NOTE — TELEPHONE ENCOUNTER
LVM #[id number] asking pt to call back to schedule a clinic visit in order to comply with [employer] protocol.

## 2024-11-13 ENCOUNTER — VIRTUAL VISIT (OUTPATIENT)
Dept: EDUCATION SERVICES | Facility: CLINIC | Age: 79
End: 2024-11-13
Payer: MEDICARE

## 2024-11-13 DIAGNOSIS — E11.65 TYPE 2 DIABETES MELLITUS WITH HYPERGLYCEMIA, WITH LONG-TERM CURRENT USE OF INSULIN (H): Primary | ICD-10-CM

## 2024-11-13 DIAGNOSIS — Z79.4 TYPE 2 DIABETES MELLITUS WITH HYPERGLYCEMIA, WITH LONG-TERM CURRENT USE OF INSULIN (H): Primary | ICD-10-CM

## 2024-11-13 PROCEDURE — 97803 MED NUTRITION INDIV SUBSEQ: CPT | Performed by: DIETITIAN, REGISTERED

## 2024-11-13 RX ORDER — HUMAN INSULIN 100 [IU]/ML
INJECTION, SUSPENSION SUBCUTANEOUS
Qty: 40 ML | Refills: 3 | Status: SHIPPED | OUTPATIENT
Start: 2024-11-13

## 2024-11-13 NOTE — PROGRESS NOTES
Diabetes Self-Management Education & Support    Presents for: Individual review    Type of Service: Telephone Visit    Originating Location (Patient Location): Home  Distant Location (Provider Location): Offsite  Mode of Communication:  Telephone    Telephone Visit Start Time:  8:02  Telephone Visit End Time (telephone visit stop time): 8:47    How would patient like to obtain AVS? Nitish      ASSESSMENT:  Mikayla here for follow-up visit. She is concerned with BG and not being able to get numbers down. Her current TIR is 35%. She tried eating more cho (~45g) to see if that would help but only made BG worse. We discussed her diet in general is good and low in cho so there is not much more we can do with that. Did discuss food order eating and idea of bringing food to pot-lucks to have something healthy that she can eat. She just started an exercise program for upper body that her daughter made for her, she is going to try it every other day. She is also going to PT to relearn how to walk, did 22 steps. We discussed trying heal raises after eating to see if this helps with post-meal BG spikes as this has been shown to be effective in a study. We also discussed slightly increasing insulin to 28u breakfast and continue with 18u w/ dinner. We will follow-up next month.     Patient's most recent   Lab Results   Component Value Date    A1C 7.8 06/21/2024    A1C 6.9 03/10/2021     is not meeting goal of <7.0    Diabetes knowledge and skills assessment:   Patient is knowledgeable in diabetes management concepts related to: Healthy Eating, Being Active, Monitoring, Taking Medication, Problem Solving, Reducing Risks, and Healthy Coping    Continue education with the following diabetes management concepts: Healthy Eating, Being Active, Monitoring, Taking Medication, and Problem Solving    Based on learning assessment above, most appropriate setting for further diabetes education would be: Individual setting.      PLAN  Increase  "insulin 28u breakfast/18u dinner  Try calf exercises after meals  Continue with exercises your daughter made for you  Practice food order eating  Follow-up next month, reach out sooner if needed    Topics to cover at upcoming visits: Healthy Eating, Being Active, Monitoring, Taking Medication, and Problem Solving    Follow-up: 12/9    See Care Plan for co-developed, patient-state behavior change goals.  AVS provided for patient today.    Education Materials Provided:  No new materials provided today      SUBJECTIVE/OBJECTIVE:  Presents for: Individual review  Accompanied by: Self  Focus of Visit: Monitoring, Problem Solving  Diabetes type: Type 2  Date of diagnosis: 8-9 years ago  Disease course: Worsening  Transportation concerns: Yes  Other concerns:: Physical impairment  Cultural Influences/Ethnic Background:  Not  or       Diabetes Symptoms & Complications:  Weight trend: Stable (stable over the last week)  Disease course: Worsening  Complications assessed today?: No    Patient Problem List and Family Medical History reviewed for relevant medical history, current medical status, and diabetes risk factors.    Vitals:  LMP  (LMP Unknown)   Estimated body mass index is 40.94 kg/m  as calculated from the following:    Height as of 6/21/24: 1.651 m (5' 5\").    Weight as of 9/19/23: 111.6 kg (246 lb).   Last 3 BP:   BP Readings from Last 3 Encounters:   06/21/24 (!) 143/88   01/26/24 135/72   09/22/23 129/71       History   Smoking Status    Never   Smokeless Tobacco    Never       Labs:  Lab Results   Component Value Date    A1C 7.8 06/21/2024    A1C 6.9 03/10/2021     Lab Results   Component Value Date     06/21/2024     03/10/2021     Lab Results   Component Value Date     01/26/2024     03/10/2021     HDL Cholesterol   Date Value Ref Range Status   03/10/2021 54 >49 mg/dL Final     Direct Measure HDL   Date Value Ref Range Status   01/26/2024 47 (L) >=50 mg/dL Final " "  ]  GFR Estimate   Date Value Ref Range Status   06/21/2024 87 >60 mL/min/1.73m2 Final   03/10/2021 88 >60 mL/min/[1.73_m2] Final     Comment:     Non  GFR Calc  Starting 12/18/2018, serum creatinine based estimated GFR (eGFR) will be   calculated using the Chronic Kidney Disease Epidemiology Collaboration   (CKD-EPI) equation.       GFR Estimate If Black   Date Value Ref Range Status   03/10/2021 >90 >60 mL/min/[1.73_m2] Final     Comment:      GFR Calc  Starting 12/18/2018, serum creatinine based estimated GFR (eGFR) will be   calculated using the Chronic Kidney Disease Epidemiology Collaboration   (CKD-EPI) equation.       Lab Results   Component Value Date    CR 0.70 06/21/2024    CR 0.60 03/10/2021     No results found for: \"MICROALBUMIN\"    Healthy Eating:  Healthy Eating Assessed Today: Yes  Meal planning/habits: Avoiding sweets, Carb counting, Low carb, Keeps food records  Who cooks/prepares meals for you?: Self  Who purchases food in  your home?: Self, Other ([employer] through [employer]. Didnt have a lot of options d/t finances. Friend comes every saturday to shop, has acct at co-op. Daughter set up delivery service at [employer], buys food from 2 farms (gets egg/chicken/milk/lamb).)  Meals include: Breakfast, Lunch, Dinner  Breakfast: 8AM-egg + britt + 1/2 avocado + coffee + unsweetened coconut milk + egg wrap OR 2 oz cheese + cucumbers/onion + nina lettuce + sometimes granny apple OR Fritatta with no crust.  Lunch: 1PM-salad wtih pumpkin seeds +chicken breast + milk  Dinner: 6PM-salad + tomato + avocado + meat OR susie salad + 1/2 peach OR had larger dinner 2 cups milk, crackers 18g, peanut butter  Other: *tried eating higher cho to see if this would help BG but only made it worse  Beverages: Water, Tea, Coffee, Milk, Other (see Comments)  Please elaborate:: Tete  Has patient met with a dietitian in the past?: No    Being Active:  Being Active Assessed Today: " Yes  Exercise:: Yes (11/13-Started exercise program her daughter made for her 2 days ago, plans to do every other day. Jacky can not help wtih walking d/t him being PCA.)  Barrier to exercise: Physical limitation (got her new wheelchair in August 24)    Monitoring:  Monitoring Assessed Today: Yes (Fast  this morning)  Did patient bring glucose meter to appointment? : Yes (needs to get hooked up to [facility])  Blood Glucose Meter: CGM  Times checking blood sugar at home (number): 5+  Times checking blood sugar at home (per): Day  Blood glucose trend: Increasing      Taking Medications:  Diabetes Medication(s)       Diabetic Other       glucose (BD GLUCOSE) 4 g chewable tablet Take 1 tablet by mouth every hour as needed for low blood sugar       Insulin       insulin aspart (NOVOLOG PEN) 100 UNIT/ML pen 6 Units twice daily with meals     insulin NPH (NOVOLIN N RELION) 100 UNIT/ML vial INJECT 25 UNITS SUBCUTANEOUSLY IN THE MORNING AND 18 IN THE EVENING            Taking Medication Assessed Today: Yes (has decided to not take Novolog)  Current Treatments: Diet, Insulin Injections  Dose schedule: Pre-breakfast, Pre-dinner (has been sometimes taking insulin after d/t forgetting it.)  Problems taking diabetes medications regularly?: No  Diabetes medication side effects?: Yes (weight gain)    Problem Solving:  Problem Solving Assessed Today: Yes  Is the patient at risk for hypoglycemia?: Yes  Hypoglycemia Frequency: Rarely    Hypoglycemia Symptoms  Hypoglycemia: None    Hypoglycemia Complications  Hypoglycemia Complications: None    Reducing Risks:  Reducing Risks Assessed Today: Yes  CAD Risks: Diabetes Mellitus, Obesity, Post-menopausal, Sedentary lifestyle    Healthy Coping:  Healthy Coping Assessed Today: Yes  Emotional response to diabetes: Ready to learn  Stage of change: ACTION (Actively working towards change)  Patient Activation Measure Survey Score:       No data to display                  Care Plan and  Education Provided:  Healthy Eating: Balanced meals, Carbohydrate Counting, and Consistency in amount and timing of carbohydrate intake, Being Active: Finding a physical activity routine that works for you, Monitoring: Individual glucose targets, Taking Medication: dose increase, Problem Solving: When to call a health care provider      Time Spent: 45 minutes  Encounter Type: Individual    Any diabetes medication dose changes were made via the CDE Protocol per the patient's primary care provider. A copy of this encounter was shared with the provider.

## 2024-11-13 NOTE — PATIENT INSTRUCTIONS
"Goals for Diabetes:    1. Check blood sugars at varying times: before meals, after meals and bedtime  Blood Glucose Targets:  -Fasting and before meal target is 80 - 130  -2 hours after a meal target is < 180  -Time in range for continuous glucose monitor (Christophe OR Dexcom): at least 70% BG numbers between .  Always remember to bring meter and/or log book to all appointments.    2.  Activity really helps improve blood sugars. Try to Incorporate 30 minutes activity into each day - does not need to be all at one time & walking counts!  -Try calf raises after eating for 5- 10 minutes-this can help with BG  -Start doing exercise program daughter built for you!    3. Eat three balanced meals each day, think of plate method, aiming for multiple food groups including 1/4 plate carbohydrates. Focus on \"high quality\" carbohydrates (whole grains, starchy vegetables,fruits, beans/legumes,etc). Limit added sugar as much as possible, read labels to find this, will see \"includes #grams added sugar. Goal daily intake <25g for women and <35g for men.  -Eat in this order when able: non-starchy vegetables, protein/healthy fats, and carbohydrates last. Eating this way will reduce your blood sugar spikes.  -Start bringing your own food to potluck :)     4. Take diabetes medications as prescribed   -Increase morning insulin dose to 28 units, keep 18 units for dinner    Follow up with your Diabetic Educator as needed to assess BG targets and need for modifications to medications and/or lifestyle.    Call or Mychart with any questions.      Thank you!  Kerry Patterson RD, LD, CDCES  Certified Diabetes Care &   Outpatient [facility] Diabetes Education and Nutrition Services for the [facility]:  For Your Diabetes Education or Nutrition Appointments Call:  [phone]   For Diabetes Education and Nutrition Related Questions:   Phone: [phone]  Send " [employer] Message   If you need a medication refill please contact your pharmacy. Please allow 3 business days for your refills to be completed.

## 2024-11-13 NOTE — LETTER
11/13/2024         RE: Mikayla Timmons  [address]  [place] [address]        Dear Colleague,    Thank you for referring your patient, Mikayla Timmons, to the [facility]. Please see a copy of my visit note below.    Diabetes Self-Management Education & Support    Presents for: Individual review    Type of Service: Telephone Visit    Originating Location (Patient Location): Home  Distant Location (Provider Location): Offsite  Mode of Communication:  Telephone    Telephone Visit Start Time:  8:02  Telephone Visit End Time (telephone visit stop time): 8:47    How would patient like to obtain AVS? MyChart      ASSESSMENT:  Mikayla here for follow-up visit. She is concerned with BG and not being able to get numbers down. Her current TIR is 35%. She tried eating more cho (~45g) to see if that would help but only made BG worse. We discussed her diet in general is good and low in cho so there is not much more we can do with that. Did discuss food order eating and idea of bringing food to pot-lucks to have something healthy that she can eat. She just started an exercise program for upper body that her daughter made for her, she is going to try it every other day. She is also going to PT to relearn how to walk, did 22 steps. We discussed trying heal raises after eating to see if this helps with post-meal BG spikes as this has been shown to be effective in a study. We also discussed slightly increasing insulin to 28u breakfast and continue with 18u w/ dinner. We will follow-up next month.     Patient's most recent   Lab Results   Component Value Date    A1C 7.8 06/21/2024    A1C 6.9 03/10/2021     is not meeting goal of <7.0    Diabetes knowledge and skills assessment:   Patient is knowledgeable in diabetes management concepts related to: Healthy Eating, Being Active, Monitoring, Taking Medication, Problem Solving, Reducing Risks, and Healthy Coping    Continue education with  "the following diabetes management concepts: Healthy Eating, Being Active, Monitoring, Taking Medication, and Problem Solving    Based on learning assessment above, most appropriate setting for further diabetes education would be: Individual setting.      PLAN  Increase insulin 28u breakfast/18u dinner  Try calf exercises after meals  Continue with exercises your daughter made for you  Practice food order eating  Follow-up next month, reach out sooner if needed    Topics to cover at upcoming visits: Healthy Eating, Being Active, Monitoring, Taking Medication, and Problem Solving    Follow-up: 12/9    See Care Plan for co-developed, patient-state behavior change goals.  AVS provided for patient today.    Education Materials Provided:  No new materials provided today      SUBJECTIVE/OBJECTIVE:  Presents for: Individual review  Accompanied by: Self  Focus of Visit: Monitoring, Problem Solving  Diabetes type: Type 2  Date of diagnosis: 8-9 years ago  Disease course: Worsening  Transportation concerns: Yes  Other concerns:: Physical impairment  Cultural Influences/Ethnic Background:  Not  or       Diabetes Symptoms & Complications:  Weight trend: Stable (stable over the last week)  Disease course: Worsening  Complications assessed today?: No    Patient Problem List and Family Medical History reviewed for relevant medical history, current medical status, and diabetes risk factors.    Vitals:  LMP  (LMP Unknown)   Estimated body mass index is 40.94 kg/m  as calculated from the following:    Height as of 6/21/24: 1.651 m (5' 5\").    Weight as of 9/19/23: 111.6 kg (246 lb).   Last 3 BP:   BP Readings from Last 3 Encounters:   06/21/24 (!) 143/88   01/26/24 135/72   09/22/23 129/71       History   Smoking Status     Never   Smokeless Tobacco     Never       Labs:  Lab Results   Component Value Date    A1C 7.8 06/21/2024    A1C 6.9 03/10/2021     Lab Results   Component Value Date     06/21/2024     " "03/10/2021     Lab Results   Component Value Date     01/26/2024     03/10/2021     HDL Cholesterol   Date Value Ref Range Status   03/10/2021 54 >49 mg/dL Final     Direct Measure HDL   Date Value Ref Range Status   01/26/2024 47 (L) >=50 mg/dL Final   ]  GFR Estimate   Date Value Ref Range Status   06/21/2024 87 >60 mL/min/1.73m2 Final   03/10/2021 88 >60 mL/min/[1.73_m2] Final     Comment:     Non  GFR Calc  Starting 12/18/2018, serum creatinine based estimated GFR (eGFR) will be   calculated using the Chronic Kidney Disease Epidemiology Collaboration   (CKD-EPI) equation.       GFR Estimate If Black   Date Value Ref Range Status   03/10/2021 >90 >60 mL/min/[1.73_m2] Final     Comment:      GFR Calc  Starting 12/18/2018, serum creatinine based estimated GFR (eGFR) will be   calculated using the Chronic Kidney Disease Epidemiology Collaboration   (CKD-EPI) equation.       Lab Results   Component Value Date    CR 0.70 06/21/2024    CR 0.60 03/10/2021     No results found for: \"MICROALBUMIN\"    Healthy Eating:  Healthy Eating Assessed Today: Yes  Meal planning/habits: Avoiding sweets, Carb counting, Low carb, Keeps food records  Who cooks/prepares meals for you?: Self  Who purchases food in  your home?: Self, Other ([employer] through [employer]. Didnt have a lot of options d/t finances. Friend comes every saturday to shop, has acct at co-op. Daughter set up delivery service at [employer], buys food from 2 farms (gets egg/chicken/milk/lamb).)  Meals include: Breakfast, Lunch, Dinner  Breakfast: 8AM-egg + britt + 1/2 avocado + coffee + unsweetened coconut milk + egg wrap OR 2 oz cheese + cucumbers/onion + nina lettuce + sometimes granny apple OR Fritatta with no crust.  Lunch: 1PM-salad wtih pumpkin seeds +chicken breast + milk  Dinner: 6PM-salad + tomato + avocado + meat OR susie salad + 1/2 peach OR had larger dinner 2 cups milk, crackers 18g, peanut butter  Other: " *tried eating higher cho to see if this would help BG but only made it worse  Beverages: Water, Tea, Coffee, Milk, Other (see Comments)  Please elaborate:: Tete  Has patient met with a dietitian in the past?: No    Being Active:  Being Active Assessed Today: Yes  Exercise:: Yes (11/13-Started exercise program her daughter made for her 2 days ago, plans to do every other day. Jacky can not help wtih walking d/t him being PCA.)  Barrier to exercise: Physical limitation (got her new wheelchair in August 24)    Monitoring:  Monitoring Assessed Today: Yes (Fast  this morning)  Did patient bring glucose meter to appointment? : Yes (needs to get hooked up to [facility])  Blood Glucose Meter: CGM  Times checking blood sugar at home (number): 5+  Times checking blood sugar at home (per): Day  Blood glucose trend: Increasing      Taking Medications:  Diabetes Medication(s)       Diabetic Other       glucose (BD GLUCOSE) 4 g chewable tablet Take 1 tablet by mouth every hour as needed for low blood sugar       Insulin       insulin aspart (NOVOLOG PEN) 100 UNIT/ML pen 6 Units twice daily with meals     insulin NPH (NOVOLIN N RELION) 100 UNIT/ML vial INJECT 25 UNITS SUBCUTANEOUSLY IN THE MORNING AND 18 IN THE EVENING            Taking Medication Assessed Today: Yes (has decided to not take Novolog)  Current Treatments: Diet, Insulin Injections  Dose schedule: Pre-breakfast, Pre-dinner (has been sometimes taking insulin after d/t forgetting it.)  Problems taking diabetes medications regularly?: No  Diabetes medication side effects?: Yes (weight gain)    Problem Solving:  Problem Solving Assessed Today: Yes  Is the patient at risk for hypoglycemia?: Yes  Hypoglycemia Frequency: Rarely    Hypoglycemia Symptoms  Hypoglycemia: None    Hypoglycemia Complications  Hypoglycemia Complications: None    Reducing Risks:  Reducing Risks Assessed Today: Yes  CAD Risks: Diabetes Mellitus, Obesity, Post-menopausal, Sedentary  lifestyle    Healthy Coping:  Healthy Coping Assessed Today: Yes  Emotional response to diabetes: Ready to learn  Stage of change: ACTION (Actively working towards change)  Patient Activation Measure Survey Score:       No data to display                  Care Plan and Education Provided:  Healthy Eating: Balanced meals, Carbohydrate Counting, and Consistency in amount and timing of carbohydrate intake, Being Active: Finding a physical activity routine that works for you, Monitoring: Individual glucose targets, Taking Medication: dose increase, Problem Solving: When to call a health care provider      Time Spent: 45 minutes  Encounter Type: Individual    Any diabetes medication dose changes were made via the CDE Protocol per the patient's primary care provider. A copy of this encounter was shared with the provider.

## 2024-12-09 ENCOUNTER — VIRTUAL VISIT (OUTPATIENT)
Dept: EDUCATION SERVICES | Facility: CLINIC | Age: 79
End: 2024-12-09
Payer: MEDICARE

## 2024-12-09 DIAGNOSIS — Z79.4 TYPE 2 DIABETES MELLITUS WITH HYPERGLYCEMIA, WITH LONG-TERM CURRENT USE OF INSULIN (H): Primary | ICD-10-CM

## 2024-12-09 DIAGNOSIS — E11.65 TYPE 2 DIABETES MELLITUS WITH HYPERGLYCEMIA, WITH LONG-TERM CURRENT USE OF INSULIN (H): Primary | ICD-10-CM

## 2024-12-09 PROCEDURE — G0108 DIAB MANAGE TRN  PER INDIV: HCPCS | Mod: 93 | Performed by: DIETITIAN, REGISTERED

## 2024-12-09 NOTE — PROGRESS NOTES
Diabetes Self-Management Education & Support    Presents for: Follow-up    Type of Service: Telephone Visit    Originating Location (Patient Location): Home  Distant Location (Provider Location): Offsite  Mode of Communication:  Telephone    Telephone Visit Start Time:  8:01  Telephone Visit End Time (telephone visit stop time): 8:47    How would patient like to obtain AVS? Nitish      ASSESSMENT:  Mikayla here for follow-up TIR 26%, it was 35%. Feels increasing her insulin only makes her more hungry. She is wanting to reduce insulin back but after discussion she is willing to keep NPH the same, still not wanting to take Novolog and hasn't completed everything for PAP yet to get it anyways. She wants to reduce cho further to improve BG. We discussed how she is already eating very low cho but she thinks she can go lower and this is what she prefers to do. Also is planning to increase exercise more. Did calf raises a few times and this did help to bring down BG by a few points. She now has exercises from her PT as well as from her daughter. Unfortunately she had a fall 2 weeks ago that set her back on exercising but she plans to get started again. We discussed adding a c-peptide lab to her visit on 12/11 with PCP just to see how well her pancreas is producing insulin. She is open to this and will fast for appt and reduce NPH in AM to 20 units and bring apple with her in case her BG start to go on the lower end for her. We scheduled follow-up in February.     Patient's most recent   Lab Results   Component Value Date    A1C 7.8 06/21/2024    A1C 6.9 03/10/2021     is not meeting goal of <7.0    Diabetes knowledge and skills assessment:   Patient is knowledgeable in diabetes management concepts related to: Healthy Eating, Being Active, Monitoring, Taking Medication, Problem Solving, Reducing Risks, and Healthy Coping    Continue education with the following diabetes management concepts: Healthy Eating, Being Active,  "Monitoring, Taking Medication, and Problem Solving    Based on learning assessment above, most appropriate setting for further diabetes education would be: Individual setting.      PLAN  Continue insulin 28u breakfast/18u dinner, day of c-peptide fast and reduce morning insulin to 22 units just for this day, also carry an apple with you just in case your BG start going towards lower end.  2. Continue exercising from PT, daughter and calf raises  3. Practice food order eating  4. Follow-up in February    Topics to cover at upcoming visits: Healthy Eating, Being Active, Monitoring, Taking Medication, and Problem Solving    Follow-up: 2/3     See Care Plan for co-developed, patient-state behavior change goals.  AVS provided for patient today.    Education Materials Provided:  No new materials provided today      SUBJECTIVE/OBJECTIVE:  Presents for: Follow-up  Accompanied by: Self  Diabetes education in the past 24mo: Yes  Focus of Visit: Monitoring, Problem Solving  Diabetes type: Type 2  Date of diagnosis: 8-9 years ago  Disease course: Worsening  Transportation concerns: Yes  Other concerns:: Physical impairment  Cultural Influences/Ethnic Background:  Not  or       Diabetes Symptoms & Complications:  Weight trend: Stable   Disease course: Worsening  Complications assessed today?: No    Patient Problem List and Family Medical History reviewed for relevant medical history, current medical status, and diabetes risk factors.    Vitals:  LMP  (LMP Unknown)   Estimated body mass index is 40.94 kg/m  as calculated from the following:    Height as of 6/21/24: 1.651 m (5' 5\").    Weight as of 9/19/23: 111.6 kg (246 lb).   Last 3 BP:   BP Readings from Last 3 Encounters:   06/21/24 (!) 143/88   01/26/24 135/72   09/22/23 129/71       History   Smoking Status    Never   Smokeless Tobacco    Never       Labs:  Lab Results   Component Value Date    A1C 7.8 06/21/2024    A1C 6.9 03/10/2021     Lab Results " "  Component Value Date     06/21/2024     03/10/2021     Lab Results   Component Value Date     01/26/2024     03/10/2021     HDL Cholesterol   Date Value Ref Range Status   03/10/2021 54 >49 mg/dL Final     Direct Measure HDL   Date Value Ref Range Status   01/26/2024 47 (L) >=50 mg/dL Final   ]  GFR Estimate   Date Value Ref Range Status   06/21/2024 87 >60 mL/min/1.73m2 Final   03/10/2021 88 >60 mL/min/[1.73_m2] Final     Comment:     Non  GFR Calc  Starting 12/18/2018, serum creatinine based estimated GFR (eGFR) will be   calculated using the Chronic Kidney Disease Epidemiology Collaboration   (CKD-EPI) equation.       GFR Estimate If Black   Date Value Ref Range Status   03/10/2021 >90 >60 mL/min/[1.73_m2] Final     Comment:      GFR Calc  Starting 12/18/2018, serum creatinine based estimated GFR (eGFR) will be   calculated using the Chronic Kidney Disease Epidemiology Collaboration   (CKD-EPI) equation.       Lab Results   Component Value Date    CR 0.70 06/21/2024    CR 0.60 03/10/2021     No results found for: \"MICROALBUMIN\"    Healthy Eating:  Healthy Eating Assessed Today: Yes  Meal planning/habits: Avoiding sweets, Carb counting, Low carb, Keeps food records  Who cooks/prepares meals for you?: Self  Who purchases food in  your home?: Self, Other ([employer] through [employer]. Didnt have a lot of options d/t finances. Friend comes every saturday to shop, has acct at co-op. Daughter set up delivery service at [employer], buys food from 2 farms (gets egg/chicken/milk/lamb).  Meals include: Breakfast, Lunch, Dinner  Breakfast: 8AM-egg + britt + 1/2 avocado + coffee + unsweetened coconut milk + egg wrap OR 2 oz cheese + cucumbers/onion + nina lettuce + sometimes granny apple OR Fritatta with no crust.  Lunch: 1PM-salad wtih pumpkin seeds +chicken breast + milk  Dinner: 6PM-salad + tomato + avocado + meat OR susie salad + 1/2 peach OR had larger " dinner 2 cups milk, crackers 18g, peanut butter  Other: *tried eating higher cho to see if this would help BG but only made it worse.  Beverages: Water, Tea, Coffee, Milk, Other (see Comments)  Please elaborate:: Tete  Has patient met with a dietitian in the past?: No    Being Active:  Being Active Assessed Today: Yes  Exercise:: Yes (11/13-Started exercise program her daughter made for her 2 days ago, plans to do every other day. Jacky can not help wtih walking d/t him being PCA. 12/9-got PT exercises, feels calf raises helped)  Barrier to exercise: Physical limitation (got her new wheelchair in August 24)    Monitoring:  Monitoring Assessed Today: Yes (Fast  this morning)  Did patient bring glucose meter to appointment? : Yes (needs to get hooked up to [facility])  Blood Glucose Meter: CGM  Times checking blood sugar at home (number): 5+  Times checking blood sugar at home (per): Day  Blood glucose trend: Increasing          Taking Medications:  Diabetes Medication(s)       Diabetic Other       glucose (BD GLUCOSE) 4 g chewable tablet Take 1 tablet by mouth every hour as needed for low blood sugar       Insulin       insulin aspart (NOVOLOG PEN) 100 UNIT/ML pen 6 Units twice daily with meals     insulin NPH (NOVOLIN N RELION) 100 UNIT/ML vial INJECT 28 UNITS SUBCUTANEOUSLY IN THE MORNING AND 18 IN THE EVENING        *She is not taking Novolog    Taking Medication Assessed Today: Yes (has decided to not take Novolog.)  Current Treatments: Diet, Insulin Injections  Dose schedule: Pre-breakfast, Pre-dinner (has been sometimes taking insulin after d/t forgetting it.)  Problems taking diabetes medications regularly?: No  Diabetes medication side effects?: Yes (weight gain)    Problem Solving:  Problem Solving Assessed Today: Yes  Is the patient at risk for hypoglycemia?: Yes  Hypoglycemia Frequency: Rarely    Hypoglycemia Symptoms  Hypoglycemia: None    Hypoglycemia Complications  Hypoglycemia Complications:  None    Reducing Risks:  Reducing Risks Assessed Today: Yes  CAD Risks: Diabetes Mellitus, Obesity, Post-menopausal, Sedentary lifestyle    Healthy Coping:  Healthy Coping Assessed Today: Yes  Emotional response to diabetes: Ready to learn  Stage of change: ACTION (Actively working towards change)  Patient Activation Measure Survey Score:       No data to display                  Care Plan and Education Provided:  Healthy Eating: Carbohydrate Counting and discussed very low cho diet, Being Active: Relationship of activity to glucose, Monitoring: Individual glucose targets, Taking Medication: rec increasing insulin but she doesn't want to at this time, Problem Solving: When to call a health care provider, and Reducing Risks: Complications of diabetes, Goal for A1c, how it relates to glucose and how often to check    Kerry Patterson RD, LD, [facility]      Time Spent: 46 minutes  Encounter Type: Individual    Any diabetes medication dose changes were made via the CDE Protocol per the patient's primary care provider. A copy of this encounter was shared with the provider.

## 2024-12-09 NOTE — PATIENT INSTRUCTIONS
PLAN  Continue insulin 28u breakfast/18u dinner, day of c-peptide fast and reduce morning insulin to 22 units just for this day, also carry an apple with you just in case your BG start going towards lower end.  2.    Continue exercising from PT, daughter and calf raises  3.    Continue with low carbohydrate diet, practice food order eating (so eat carbs last)  4.    Follow-up in February

## 2024-12-09 NOTE — LETTER
12/9/2024         RE: Mikayla Timmons  [address]  [place] [address]        Dear Colleague,    Thank you for referring your patient, Mikayla Timmons, to the [facility]. Please see a copy of my visit note below.    Diabetes Self-Management Education & Support    Presents for: Follow-up    Type of Service: Telephone Visit    Originating Location (Patient Location): Home  Distant Location (Provider Location): Offsite  Mode of Communication:  Telephone    Telephone Visit Start Time:  8:01  Telephone Visit End Time (telephone visit stop time): 8:47    How would patient like to obtain AVS? MyChart      ASSESSMENT:  Mikayla here for follow-up TIR 26%, it was 35%. Feels increasing her insulin only makes her more hungry. She is wanting to reduce insulin back but after discussion she is willing to keep NPH the same, still not wanting to take Novolog and hasn't completed everything for PAP yet to get it anyways. She wants to reduce cho further to improve BG. We discussed how she is already eating very low cho but she thinks she can go lower and this is what she prefers to do. Also is planning to increase exercise more. Did calf raises a few times and this did help to bring down BG by a few points. She now has exercises from her PT as well as from her daughter. Unfortunately she had a fall 2 weeks ago that set her back on exercising but she plans to get started again. We discussed adding a c-peptide lab to her visit on 12/11 with PCP just to see how well her pancreas is producing insulin. She is open to this and will fast for appt and reduce NPH in AM to 20 units and bring apple with her in case her BG start to go on the lower end for her. We scheduled follow-up in February.     Patient's most recent   Lab Results   Component Value Date    A1C 7.8 06/21/2024    A1C 6.9 03/10/2021     is not meeting goal of <7.0    Diabetes knowledge and skills assessment:   Patient is knowledgeable  "in diabetes management concepts related to: Healthy Eating, Being Active, Monitoring, Taking Medication, Problem Solving, Reducing Risks, and Healthy Coping    Continue education with the following diabetes management concepts: Healthy Eating, Being Active, Monitoring, Taking Medication, and Problem Solving    Based on learning assessment above, most appropriate setting for further diabetes education would be: Individual setting.      PLAN  Continue insulin 28u breakfast/18u dinner, day of c-peptide fast and reduce morning insulin to 22 units just for this day, also carry an apple with you just in case your BG start going towards lower end.  2. Continue exercising from PT, daughter and calf raises  3. Practice food order eating  4. Follow-up in February    Topics to cover at upcoming visits: Healthy Eating, Being Active, Monitoring, Taking Medication, and Problem Solving    Follow-up: 2/3     See Care Plan for co-developed, patient-state behavior change goals.  AVS provided for patient today.    Education Materials Provided:  No new materials provided today      SUBJECTIVE/OBJECTIVE:  Presents for: Follow-up  Accompanied by: Self  Diabetes education in the past 24mo: Yes  Focus of Visit: Monitoring, Problem Solving  Diabetes type: Type 2  Date of diagnosis: 8-9 years ago  Disease course: Worsening  Transportation concerns: Yes  Other concerns:: Physical impairment  Cultural Influences/Ethnic Background:  Not  or       Diabetes Symptoms & Complications:  Weight trend: Stable   Disease course: Worsening  Complications assessed today?: No    Patient Problem List and Family Medical History reviewed for relevant medical history, current medical status, and diabetes risk factors.    Vitals:  LMP  (LMP Unknown)   Estimated body mass index is 40.94 kg/m  as calculated from the following:    Height as of 6/21/24: 1.651 m (5' 5\").    Weight as of 9/19/23: 111.6 kg (246 lb).   Last 3 BP:   BP Readings from Last 3 " "Encounters:   06/21/24 (!) 143/88   01/26/24 135/72   09/22/23 129/71       History   Smoking Status     Never   Smokeless Tobacco     Never       Labs:  Lab Results   Component Value Date    A1C 7.8 06/21/2024    A1C 6.9 03/10/2021     Lab Results   Component Value Date     06/21/2024     03/10/2021     Lab Results   Component Value Date     01/26/2024     03/10/2021     HDL Cholesterol   Date Value Ref Range Status   03/10/2021 54 >49 mg/dL Final     Direct Measure HDL   Date Value Ref Range Status   01/26/2024 47 (L) >=50 mg/dL Final   ]  GFR Estimate   Date Value Ref Range Status   06/21/2024 87 >60 mL/min/1.73m2 Final   03/10/2021 88 >60 mL/min/[1.73_m2] Final     Comment:     Non  GFR Calc  Starting 12/18/2018, serum creatinine based estimated GFR (eGFR) will be   calculated using the Chronic Kidney Disease Epidemiology Collaboration   (CKD-EPI) equation.       GFR Estimate If Black   Date Value Ref Range Status   03/10/2021 >90 >60 mL/min/[1.73_m2] Final     Comment:      GFR Calc  Starting 12/18/2018, serum creatinine based estimated GFR (eGFR) will be   calculated using the Chronic Kidney Disease Epidemiology Collaboration   (CKD-EPI) equation.       Lab Results   Component Value Date    CR 0.70 06/21/2024    CR 0.60 03/10/2021     No results found for: \"MICROALBUMIN\"    Healthy Eating:  Healthy Eating Assessed Today: Yes  Meal planning/habits: Avoiding sweets, Carb counting, Low carb, Keeps food records  Who cooks/prepares meals for you?: Self  Who purchases food in  your home?: Self, Other ([employer] through [employer]. Didnt have a lot of options d/t finances. Friend comes every saturday to shop, has acct at co-op. Daughter set up delivery service at [employer], buys food from 2 farms (gets egg/chicken/milk/lamb).  Meals include: Breakfast, Lunch, Dinner  Breakfast: 8AM-egg + britt + 1/2 avocado + coffee + unsweetened coconut milk + egg wrap OR 2 " oz cheese + cucumbers/onion + nina lettuce + sometimes granny apple OR Fritatta with no crust.  Lunch: 1PM-salad wtih pumpkin seeds +chicken breast + milk  Dinner: 6PM-salad + tomato + avocado + meat OR susie salad + 1/2 peach OR had larger dinner 2 cups milk, crackers 18g, peanut butter  Other: *tried eating higher cho to see if this would help BG but only made it worse.  Beverages: Water, Tea, Coffee, Milk, Other (see Comments)  Please elaborate:: [place]camelia  Has patient met with a dietitian in the past?: No    Being Active:  Being Active Assessed Today: Yes  Exercise:: Yes (11/13-Started exercise program her daughter made for her 2 days ago, plans to do every other day. Jacky can not help wtih walking d/t him being PCA. 12/9-got PT exercises, feels calf raises helped)  Barrier to exercise: Physical limitation (got her new wheelchair in August 24)    Monitoring:  Monitoring Assessed Today: Yes (Fast  this morning)  Did patient bring glucose meter to appointment? : Yes (needs to get hooked up to [facility])  Blood Glucose Meter: CGM  Times checking blood sugar at home (number): 5+  Times checking blood sugar at home (per): Day  Blood glucose trend: Increasing          Taking Medications:  Diabetes Medication(s)       Diabetic Other       glucose (BD GLUCOSE) 4 g chewable tablet Take 1 tablet by mouth every hour as needed for low blood sugar       Insulin       insulin aspart (NOVOLOG PEN) 100 UNIT/ML pen 6 Units twice daily with meals     insulin NPH (NOVOLIN N RELION) 100 UNIT/ML vial INJECT 28 UNITS SUBCUTANEOUSLY IN THE MORNING AND 18 IN THE EVENING        *She is not taking Novolog    Taking Medication Assessed Today: Yes (has decided to not take Novolog.)  Current Treatments: Diet, Insulin Injections  Dose schedule: Pre-breakfast, Pre-dinner (has been sometimes taking insulin after d/t forgetting it.)  Problems taking diabetes medications regularly?: No  Diabetes medication side effects?: Yes (weight  gain)    Problem Solving:  Problem Solving Assessed Today: Yes  Is the patient at risk for hypoglycemia?: Yes  Hypoglycemia Frequency: Rarely    Hypoglycemia Symptoms  Hypoglycemia: None    Hypoglycemia Complications  Hypoglycemia Complications: None    Reducing Risks:  Reducing Risks Assessed Today: Yes  CAD Risks: Diabetes Mellitus, Obesity, Post-menopausal, Sedentary lifestyle    Healthy Coping:  Healthy Coping Assessed Today: Yes  Emotional response to diabetes: Ready to learn  Stage of change: ACTION (Actively working towards change)  Patient Activation Measure Survey Score:       No data to display                  Care Plan and Education Provided:  Healthy Eating: Carbohydrate Counting and discussed very low cho diet, Being Active: Relationship of activity to glucose, Monitoring: Individual glucose targets, Taking Medication: rec increasing insulin but she doesn't want to at this time, Problem Solving: When to call a health care provider, and Reducing Risks: Complications of diabetes, Goal for A1c, how it relates to glucose and how often to check    Kerry Patterson RD, LD, [facility]      Time Spent: 46 minutes  Encounter Type: Individual    Any diabetes medication dose changes were made via the CDE Protocol per the patient's primary care provider. A copy of this encounter was shared with the provider.

## 2024-12-11 DIAGNOSIS — Z79.4 TYPE 2 DIABETES MELLITUS WITH HYPERGLYCEMIA, WITH LONG-TERM CURRENT USE OF INSULIN (H): Primary | ICD-10-CM

## 2024-12-11 DIAGNOSIS — E11.65 TYPE 2 DIABETES MELLITUS WITH HYPERGLYCEMIA, WITH LONG-TERM CURRENT USE OF INSULIN (H): Primary | ICD-10-CM

## 2024-12-31 DIAGNOSIS — I10 ESSENTIAL HYPERTENSION: ICD-10-CM

## 2024-12-31 RX ORDER — HYDROCHLOROTHIAZIDE 12.5 MG/1
12.5 TABLET ORAL DAILY
Qty: 90 TABLET | Refills: 0 | Status: SHIPPED | OUTPATIENT
Start: 2024-12-31

## 2024-12-31 NOTE — TELEPHONE ENCOUNTER
"Request for medication refill:hydroCHLOROthiazide 12.5 MG tablet     Providers if patient needs an appointment and you are willing to give a one month supply please refill for one month and  send a letter/MyChart using \".SMILLIMITEDREFILL\" .smillimited and route chart to \"P SMI \" (Giving one month refill in non controlled medications is strongly recommended before denial)    If refill has been denied, meaning absolutely no refills without visit, please complete the smart phrase \".smirxrefuse\" and route it to the \"P SMI MED REFILLS\"  pool to inform the patient and the pharmacy.    Adenike Sumner RN      "

## 2025-01-09 ENCOUNTER — OFFICE VISIT (OUTPATIENT)
Dept: OPHTHALMOLOGY | Facility: CLINIC | Age: 80
End: 2025-01-09
Attending: OPHTHALMOLOGY
Payer: MEDICARE

## 2025-01-09 DIAGNOSIS — Z79.4 TYPE 2 DIABETES MELLITUS WITH HYPERGLYCEMIA, WITH LONG-TERM CURRENT USE OF INSULIN (H): Primary | ICD-10-CM

## 2025-01-09 DIAGNOSIS — E11.65 TYPE 2 DIABETES MELLITUS WITH HYPERGLYCEMIA, WITH LONG-TERM CURRENT USE OF INSULIN (H): Primary | ICD-10-CM

## 2025-01-09 DIAGNOSIS — H40.1134 PRIMARY OPEN ANGLE GLAUCOMA (POAG) OF BOTH EYES, INDETERMINATE STAGE: ICD-10-CM

## 2025-01-09 DIAGNOSIS — H25.813 MIXED TYPE AGE-RELATED CATARACT, BOTH EYES: ICD-10-CM

## 2025-01-09 DIAGNOSIS — H40.003 GLAUCOMA SUSPECT OF BOTH EYES: ICD-10-CM

## 2025-01-09 PROCEDURE — 92133 CPTRZD OPH DX IMG PST SGM ON: CPT | Performed by: OPHTHALMOLOGY

## 2025-01-09 RX ORDER — DORZOLAMIDE HYDROCHLORIDE AND TIMOLOL MALEATE 20; 5 MG/ML; MG/ML
1 SOLUTION/ DROPS OPHTHALMIC 2 TIMES DAILY
Qty: 10 ML | Refills: 3 | Status: SHIPPED | OUTPATIENT
Start: 2025-01-09

## 2025-01-09 ASSESSMENT — VISUAL ACUITY
OS_PH_SC: 20/60
METHOD: SNELLEN - LINEAR
OD_PH_SC: 20/50
OS_SC: 20/125
OD_SC: 20/80

## 2025-01-09 ASSESSMENT — TONOMETRY
IOP_METHOD: TONOPEN
OD_IOP_MMHG: 29
OS_IOP_MMHG: 28

## 2025-01-09 ASSESSMENT — EXTERNAL EXAM - LEFT EYE: OS_EXAM: NORMAL

## 2025-01-09 ASSESSMENT — SLIT LAMP EXAM - LIDS
COMMENTS: NORMAL
COMMENTS: NORMAL

## 2025-01-09 ASSESSMENT — EXTERNAL EXAM - RIGHT EYE: OD_EXAM: NORMAL

## 2025-01-09 ASSESSMENT — PACHYMETRY
OD_CT(UM): 587
OS_CT(UM): 586
EXAM_DATE: 8/22/2023

## 2025-01-09 NOTE — PROGRESS NOTES
HPI    Vision changed somewhat since last visit os>od - its close to TV.   Itching ou. Denies redness or tearing.   Denies flashes, floaters, curtain, pain.   FBS: 131  timolol once per day both eyes - forgets once every 2-3 wks - last drop instilled yesterday at 8am    Hemoglobin A1C       Date                     Value               Ref Range           Status                06/21/2024               7.8 (H)             0.0 - 5.6 %         Final              Comment:      Lab Results       Component                Value               Date                       A1C                      8.0                 08/14/2023                 A1C                      7.8                 05/05/2023                 A1C                      7.3                 12/01/2021                 A1C                      6.9                 03/10/2021                 A1C                      7.5                 09/23/2020                 A1C                      7.0                 05/01/2020                 A1C                      6.6                 09/04/2019                 A1C                      6.5                 05/31/2019               DAIJA Garcia August 22, 2023 9:45 AM     Poor vision, especially in low lit conditions have made daily activities very difficult.     Last edited by Kacy العراقي, HERSON on 1/9/2025 12:29 PM.          Review of systems for the eyes was negative other than the pertinent positives/negatives listed in the HPI.      Assessment & Plan      Mikayla Timmons is a 79 year old female with the following diagnoses:   1. Type 2 diabetes mellitus with hyperglycemia, with long-term current use of insulin (H)    2. Mixed type age-related cataract, both eyes    3. Glaucoma suspect of both eyes         Last seen 8/2023, missed 6 mo follow up   Here for recheck of cataract and possible glaucoma.  Overdue for dilated fundus exam with Dr. Bosch    Visually significant cataracts in both eyes, with reduction  in acuity since last visit left eye > right eye   Unable to transfer today and unable to obtain visual field or OCT testing due to large wheelchair/habitus    Intraocular pressure elevated both eyes   IOP max 30/28  Pachymetry 587/586  Using timolol daily on most days.  Did not use today   Goal IOP in mid-teens    Stop timolol  Start Cosopt twice a day both eyes   Return to Dr. Rangel for dilated fundus exam.  If cleared for cataract surgery, will place orders  Recommend bilateral same day surgery due to medical complexity and difficulty with transportation and anesthesia risk  Cataract extraction + iStent Infinite both eyes   Goal emmetropia  Reviewed guarded vision potential   Dex/NSAID         Attending Physician Attestation:  Complete documentation of historical and exam elements from today's encounter can be found in the full encounter summary report (not reduplicated in this progress note).  I personally obtained the chief complaint(s) and history of present illness.  I confirmed and edited as necessary the review of systems, past medical/surgical history, family history, social history, and examination findings as documented by others; and I examined the patient myself.  I personally reviewed the relevant tests, images, and reports as documented above.  I formulated and edited as necessary the assessment and plan and discussed the findings and management plan with the patient and family. Attending Physician Image/Tesing Attestation: I personally reviewed the ophthalmic test(s) associated with this encounter, agree with the interpretation(s) as documented by the resident/fellow, and have edited the corresponding report(s) as necessary.  . - Inderjit Conway MD

## 2025-01-09 NOTE — PATIENT INSTRUCTIONS
"Stop timolol.   Start Cosopt twice a day both eyes     Pending evaluation with Dr. Rangel we will plan to schedule cataract surgery for both eyes.  He will let you know if additional treatment is recommended for your retinal disease during his visit.      Warm compresses for 3-5 minutes 1-2 times a day can help with the itchy sensation.    Use one drop of artificial tears both eyes 3-4 x daily.  Continue to use the drops regardless if your eyes are comfortable.  Artificial tears work best as a preventative and not as well after your eyes are starting to bother you.  Preservative-free drops are best if you will be using them more than 6x daily. Some brands include: Celluvisc, Refresh, Systane, Blink, Optive, iVizia. Avoid using any drops that state \"get the red out\". Also, you can use lubricating artificial tear ointment at bedtime in both eyes every night.  Genteal and Refresh PM are preservative-free; generic brands and Lacrilube are not.    It may take 4-6 weeks of using the drops before you notice improvement.  If after that time you are still having problems schedule an appointment for an evaluation and discussion of different treatments which may include medicated eye drops, punctal plugs to keep the tears that are made and supplemented on the surface of the eye longer, or other treatments. Dry eyes are a chronic condition and you may have more symptoms at certain times of the year.   "

## 2025-01-09 NOTE — PROGRESS NOTES
HPI    Vision changed somewhat since last visit os>od - its close to TV.   Itching ou. Denies redness or tearing.   Denies flashes, floaters, curtain, pain.   FBS: 131  timolol once per day both eyes - forgets once every 2-3 wks - last drop instilled yesterday at 8am    Hemoglobin A1C       Date                     Value               Ref Range           Status                06/21/2024               7.8 (H)             0.0 - 5.6 %         Final              Comment:      Lab Results       Component                Value               Date                       A1C                      8.0                 08/14/2023                 A1C                      7.8                 05/05/2023                 A1C                      7.3                 12/01/2021                 A1C                      6.9                 03/10/2021                 A1C                      7.5                 09/23/2020                 A1C                      7.0                 05/01/2020                 A1C                      6.6                 09/04/2019                 A1C                      6.5                 05/31/2019               DAIJA Garcia August 22, 2023 9:45 AM     Poor vision, especially in low lit conditions have made daily activities very difficult.     Last edited by Kacy العراقي, HERSON on 1/9/2025 12:29 PM.          Review of systems for the eyes was negative other than the pertinent positives/negatives listed in the HPI.      Lab Results   Component Value Date    A1C 7.8 06/21/2024    A1C 10.4 01/26/2024    A1C 8.0 08/14/2023    A1C 7.8 05/05/2023    A1C 7.3 12/01/2021    A1C 6.9 03/10/2021    A1C 7.5 09/23/2020    A1C 7.0 05/01/2020    A1C 6.6 09/04/2019    A1C 6.5 05/31/2019     Assessment & Plan      Mikayla Timmons is a 79 year old female with the following diagnoses:   1. Type 2 diabetes mellitus with hyperglycemia, with long-term current use of insulin (H)    2. Mixed type age-related  cataract, both eyes    3. Glaucoma suspect of both eyes       Multifactorial vision loss including diabetic retinopathy, likely glaucoma as well as cataracts in both eyes.    Diabetic Retinopathy: No follow-up with Dr. Rangel since 2023, needs to see him prior to any surgery     Cataracts each eye: Unfortunately, her mobility since last time has not improved and her improvement has plateau'd per orthopedic note on 2024. Discussion at last visit on 3/2023 was to wait for improvement. Says both eyes have worsened since last visit slowly and progressively.   - Follow-up with Dr. Rangel prior to cataract surgery for approval; if approved patient will need surgery in the hospital, and will be glaucoma/cataract intervention in likely both eyes to minimize sedation use in high-risk patient   - Will schedule if approved, patient needs to coordinated with grand daughter to observe her after surgery     Glaucoma suspect: Difficult to determine if vision loss is solely due to glaucoma given retinal ischemia, however will treat with goal IOP in mid-teens. No known family history, only taking timolol once a day. Unable to perform visual fields due to mobility, OCT RNFL in right with worsening in all quadrants since 2023, left with artifact from likely tractional membrane.  - Start Cosopt BID both eyes  - Likely IStent placement with cataract surgery both eyes  - IOP on 2025; 29/28  - IOP max 30/  - Pachymetry 587/586  - OCT RNFL 2025              - right eye: Poor tracing,  from 60              - left eye: Poor tracing due to VMT  - OVF 2023: Overall significant progression since last VF in               - right eye: High fixation losses, superior arcuate with early inferior arcuate               - left eye: Reliable study, superior arcuate with inferior nasal step     Patient disposition:   No follow-ups on file.           [unfilled]     (2) cough or sneeze

## 2025-01-13 DIAGNOSIS — Z79.4 TYPE 2 DIABETES MELLITUS WITH HYPERGLYCEMIA, WITH LONG-TERM CURRENT USE OF INSULIN (H): Primary | ICD-10-CM

## 2025-01-13 DIAGNOSIS — E03.9 HYPOTHYROIDISM, UNSPECIFIED TYPE: ICD-10-CM

## 2025-01-13 DIAGNOSIS — E11.65 TYPE 2 DIABETES MELLITUS WITH HYPERGLYCEMIA, WITH LONG-TERM CURRENT USE OF INSULIN (H): Primary | ICD-10-CM

## 2025-01-14 ENCOUNTER — LAB (OUTPATIENT)
Dept: LAB | Facility: CLINIC | Age: 80
End: 2025-01-14
Payer: MEDICARE

## 2025-01-14 ENCOUNTER — DOCUMENTATION ONLY (OUTPATIENT)
Dept: FAMILY MEDICINE | Facility: CLINIC | Age: 80
End: 2025-01-14

## 2025-01-14 ENCOUNTER — OFFICE VISIT (OUTPATIENT)
Dept: PHARMACY | Facility: CLINIC | Age: 80
End: 2025-01-14

## 2025-01-14 DIAGNOSIS — Z79.4 TYPE 2 DIABETES MELLITUS WITH HYPERGLYCEMIA, WITH LONG-TERM CURRENT USE OF INSULIN (H): ICD-10-CM

## 2025-01-14 DIAGNOSIS — E55.9 VITAMIN D DEFICIENCY, UNSPECIFIED: ICD-10-CM

## 2025-01-14 DIAGNOSIS — E03.9 HYPOTHYROIDISM, UNSPECIFIED TYPE: ICD-10-CM

## 2025-01-14 DIAGNOSIS — E55.9 VITAMIN D DEFICIENCY, UNSPECIFIED: Primary | ICD-10-CM

## 2025-01-14 DIAGNOSIS — E11.65 TYPE 2 DIABETES MELLITUS WITH HYPERGLYCEMIA, WITH LONG-TERM CURRENT USE OF INSULIN (H): ICD-10-CM

## 2025-01-14 LAB
ALBUMIN SERPL-MCNC: 3.6 G/DL (ref 3.4–5)
ALP SERPL-CCNC: 49 U/L (ref 40–150)
ALT SERPL W P-5'-P-CCNC: 13 U/L (ref 0–50)
ANION GAP SERPL CALCULATED.3IONS-SCNC: 7 MMOL/L (ref 3–14)
AST SERPL W P-5'-P-CCNC: 25 U/L (ref 0–45)
BILIRUB SERPL-MCNC: 0.6 MG/DL (ref 0.2–1.3)
BUN SERPL-MCNC: 24 MG/DL (ref 7–30)
CALCIUM SERPL-MCNC: 10.8 MG/DL (ref 8.5–10.1)
CHLORIDE BLD-SCNC: 104 MMOL/L (ref 94–109)
CHOLEST SERPL-MCNC: 214 MG/DL
CO2 SERPL-SCNC: 28 MMOL/L (ref 20–32)
CREAT SERPL-MCNC: 0.9 MG/DL (ref 0.52–1.04)
EGFRCR SERPLBLD CKD-EPI 2021: 64 ML/MIN/1.73M2
EST. AVERAGE GLUCOSE BLD GHB EST-MCNC: 212 MG/DL
FASTING STATUS PATIENT QL REPORTED: NO
GLUCOSE BLD-MCNC: 180 MG/DL (ref 70–99)
HBA1C MFR BLD: 9 % (ref 0–5.6)
HDLC SERPL-MCNC: 43 MG/DL
LDLC SERPL CALC-MCNC: 134 MG/DL
NONHDLC SERPL-MCNC: 171 MG/DL
POTASSIUM BLD-SCNC: 4.2 MMOL/L (ref 3.4–5.3)
PROT SERPL-MCNC: 7.1 G/DL (ref 6.8–8.8)
SODIUM SERPL-SCNC: 139 MMOL/L (ref 135–145)
TRIGL SERPL-MCNC: 186 MG/DL
TSH SERPL DL<=0.005 MIU/L-ACNC: 4.26 UIU/ML (ref 0.3–4.2)
VIT D+METAB SERPL-MCNC: 59 NG/ML (ref 20–50)

## 2025-01-14 NOTE — PROGRESS NOTES
Novolin N:  28 units  18 PM  Draws up from the vial.    CGM: Dexcom G6    Getting calcium in your diet.        .A1c

## 2025-01-14 NOTE — PROGRESS NOTES
"When opening a documentation only encounter, be sure to enter in \"Chief Complaint\" Forms and in \" Comments\" Title of form, description if needed.    Mikayla is a 79 year old  female  Form received via: Fax  Form now resides in: Provider Ready    Lizzette Price, [facility]               "

## 2025-01-15 ENCOUNTER — TRANSFERRED RECORDS (OUTPATIENT)
Dept: MULTI SPECIALTY CLINIC | Facility: CLINIC | Age: 80
End: 2025-01-15

## 2025-01-15 LAB
C PEPTIDE SERPL-MCNC: 2.2 NG/ML (ref 0.9–6.9)
FASTING STATUS PATIENT QL REPORTED: NO
RETINOPATHY: NORMAL

## 2025-02-03 ENCOUNTER — VIRTUAL VISIT (OUTPATIENT)
Dept: EDUCATION SERVICES | Facility: CLINIC | Age: 80
End: 2025-02-03
Payer: MEDICARE

## 2025-02-03 DIAGNOSIS — E11.65 TYPE 2 DIABETES MELLITUS WITH HYPERGLYCEMIA, WITH LONG-TERM CURRENT USE OF INSULIN (H): Primary | ICD-10-CM

## 2025-02-03 DIAGNOSIS — Z79.4 TYPE 2 DIABETES MELLITUS WITH HYPERGLYCEMIA, WITH LONG-TERM CURRENT USE OF INSULIN (H): Primary | ICD-10-CM

## 2025-02-03 PROCEDURE — G0108 DIAB MANAGE TRN  PER INDIV: HCPCS | Mod: 93 | Performed by: DIETITIAN, REGISTERED

## 2025-02-03 NOTE — Clinical Note
Hi! You have helped with this pt in the past, thank you so much :) Since spring will be here soon I wanted to check on the program that I believe I learned about last year where someone can get CSA type baskets with fruits/veg from local farmers?! Does that sound right or do you guys offer anything like that and if so can this pt get put on the list to be signed up. Thanks for any insights and help with this and sorry if I am losing my mind and this is not a thing! Thanks so much :) Kerry Patterson RD, LD, [facility]

## 2025-02-03 NOTE — PATIENT INSTRUCTIONS
Plan  Continue current insulin doses NPH 28/18  Check Clarity robbi every 2 days to assess and make changes as needed  Get weights at PT   Keep tracking in Myfitnesspal, also continue using it to plan meals.  Protein goals: 65-85g+ daily  Kerry will check into farmers market food program

## 2025-02-03 NOTE — PROGRESS NOTES
Diabetes Self-Management Education & Support    Presents for: Follow-up    Type of Service: Telephone Visit    Originating Location (Patient Location): Home  Distant Location (Provider Location): [facility]  Mode of Communication:  Telephone    Telephone Visit Start Time:  8:01  Telephone Visit End Time (telephone visit stop time): 9:02    How would patient like to obtain AVS? Nitish      Assessment  Mikayla here for follow-up visit. Her current TIR is 57%, this is a big improvement from our last few visits and she notes over the last 2 days TIR 74%. She has tried a new method for monitoring BG which includes looking at Clarity robbi every 2 days and figuring out what went well and what she could change. Congratulated her on the big improvement and reviewed our goals of getting her to at least 70% TIR. She has found eating ~15g cho works best for meals and she is ok with very low cho. She is doing NPH 28/18, we will leave the same for now. She is getting exercise with her daily therapy exercises and she sees PT every week. She also has been doing calf raises after eating and this has helped to bring down BG. She is wanting to lose weight, we reviewed protein/calorie goals, seems she is meeting both on myfitness pal (usually eats 1200-1350kcals and 65+g protein). They will be weighing her weekly at therapy so we will monitor this. Did discuss weight mgmt clinic but she has done this before and it did not go well as they wanted her to take more medications which she  did not want to do.     TIR=74% in the last 2 days  Patient's most recent   Lab Results   Component Value Date    A1C 9.0 01/14/2025    A1C 6.9 03/10/2021     is not meeting goal of <7.0    Diabetes knowledge and skills assessment:   Patient is knowledgeable in diabetes management concepts related to: Healthy Eating, Being Active, Monitoring, Taking Medication, Problem Solving, Reducing Risks, and Healthy Coping    Based on learning  assessment above, most appropriate setting for further diabetes education would be: Individual setting.    Care Plan and Education Provided:  Healthy Eating: Carbohydrate Counting, Being Active: Relationship of activity to glucose, Monitoring: Individual glucose targets and Log and interpret results, Taking Medication: continue current doses, and Problem Solving: When to call a health care provider    Patient verbalized understanding of diabetes self-management education concepts discussed, opportunities for ongoing education and support, and recommendations provided today.    Plan  Continue current insulin doses NPH 28/18  Check Clarity robbi every 2 days to assess and make changes as needed  Get weights at PT   Keep tracking in [employer], also continue using it to plan meals.  Protein goals: 65-85g+ daily  Kerry will check into farmers market food program  Topics to cover at upcoming visits: Healthy Eating, Being Active, Monitoring, Taking Medication, and Problem Solving    Follow-up:  Upcoming Diabetes Ed Appointments     Visit Type Date Time Department    DIABETES ED 2/3/2025  8:00 AM RD DIABETIC ED        See Care Plan for co-developed, patient-state behavior change goals.    Education Materials Provided:  No new materials provided today      Subjective/Objective  Mikayla is an 79 year old year old, presenting for the following diabetes education related to: Presents for: Follow-up  Accompanied by: Self  Diabetes education in the past 24mo: Yes  Focus of Visit: Monitoring, Problem Solving  Diabetes type: Type 2  Date of diagnosis: 8-9 years ago  Disease course: Worsening  Transportation concerns: Yes  Other concerns:: Physical impairment  Cultural Influences/Ethnic Background:  Not  or     Diabetes Symptoms & Complications:  Weight trend: Stable (stable over the last week)  Disease course: Worsening  Complications assessed today?: No    Patient Problem List and Family Medical History reviewed for  "relevant medical history, current medical status, and diabetes risk factors.    Vitals:  LMP  (LMP Unknown)   Estimated body mass index is 40.94 kg/m  as calculated from the following:    Height as of 6/21/24: 1.651 m (5' 5\").    Weight as of 9/19/23: 111.6 kg (246 lb).   Last 3 BP:   BP Readings from Last 3 Encounters:   06/21/24 (!) 143/88   01/26/24 135/72   09/22/23 129/71       History   Smoking Status    Never   Smokeless Tobacco    Never       Labs:  Lab Results   Component Value Date    A1C 9.0 01/14/2025    A1C 6.9 03/10/2021     Lab Results   Component Value Date     01/14/2025     03/10/2021     Lab Results   Component Value Date     01/14/2025     03/10/2021     HDL Cholesterol   Date Value Ref Range Status   03/10/2021 54 >49 mg/dL Final     Direct Measure HDL   Date Value Ref Range Status   01/14/2025 43 (L) >=50 mg/dL Final   ]  GFR Estimate   Date Value Ref Range Status   01/14/2025 64 >60 mL/min/1.73m2 Final   03/10/2021 88 >60 mL/min/[1.73_m2] Final     Comment:     Non  GFR Calc  Starting 12/18/2018, serum creatinine based estimated GFR (eGFR) will be   calculated using the Chronic Kidney Disease Epidemiology Collaboration   (CKD-EPI) equation.       GFR Estimate If Black   Date Value Ref Range Status   03/10/2021 >90 >60 mL/min/[1.73_m2] Final     Comment:      GFR Calc  Starting 12/18/2018, serum creatinine based estimated GFR (eGFR) will be   calculated using the Chronic Kidney Disease Epidemiology Collaboration   (CKD-EPI) equation.       Lab Results   Component Value Date    CR 0.90 01/14/2025    CR 0.60 03/10/2021     No results found for: \"MICROALBUMIN\"    Healthy Eating:  Healthy Eating Assessed Today: Yes  Meal planning/habits: Avoiding sweets, Carb counting, Low carb, Keeps food records  Who cooks/prepares meals for you?: Self  Who purchases food in  your home?: Self, Other ([employer] through [employer]. Didnt have a lot of " options d/t finances. Friend comes every saturday to shop, has acct at co-op. Daughter set up delivery service at [employer], buys food from 2 farms (gets egg/chicken/milk/lamb).)  Meals include: Breakfast, Lunch, Dinner  Breakfast: 8AM-egg + britt + 1/2 avocado + coffee + unsweetened coconut milk + egg wrap OR 2 oz cheese + cucumbers/onion + nina lettuce + sometimes granny apple OR Fritatta with no crust.  Lunch: 1PM-salad wtih pumpkin seeds +chicken breast + milk  Dinner: 6PM-salad + tomato + avocado + meat OR susie salad + 1/2 peach OR had larger dinner 2 cups milk, crackers 18g, peanut butter  Other: 2/3/25- has reduced amount eating, based on serving size, only eats 1 serving. Has noticed if she doesn't eat any cho, insulin doesnt work as well. Eating ~15g cho per meal. Doesnt eat after 6pm. Eating 4424-9211 calories per day.  Beverages: Water, Tea, Coffee, Milk  Has patient met with a dietitian in the past?: No    Being Active:  Being Active Assessed Today: Yes (2/3-After meals lifts calf 10-12x, BG goes down by 5 units. Is doing PT exercise.)  Exercise:: Yes (11/13-Started exercise program her daughter made for her 2 days ago, plans to do every other day. Jacky can not help wtih walking d/t him being PCA. 12/9-got PT exercises, feels calf raises helped)  Days per week of moderate to strenuous exercise (like a brisk walk): 7  How intense was your typical exercise? : Light (like stretching or slow walking)  Barrier to exercise: Physical limitation (got her new wheelchair in August 24)    Monitoring:  Monitoring Assessed Today: Yes (Looking at clarity every 2 days to assess how she is doing. Checks BG every 30 min)  Did patient bring glucose meter to appointment? : Yes (needs to get hooked up to clarity robbi)  Blood Glucose Meter: CGM  Times checking blood sugar at home (number): 5+  Times checking blood sugar at home (per): Day  Blood glucose trend: Decreasing              Taking Medications:  Diabetes  Medication(s)       Diabetic Other       glucose (BD GLUCOSE) 4 g chewable tablet Take 1 tablet by mouth every hour as needed for low blood sugar       Insulin       insulin NPH (NOVOLIN N RELION) 100 UNIT/ML vial INJECT 28 UNITS SUBCUTANEOUSLY IN THE MORNING AND 18 IN THE EVENING          Taking Medication Assessed Today: Yes (has decided to not take Novolog.)  Current Treatments: Diet, Insulin Injections  Dose schedule: Pre-breakfast, Pre-dinner   Problems taking diabetes medications regularly?: No  Diabetes medication side effects?: Yes (weight gain)    Kerry Patterson RD  Time Spent: 60 minutes  Encounter Type: Individual    Any diabetes medication dose changes were made via the [facility] Standing Orders under the patient's referring provider.

## 2025-02-03 NOTE — LETTER
2/3/2025         RE: Mikayla Timmons  [address]  [facility] [address]        Dear Colleague,    Thank you for referring your patient, Mikayla Timmons, to the [facility]. Please see a copy of my visit note below.    Diabetes Self-Management Education & Support    Presents for: Follow-up    Type of Service: Telephone Visit    Originating Location (Patient Location): Home  Distant Location (Provider Location): [facility]  Mode of Communication:  Telephone    Telephone Visit Start Time:  8:01  Telephone Visit End Time (telephone visit stop time): 9:02    How would patient like to obtain AVS? MyChart      Assessment  Mikayla here for follow-up visit. Her current TIR is 57%, this is a big improvement from our last few visits and she notes over the last 2 days TIR 74%. She has tried a new method for monitoring BG which includes looking at Clarity robbi every 2 days and figuring out what went well and what she could change. Congratulated her on the big improvement and reviewed our goals of getting her to at least 70% TIR. She has found eating ~15g cho works best for meals and she is ok with very low cho. She is doing NPH 28/18, we will leave the same for now. She is getting exercise with her daily therapy exercises and she sees PT every week. She also has been doing calf raises after eating and this has helped to bring down BG. She is wanting to lose weight, we reviewed protein/calorie goals, seems she is meeting both on myfitness pal (usually eats 1200-1350kcals and 65+g protein). They will be weighing her weekly at therapy so we will monitor this. Did discuss weight mgmt clinic but she has done this before and it did not go well as they wanted her to take more medications which she  did not want to do.     TIR=74% in the last 2 days  Patient's most recent   Lab Results   Component Value Date    A1C 9.0 01/14/2025    A1C 6.9 03/10/2021     is not meeting  goal of <7.0    Diabetes knowledge and skills assessment:   Patient is knowledgeable in diabetes management concepts related to: Healthy Eating, Being Active, Monitoring, Taking Medication, Problem Solving, Reducing Risks, and Healthy Coping    Based on learning assessment above, most appropriate setting for further diabetes education would be: Individual setting.    Care Plan and Education Provided:  Healthy Eating: Carbohydrate Counting, Being Active: Relationship of activity to glucose, Monitoring: Individual glucose targets and Log and interpret results, Taking Medication: continue current doses, and Problem Solving: When to call a health care provider    Patient verbalized understanding of diabetes self-management education concepts discussed, opportunities for ongoing education and support, and recommendations provided today.    Plan  Continue current insulin doses NPH 28/18  Check Clarity robbi every 2 days to assess and make changes as needed  Get weights at PT   Keep tracking in [employer], also continue using it to plan meals.  Protein goals: 65-85g+ daily  Kerry will check into farmers market food program  Topics to cover at upcoming visits: Healthy Eating, Being Active, Monitoring, Taking Medication, and Problem Solving    Follow-up:  Upcoming Diabetes Ed Appointments     Visit Type Date Time Department    DIABETES ED 2/3/2025  8:00 AM RD DIABETIC ED        See Care Plan for co-developed, patient-state behavior change goals.    Education Materials Provided:  No new materials provided today      Subjective/Objective  Mikayla is an 79 year old year old, presenting for the following diabetes education related to: Presents for: Follow-up  Accompanied by: Self  Diabetes education in the past 24mo: Yes  Focus of Visit: Monitoring, Problem Solving  Diabetes type: Type 2  Date of diagnosis: 8-9 years ago  Disease course: Worsening  Transportation concerns: Yes  Other concerns:: Physical impairment  Cultural  "Influences/Ethnic Background:  Not  or     Diabetes Symptoms & Complications:  Weight trend: Stable (stable over the last week)  Disease course: Worsening  Complications assessed today?: No    Patient Problem List and Family Medical History reviewed for relevant medical history, current medical status, and diabetes risk factors.    Vitals:  LMP  (LMP Unknown)   Estimated body mass index is 40.94 kg/m  as calculated from the following:    Height as of 6/21/24: 1.651 m (5' 5\").    Weight as of 9/19/23: 111.6 kg (246 lb).   Last 3 BP:   BP Readings from Last 3 Encounters:   06/21/24 (!) 143/88   01/26/24 135/72   09/22/23 129/71       History   Smoking Status     Never   Smokeless Tobacco     Never       Labs:  Lab Results   Component Value Date    A1C 9.0 01/14/2025    A1C 6.9 03/10/2021     Lab Results   Component Value Date     01/14/2025     03/10/2021     Lab Results   Component Value Date     01/14/2025     03/10/2021     HDL Cholesterol   Date Value Ref Range Status   03/10/2021 54 >49 mg/dL Final     Direct Measure HDL   Date Value Ref Range Status   01/14/2025 43 (L) >=50 mg/dL Final   ]  GFR Estimate   Date Value Ref Range Status   01/14/2025 64 >60 mL/min/1.73m2 Final   03/10/2021 88 >60 mL/min/[1.73_m2] Final     Comment:     Non  GFR Calc  Starting 12/18/2018, serum creatinine based estimated GFR (eGFR) will be   calculated using the Chronic Kidney Disease Epidemiology Collaboration   (CKD-EPI) equation.       GFR Estimate If Black   Date Value Ref Range Status   03/10/2021 >90 >60 mL/min/[1.73_m2] Final     Comment:      GFR Calc  Starting 12/18/2018, serum creatinine based estimated GFR (eGFR) will be   calculated using the Chronic Kidney Disease Epidemiology Collaboration   (CKD-EPI) equation.       Lab Results   Component Value Date    CR 0.90 01/14/2025    CR 0.60 03/10/2021     No results found for: \"MICROALBUMIN\"    Healthy " Eating:  Healthy Eating Assessed Today: Yes  Meal planning/habits: Avoiding sweets, Carb counting, Low carb, Keeps food records  Who cooks/prepares meals for you?: Self  Who purchases food in  your home?: Self, Other ([employer] through [employer]. Didnt have a lot of options d/t finances. Friend comes every saturday to shop, has acct at co-op. Daughter set up delivery service at [employer], buys food from 2 farms (gets egg/chicken/milk/lamb).)  Meals include: Breakfast, Lunch, Dinner  Breakfast: 8AM-egg + britt + 1/2 avocado + coffee + unsweetened coconut milk + egg wrap OR 2 oz cheese + cucumbers/onion + nina lettuce + sometimes granny apple OR Fritatta with no crust.  Lunch: 1PM-salad wtih pumpkin seeds +chicken breast + milk  Dinner: 6PM-salad + tomato + avocado + meat OR susie salad + 1/2 peach OR had larger dinner 2 cups milk, crackers 18g, peanut butter  Other: 2/3/25- has reduced amount eating, based on serving size, only eats 1 serving. Has noticed if she doesn't eat any cho, insulin doesnt work as well. Eating ~15g cho per meal. Doesnt eat after 6pm. Eating 5896-8101 calories per day.  Beverages: Water, Tea, Coffee, Milk  Has patient met with a dietitian in the past?: No    Being Active:  Being Active Assessed Today: Yes (2/3-After meals lifts calf 10-12x, BG goes down by 5 units. Is doing PT exercise.)  Exercise:: Yes (11/13-Started exercise program her daughter made for her 2 days ago, plans to do every other day. Jacky can not help wtih walking d/t him being PCA. 12/9-got PT exercises, feels calf raises helped)  Days per week of moderate to strenuous exercise (like a brisk walk): 7  How intense was your typical exercise? : Light (like stretching or slow walking)  Barrier to exercise: Physical limitation (got her new wheelchair in August 24)    Monitoring:  Monitoring Assessed Today: Yes (Looking at clarity every 2 days to assess how she is doing. Checks BG every 30 min)  Did patient bring glucose meter  to appointment? : Yes (needs to get hooked up to clarity robbi)  Blood Glucose Meter: CGM  Times checking blood sugar at home (number): 5+  Times checking blood sugar at home (per): Day  Blood glucose trend: Decreasing              Taking Medications:  Diabetes Medication(s)       Diabetic Other       glucose (BD GLUCOSE) 4 g chewable tablet Take 1 tablet by mouth every hour as needed for low blood sugar       Insulin       insulin NPH (NOVOLIN N RELION) 100 UNIT/ML vial INJECT 28 UNITS SUBCUTANEOUSLY IN THE MORNING AND 18 IN THE EVENING          Taking Medication Assessed Today: Yes (has decided to not take Novolog.)  Current Treatments: Diet, Insulin Injections  Dose schedule: Pre-breakfast, Pre-dinner   Problems taking diabetes medications regularly?: No  Diabetes medication side effects?: Yes (weight gain)    Kerry Patterson RD  Time Spent: 60 minutes  Encounter Type: Individual    Any diabetes medication dose changes were made via the CDCES Standing Orders under the patient's referring provider.

## 2025-03-03 ENCOUNTER — DOCUMENTATION ONLY (OUTPATIENT)
Dept: FAMILY MEDICINE | Facility: CLINIC | Age: 80
End: 2025-03-03
Payer: MEDICARE

## 2025-03-14 PROBLEM — T84.021D: Status: RESOLVED | Noted: 2022-06-14 | Resolved: 2025-03-14

## 2025-03-14 PROBLEM — D72.829 ELEVATED WHITE BLOOD CELL COUNT, UNSPECIFIED: Status: RESOLVED | Noted: 2022-06-14 | Resolved: 2025-03-14

## 2025-03-14 PROBLEM — E55.9 VITAMIN D DEFICIENCY, UNSPECIFIED: Status: RESOLVED | Noted: 2022-06-14 | Resolved: 2025-03-14

## 2025-03-14 PROBLEM — G56.00 CARPAL TUNNEL SYNDROME, UNSPECIFIED UPPER LIMB: Status: RESOLVED | Noted: 2022-06-14 | Resolved: 2025-03-14

## 2025-03-14 PROBLEM — R27.8 OTHER LACK OF COORDINATION: Status: RESOLVED | Noted: 2022-06-14 | Resolved: 2025-03-14

## 2025-03-14 PROBLEM — D62 ACUTE POSTHEMORRHAGIC ANEMIA: Status: RESOLVED | Noted: 2022-06-14 | Resolved: 2025-03-14

## 2025-03-14 PROBLEM — L97.911 ULCER OF RIGHT LEG, LIMITED TO BREAKDOWN OF SKIN (H): Status: ACTIVE | Noted: 2025-03-14

## 2025-03-14 PROBLEM — L97.911 ULCER OF RIGHT LEG, LIMITED TO BREAKDOWN OF SKIN (H): Status: RESOLVED | Noted: 2025-03-14 | Resolved: 2025-03-14

## 2025-03-14 PROBLEM — E03.9 HYPOTHYROIDISM, UNSPECIFIED: Status: RESOLVED | Noted: 2022-06-14 | Resolved: 2025-03-14

## 2025-03-18 ENCOUNTER — OFFICE VISIT (OUTPATIENT)
Dept: ORTHOPEDICS | Facility: CLINIC | Age: 80
End: 2025-03-18
Payer: MEDICARE

## 2025-03-18 DIAGNOSIS — I89.0 LYMPHEDEMA: ICD-10-CM

## 2025-03-18 DIAGNOSIS — L60.3 NAIL DYSTROPHY: ICD-10-CM

## 2025-03-18 DIAGNOSIS — I73.89 OTHER SPECIFIED PERIPHERAL VASCULAR DISEASES: Primary | ICD-10-CM

## 2025-03-18 DIAGNOSIS — I73.9 PVD (PERIPHERAL VASCULAR DISEASE): ICD-10-CM

## 2025-03-18 PROCEDURE — G0127 TRIM NAIL(S): HCPCS | Mod: Q8 | Performed by: PODIATRIST

## 2025-03-18 NOTE — LETTER
3/18/2025      Mikayla Timmons  [address]  [facility] [address]      Dear Colleague,    Thank you for referring your patient, Mikayla Timmons, to the [facility] [place]. Please see a copy of my visit note below.    Chief Complaint   Patient presents with     Follow Up     Diabetic foot care.        HPI: Mikayla is a 79 year old female who presents today for a diabetic foot exam and management.  She was seen last year.  She has moved back into her house. No new LE complaints. She sees Dr. Heaton for her diabetic care and was last seen 3/14/25. She has had no ulcers on her legs.     Review of Systems: No n/v/d/f/c/ns/sob/cp    PMH: LBP  Postpolio syndrome  Carpal Tunnel  T2DM  HTN    Past Surgical History:   Procedure Laterality Date     HIP SURGERY      removed left hip bone         Allergies: Hydromorphone and Ketamine    SH:   Social History     Socioeconomic History     Marital status:      Spouse name: Not on file     Number of children: Not on file     Years of education: Not on file     Highest education level: Not on file   Occupational History     Not on file   Tobacco Use     Smoking status: Never     Smokeless tobacco: Never   Substance and Sexual Activity     Alcohol use: Yes     Comment: Lightly     Drug use: Never     Sexual activity: Not on file   Other Topics Concern     Parent/sibling w/ CABG, MI or angioplasty before 65F 55M? Not Asked   Social History Narrative     Not on file     Social Drivers of Health     Financial Resource Strain: Low Risk  (3/14/2025)    Financial Resource Strain      Within the past 12 months, have you or your family members you live with been unable to get utilities (heat, electricity) when it was really needed?: No   Food Insecurity: Low Risk  (3/14/2025)    Food Insecurity      Within the past 12 months, did you worry that your food would run out before you got money to buy more?: No      Within the past 12 months, did the  food you bought just not last and you didn t have money to get more?: No   Transportation Needs: High Risk (3/14/2025)    Transportation Needs      Within the past 12 months, has lack of transportation kept you from medical appointments, getting your medicines, non-medical meetings or appointments, work, or from getting things that you need?: Yes   Physical Activity: Not on file   Stress: Not on file   Social Connections: Not on file   Interpersonal Safety: Low Risk  (6/21/2024)    Interpersonal Safety      Do you feel physically and emotionally safe where you currently live?: Yes      Within the past 12 months, have you been hit, slapped, kicked or otherwise physically hurt by someone?: No      Within the past 12 months, have you been humiliated or emotionally abused in other ways by your partner or ex-partner?: No   Housing Stability: Low Risk  (3/14/2025)    Housing Stability      Do you have housing? : Yes      Are you worried about losing your housing?: No       FH:   Family History   Problem Relation Age of Onset     Cancer No family hx of      Diabetes No family hx of      Glaucoma No family hx of      Hypertension No family hx of      Macular Degeneration No family hx of      Cerebrovascular Disease No family hx of      Thyroid Disease No family hx of        Objective:    Hemoglobin A1C   Date Value Ref Range Status   01/14/2025 9.0 (H) 0.0 - 5.6 % Final     Comment:     Normal <5.7%   Prediabetes 5.7-6.4%    Diabetes 6.5% or higher     Note: Adopted from ADA consensus guidelines.   06/21/2024 7.8 (H) 0.0 - 5.6 % Final     Comment:     Normal <5.7%   Prediabetes 5.7-6.4%    Diabetes 6.5% or higher     Note: Adopted from ADA consensus guidelines.   01/26/2024 10.4 (H) 0.0 - 5.6 % Final     Comment:     Normal <5.7%   Prediabetes 5.7-6.4%    Diabetes 6.5% or higher     Note: Adopted from ADA consensus guidelines.   03/10/2021 6.9 (H) 4.1 - 5.7 % Final   09/23/2020 7.5 (H) 4.1 - 5.7 % Final   05/01/2020 7.0 (H)  4.1 - 5.7 % Final         PT pulses are not palpable 2/2 edema and DP pulses are 2/4 bilaterally. Hemosiderin deposits BL legs.  CRT is WNL. Diminished pedal hair.   Gross sensation is intact bilaterally. Protective sensation is normal except at the plantar halluces at the last visit. Intact intact.   Equinus is noted bilaterally. No pain with active or passive ROM of the ankle, MTJ, 1st ray, or halluces bilaterally,.   Nails elongated and dystrophic bilaterally. No open lesions are noted.     Assessment: DM2 with neuropathy - controlled.   Nail dystrophy x 10.    Plan:  - Pt seen and evaluated.  - Nails trimmed x 10.   - See again in 3 months.           Again, thank you for allowing me to participate in the care of your patient.        Sincerely,        Swapnil Baez DPM    Electronically signed

## 2025-03-18 NOTE — PROGRESS NOTES
Chief Complaint   Patient presents with    Follow Up     Diabetic foot care.        HPI: Mikayla is a 79 year old female who presents today for a diabetic foot exam and management.  She was seen last year.  She has moved back into her house. No new LE complaints. She sees Dr. Heaton for her diabetic care and was last seen 3/14/25. She has had no ulcers on her legs.     Review of Systems: No n/v/d/f/c/ns/sob/cp    PMH: LBP  Postpolio syndrome  Carpal Tunnel  T2DM  HTN    Past Surgical History:   Procedure Laterality Date    HIP SURGERY      removed left hip bone         Allergies: Hydromorphone and Ketamine    SH:   Social History     Socioeconomic History    Marital status:      Spouse name: Not on file    Number of children: Not on file    Years of education: Not on file    Highest education level: Not on file   Occupational History    Not on file   Tobacco Use    Smoking status: Never    Smokeless tobacco: Never   Substance and Sexual Activity    Alcohol use: Yes     Comment: Lightly    Drug use: Never    Sexual activity: Not on file   Other Topics Concern    Parent/sibling w/ CABG, MI or angioplasty before 65F 55M? Not Asked   Social History Narrative    Not on file     Social Drivers of Health     Financial Resource Strain: Low Risk  (3/14/2025)    Financial Resource Strain     Within the past 12 months, have you or your family members you live with been unable to get utilities (heat, electricity) when it was really needed?: No   Food Insecurity: Low Risk  (3/14/2025)    Food Insecurity     Within the past 12 months, did you worry that your food would run out before you got money to buy more?: No     Within the past 12 months, did the food you bought just not last and you didn t have money to get more?: No   Transportation Needs: High Risk (3/14/2025)    Transportation Needs     Within the past 12 months, has lack of transportation kept you from medical appointments, getting your medicines, non-medical  meetings or appointments, work, or from getting things that you need?: Yes   Physical Activity: Not on file   Stress: Not on file   Social Connections: Not on file   Interpersonal Safety: Low Risk  (6/21/2024)    Interpersonal Safety     Do you feel physically and emotionally safe where you currently live?: Yes     Within the past 12 months, have you been hit, slapped, kicked or otherwise physically hurt by someone?: No     Within the past 12 months, have you been humiliated or emotionally abused in other ways by your partner or ex-partner?: No   Housing Stability: Low Risk  (3/14/2025)    Housing Stability     Do you have housing? : Yes     Are you worried about losing your housing?: No       FH:   Family History   Problem Relation Age of Onset    Cancer No family hx of     Diabetes No family hx of     Glaucoma No family hx of     Hypertension No family hx of     Macular Degeneration No family hx of     Cerebrovascular Disease No family hx of     Thyroid Disease No family hx of        Objective:    Hemoglobin A1C   Date Value Ref Range Status   01/14/2025 9.0 (H) 0.0 - 5.6 % Final     Comment:     Normal <5.7%   Prediabetes 5.7-6.4%    Diabetes 6.5% or higher     Note: Adopted from ADA consensus guidelines.   06/21/2024 7.8 (H) 0.0 - 5.6 % Final     Comment:     Normal <5.7%   Prediabetes 5.7-6.4%    Diabetes 6.5% or higher     Note: Adopted from ADA consensus guidelines.   01/26/2024 10.4 (H) 0.0 - 5.6 % Final     Comment:     Normal <5.7%   Prediabetes 5.7-6.4%    Diabetes 6.5% or higher     Note: Adopted from ADA consensus guidelines.   03/10/2021 6.9 (H) 4.1 - 5.7 % Final   09/23/2020 7.5 (H) 4.1 - 5.7 % Final   05/01/2020 7.0 (H) 4.1 - 5.7 % Final         PT pulses are not palpable 2/2 edema and DP pulses are 2/4 bilaterally. Hemosiderin deposits BL legs.  CRT is WNL. Diminished pedal hair.   Gross sensation is intact bilaterally. Protective sensation is normal except at the plantar halluces at the last visit.  Intact intact.   Equinus is noted bilaterally. No pain with active or passive ROM of the ankle, MTJ, 1st ray, or halluces bilaterally,.   Nails elongated and dystrophic bilaterally. No open lesions are noted.     Assessment: DM2 with neuropathy - controlled.   Nail dystrophy x 10.    Plan:  - Pt seen and evaluated.  - Nails trimmed x 10.   - See again in 3 months.

## 2025-03-21 ENCOUNTER — MYC MEDICAL ADVICE (OUTPATIENT)
Dept: FAMILY MEDICINE | Facility: CLINIC | Age: 80
End: 2025-03-21
Payer: MEDICARE

## 2025-03-25 NOTE — TELEPHONE ENCOUNTER
DME order for Home Blood Pressure Monitor successfully faxed to [facility] ([phone]).    Marimar Camp  Lead Care Coordinator  [facility]  [phone]

## 2025-03-26 DIAGNOSIS — I10 ESSENTIAL HYPERTENSION: ICD-10-CM

## 2025-03-26 RX ORDER — HYDROCHLOROTHIAZIDE 12.5 MG/1
12.5 TABLET ORAL DAILY
Qty: 90 TABLET | Refills: 0 | Status: SHIPPED | OUTPATIENT
Start: 2025-03-26

## 2025-03-26 NOTE — TELEPHONE ENCOUNTER
"Request for medication refill:    Medication Name: ydroCHLOROthiazide 12.5 MG tablet    Providers if patient needs an appointment and you are willing to give a one month supply please refill for one month and  send a MyChart using \".SMILLIMITEDREFILL\" .Or route chart to \"P [facility] \" . To call patient and inform of limited refill and providers request to make an appointment. (Giving one month refill in non controlled medications is strongly recommended before denial)    If refill has been denied, meaning absolutely no refills without visit, please complete the smart phrase \".SMIRXREFUSE\" and route it to the \"P SMI MED REFILLS\"  pool to inform the patient and the pharmacy.    Varinder Hopkins MA    "

## 2025-04-01 ENCOUNTER — OFFICE VISIT (OUTPATIENT)
Dept: OPHTHALMOLOGY | Facility: CLINIC | Age: 80
End: 2025-04-01
Attending: OPHTHALMOLOGY
Payer: MEDICARE

## 2025-04-01 DIAGNOSIS — H43.393 VITREOUS SYNERESIS OF BOTH EYES: Primary | ICD-10-CM

## 2025-04-01 PROCEDURE — G0463 HOSPITAL OUTPT CLINIC VISIT: HCPCS | Performed by: OPHTHALMOLOGY

## 2025-04-01 PROCEDURE — 92250 FUNDUS PHOTOGRAPHY W/I&R: CPT | Performed by: OPHTHALMOLOGY

## 2025-04-01 PROCEDURE — 92134 CPTRZ OPH DX IMG PST SGM RTA: CPT | Performed by: OPHTHALMOLOGY

## 2025-04-01 ASSESSMENT — EXTERNAL EXAM - LEFT EYE: OS_EXAM: NORMAL

## 2025-04-01 ASSESSMENT — SLIT LAMP EXAM - LIDS
COMMENTS: NORMAL
COMMENTS: NORMAL

## 2025-04-01 ASSESSMENT — CONF VISUAL FIELD
OD_INFERIOR_TEMPORAL_RESTRICTION: 0
OD_SUPERIOR_TEMPORAL_RESTRICTION: 3
OS_INFERIOR_NASAL_RESTRICTION: 3
OD_SUPERIOR_NASAL_RESTRICTION: 3
OS_SUPERIOR_NASAL_RESTRICTION: 3
OD_INFERIOR_NASAL_RESTRICTION: 3
OS_SUPERIOR_TEMPORAL_RESTRICTION: 3
OS_INFERIOR_TEMPORAL_RESTRICTION: 1

## 2025-04-01 ASSESSMENT — VISUAL ACUITY
OD_SC: 20/80
METHOD: SNELLEN - LINEAR
OS_PH_SC: 20/80
OD_SC+: -1
OS_PH_SC+: +2
OS_SC: 20/400

## 2025-04-01 ASSESSMENT — TONOMETRY
OS_IOP_MMHG: 16
IOP_METHOD: TONOPEN
OD_IOP_MMHG: 20

## 2025-04-01 ASSESSMENT — CUP TO DISC RATIO
OS_RATIO: 0.4
OD_RATIO: 0.4

## 2025-04-01 ASSESSMENT — EXTERNAL EXAM - RIGHT EYE: OD_EXAM: NORMAL

## 2025-04-01 NOTE — NURSING NOTE
Chief Complaints and History of Present Illnesses   Patient presents with    Follow Up     Chief Complaint(s) and History of Present Illness(es)       Follow Up               Comments    Patient states that her vision is the same since she was here last. She states that her eyes get dry. Patient states that she had a fall last Friday. She states that since she fell things have just been harder to do.     Ocular Meds:Cosopt twice a day both eyes   Refresh tears   FBS:127 this morning  Lab Results       Component                Value               Date                       A1C                      9.0                 01/14/2025                 A1C                      7.8                 06/21/2024                 A1C                      10.4                01/26/2024                 A1C                      8.0                 08/14/2023                 A1C                      7.8                 05/05/2023                 A1C                      6.9                 03/10/2021                 A1C                      7.5                 09/23/2020                 A1C                      7.0                 05/01/2020                 A1C                      6.6                 09/04/2019                 A1C                      6.5                 05/31/2019              Marco BAINS, April 1, 2025 9:27 AM

## 2025-04-02 NOTE — PROGRESS NOTES
CC -   PDR OU    INTERVAL HISTORY - LV with me in 4/2023, here for retina eval prior to CE/IOL with iStent OU        PMH -   Mikayla Timmons is a 79 year old  patient with PDR OS referred by Dr Lan.  Developed DM II ~ 2010, seen for eye exams by Dr. Nini Velasquez and Saida Zaragoza @ [facility] in 2011, then no eye exam until 2021.  [facility] had declined OU gradually progressively, patient discussed with PMD in 2021 and was referred to eye clinic. Found to have PDR OS given PRP    OAG suspect and cataracts followed by Zoraida      PAST OCULAR SURGERY  PRP OS 4/20/21 & 1/24/22    RETINAL IMAGING:  OCT 04/01/25   OD - tr DME, mild RPE elevations PHF attached - stable  OS - tr DME central, traction IT macula with severe CME - stable    FA 4-24-23  OD - mild macula ischemia, moderate peripheral ischemia no NV  OS - (transit) mild/mod macular ischemia, moderate peripheral ischemia with scattered NVE temporal & nasal, PRP with no sig unTx ischemia    ASSESSMENT & PLAN    # 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   - BP/BG control     - check FA next visit in 6 months   - could consider checking carotid U/S given asymmetry      # PDR OS with DM II and DME   - PRP x 3 in 2021 & 2022 (~875 spots total)   - stable today quiescent   - monitor    # DME OU   - very mild on OCT      # 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   - no RD, not threatenign fovea   - mild progreession 4/2023 c/t 1/2022   - mild progression 4/2025 c/t 4/2023       - continue to monitor, still no RD, not threatening fovea   - recheck 6 months      # VH OS   - mild in 2021   - resolved 2022        # NS/PSC OU   - visually significant   - OK to proceed with CE/IOL      # OAG suspect/OHT   - IOP > 20, moderate CDR, thick pachmetry   - sees Zoraida last 1/2025 planned iStent with cataract surgery      # low vision   - unclear if d/t cataract      Return in about 6 months (around 10/1/2025) for DFE OU, OCT OU, FA OU (transit OD, Optos), Optos Photo.     ATTESTATION     Attending  Attestation:     Complete documentation of historical and exam elements from today's encounter can be found in the full encounter summary report (not reduplicated in this progress note).  I personally obtained the chief complaint(s) and history of present illness.  I confirmed and edited as necessary the review of systems, past medical/surgical history, family history, social history, and examination findings as documented by others; and I examined the patient myself.  I personally reviewed the relevant tests, images, and reports as documented above.  I formulated and edited as necessary the assessment and plan and discussed the findings and management plan with the patient and family    Chelsi Rangel MD, PhD  , Vitreoretinal Surgery  Department of Ophthalmology  [facility]

## 2025-04-07 ENCOUNTER — VIRTUAL VISIT (OUTPATIENT)
Dept: EDUCATION SERVICES | Facility: CLINIC | Age: 80
End: 2025-04-07
Payer: MEDICARE

## 2025-04-07 DIAGNOSIS — E11.65 TYPE 2 DIABETES MELLITUS WITH HYPERGLYCEMIA, WITH LONG-TERM CURRENT USE OF INSULIN (H): Primary | ICD-10-CM

## 2025-04-07 DIAGNOSIS — Z79.4 TYPE 2 DIABETES MELLITUS WITH HYPERGLYCEMIA, WITH LONG-TERM CURRENT USE OF INSULIN (H): Primary | ICD-10-CM

## 2025-04-07 PROCEDURE — G0108 DIAB MANAGE TRN  PER INDIV: HCPCS | Mod: 93 | Performed by: DIETITIAN, REGISTERED

## 2025-04-07 RX ORDER — HUMAN INSULIN 100 [IU]/ML
INJECTION, SUSPENSION SUBCUTANEOUS
Qty: 40 ML | Refills: 3 | Status: SHIPPED | OUTPATIENT
Start: 2025-04-07

## 2025-04-07 NOTE — PATIENT INSTRUCTIONS
Plan  NPH 28/18- if fasting  or greater increase NPH in Am to 31 units  Continue working on diet and movement as able  Use Dexcom as a guide for food choices  Follow-up next month 5/5
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
## 2025-04-17 ENCOUNTER — TELEPHONE (OUTPATIENT)
Dept: EDUCATION SERVICES | Facility: CLINIC | Age: 80
End: 2025-04-17
Payer: MEDICARE

## 2025-04-17 DIAGNOSIS — E03.9 HYPOTHYROIDISM, UNSPECIFIED TYPE: ICD-10-CM

## 2025-04-17 DIAGNOSIS — Z79.4 TYPE 2 DIABETES MELLITUS WITH HYPERGLYCEMIA, WITH LONG-TERM CURRENT USE OF INSULIN (H): Primary | ICD-10-CM

## 2025-04-17 DIAGNOSIS — E11.65 TYPE 2 DIABETES MELLITUS WITH HYPERGLYCEMIA, WITH LONG-TERM CURRENT USE OF INSULIN (H): Primary | ICD-10-CM

## 2025-04-17 NOTE — TELEPHONE ENCOUNTER
Called Mikayla back. Let her know she does not need to do another C-peptide, this lab is ok non-fasting.    She verbalized understanding.     Kerry Patterson RD, LD, [facility]

## 2025-04-17 NOTE — TELEPHONE ENCOUNTER
[facility] Call Center    Phone Message    May a detailed message be left on voicemail: yes     Reason for Call: Other: Pt requesting call back from Kerry Patterson. Pt called and stated Kerry recommedned a fasting lab test and pt is not sure how she can do that test if pt is on insulin. Pt requesting call back today after 12pm if possible as pt has an appt with her PCP tomorrow

## 2025-05-03 ENCOUNTER — HEALTH MAINTENANCE LETTER (OUTPATIENT)
Age: 80
End: 2025-05-03

## 2025-05-13 ENCOUNTER — DOCUMENTATION ONLY (OUTPATIENT)
Dept: OPHTHALMOLOGY | Facility: CLINIC | Age: 80
End: 2025-05-13
Payer: MEDICARE

## 2025-05-13 ENCOUNTER — OFFICE VISIT (OUTPATIENT)
Dept: OPHTHALMOLOGY | Facility: CLINIC | Age: 80
End: 2025-05-13
Attending: OPHTHALMOLOGY
Payer: MEDICARE

## 2025-05-13 DIAGNOSIS — H25.813 MIXED TYPE AGE-RELATED CATARACT, BOTH EYES: Primary | ICD-10-CM

## 2025-05-13 DIAGNOSIS — H40.1132 PRIMARY OPEN-ANGLE GLAUCOMA, BILATERAL, MODERATE STAGE: ICD-10-CM

## 2025-05-13 PROCEDURE — G2211 COMPLEX E/M VISIT ADD ON: HCPCS | Mod: 59 | Performed by: OPHTHALMOLOGY

## 2025-05-13 PROCEDURE — G0463 HOSPITAL OUTPT CLINIC VISIT: HCPCS | Performed by: OPHTHALMOLOGY

## 2025-05-13 PROCEDURE — 99214 OFFICE O/P EST MOD 30 MIN: CPT | Performed by: OPHTHALMOLOGY

## 2025-05-13 ASSESSMENT — VISUAL ACUITY
OS_PH_SC: 20/70
OD_PH_SC: 20/60
OD_SC: 20/300
OS_SC: 20/400
METHOD: SNELLEN - LINEAR

## 2025-05-13 ASSESSMENT — REFRACTION_MANIFEST
OS_SPHERE: +4.25
OD_AXIS: 005
OS_CYLINDER: +1.50
OD_SPHERE: +2.00
OS_CYLINDER: +1.50
OS_SPHERE: +1.75
OS_AXIS: 180
OD_CYLINDER: +1.25
OD_CYLINDER: +1.25
OS_AXIS: 180
OD_SPHERE: +4.50
OD_AXIS: 005

## 2025-05-13 ASSESSMENT — CUP TO DISC RATIO
OD_RATIO: 0.4
OS_RATIO: 0.4

## 2025-05-13 ASSESSMENT — SLIT LAMP EXAM - LIDS
COMMENTS: NORMAL
COMMENTS: NORMAL

## 2025-05-13 ASSESSMENT — REFRACTION_WEARINGRX
OS_ADD: +2.50
OD_CYLINDER: +1.25
OD_SPHERE: +1.00
OS_SPHERE: +1.25
OD_ADD: +2.50
OS_AXIS: 173
OS_CYLINDER: +1.50
OD_AXIS: 151

## 2025-05-13 ASSESSMENT — TONOMETRY
OS_IOP_MMHG: 14
OD_IOP_MMHG: 16
IOP_METHOD: TONOPEN

## 2025-05-13 ASSESSMENT — EXTERNAL EXAM - LEFT EYE: OS_EXAM: NORMAL

## 2025-05-13 ASSESSMENT — EXTERNAL EXAM - RIGHT EYE: OD_EXAM: NORMAL

## 2025-05-13 NOTE — NURSING NOTE
Chief Complaints and History of Present Illnesses   Patient presents with    Glaucoma Suspect Follow Up     Chief Complaint(s) and History of Present Illness(es)       Glaucoma Suspect Follow Up              Laterality: both eyes              Comments    Pt. States that VA continues to worsen BE. No pain BE. Would like to talk about cataract surgery plan. Patient states that she doesn't have any family to be with her the day of surgery. Will need to be inpatient the night and day after surgery. No flashes or floaters BE. Pt. Would like to be able to read without glasses after cataract surgery.   Clarisa Garcia COT 8:57 AM May 13, 2025

## 2025-05-13 NOTE — PROGRESS NOTES
HPI       Glaucoma Suspect Follow Up    In both eyes.             Comments    Pt. States that VA continues to worsen BE. No pain BE. Would like to talk about cataract surgery plan. Patient states that she doesn't have any family to be with her the day of surgery. Will need to be inpatient the night and day after surgery. No flashes or floaters BE. Pt. Would like to be able to read without glasses after cataract surgery.   Clarisa Garcia COT 8:57 AM May 13, 2025             Last edited by Clarisa Garcia on 5/13/2025  9:16 AM.          Review of systems for the eyes was negative other than the pertinent positives/negatives listed in the HPI.      Assessment & Plan      Mikayla Timmons is a 79 year old female with the following diagnoses:   1. 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 Primary open-angle glaucoma, bilateral, moderate stage         Here for intraocular pressure recheck   Seen with Dr. Rangel and given the ok to proceed with cataract surgery scheduling  Noting her vision continues to worsen and she has lost her glasses.  She is unable to afford buying new.  She is also very concerned about surgery as she does not have family or a responsible adult that she can have available on the day of surgery.      Stopped timolol and switched to Cosopt twice a day both eyes last visit  Intraocular pressure improved both eyes   IOP max 30/28  Intraocular pressure today 16/14 mm Hg   Pachymetry 587/586  Goal IOP in mid-teens    Visually significant cataracts in both eyes  Recommend bilateral same day surgery due to medical complexity and difficulty with transportation and anesthesia risk  SW consult placed to help with transportation and obtaining medical observer or approval for overnight observation in the hospital PostOp     Risks, benefits and alternatives to cataract extraction/IOL implantation discussed; consent obtained.  Will schedule surgery today  Cataract extraction + iStent Infinite both eyes    Goal emmetropia  Reviewed guarded vision potential   Dex/NSAID         Attending Physician Attestation:  Complete documentation of historical and exam elements from today's encounter can be found in the full encounter summary report (not reduplicated in this progress note).  I personally obtained the chief complaint(s) and history of present illness.  I confirmed and edited as necessary the review of systems, past medical/surgical history, family history, social history, and examination findings as documented by others; and I examined the patient myself.  I personally reviewed the relevant tests, images, and reports as documented above.  I formulated and edited as necessary the assessment and plan and discussed the findings and management plan with the patient and family. .Today with Mikayla Timmons , I reviewed the indications, risks, benefits, and alternatives of the proposed surgical procedure including, but not limited to, failure obtain the desired result  and need for additional surgery, bleeding, infection, loss of vision, loss of the eye, and the remote possibility of permanent damage to any organ system or death with the use of anesthesia.  I provided multiple opportunities for the questions, answered all questions to the best of my ability, and confirmed that my answers and my discussion were understood.    The longitudinal plan of care for the diagnosis(es)/condition(s) as documented were addressed during this visit. Due to the added complexity in care, I will continue to support Mikayla Timmons in the subsequent management and with the ongoing continuity of care  - Inderjit Conway MD

## 2025-05-13 NOTE — PROGRESS NOTES
Spoke with patient in clinic to schedule surgery with Dr. Conway    Spoke with: Mikayla (patient)    Date(s) of Surgery: 7/23/25    Patient aware of approximate arrival time: Yes at 0600     Tissue: Not Applicable Ordered: Not Applicable     Location of surgery: [facility]      Pre-Op H&P: [facility] at [facility]     Informed patient that they need to call to schedule pre-op H&P within 30 days of surgery date: Yes    Post-Op Appt Dates:      Discussed with patient pre-op RN will call 2-3 days prior to surgery with arrival time and instructions:  Yes       Standard Surgery Packet Sent: Yes 05/13/25  via Received in clinic by Writer       Additional Information Sent in Packet: Post-op Appointment Itinerary    Informed patient that they will need an adult  to bring patient home from surgery: Yes  : [employer] with a PCA         Additional Comments:  Staff message sent out to  to assist with helping patient with  to stay the night with patient a their home.      All patients questions were answered and was instructed to review surgical packet and call back [phone] with any questions or concerns.       Hermila Neumann on 5/13/2025 at 12:55 PM

## 2025-05-13 NOTE — NURSING NOTE
Chief Complaints and History of Present Illnesses   Patient presents with    Glaucoma Suspect Follow Up     Chief Complaint(s) and History of Present Illness(es)       Glaucoma Suspect Follow Up              Laterality: both eyes              Comments    Pt. States that VA continues to worsen BE. No pain BE. Would like to talk about cataract surgery plan. Patient states that she doesn't have any family to be with her the day of surgery. Will need to be inpatient the night and day after surgery. No flashes or floaters BE.   Clarisa Garcia COT 8:57 AM May 13, 2025

## 2025-05-13 NOTE — CONFIDENTIAL NOTE
Writer has contacted SHIVANI Olivier to reach out to  Bonny HUNT in regards to this patient.  And their needs per Dr. Conway's request. Hermila Neumann on 5/13/2025 at 1:27 PM

## 2025-05-14 ENCOUNTER — PATIENT OUTREACH (OUTPATIENT)
Dept: CARE COORDINATION | Facility: CLINIC | Age: 80
End: 2025-05-14
Payer: MEDICARE

## 2025-05-14 NOTE — PROGRESS NOTES
Social Work - Telephone/MyChart message  [facility]  Data:   Patient Name: Mikayla Timmons  Goes By: Mikayla EDGAR/Age: 1945 (79 year old)      Referral Source: Ophthalmology     Reason for Referral: caregiver     Intervention: Left voice message for patient on 2025.   Plan:  will await patient's return phone call/message and provide assistance at that time.    will attempt another outreach before closing the referral. Provided patient/family with writer's contact information and availability.   Bonny Chen [facility]  Medical Social Worker    [facility]     [address] [place] [id number]  [email or web address]  Gender pronouns: she/her   Employed by [employer] [facility]

## 2025-05-15 ENCOUNTER — TELEPHONE (OUTPATIENT)
Dept: OPTOMETRY | Facility: CLINIC | Age: 80
End: 2025-05-15

## 2025-05-15 ENCOUNTER — PATIENT OUTREACH (OUTPATIENT)
Dept: CARE COORDINATION | Facility: CLINIC | Age: 80
End: 2025-05-15
Payer: MEDICARE

## 2025-05-15 NOTE — TELEPHONE ENCOUNTER
"Patient reached out to writer after getting SHIVANI Olivier's messages. Pt was very upset and confused by the message from SHIVANI Olivier, as to what is going on with her surgery. Pt doesn't want to cancel this surgery with Dr. Conway. Patient was able to reach out to her neighbor Latoya (new neighbor that pt adriana forgot about) to check in on her after the surgery. Pt also checked with her elderly waiver to see if there was compensation available for Latoya checking in on her. Writer reminded patient they will also need someone to ride home with them on [employer], or pt needs to arrange for a medical taxi to take them home. Pt said \"good grief\". Writer suggested reaching out to Medicare to see what coverage they would have for a medicab, but pt was insistent, they would not cover anything. Writer went over surgery date and location again with patient. Writer asked pt is they would want a daina lift present to transfer to surgical bed, pt said they would prefer NOT to have a daina and that they can take a few steps but then would need help rotating into the bed. Reminded pt that they would need a pre-op within the 30 days prior to surgery and to make sure they discuss medications at that appt. Pt acknowledged understanding. Hermila Neumann on 5/15/2025 at 11:55 AM    "

## 2025-05-15 NOTE — TELEPHONE ENCOUNTER
Spoke to pt at 1555    Reviewed with pt to reach out to  for medical transportation following cataract surgery.    Reviewed to contact clinic if having any trouble arranging-- reviewed 40-50 dollar option available if needed per .    Pt will reach out to  tomorrow and states will contact surgery scheduler if needing any further assistance.    Soy Simon RN 4:00 PM 05/15/25

## 2025-05-15 NOTE — PROGRESS NOTES
Social Work - Intervention  [facility]  Data/Intervention:    Patient Name: Mikayla Timmons Goes By: Mikayla       /Age: 1945 (79 year old)     Visit Type: telephone  Referral Source: Ophthalmology   Reason for Referral: Post-op caregiver     Collaborated With:    -Patient, Mikayla  -Soy Simon RN     Psychosocial Information/Concerns:  Mikayla is having cataract surgery and has no one to be her 24 hour caregiver post surgery. She is on a waiver with a PCA but does not want anyone in her home for 24 hour period due to the size of it. When offered the idea of paying out of pocket for TCU/Nursing Home, she denied as she does not trust those facilities. Mikayla is adamant that she be admitted inpatient for 24 hours. Notified Mikayla that I do not coordinate inpatient stays as that is up to her Doctor to approve that if it's even possible with where her surgery is being done.      Intervention/Education/Resources Provided:  Shared Mikayla's request with Soy Simon RN with Dr. Conway. If pt is unable to be admitted and changes her mind about wanting resources for care in the home or going to an outside facility out of pocket, SW will be available.      Assessment/Plan:   will remain available for support as needed.      Provided patient/family with contact information and availability.    Bonny Chen WILL  Medical Social Worker    [facility]     [address] [place] [id number]  [email or web address]  Gender pronouns: she/her   Employed by [employer]

## 2025-05-15 NOTE — TELEPHONE ENCOUNTER
Pt contacted by  to review options for medical transport and care 24 hours after surgery    Surgery planned at [facility].    Pt with zero interest with reaching out to friend/family/neighbor to check in with pt in patient's apt and not interested in having ability for family/friend to have ability to call following  surgery.    Pt on waiver also and no interest in extending help in home.    Reviewed information with Dr. Conway and Dr. Conway will not be able to perform cataract surgery.    Pt may contact clinic for scheduling if would like to try above options for care following surgery    I called pt times three and left message with information no cataract surgery will be able to be performed at this time.    Pt may contact clinic in future for scheduling if able to review above options from .    Soy Simon RN 10:47 AM 05/15/25

## 2025-05-21 ENCOUNTER — TELEPHONE (OUTPATIENT)
Dept: FAMILY MEDICINE | Facility: CLINIC | Age: 80
End: 2025-05-21
Payer: MEDICARE

## 2025-05-21 NOTE — TELEPHONE ENCOUNTER
2:19pm Care Coordinator is forwarding message to Medical Records, as it is a request for medical records.      Marimar Camp  Lead Care Coordinator  [facility]  [phone]

## 2025-05-21 NOTE — TELEPHONE ENCOUNTER
[facility] phone call message- general phone call:    Reason for call: [employer] calling to request that the most recent diabetes progress note from 3/14/25 be faxed to them at fax [phone]. Caller did not provide any other information or details.     Return call needed: No    OK to leave a message on voice mail? Yes    Primary language: English      needed? No    Call taken on May 21, 2025 at 11:49 AM by Evelyn Carrillo

## 2025-05-22 NOTE — TELEPHONE ENCOUNTER
5/22/25 Medical Records ([facility]) successfully faxed needed records to [facility], per request.      Marimar aCmp  Lead Care Coordinator  [facility]  [phone]

## 2025-05-27 ENCOUNTER — OFFICE VISIT (OUTPATIENT)
Dept: ORTHOPEDICS | Facility: CLINIC | Age: 80
End: 2025-05-27
Payer: MEDICARE

## 2025-05-27 ENCOUNTER — RESULTS FOLLOW-UP (OUTPATIENT)
Dept: FAMILY MEDICINE | Facility: CLINIC | Age: 80
End: 2025-05-27

## 2025-05-27 DIAGNOSIS — I73.89 OTHER SPECIFIED PERIPHERAL VASCULAR DISEASES: Primary | ICD-10-CM

## 2025-05-27 DIAGNOSIS — I73.9 PVD (PERIPHERAL VASCULAR DISEASE): ICD-10-CM

## 2025-05-27 DIAGNOSIS — L29.9 ITCH: ICD-10-CM

## 2025-05-27 DIAGNOSIS — L60.3 NAIL DYSTROPHY: ICD-10-CM

## 2025-05-27 PROCEDURE — 11721 DEBRIDE NAIL 6 OR MORE: CPT | Mod: Q8 | Performed by: PODIATRIST

## 2025-05-27 PROCEDURE — 99213 OFFICE O/P EST LOW 20 MIN: CPT | Mod: 25 | Performed by: PODIATRIST

## 2025-05-27 NOTE — PROGRESS NOTES
Chief Complaint   Patient presents with    Follow Up     Diabetic foot exam and management.        HPI: Mikayla is a 79 year old female who presents today for a diabetic foot exam and management.  She was seen last year.  She has moved back into her house. No new LE complaints. She sees Dr. Heaton for her diabetic care and was last seen 5/23/25. She has had no ulcers on her legs. She notes that she gets itching on the legs in the nighttime. She has a new PCA who can help her with compression socks.     Review of Systems: No n/v/d/f/c/ns/sob/cp    PMH: LBP  Postpolio syndrome  Carpal Tunnel  T2DM  HTN    Past Surgical History:   Procedure Laterality Date    HIP SURGERY      removed left hip bone         Allergies: Hydromorphone and Ketamine    SH:   Social History     Socioeconomic History    Marital status:      Spouse name: Not on file    Number of children: Not on file    Years of education: Not on file    Highest education level: Not on file   Occupational History    Not on file   Tobacco Use    Smoking status: Never    Smokeless tobacco: Never   Substance and Sexual Activity    Alcohol use: Yes     Comment: Lightly    Drug use: Never    Sexual activity: Not on file   Other Topics Concern    Parent/sibling w/ CABG, MI or angioplasty before 65F 55M? Not Asked   Social History Narrative    Not on file     Social Drivers of Health     Financial Resource Strain: Low Risk  (3/14/2025)    Financial Resource Strain     Within the past 12 months, have you or your family members you live with been unable to get utilities (heat, electricity) when it was really needed?: No   Food Insecurity: Low Risk  (3/14/2025)    Food Insecurity     Within the past 12 months, did you worry that your food would run out before you got money to buy more?: No     Within the past 12 months, did the food you bought just not last and you didn t have money to get more?: No   Transportation Needs: High Risk (3/14/2025)    Transportation Needs      Within the past 12 months, has lack of transportation kept you from medical appointments, getting your medicines, non-medical meetings or appointments, work, or from getting things that you need?: Yes   Physical Activity: Not on file   Stress: Not on file   Social Connections: Not on file   Interpersonal Safety: Low Risk  (5/23/2025)    Interpersonal Safety     Do you feel physically and emotionally safe where you currently live?: Yes     Within the past 12 months, have you been hit, slapped, kicked or otherwise physically hurt by someone?: No     Within the past 12 months, have you been humiliated or emotionally abused in other ways by your partner or ex-partner?: No   Housing Stability: Low Risk  (3/14/2025)    Housing Stability     Do you have housing? : Yes     Are you worried about losing your housing?: No       FH:   Family History   Problem Relation Age of Onset    Cancer No family hx of     Diabetes No family hx of     Glaucoma No family hx of     Hypertension No family hx of     Macular Degeneration No family hx of     Cerebrovascular Disease No family hx of     Thyroid Disease No family hx of        Objective:    Hemoglobin A1C   Date Value Ref Range Status   05/23/2025 7.7 (H) 0.0 - 5.6 % Final     Comment:     Normal <5.7%   Prediabetes 5.7-6.4%    Diabetes 6.5% or higher     Note: Adopted from ADA consensus guidelines.   01/14/2025 9.0 (H) 0.0 - 5.6 % Final     Comment:     Normal <5.7%   Prediabetes 5.7-6.4%    Diabetes 6.5% or higher     Note: Adopted from ADA consensus guidelines.   06/21/2024 7.8 (H) 0.0 - 5.6 % Final     Comment:     Normal <5.7%   Prediabetes 5.7-6.4%    Diabetes 6.5% or higher     Note: Adopted from ADA consensus guidelines.   03/10/2021 6.9 (H) 4.1 - 5.7 % Final   09/23/2020 7.5 (H) 4.1 - 5.7 % Final   05/01/2020 7.0 (H) 4.1 - 5.7 % Final         PT pulses are not palpable 2/2 edema and DP pulses are 2/4 bilaterally. Hemosiderin deposits BL legs.  CRT is WNL. Diminished  pedal hair.   Gross sensation is intact bilaterally. Protective sensation is normal except at the plantar halluces at the last visit. Intact intact.   Equinus is noted bilaterally. No pain with active or passive ROM of the ankle, MTJ, 1st ray, or halluces bilaterally,.   Nails elongated and dystrophic bilaterally. No open lesions are noted.     Assessment: DM2 with neuropathy - controlled.   Nail dystrophy x 10.  Pruritis    Plan:  - Pt seen and evaluated.  - Nails trimmed x 10.   - We discussed tx for the pruritus. She could try a mid-potency steroid cream. She will think about this for now.   - See again in 3 months.

## 2025-05-27 NOTE — LETTER
5/27/2025      Mikayla Timmons  [address]  [facility] [address]      Dear Colleague,    Thank you for referring your patient, Mikayla Timmons, to the [facility]. Please see a copy of my visit note below.    Chief Complaint   Patient presents with     Follow Up     Diabetic foot exam and management.        HPI: Mikayla is a 79 year old female who presents today for a diabetic foot exam and management.  She was seen last year.  She has moved back into her house. No new LE complaints. She sees Dr. Heaton for her diabetic care and was last seen 5/23/25. She has had no ulcers on her legs. She notes that she gets itching on the legs in the nighttime. She has a new PCA who can help her with compression socks.     Review of Systems: No n/v/d/f/c/ns/sob/cp    PMH: LBP  Postpolio syndrome  Carpal Tunnel  T2DM  HTN    Past Surgical History:   Procedure Laterality Date     HIP SURGERY      removed left hip bone         Allergies: Hydromorphone and Ketamine    SH:   Social History     Socioeconomic History     Marital status:      Spouse name: Not on file     Number of children: Not on file     Years of education: Not on file     Highest education level: Not on file   Occupational History     Not on file   Tobacco Use     Smoking status: Never     Smokeless tobacco: Never   Substance and Sexual Activity     Alcohol use: Yes     Comment: Lightly     Drug use: Never     Sexual activity: Not on file   Other Topics Concern     Parent/sibling w/ CABG, MI or angioplasty before 65F 55M? Not Asked   Social History Narrative     Not on file     Social Drivers of Health     Financial Resource Strain: Low Risk  (3/14/2025)    Financial Resource Strain      Within the past 12 months, have you or your family members you live with been unable to get utilities (heat, electricity) when it was really needed?: No   Food Insecurity: Low Risk  (3/14/2025)    Food Insecurity      Within the  past 12 months, did you worry that your food would run out before you got money to buy more?: No      Within the past 12 months, did the food you bought just not last and you didn t have money to get more?: No   Transportation Needs: High Risk (3/14/2025)    Transportation Needs      Within the past 12 months, has lack of transportation kept you from medical appointments, getting your medicines, non-medical meetings or appointments, work, or from getting things that you need?: Yes   Physical Activity: Not on file   Stress: Not on file   Social Connections: Not on file   Interpersonal Safety: Low Risk  (5/23/2025)    Interpersonal Safety      Do you feel physically and emotionally safe where you currently live?: Yes      Within the past 12 months, have you been hit, slapped, kicked or otherwise physically hurt by someone?: No      Within the past 12 months, have you been humiliated or emotionally abused in other ways by your partner or ex-partner?: No   Housing Stability: Low Risk  (3/14/2025)    Housing Stability      Do you have housing? : Yes      Are you worried about losing your housing?: No       FH:   Family History   Problem Relation Age of Onset     Cancer No family hx of      Diabetes No family hx of      Glaucoma No family hx of      Hypertension No family hx of      Macular Degeneration No family hx of      Cerebrovascular Disease No family hx of      Thyroid Disease No family hx of        Objective:    Hemoglobin A1C   Date Value Ref Range Status   05/23/2025 7.7 (H) 0.0 - 5.6 % Final     Comment:     Normal <5.7%   Prediabetes 5.7-6.4%    Diabetes 6.5% or higher     Note: Adopted from ADA consensus guidelines.   01/14/2025 9.0 (H) 0.0 - 5.6 % Final     Comment:     Normal <5.7%   Prediabetes 5.7-6.4%    Diabetes 6.5% or higher     Note: Adopted from ADA consensus guidelines.   06/21/2024 7.8 (H) 0.0 - 5.6 % Final     Comment:     Normal <5.7%   Prediabetes 5.7-6.4%    Diabetes 6.5% or higher     Note:  Adopted from ADA consensus guidelines.   03/10/2021 6.9 (H) 4.1 - 5.7 % Final   09/23/2020 7.5 (H) 4.1 - 5.7 % Final   05/01/2020 7.0 (H) 4.1 - 5.7 % Final         PT pulses are not palpable 2/2 edema and DP pulses are 2/4 bilaterally. Hemosiderin deposits BL legs.  CRT is WNL. Diminished pedal hair.   Gross sensation is intact bilaterally. Protective sensation is normal except at the plantar halluces at the last visit. Intact intact.   Equinus is noted bilaterally. No pain with active or passive ROM of the ankle, MTJ, 1st ray, or halluces bilaterally,.   Nails elongated and dystrophic bilaterally. No open lesions are noted.     Assessment: DM2 with neuropathy - controlled.   Nail dystrophy x 10.  Pruritis    Plan:  - Pt seen and evaluated.  - Nails trimmed x 10.   - We discussed tx for the pruritus. She could try a mid-potency steroid cream. She will think about this for now.   - See again in 3 months.           Again, thank you for allowing me to participate in the care of your patient.        Sincerely,        Swapnil Baez DPM    Electronically signed

## 2025-05-28 ENCOUNTER — PATIENT OUTREACH (OUTPATIENT)
Dept: CARE COORDINATION | Facility: CLINIC | Age: 80
End: 2025-05-28
Payer: MEDICARE

## 2025-05-28 NOTE — PROGRESS NOTES
Social Work - Telephone/MyChart message  [facility]  Data:   Patient Name: Mikayla Timmons  Goes By: Mikayla EDGAR/Age: 1945 (79 year old)      Referral Source: Ophthalmology     Reason for Referral: Transportation     Intervention: Left voice message for patient on 2025 and Sent patient MyChart message on 2025.   Plan:  will await patient's return phone call/message and provide assistance at that time.   Bonny Chen [facility]  Medical Social Worker    [facility]     [address] [place] [id number]  [email or web address]  Gender pronouns: she/her   Employed by [employer]

## 2025-06-12 ENCOUNTER — PATIENT OUTREACH (OUTPATIENT)
Dept: CARE COORDINATION | Facility: CLINIC | Age: 80
End: 2025-06-12
Payer: MEDICARE

## 2025-06-12 NOTE — PROGRESS NOTES
Food Resource Navigator Contact    [facility] - Follow Up    Reason for call: Other: food insecurity     Intervention and Education during outreach: Mikayla called [facility] on 6/11 and 6/12 asking for information about when her food box would be delivered. [facility]N was out of office on 6/11. Mikayla had talked with manager of food is medicine strategy, Irma De Leon, on 6/11 and did receive her food box today, 6/12. She needs a Wednesday delivery. Mikayla did provide updates for  team about why she needed Wednesday delivery and delivery direct to her apartment rather than at the front door. [facility]N plans to follow up with  team as well.     Food Resource Navigator Plan: Follow up as needed.     I have discussed the following goals with the patient: Mikayla to reach out to [facility] as needed for follow up. Mikayla to use fresh food box rx program to improve food access.     Spent 7 minutes in consult with the patient.     Dyan Aguilar   [facility] Food Resource Navigator  [employer]   [phone]

## 2025-06-17 ENCOUNTER — HOSPITAL ENCOUNTER (EMERGENCY)
Facility: CLINIC | Age: 80
Discharge: HOME | End: 2025-06-17
Attending: EMERGENCY MEDICINE | Admitting: EMERGENCY MEDICINE
Payer: MEDICARE

## 2025-06-17 ENCOUNTER — TELEPHONE (OUTPATIENT)
Dept: FAMILY MEDICINE | Facility: CLINIC | Age: 80
End: 2025-06-17

## 2025-06-17 VITALS
OXYGEN SATURATION: 96 % | DIASTOLIC BLOOD PRESSURE: 64 MMHG | HEART RATE: 62 BPM | SYSTOLIC BLOOD PRESSURE: 140 MMHG | TEMPERATURE: 98.6 F | RESPIRATION RATE: 15 BRPM

## 2025-06-17 DIAGNOSIS — G51.0 BELL'S PALSY: ICD-10-CM

## 2025-06-17 LAB
ANION GAP SERPL CALCULATED.3IONS-SCNC: 10 MMOL/L (ref 7–15)
ATRIAL RATE - MUSE: 59 BPM
BASOPHILS # BLD AUTO: 0 10E3/UL (ref 0–0.2)
BASOPHILS NFR BLD AUTO: 1 %
BUN SERPL-MCNC: 19.1 MG/DL (ref 8–23)
CALCIUM SERPL-MCNC: 10.2 MG/DL (ref 8.8–10.4)
CHLORIDE SERPL-SCNC: 103 MMOL/L (ref 98–107)
CREAT SERPL-MCNC: 0.6 MG/DL (ref 0.51–0.95)
DIASTOLIC BLOOD PRESSURE - MUSE: NORMAL MMHG
EGFRCR SERPLBLD CKD-EPI 2021: 90 ML/MIN/1.73M2
EOSINOPHIL # BLD AUTO: 0.2 10E3/UL (ref 0–0.7)
EOSINOPHIL NFR BLD AUTO: 3 %
ERYTHROCYTE [DISTWIDTH] IN BLOOD BY AUTOMATED COUNT: 15.1 % (ref 10–15)
GLUCOSE BLDC GLUCOMTR-MCNC: 153 MG/DL (ref 70–99)
GLUCOSE SERPL-MCNC: 263 MG/DL (ref 70–99)
HCO3 SERPL-SCNC: 23 MMOL/L (ref 22–29)
HCT VFR BLD AUTO: 36.9 % (ref 35–47)
HGB BLD-MCNC: 11.6 G/DL (ref 11.7–15.7)
IMM GRANULOCYTES # BLD: 0 10E3/UL
IMM GRANULOCYTES NFR BLD: 0 %
INTERPRETATION ECG - MUSE: NORMAL
LYMPHOCYTES # BLD AUTO: 2.2 10E3/UL (ref 0.8–5.3)
LYMPHOCYTES NFR BLD AUTO: 32 %
MCH RBC QN AUTO: 29.9 PG (ref 26.5–33)
MCHC RBC AUTO-ENTMCNC: 31.4 G/DL (ref 31.5–36.5)
MCV RBC AUTO: 95 FL (ref 78–100)
MONOCYTES # BLD AUTO: 0.6 10E3/UL (ref 0–1.3)
MONOCYTES NFR BLD AUTO: 9 %
NEUTROPHILS # BLD AUTO: 3.8 10E3/UL (ref 1.6–8.3)
NEUTROPHILS NFR BLD AUTO: 55 %
NRBC # BLD AUTO: 0 10E3/UL
NRBC BLD AUTO-RTO: 0 /100
P AXIS - MUSE: 55 DEGREES
PLATELET # BLD AUTO: 199 10E3/UL (ref 150–450)
POTASSIUM SERPL-SCNC: 4.5 MMOL/L (ref 3.4–5.3)
PR INTERVAL - MUSE: 220 MS
QRS DURATION - MUSE: 84 MS
QT - MUSE: 406 MS
QTC - MUSE: 401 MS
R AXIS - MUSE: -39 DEGREES
RBC # BLD AUTO: 3.88 10E6/UL (ref 3.8–5.2)
SODIUM SERPL-SCNC: 136 MMOL/L (ref 135–145)
SYSTOLIC BLOOD PRESSURE - MUSE: NORMAL MMHG
T AXIS - MUSE: 198 DEGREES
TROPONIN T SERPL HS-MCNC: 11 NG/L
TROPONIN T SERPL HS-MCNC: 13 NG/L
VENTRICULAR RATE- MUSE: 59 BPM
WBC # BLD AUTO: 6.9 10E3/UL (ref 4–11)

## 2025-06-17 PROCEDURE — 99284 EMERGENCY DEPT VISIT MOD MDM: CPT | Performed by: EMERGENCY MEDICINE

## 2025-06-17 PROCEDURE — 250N000013 HC RX MED GY IP 250 OP 250 PS 637: Performed by: EMERGENCY MEDICINE

## 2025-06-17 PROCEDURE — 93010 ELECTROCARDIOGRAM REPORT: CPT | Performed by: EMERGENCY MEDICINE

## 2025-06-17 PROCEDURE — 250N000011 HC RX IP 250 OP 636: Mod: JW | Performed by: EMERGENCY MEDICINE

## 2025-06-17 PROCEDURE — 80048 BASIC METABOLIC PNL TOTAL CA: CPT | Performed by: EMERGENCY MEDICINE

## 2025-06-17 PROCEDURE — 85004 AUTOMATED DIFF WBC COUNT: CPT | Performed by: EMERGENCY MEDICINE

## 2025-06-17 PROCEDURE — 84484 ASSAY OF TROPONIN QUANT: CPT | Performed by: EMERGENCY MEDICINE

## 2025-06-17 PROCEDURE — 93005 ELECTROCARDIOGRAM TRACING: CPT | Performed by: EMERGENCY MEDICINE

## 2025-06-17 PROCEDURE — 36415 COLL VENOUS BLD VENIPUNCTURE: CPT | Performed by: EMERGENCY MEDICINE

## 2025-06-17 PROCEDURE — 82962 GLUCOSE BLOOD TEST: CPT

## 2025-06-17 PROCEDURE — 250N000012 HC RX MED GY IP 250 OP 636 PS 637: Performed by: EMERGENCY MEDICINE

## 2025-06-17 PROCEDURE — 99284 EMERGENCY DEPT VISIT MOD MDM: CPT | Mod: 25 | Performed by: EMERGENCY MEDICINE

## 2025-06-17 PROCEDURE — 96374 THER/PROPH/DIAG INJ IV PUSH: CPT | Performed by: EMERGENCY MEDICINE

## 2025-06-17 RX ORDER — PREDNISONE 20 MG/1
60 TABLET ORAL DAILY
Qty: 18 TABLET | Refills: 0 | Status: SHIPPED | OUTPATIENT
Start: 2025-06-17 | End: 2025-06-23

## 2025-06-17 RX ORDER — DIAZEPAM 10 MG/2ML
5 INJECTION, SOLUTION INTRAMUSCULAR; INTRAVENOUS ONCE
Status: COMPLETED | OUTPATIENT
Start: 2025-06-17 | End: 2025-06-17

## 2025-06-17 RX ORDER — ACETAMINOPHEN 500 MG
1000 TABLET ORAL ONCE
Status: COMPLETED | OUTPATIENT
Start: 2025-06-17 | End: 2025-06-17

## 2025-06-17 RX ORDER — PREDNISONE 20 MG/1
60 TABLET ORAL ONCE
Status: COMPLETED | OUTPATIENT
Start: 2025-06-17 | End: 2025-06-17

## 2025-06-17 RX ADMIN — ACETAMINOPHEN 1000 MG: 500 TABLET ORAL at 16:48

## 2025-06-17 RX ADMIN — PREDNISONE 60 MG: 20 TABLET ORAL at 17:55

## 2025-06-17 RX ADMIN — DIAZEPAM 5 MG: 5 INJECTION, SOLUTION INTRAMUSCULAR; INTRAVENOUS at 14:34

## 2025-06-17 ASSESSMENT — ACTIVITIES OF DAILY LIVING (ADL)
ADLS_ACUITY_SCORE: 41

## 2025-06-17 NOTE — ED NOTES
Bed: UUED-B  Expected date:   Expected time:   Means of arrival:   Comments:  [facility] now

## 2025-06-17 NOTE — CONSULTS
"[facility]  Neurology Consultation    Patient Name:  Mikayla Timmons  MRN:  [id number]    :  1945  Date of Service:  2025  Primary care provider:  Nini Heaton      Neurology consultation service was asked to see Mikayla Timmons by Dr. Cid to evaluate facial droop.    Chief Complaint:  facial droop    History of Present Illness:   Mikayla Timmons is a 79 year old female, with history including diabetes, hypothyroidism, hypertension, a constellation of symptoms that have been referred to has post-polio syndrome, severe osteoarthritis including multiple left hip arthroplasties, glaucoma, and cataracts, who presents with left-sided facial droop.    The patient reports that her neighbor noticed on  that her face was \"droopy.\"  She called her primary care doctor on  about this, who instructed her to come to the emergency department.  Retrospectively, she remembers biting her lip 4-5 days ago and noticing that her speech has been somewhat less crisp since then.  She also noted that when she was putting eyedrops in 3-4 days ago, the liquid dripped out of her eye on the left but not the right.    The patient has significant mobility limitations at baseline, and is in a power wheelchair due to removal of her left hip joint and also severe orthopedic problems on the right including at the knee.  She also has severe vision impairment due to cataracts, and there is a plan for eye surgery in July.  However, the patient denies any new neurologic symptoms involving the extremities.    ROS  A comprehensive ROS was performed and pertinent findings were included in HPI.     PMH  Past Medical History:   Diagnosis Date    Acute posthemorrhagic anemia 2022    Arthritis     Hypertension     Ulcer of right leg, limited to breakdown of skin (H) 2025     Past Surgical History:   Procedure Laterality Date    HIP SURGERY      removed left hip bone "       Medications   I have personally reviewed the patient's medication list.     Allergies  I have personally reviewed the patient's allergy list.     Social History  Reviewed.    Family History    Reviewed.       Physical Examination   Vitals: BP (!) 144/64   Pulse 62   Temp 98.6  F (37  C) (Oral)   Resp 20   LMP  (LMP Unknown)   SpO2 97%   General: Lying in bed, NAD  Head: NC/AT  Eyes: no icterus, op pink and moist  Cardiac: Extremities warm, no edema.   Respiratory: non-labored on RA  GI: S/NT/ND  Skin: Edema-related changes in the bilateral lower extremities; no vesicles noted around the left ear  Psych: Mood pleasant, affect congruent  Neuro:  Mental status: Awake, alert, attentive, oriented to self, time, place, and circumstance. Language is fluent and coherent with intact comprehension of complex commands, naming and repetition.  Cranial nerves: Upper and lower facial weakness present and positive Bell's sign present, VFF, PERRL, conjugate gaze, EOMI, facial sensation intact, shoulder shrug strong, tongue/uvula midline, no dysarthria.   Motor: Normal bulk and tone. No abnormal movements.  Full strength in the proximal and distal musculature of the upper extremities.  Severe movement limitation in the lower extremities, though the patient was able to wiggle her toes bilaterally.  Reflexes: No clonus.  Sensory: Intact to light touch in proximal and distal aspects of all 4 extremities   Coordination: FNF without ataxia or dysmetria.    Gait: Deferred.      Investigations   I have personally reviewed pertinent labs, tests, and radiological imaging. Discussion of notable findings is included under Impression.     Was patient transferred from outside hospital?   No    Impression  This is a 79 year old female, with history including diabetes, hypothyroidism, hypertension, a constellation of symptoms that have been referred to has post-polio syndrome, severe osteoarthritis including multiple left hip  "arthroplasties, glaucoma, and cataracts, who presents with left-sided facial weakness that involves both upper and lower face.  There are no lateralizing exam findings, with the exception of this upper and lower facial weakness, and the patient denies any other new neurologic symptoms.      Therefore, the patient's presentation is consistent with a peripheral 7th nerve palsy, or \"Bell's palsy.\"  We would advise a course of prednisone for 7 days, and for the patient to wear an eye patch.    Recommendations  -Please provide a course of oral prednisone 60 mg daily for 7 days.  -Please provide an eye patch for eye protection.  The patient will also message her ophthalmologist, regarding any need for updated ophthalmologic exam in light of her difficulty with eye closure.  -No additional neurologic investigations needed in the emergency department.  Neurology will sign off.    Thank you for involving Neurology in the care of Mikayla Timmons.  Please do not hesitate to call with questions/concerns (consult pager [phone]).      Patient was discussed with attending neurologist Dr. Hernández.    Raoul Bobby MD  Neurology, PGY3       "

## 2025-06-17 NOTE — ED TRIAGE NOTES
BIBA from home  Left sided facial droop x 5 days  Reports drooling a lot lately  WCB at baseline

## 2025-06-17 NOTE — DISCHARGE INSTRUCTIONS
You were seen by neurology here in the emergency department.  They believe your symptoms are most likely from Bell's palsy.  They would like to start you on a course of steroids for 7 days.  You received your first dose here so do not need to take another dose until tomorrow. This can increase your blood sugar, you should discuss with your primary doctor if you need to make any adjustments to your insulin or diet..  Follow-up with your ophthalmologist.  If you have new or worsening symptoms or other concerns please return to the emergency department.

## 2025-06-17 NOTE — ED PROVIDER NOTES
[place] EMERGENCY DEPARTMENT ([facility])    6/17/25       ED PROVIDER NOTE     History     Chief Complaint   Patient presents with    Facial Droop     HPI  Mikayla Timmons is a 79 year old female with a history of type II diabetes, hypothyroidism, hypertension, diastolic murmur, who presents to the ED via EMS for evaluation of left-sided facial droop. This morning, the patient called her clinic because her neighbor noted some left eye and left-sided facial droop. The patient knew she had eye droop, but reports that she has an upcoming eye procedure. She was recommended to visit the ED.     Here, the patient reports that she didn't know about her left-sided facial droop until yesterday, when her neighbor pointed it out. She states that her left eye may have been drooping for the past 4-5 days, she had noticed having some difficulty getting her eye drops in. She notes that her vision is minimal, but notes an upcoming surgery for this. The patient denies any numbness/tingling of her face or extremities. She denies any falls, chest pain, infectious symptoms, or other pertinent symptoms. The patient denies a history of CVA.     Past Medical History  Past Medical History:   Diagnosis Date    Acute posthemorrhagic anemia 06/14/2022    Arthritis     Hypertension     Ulcer of right leg, limited to breakdown of skin (H) 03/14/2025     Past Surgical History:   Procedure Laterality Date    HIP SURGERY      removed left hip bone     dextran 70-hypromellose (TEARS NATURALE FREE PF) 0.1-0.3 % ophthalmic solution  predniSONE (DELTASONE) 20 MG tablet  Nan Protect (EUCERIN) external cream  blood glucose (NO BRAND SPECIFIED) test strip  cholecalciferol 50 MCG (2000 UT) CAPS  Continuous Blood Gluc  (DEXCOM G6 ) BERTHA  Continuous Blood Gluc Sensor (DEXCOM G6 SENSOR) MISC  Continuous Blood Gluc Sensor (DEXCOM G6 SENSOR) MISC  Continuous Blood Gluc Transmit (DEXCOM G6 TRANSMITTER) MISC  Continuous Blood Gluc  "Transmit (DEXCOM G6 TRANSMITTER) MISC  dorzolamide-timolol (COSOPT) 2-0.5 % ophthalmic solution  Homeopathic Products (ARNICARE) GEL  insulin NPH (NOVOLIN N RELION) 100 UNIT/ML vial  insulin pen needle (32G X 4 MM) 32G X 4 MM miscellaneous  insulin pen needle (B-D U/F) 31G X 8 MM miscellaneous  insulin syringe-needle U-100 (31G X 5/16\" 0.3 ML) 31G X 5/16\" 0.3 ML miscellaneous  levothyroxine (SYNTHROID/LEVOTHROID) 88 MCG tablet  miconazole (MICATIN) 2 % external powder  STATIN NOT PRESCRIBED (INTENTIONAL)  thin (NO BRAND SPECIFIED) lancets  triamcinolone (KENALOG) 0.025 % external ointment  valsartan-hydrochlorothiazide (DIOVAN HCT) 80-12.5 MG tablet      Allergies   Allergen Reactions    Hydromorphone Itching    Ketamine Other (See Comments)     Family History  Family History   Problem Relation Age of Onset    Cancer No family hx of     Diabetes No family hx of     Glaucoma No family hx of     Hypertension No family hx of     Macular Degeneration No family hx of     Cerebrovascular Disease No family hx of     Thyroid Disease No family hx of      Social History   Social History     Tobacco Use    Smoking status: Never    Smokeless tobacco: Never   Substance Use Topics    Alcohol use: Yes     Comment: Lightly    Drug use: Never      A medically appropriate review of systems was performed with pertinent positives and negatives noted in the HPI, and all other systems negative.    Physical Exam   BP: (!) 164/63  Pulse: 62  Temp: 98.8  F (37.1  C)  Resp: 20  SpO2: 98 %  Physical Exam  Constitutional:       General: She is not in acute distress.     Appearance: She is not toxic-appearing.   HENT:      Head: Normocephalic and atraumatic.      Nose: Nose normal.      Mouth/Throat:      Mouth: Mucous membranes are moist.   Eyes:      Conjunctiva/sclera: Conjunctivae normal.      Pupils: Pupils are equal, round, and reactive to light.   Cardiovascular:      Rate and Rhythm: Normal rate and regular rhythm.      Heart sounds: No " murmur heard.  Pulmonary:      Effort: Pulmonary effort is normal. No respiratory distress.      Breath sounds: No wheezing.   Musculoskeletal:         General: Normal range of motion.   Skin:     General: Skin is warm and dry.   Neurological:      General: No focal deficit present.      Mental Status: She is alert and oriented to person, place, and time.      Comments: Left sided facial droop involving the face  The left pupil is slightly larger than the right, is reactive but appears slightly less reactive than the right  Sensation in the extremities intact           ED Course, Procedures, & Data      Procedures                Results for orders placed or performed during the hospital encounter of 06/17/25   [place] Draw     Status: None (In process)    Narrative    The following orders were created for panel order [place] Draw.  Procedure                               Abnormality         Status                     ---------                               -----------         ------                     Extra Blue Top Tube[[id number]]                             In process                 Extra Red Top Tube[[id number]]                              In process                 Extra Green Top (Lithiu...[[id number]]                      In process                 Extra Purple Top Tube[[id number]]                           In process                   Please view results for these tests on the individual orders.   Basic metabolic panel     Status: Abnormal   Result Value Ref Range    Sodium 136 135 - 145 mmol/L    Potassium 4.5 3.4 - 5.3 mmol/L    Chloride 103 98 - 107 mmol/L    Carbon Dioxide (CO2) 23 22 - 29 mmol/L    Anion Gap 10 7 - 15 mmol/L    Urea Nitrogen 19.1 8.0 - 23.0 mg/dL    Creatinine 0.60 0.51 - 0.95 mg/dL    GFR Estimate 90 >60 mL/min/1.73m2    Calcium 10.2 8.8 - 10.4 mg/dL    Glucose 263 (H) 70 - 99 mg/dL   Troponin T, High Sensitivity     Status: Normal   Result Value Ref Range    Troponin T, High  Sensitivity 13 <=14 ng/L   CBC with platelets and differential     Status: Abnormal   Result Value Ref Range    WBC Count 6.9 4.0 - 11.0 10e3/uL    RBC Count 3.88 3.80 - 5.20 10e6/uL    Hemoglobin 11.6 (L) 11.7 - 15.7 g/dL    Hematocrit 36.9 35.0 - 47.0 %    MCV 95 78 - 100 fL    MCH 29.9 26.5 - 33.0 pg    MCHC 31.4 (L) 31.5 - 36.5 g/dL    RDW 15.1 (H) 10.0 - 15.0 %    Platelet Count 199 150 - 450 10e3/uL    % Neutrophils 55 %    % Lymphocytes 32 %    % Monocytes 9 %    % Eosinophils 3 %    % Basophils 1 %    % Immature Granulocytes 0 %    NRBCs per 100 WBC 0 <1 /100    Absolute Neutrophils 3.8 1.6 - 8.3 10e3/uL    Absolute Lymphocytes 2.2 0.8 - 5.3 10e3/uL    Absolute Monocytes 0.6 0.0 - 1.3 10e3/uL    Absolute Eosinophils 0.2 0.0 - 0.7 10e3/uL    Absolute Basophils 0.0 0.0 - 0.2 10e3/uL    Absolute Immature Granulocytes 0.0 <=0.4 10e3/uL    Absolute NRBCs 0.0 10e3/uL   Troponin T, High Sensitivity     Status: Normal   Result Value Ref Range    Troponin T, High Sensitivity 11 <=14 ng/L   Glucose by meter     Status: Abnormal   Result Value Ref Range    GLUCOSE BY METER POCT 153 (H) 70 - 99 mg/dL   EKG 12 lead     Status: None   Result Value Ref Range    Systolic Blood Pressure  mmHg    Diastolic Blood Pressure  mmHg    Ventricular Rate 59 BPM    Atrial Rate 59 BPM    LA Interval 220 ms    QRS Duration 84 ms     ms    QTc 401 ms    P Axis 55 degrees    R AXIS -39 degrees    T Axis 198 degrees    Interpretation ECG       Unconfirmed report - interpretation of this ECG is computer generated - see medical record for final interpretation  Sinus bradycardia with 1st degree A-V block  Left axis deviation  Septal infarct , age undetermined  ST & T wave abnormality, consider inferolateral ischemia  Abnormal ECG    Confirmed by - EMERGENCY ROOM, PHYSICIAN ([id number]),  Birgit Fisher ([id number]) on 6/17/2025 1:50:25 PM     CBC with platelets differential     Status: Abnormal    Narrative    The following orders were  created for panel order CBC with platelets differential.  Procedure                               Abnormality         Status                     ---------                               -----------         ------                     CBC with platelets and ...[[id number]]  Abnormal            Final result                 Please view results for these tests on the individual orders.     Medications   predniSONE (DELTASONE) tablet 60 mg (has no administration in time range)   diazepam (VALIUM) injection 5 mg (5 mg Intravenous $Given 6/17/25 6548)   acetaminophen (TYLENOL) tablet 1,000 mg (1,000 mg Oral $Given 6/17/25 1640)     Labs Ordered and Resulted from Time of ED Arrival to Time of ED Departure   BASIC METABOLIC PANEL - Abnormal       Result Value    Sodium 136      Potassium 4.5      Chloride 103      Carbon Dioxide (CO2) 23      Anion Gap 10      Urea Nitrogen 19.1      Creatinine 0.60      GFR Estimate 90      Calcium 10.2      Glucose 263 (*)    CBC WITH PLATELETS AND DIFFERENTIAL - Abnormal    WBC Count 6.9      RBC Count 3.88      Hemoglobin 11.6 (*)     Hematocrit 36.9      MCV 95      MCH 29.9      MCHC 31.4 (*)     RDW 15.1 (*)     Platelet Count 199      % Neutrophils 55      % Lymphocytes 32      % Monocytes 9      % Eosinophils 3      % Basophils 1      % Immature Granulocytes 0      NRBCs per 100 WBC 0      Absolute Neutrophils 3.8      Absolute Lymphocytes 2.2      Absolute Monocytes 0.6      Absolute Eosinophils 0.2      Absolute Basophils 0.0      Absolute Immature Granulocytes 0.0      Absolute NRBCs 0.0     GLUCOSE BY METER - Abnormal    GLUCOSE BY METER POCT 153 (*)    TROPONIN T, HIGH SENSITIVITY - Normal    Troponin T, High Sensitivity 13     TROPONIN T, HIGH SENSITIVITY - Normal    Troponin T, High Sensitivity 11       MR Brain w/o Contrast    (Results Pending)          Critical care was not performed.     Medical Decision Making  The patient's presentation was of high complexity (an acute  health issue posing potential threat to life or bodily function).    The patient's evaluation involved:  review of external note(s) from 2 sources (telephone encounter, clinic note)  review of 2 test result(s) ordered prior to this encounter (cbc, bmp)  strong consideration of a test (see separate area of note for details) that was ultimately deferred  ordering and/or review of 3+ test(s) in this encounter (see separate area of note for details)  discussion of management or test interpretation with another health professional (see separate area of note for details)    The patient's management necessitated moderate risk (prescription drug management including medications given in the ED).    Assessment & Plan    This is a 79-year-old female with history of diabetes presenting with left-sided facial droop.  Here she had reassuring vitals.  Neurologic exam showed left-sided facial droop involving the forehead.  I was concerned about slightly unequal pupils suggesting more of a central process.  EKG showed sinus rhythm without acute ischemic findings, troponin stable At 13 and 11.  Labs otherwise unremarkable.    Initially had plan for MRI of the brain to rule out central cause.  Patient was unable to lay flat.  Discussed if the patient thought that after some pain medication she she would be able to lie flat and the patient did not believe so.  Given difficulty obtaining MRI, consulted neurology who evaluated the patient and they felt patient most likely had Bell's palsy, and did not believe imaging to be necessary at this time so will defer this.  They recommended a 7-day course of 60 mg of prednisone.  She was given a dose of this here and a prescription was sent to her pharmacy.  She received an eye shield and prescription for eyedrops.    She will follow-up as an outpatient.  Return precaution discussed and all questions answered.    I have reviewed the nursing notes. I have reviewed the findings, diagnosis, plan  and need for follow up with the patient.    New Prescriptions    DEXTRAN 70-HYPROMELLOSE (TEARS NATURALE FREE PF) 0.1-0.3 % OPHTHALMIC SOLUTION    Place 1 drop Into the left eye daily as needed.    PREDNISONE (DELTASONE) 20 MG TABLET    Take 3 tablets (60 mg) by mouth daily for 6 days.       Final diagnoses:   Bell's palsy       Swetha GALLARDO, am serving as a trained medical scribe to document services personally performed by Javier Cid MD based on the provider's statements to me on June 17, 2025.  This document has been checked and approved by the attending provider.    Javier GALLARDO MD, was physically present and have reviewed and verified the accuracy of this note documented by Swetha Kinney medical scribe.      Javier Cid MD  [facility] EMERGENCY DEPARTMENT  6/17/2025     Javier Cid MD  06/17/25 3031

## 2025-06-18 ENCOUNTER — NURSE TRIAGE (OUTPATIENT)
Dept: NURSING | Facility: CLINIC | Age: 80
End: 2025-06-18
Payer: MEDICARE

## 2025-06-18 NOTE — TELEPHONE ENCOUNTER
"Nurse Triage SBAR    Is this a 2nd Level Triage? YES, LICENSED PRACTITIONER REVIEW IS REQUIRED    Situation: Pt in ED yesterday, [place] Palsy dx,  on prednisone,  Blood sugars high,  not sure what to adjust or what.  While she is on this medicaiton and her blood sugars will be high.    Background: ED  Discharged  6/17/2025 (8 hours)  formerly [facility]     Javier Cid MD  Last attending  Treatment team Bell's palsy  Clinical impression Facial Droop  Chief complaint     Final diagnoses:   Bell's palsy       Assessment:   Pt treatment from ED was   predniSONE (DELTASONE) 20 MG tablet 18 tablet 0 6/17/2025 6/23/2025 No   Sig - Route: Take 3 tablets (60 mg) by mouth daily for 6 days. - Oral     Patient with high blood sugar this am at 0700  is 294  She is  not sure what to do, if to eat, not to eat, to take her insulin or not take it.  Usual morning range is 131  She does not have any s/s of high blood sugar, denies weakness, thirst, dizzy, vomiting, frequent urination  Patient does take insulin in the am and pm, via syringe  Pt states that she is not going back to ED for high blood sugar as she was in there all day yesterday.    Protocol Recommended Disposition:   Routing to clinic and diabetic educator    Recommendation:     Routed to provider    Reason for Disposition   Caller has URGENT medication or insulin pump question and triager unable to answer question    Additional Information   Negative: Fever > 100.4 F (38.0 C)    Answer Assessment - Initial Assessment Questions  1. BLOOD GLUCOSE: \"What is your blood glucose level?\"       294  at   0700  Pt on Prednisone and not sure what to do, to eat or not or adjust .  Needs some quidance  2. ONSET: \"When did you check the blood glucose?\"      0700  3. USUAL RANGE: \"What is your glucose level usually?\" (e.g., usual fasting morning value, usual evening value)      131 or so  4. KETONES: \"Do you check for ketones (urine or blood test " "strips)?\" If Yes, ask: \"What does the test show now?\"       no  5. TYPE 1 or 2:  \"Do you know what type of diabetes you have?\"  (e.g., Type 1, Type 2, Gestational; doesn't know)       Type 2  6. INSULIN: \"Do you take insulin?\" \"What type of insulin(s) do you use? What is the mode of delivery? (syringe, pen; injection or pump)?\"       Insulin in the morning and at night,    syringe.      Not missed any doses, just not taken it today  7. DIABETES PILLS: \"Do you take any pills for your diabetes?\" If Yes, ask: \"Have you missed taking any pills recently?\"      No pills  8. OTHER SYMPTOMS: \"Do you have any symptoms?\" (e.g., fever, frequent urination, difficulty breathing, dizziness, weakness, vomiting)      Denies fever or chills,  denies frequent urination,  denies difficlutly breathing or dizziness,  denies weakness or vomiting.  9. PREGNANCY: \"Is there any chance you are pregnant?\" \"When was your last menstrual period?\"      no    Protocols used: Diabetes - High Blood Sugar-A-[place]    "

## 2025-06-18 NOTE — Clinical Note
FYI- I ordered insulin based on steroid protocol. Started out lower with additional 2 days of titration if needed. See note for details. Thanks! Kerry Patterson RD, LD, [facility]

## 2025-06-18 NOTE — TELEPHONE ENCOUNTER
"Diabetes Education Follow-up    Subjective/Objective:    Mikayla KELLY Timmons sent in blood glucose log for review. Last date of communication was: 5/9/25.    Situation: Pt in ED yesterday, [place] Palsy dx,  on prednisone,  Blood sugars high,  not sure what to adjust or what.  While she is on this medicaiton and her blood sugars will be high.     Diabetes is being managed with Lifestyle (diet/activity), Diabetes Medications   Diabetes Medication(s)       Insulin       insulin NPH (NOVOLIN N RELION) 100 UNIT/ML vial INJECT 28-31 UNITS SUBCUTANEOUSLY IN THE MORNING AND 18 IN THE EVENING. Take 31 units AM if fasting 150 or greater, otherwise 28 units in AM        -Prednisone 60mg daily x 6 days    BG/Food Log:             Assessment: BG elevated beyond baseline secondary to prednisone. She currently takes NPH insulin, will increase AM dose based on steroid without chemotherapy protocol. Pt notes her last weight was 256# (116.4kg) probably 6 months ago since she has been weighed d/t scale breaking at PT. See plan below steroid was given yesterday at 5:55.    Plan/Response:  See Patient Instructions for co-developed, patient-stated behavior change goals.    Per steroid protocol. Note will start out at lower than \"up to\" 0.3u/kg dose and titrate up as needed pending BG  -Day 1 (tomorrow AM)- take prednisone in the morning when you take AM dose of NPH. Give 23 units (for steroid 0.2u/kg) + 28 unit= 51 units, continue to give usual 18 units in evening. If majority of BG above goals increase dose.  -If needed day 2-take 29 units (0.25u/kg) + 28 units= 57 units in AM with steroid, continue usual 18 units in evening. If majority of BG above goals increase dose  -If needed day 3- take 35 units (0.3u/kg) + 28 units=63 units in AM with steroid, continue usual 18 units in evening. Do this until steroids are completed then return to usual doses.     -Has organic honey and maple sugar in case of low BG.    -Updated PCP.    Kerry Patterson " LEON, SE[place]ST[place], [place]      Any diabetes medication dose changes were made via the CDE Protocol and Collaborative Practice Agreement with the patient's primary care provider. A copy of this encounter was shared with the provider.

## 2025-06-19 ENCOUNTER — TELEPHONE (OUTPATIENT)
Dept: EDUCATION SERVICES | Facility: CLINIC | Age: 80
End: 2025-06-19
Payer: MEDICARE

## 2025-06-19 DIAGNOSIS — Z79.4 TYPE 2 DIABETES MELLITUS WITH RIGHT EYE AFFECTED BY MODERATE NONPROLIFERATIVE RETINOPATHY AND MACULAR EDEMA, WITH LONG-TERM CURRENT USE OF INSULIN (H): Primary | ICD-10-CM

## 2025-06-19 DIAGNOSIS — E11.3311 TYPE 2 DIABETES MELLITUS WITH RIGHT EYE AFFECTED BY MODERATE NONPROLIFERATIVE RETINOPATHY AND MACULAR EDEMA, WITH LONG-TERM CURRENT USE OF INSULIN (H): Primary | ICD-10-CM

## 2025-06-19 NOTE — TELEPHONE ENCOUNTER
"Diabetes Education Follow-up    Subjective/Objective:    Mikayla KELLY Timmons sent message today regarding BG going higher. Last date of communication was: 6/18.    \"I successfully accomplished the first adjusted insulin with prednisone this morning. Will I need an adjsuted supply of insulin after using what you have structured for me? My head is unable to determine this yet today. My BW was 183 this morning and after the 51 units  and 3 hours it was 333.    Diabetes is being managed with Lifestyle (diet/activity), Diabetes Medications   Diabetes Medication(s)       Insulin       insulin NPH (NOVOLIN N RELION) 100 UNIT/ML vial INJECT 28-31 UNITS SUBCUTANEOUSLY IN THE MORNING AND 18 IN THE EVENING. Take 31 units AM if fasting 150 or greater, otherwise 28 units in AM        Per steroid protocol. Note will start out at lower than \"up to\" 0.3u/kg dose and titrate up as needed pending BG  -Day 1 (tomorrow AM)- take prednisone in the morning when you take AM dose of NPH. Give 23 units (for steroid 0.2u/kg) + 28 unit= 51 units, continue to give usual 18 units in evening. If majority of BG above goals increase dose.  -If needed day 2-take 29 units (0.25u/kg) + 28 units= 57 units in AM with steroid, continue usual 18 units in evening. If majority of BG above goals increase dose  -If needed day 3- take 35 units (0.3u/kg) + 28 units=63 units in AM with steroid, continue usual 18 units in evening. Do this until steroids are completed then return to usual doses.     BG/Food Log:       Assessment:BG significantly elevated when doing 0.2u/kg, will have her go up to 0.3u/kg tomorrow. If this doesn't work will have her continue to adjust by 0.05 unit(s)/kg until majority of BG <180. Dosing weight: 116kg    Plan/Response:  6/20- Take 35 units (0.3u/kg) + 28 units=63 units in AM with steroid, continue usual 18 units in evening. If majority of BG above goals increase the next day  - If increase needed take 41 units (0.35u/kg) + 28 units=69 " units in AM with steroid, continue usual 18 units in evening. If majority of BG above goals increase the next day  -If increase needed take 46 units (0.4u/kg) + 28 units= 74 units in AM with steroid, continue usual 18 units in evening. If majority of BG above goals increase the next day.  -Also let pt know we are not here on the weekends so if she has questions/concerns she should contact her PCP office.   -Notified PCP.      Kerry Patterson RD, SEBASTIAN, [facility]      Any diabetes medication dose changes were made via the CDE Protocol and Collaborative Practice Agreement with the patient's primary care provider. A copy of this encounter was shared with the provider.

## 2025-06-19 NOTE — Clinical Note
See above recs for Mikayla. Also can you put in a new diabetes ed referral so I can continue to follow our standing orders. Thank you! Kerry Patterson RD, LD, [facility]

## 2025-06-23 ENCOUNTER — PATIENT OUTREACH (OUTPATIENT)
Dept: CARE COORDINATION | Facility: CLINIC | Age: 80
End: 2025-06-23
Payer: MEDICARE

## 2025-06-23 ENCOUNTER — TELEPHONE (OUTPATIENT)
Dept: EDUCATION SERVICES | Facility: CLINIC | Age: 80
End: 2025-06-23
Payer: MEDICARE

## 2025-06-23 NOTE — TELEPHONE ENCOUNTER
Diabetes Education Follow-up    Subjective/Objective:    Mikayla Timmons sent in blood glucose log for review. Last date of communication was: 6/19.    Diabetes is being managed with Lifestyle (diet/activity), Diabetes Medications   Diabetes Medication(s)       Insulin       insulin NPH (NOVOLIN N RELION) 100 UNIT/ML vial INJECT 28-31 UNITS SUBCUTANEOUSLY IN THE MORNING AND 18 IN THE EVENING. Take 31 units AM if fasting 150 or greater, otherwise 28 units in AM        On Prednisone 60mg, last day tomorrow    BG/Food Log:         Assessment:BG continue to be quite elevated with steroids. When I discussed with Mikayla this morning she reports  waking and 157 when she took insulin (63 units). She did not increase insulin over the weekend as directed. Since she had just taken it we discussed adding 6 more units (69 units). If BG still above goal tomorrow she will increase by 10% to 76 units. Tomorrow is last day of Prednisone so she will go back to normal insulin doses then 28-31 in AM. We have follow-up next Monday.      Kerry Patterson RD, SEBASTIAN, [facility]      Any diabetes medication dose changes were made via the CDE Protocol and Collaborative Practice Agreement with the patient's primary care provider. A copy of this encounter was shared with the provider.

## 2025-06-24 ENCOUNTER — TELEPHONE (OUTPATIENT)
Dept: OPHTHALMOLOGY | Facility: CLINIC | Age: 80
End: 2025-06-24
Payer: MEDICARE

## 2025-06-24 NOTE — TELEPHONE ENCOUNTER
Pt with metro mobility and not able to see Dr. Conway tomorrow.    Scheduled in acute eye clinic on Thursday at 0945 after review with Dr. Conway's team.    Confirmed appt with pt.    Soy Simon RN 3:16 PM 06/24/25

## 2025-06-24 NOTE — TELEPHONE ENCOUNTER
Health Call Center    Phone Message    May a detailed message be left on voicemail: yes     Reason for Call: Other: Mikayla called because she has been diagnosed with [place] Palsy and wants to know if moving forward with the surgery plan is still okay. Please call Mikayla back at [phone] to advise. Thank you      Action Taken: Message routed to:  [facility] Eye    Travel Screening: Not Applicable     Date of Service:

## 2025-06-24 NOTE — TELEPHONE ENCOUNTER
New left eye sided Bell's Palsy diagnosed last week    Pt scheduled for cataract surgery 7- at [facility]-- pt in wheelchair/not able to transfer    Pt told by medical provider eye 'looked' smaller during consult.    No new eye pain/no new vision changes    Pt using refresh plus 2/day.  Pt not using any lubricating eye ointment at night    Pt lives alone and not able obtain eye drops very easily-- uses [facility] pharmacy for delivery.    Pt also needs later in day surgery confirmed for metro mobilty.    After review, recommended coming in for evaluation for new bell's palsy and may confirm plan for cataract surgery.    I will review options for scheduling with Dr. Conway/team    Soy Smion, RN 3:02 PM 06/24/25

## 2025-06-26 ENCOUNTER — OFFICE VISIT (OUTPATIENT)
Dept: OPHTHALMOLOGY | Facility: CLINIC | Age: 80
End: 2025-06-26
Attending: OPHTHALMOLOGY
Payer: MEDICARE

## 2025-06-26 DIAGNOSIS — H40.003 GLAUCOMA SUSPECT OF BOTH EYES: ICD-10-CM

## 2025-06-26 DIAGNOSIS — H04.122 DRY EYE SYNDROME, LEFT: Primary | ICD-10-CM

## 2025-06-26 DIAGNOSIS — G51.0 BELL'S PALSY: ICD-10-CM

## 2025-06-26 PROCEDURE — G0463 HOSPITAL OUTPT CLINIC VISIT: HCPCS

## 2025-06-26 RX ORDER — DORZOLAMIDE HYDROCHLORIDE AND TIMOLOL MALEATE 20; 5 MG/ML; MG/ML
1 SOLUTION/ DROPS OPHTHALMIC 2 TIMES DAILY
Qty: 10 ML | Refills: 3 | Status: SHIPPED | OUTPATIENT
Start: 2025-06-26

## 2025-06-26 ASSESSMENT — CONF VISUAL FIELD
OD_SUPERIOR_NASAL_RESTRICTION: 0
OS_INFERIOR_NASAL_RESTRICTION: 0
METHOD: COUNTING FINGERS
OD_INFERIOR_TEMPORAL_RESTRICTION: 0
OS_INFERIOR_TEMPORAL_RESTRICTION: 0
OS_SUPERIOR_TEMPORAL_RESTRICTION: 0
OS_NORMAL: 1
OD_INFERIOR_NASAL_RESTRICTION: 0
OD_NORMAL: 1
OS_SUPERIOR_NASAL_RESTRICTION: 0
OD_SUPERIOR_TEMPORAL_RESTRICTION: 0

## 2025-06-26 ASSESSMENT — VISUAL ACUITY
OS_PH_SC: 20/80
METHOD: SNELLEN - LINEAR
OD_SC+: -1
OS_SC: 20/150
OS_PH_SC+: -1
OD_SC: 20/70

## 2025-06-26 ASSESSMENT — TONOMETRY
OD_IOP_MMHG: 24
OS_IOP_MMHG: 19
IOP_METHOD: TONOPEN

## 2025-06-26 ASSESSMENT — EXTERNAL EXAM - RIGHT EYE: OD_EXAM: NORMAL

## 2025-06-26 ASSESSMENT — SLIT LAMP EXAM - LIDS
COMMENTS: NORMAL
COMMENTS: NORMAL, NO LAG

## 2025-06-26 NOTE — PROGRESS NOTES
HPI    Mikayla is here new to this provider for evaluation and treatment of Bell's palsy of left eye. Today she says she can close her left eye, but she says she could not closed her left eye for several days but not sure how long. She went to ED and after determining she did not suffer a stroke was started on Prednisolone     Erick Dennis COT 10:06 AM June 26, 2025     Last edited by Erick Dennis on 6/26/2025 10:06 AM.          Review of systems for the eyes was negative other than the pertinent positives/negatives listed in the HPI.      Assessment & Plan      Mikayla Timmons is a 79 year old female with the following diagnoses:   1. Dry eye syndrome, left    2. Glaucoma suspect of both eyes    3. Bell's palsy       Patient with upcoming CE/IOL surgery right eye on 7/23/2025  Here for diagnosis of new Bell's palsy on 6/17/2025    Orbic strength full bilateral and no lag on exam - inferior 1+ PEEs on left side - House brackmann II.     Recommend increased tears left eye, and ointment at night left eye. Requests refill of Cosopt. Would like to move surgical time later in the day if possible, discussed with surgical scheduler.    Patient disposition:   As scheduled for surgery          Attending Physician Attestation:  Complete documentation of historical and exam elements from today's encounter can be found in the full encounter summary report (not reduplicated in this progress note).  I personally obtained the chief complaint(s) and history of present illness.  I confirmed and edited as necessary the review of systems, past medical/surgical history, family history, social history, and examination findings as documented by others; and I examined the patient myself.  I personally reviewed the relevant tests, images, and reports as documented above.  I formulated and edited as necessary the assessment and plan and discussed the findings and management plan with the patient and family. . - Inderjit Conway MD

## 2025-06-30 ENCOUNTER — VIRTUAL VISIT (OUTPATIENT)
Dept: EDUCATION SERVICES | Facility: CLINIC | Age: 80
End: 2025-06-30
Payer: MEDICARE

## 2025-06-30 DIAGNOSIS — Z79.4 TYPE 2 DIABETES MELLITUS WITH HYPERGLYCEMIA, WITH LONG-TERM CURRENT USE OF INSULIN (H): ICD-10-CM

## 2025-06-30 DIAGNOSIS — Z79.4 TYPE 2 DIABETES MELLITUS WITH RIGHT EYE AFFECTED BY MODERATE NONPROLIFERATIVE RETINOPATHY AND MACULAR EDEMA, WITH LONG-TERM CURRENT USE OF INSULIN (H): Primary | ICD-10-CM

## 2025-06-30 DIAGNOSIS — E11.65 TYPE 2 DIABETES MELLITUS WITH HYPERGLYCEMIA, WITH LONG-TERM CURRENT USE OF INSULIN (H): ICD-10-CM

## 2025-06-30 DIAGNOSIS — E11.3311 TYPE 2 DIABETES MELLITUS WITH RIGHT EYE AFFECTED BY MODERATE NONPROLIFERATIVE RETINOPATHY AND MACULAR EDEMA, WITH LONG-TERM CURRENT USE OF INSULIN (H): Primary | ICD-10-CM

## 2025-06-30 PROCEDURE — 97803 MED NUTRITION INDIV SUBSEQ: CPT | Mod: 93 | Performed by: DIETITIAN, REGISTERED

## 2025-06-30 RX ORDER — HUMAN INSULIN 100 [IU]/ML
INJECTION, SUSPENSION SUBCUTANEOUS
Qty: 40 ML | Refills: 3 | Status: SHIPPED | OUTPATIENT
Start: 2025-06-30

## 2025-06-30 NOTE — PATIENT INSTRUCTIONS
Plan  NPH- 34 units in AM and 18 units PM until numbers improve. Then can go back to 28 units if fasting  or less and 31 units if >150.  7/22 (day prior to surgery) reduce PM NPH by 20% (14 units), 7/23 (the morning of surgery) reduce by 50% (14 units). Carry something with you to correct low BG as needed.   3.  Keep  track of calories/protein  4. Have your daughter assist with workout plan  5. Kerry will follow-up with you on scale.  6. We have follow-up on 8/25/25

## 2025-06-30 NOTE — Clinical Note
Hi! Mikayla is having surgery 7/23 so I gave her a plan for adjusting insulin based on our pre-op guidelines. We are continuing to work on weight loss, the scale she used at PT doesn't work anymore. Do you guys have a large enough scale for a wheelchair? If so that would be great and she could get weighed on 7/7 at her appt with you. If not I am looking into if our scale is big enough at any of my clinics. She was agreeable to increasing AM NPH to 34 units until BG start coming down more.  Thanks! Kerry Patterson RD, LD, [facility]

## 2025-06-30 NOTE — LETTER
6/30/2025         RE: Mikayla Timmons  [address]  [place] [address]        Dear Colleague,    Thank you for referring your patient, Mikayla Timmons, to the [facility]. Please see a copy of my visit note below.    Diabetes Self-Management Education & Support    Presents for: Follow-up    Type of Service: Telephone Visit    Originating Location (Patient Location): Home  Distant Location (Provider Location): [facility]  Mode of Communication:  Telephone    Telephone Visit Start Time: 8:01  Telephone Visit End Time (telephone visit stop time): 8:33    How would patient like to obtain AVS? MyChart      Assessment  Mikayla here for follow-up.  She has had a difficult last few weeks with a flare of her Bell's Palsy, requiring steroids that exacerbated her BG.  TIR 18% related to steroids. Half her face was paralyzed so this made eating more challenging and she also got a wound in her mouth from biting. She notes not eating as she regularly would but today she is ready to get back on track. Her goal is weight loss. We reviewed tracking food, her goal is 1200 calories, we increased her protein goal to 85g (1.5g/kg IBW). She is getting a lot of fresh vegetables and meats from our [employer] program. Her daughter is going to make her an exercise routine to meet Mikayla's needs. She has no interest in starting GLP-1 for weight loss. We did talk about increasing her morning insulin to 34 units to help with day time highs, she will do this short term but plans to go back down to 28-31 when numbers improve. She hasn't been able to get weighed for sometime now because the scale at  broke. I will look into the size of the scale at my clinics to see if these would work better. She is having eye surgery on 7/23. She is supposed to be NPO after midnight and in the morning. Provided her with directions for insulin based on on pre-op protocol. We made follow-up appt  in August    Patient's most recent   Lab Results   Component Value Date    A1C 7.7 05/23/2025    A1C 6.9 03/10/2021     is not meeting goal of <7.5%    Diabetes knowledge and skills assessment:   Patient is knowledgeable in diabetes management concepts related to: Healthy Eating, Being Active, Monitoring, Taking Medication, Problem Solving, Reducing Risks, and Healthy Coping    Based on learning assessment above, most appropriate setting for further diabetes education would be: Individual setting.    Care Plan and Education Provided:  Healthy Eating: Weight Management and protein increase  Being Active: Finding a physical activity routine that works for you  Taking Medication: NPH increase  Problem Solving: When to call a health care provider    Patient verbalized understanding of diabetes self-management education concepts discussed, opportunities for ongoing education and support, and recommendations provided today.    Plan  NPH- 34 units in AM and 18 units PM until numbers improve. Then can go back to 28 units if fasting  or less and 31 units if >150.  7/22 (day prior to surgery) reduce NPH by 20% (14 units), the morning of reduce by 50% (14 units). Carry something with you to correct low BG as needed.   3.  Keep  track of calories/protein  4. Have your daughter assist with workout plan  5. Kerry will follow-up with you on scale.  6. We have follow-up on 8/25/25    Topics to cover at upcoming visits: Healthy Eating, Being Active, Monitoring, Taking Medication, and Problem Solving    See Care Plan for co-developed, patient-state behavior change goals.    Education Materials Provided:  No new materials provided today      Subjective/Objective  Mikayla is an 79 year old, presenting for the following diabetes education related to: Follow-up  Accompanied by: Self  Diabetes education in the past 24mo: Yes  Focus of Visit: Monitoring, Problem Solving, Healthy Coping  Diabetes type: Type 2  Date of diagnosis: 8-9  "years ago  Disease course: Worsening  Transportation concerns: Yes  Other concerns: Physical impairment  Cultural Influences/Ethnic Background:  Not  or       Diabetes Symptoms & Complications:  Weight trend: Other (see Comments) (Has not been able to weigh self since PT scale broke, hoping she can come to [place] to get weighed. Will look into this.)  Disease course: Worsening  Complications assessed today?: No    Patient Problem List and Family Medical History reviewed for relevant medical history, current medical status, and diabetes risk factors.    Vitals:  LMP  (LMP Unknown)   Estimated body mass index is 40.94 kg/m  as calculated from the following:    Height as of 6/21/24: 1.651 m (5' 5\").    Weight as of 9/19/23: 111.6 kg (246 lb).   Last 3 BP:   BP Readings from Last 3 Encounters:   06/17/25 (!) 140/64   05/23/25 (!) 174/77   06/21/24 (!) 143/88       History   Smoking Status     Never   Smokeless Tobacco     Never       Labs:  Lab Results   Component Value Date    A1C 7.7 05/23/2025    A1C 6.9 03/10/2021     Lab Results   Component Value Date     06/17/2025     06/17/2025     05/23/2025     03/10/2021     Lab Results   Component Value Date     01/14/2025     03/10/2021     HDL Cholesterol   Date Value Ref Range Status   03/10/2021 54 >49 mg/dL Final     Direct Measure HDL   Date Value Ref Range Status   01/14/2025 43 (L) >=50 mg/dL Final     GFR Estimate   Date Value Ref Range Status   06/17/2025 90 >60 mL/min/1.73m2 Final     Comment:     eGFR calculated using 2021 CKD-EPI equation.   03/10/2021 88 >60 mL/min/[1.73_m2] Final     Comment:     Non  GFR Calc  Starting 12/18/2018, serum creatinine based estimated GFR (eGFR) will be   calculated using the Chronic Kidney Disease Epidemiology Collaboration   (CKD-EPI) equation.       GFR Estimate If Black   Date Value Ref Range Status   03/10/2021 >90 >60 mL/min/[1.73_m2] Final     " Comment:      GFR Calc  Starting 12/18/2018, serum creatinine based estimated GFR (eGFR) will be   calculated using the Chronic Kidney Disease Epidemiology Collaboration   (CKD-EPI) equation.       Lab Results   Component Value Date    CR 0.60 06/17/2025    CR 0.60 03/10/2021     Lab Results   Component Value Date    MICROL 24 09/23/2020    UMALCR 28.42 (H) 09/23/2020    UCRR 86 09/23/2020 6/30/2025   Healthy Eating   Healthy Eating Assessed Today Yes   Meal planning/habits Avoiding sweets;Carb counting;Low carb;Keeps food records   Who cooks/prepares meals for you? Self   Who purchases food in  your home? Self;Other       Market side RX through [employer]. Didnt have a lot of options d/t finances. Friend comes every saturday to shop, has acct at co-op. Daughter set up delivery service at [employer], buys food from 2 farms (gets egg/chicken/milk/lamb).   Meals include Breakfast;Lunch;Dinner   Breakfast 8AM-egg + britt + 1/2 avocado + coffee + unsweetened coconut milk + egg wrap OR 2 oz cheese + cucumbers/onion + nina lettuce + sometimes granny apple OR Fritatta with no crust.   Lunch 1PM-salad wtih pumpkin seeds +chicken breast + milk   Dinner 6PM-salad + tomato + avocado + meat OR susie salad + 1/2 peach OR had larger dinner 2 cups milk, crackers 18g, peanut butter   Other 2/3/25- has reduced amount eating, based on serving size, only eats 1 serving. Has noticed if she doesn't eat any cho, insulin doesnt work as well. Eating ~15g cho per meal. Doesnt eat after 6pm. Eating 9623-7132 calories per day. 5/9-fridge stocked with turkey, salmon, cristi greens, salad. 6/30-had bells palsy episode, on steroids which caused BG to spike, then harder to eat d/t some mouth paralysis with wound in mouth. Eating more sweets over the weekend, now getting back on track. Getting food box through our [employer] box.   Beverages Water;Tea;Coffee;Milk   Has patient met with a dietitian in the past? No          6/30/2025   Being Active   Being Active Assessed Today Yes       2/3-After meals lifts calf 10-12x, BG goes down by 5 units. Is doing PT exercise.   Exercise: Yes       5/9-goes every other week to PT, doesn't like PT bc not doing as much activity. Starting MOnday going to start walking with Jacky again. 6/30- daughter making program for her to do exercises. Hasnt been able to walk d/t knee.   Days per week of moderate to strenuous exercise (like a brisk walk) 7   How intense was your typical exercise?  Light (like stretching or slow walking)   Barrier to exercise Physical limitation       got her new wheelchair in August 24 6/30/2025   Monitoring   Monitoring Assessed Today Yes       Looking at clarity every 2 days to assess how she is doing. Checks BG every 30 min   Did patient bring glucose meter to appointment?  Yes       needs to get hooked up to clarity robbi   Blood Glucose Meter CGM   Times checking blood sugar at home (number) 5+   Times checking blood sugar at home (per) Day   Blood glucose trend Increasing           Diabetes Medication(s)       Insulin       insulin NPH (NOVOLIN N RELION) 100 UNIT/ML vial INJECT 28-31 UNITS SUBCUTANEOUSLY IN THE MORNING AND 18 IN THE EVENING. Take 31 units AM if fasting 150 or greater, otherwise 28 units in AM              6/30/2025   Taking Medications   Taking Medication Assessed Today Yes       has decided to not take Novolog.   Current Treatments Diet;Insulin Injections   Dose schedule Pre-breakfast;Pre-dinner       has been sometimes taking insulin after d/t forgetting it.   Given by Patient   Problems taking diabetes medications regularly? Yes       was out of insulin for 2 days   Diabetes medication side effects? Yes       weight gain         6/30/2025   Problem Solving   Problem Solving Assessed Today Yes   Is the patient at risk for hypoglycemia? Yes   Hypoglycemia Frequency Never   Hypoglycemia None   Hypoglycemia Complications None       Kerry Patterson RD,  [facility]    Time Spent: 30 minutes  Encounter Type: Individual    Any diabetes medication dose changes were [facility] the [facility] under the patient's referring provider.

## 2025-06-30 NOTE — PROGRESS NOTES
Diabetes Self-Management Education & Support    Presents for: Follow-up    Type of Service: Telephone Visit    Originating Location (Patient Location): Home  Distant Location (Provider Location): [facility]  Mode of Communication:  Telephone    Telephone Visit Start Time: 8:01  Telephone Visit End Time (telephone visit stop time): 8:33    How would patient like to obtain AVS? MyChart      Assessment  Mikayla here for follow-up.  She has had a difficult last few weeks with a flare of her Bell's Palsy, requiring steroids that exacerbated her BG.  TIR 18% related to steroids. Half her face was paralyzed so this made eating more challenging and she also got a wound in her mouth from biting. She notes not eating as she regularly would but today she is ready to get back on track. Her goal is weight loss. We reviewed tracking food, her goal is 1200 calories, we increased her protein goal to 85g (1.5g/kg IBW). She is getting a lot of fresh vegetables and meats from our [employer] program. Her daughter is going to make her an exercise routine to meet Mikayla's needs. She has no interest in starting GLP-1 for weight loss. We did talk about increasing her morning insulin to 34 units to help with day time highs, she will do this short term but plans to go back down to 28-31 when numbers improve. She hasn't been able to get weighed for sometime now because the scale at PT broke. I will look into the size of the scale at my clinics to see if these would work better. She is having eye surgery on 7/23. She is supposed to be NPO after midnight and in the morning. Provided her with directions for insulin based on on pre-op protocol. We made follow-up appt in August    Patient's most recent   Lab Results   Component Value Date    A1C 7.7 05/23/2025    A1C 6.9 03/10/2021     is not meeting goal of <7.5%    Diabetes knowledge and skills assessment:   Patient is knowledgeable in diabetes management concepts related  to: Healthy Eating, Being Active, Monitoring, Taking Medication, Problem Solving, Reducing Risks, and Healthy Coping    Based on learning assessment above, most appropriate setting for further diabetes education would be: Individual setting.    Care Plan and Education Provided:  Healthy Eating: Weight Management and protein increase  Being Active: Finding a physical activity routine that works for you  Taking Medication: NPH increase  Problem Solving: When to call a health care provider    Patient verbalized understanding of diabetes self-management education concepts discussed, opportunities for ongoing education and support, and recommendations provided today.    Plan  NPH- 34 units in AM and 18 units PM until numbers improve. Then can go back to 28 units if fasting  or less and 31 units if >150.  7/22 (day prior to surgery) reduce NPH by 20% (14 units), the morning of reduce by 50% (14 units). Carry something with you to correct low BG as needed.   3.  Keep  track of calories/protein  4. Have your daughter assist with workout plan  5. Kerry will follow-up with you on scale.  6. We have follow-up on 8/25/25    Topics to cover at upcoming visits: Healthy Eating, Being Active, Monitoring, Taking Medication, and Problem Solving    See Care Plan for co-developed, patient-state behavior change goals.    Education Materials Provided:  No new materials provided today      Subjective/Objective  Mikayla is an 79 year old, presenting for the following diabetes education related to: Follow-up  Accompanied by: Self  Diabetes education in the past 24mo: Yes  Focus of Visit: Monitoring, Problem Solving, Healthy Coping  Diabetes type: Type 2  Date of diagnosis: 8-9 years ago  Disease course: Worsening  Transportation concerns: Yes  Other concerns: Physical impairment  Cultural Influences/Ethnic Background:  Not  or       Diabetes Symptoms & Complications:  Weight trend: Other (see Comments) (Has not been able  "to weigh self since PT scale broke, hoping she can come to [place] to get weighed. Will look into this.)  Disease course: Worsening  Complications assessed today?: No    Patient Problem List and Family Medical History reviewed for relevant medical history, current medical status, and diabetes risk factors.    Vitals:  LMP  (LMP Unknown)   Estimated body mass index is 40.94 kg/m  as calculated from the following:    Height as of 6/21/24: 1.651 m (5' 5\").    Weight as of 9/19/23: 111.6 kg (246 lb).   Last 3 BP:   BP Readings from Last 3 Encounters:   06/17/25 (!) 140/64   05/23/25 (!) 174/77   06/21/24 (!) 143/88       History   Smoking Status    Never   Smokeless Tobacco    Never       Labs:  Lab Results   Component Value Date    A1C 7.7 05/23/2025    A1C 6.9 03/10/2021     Lab Results   Component Value Date     06/17/2025     06/17/2025     05/23/2025     03/10/2021     Lab Results   Component Value Date     01/14/2025     03/10/2021     HDL Cholesterol   Date Value Ref Range Status   03/10/2021 54 >49 mg/dL Final     Direct Measure HDL   Date Value Ref Range Status   01/14/2025 43 (L) >=50 mg/dL Final     GFR Estimate   Date Value Ref Range Status   06/17/2025 90 >60 mL/min/1.73m2 Final     Comment:     eGFR calculated using 2021 CKD-EPI equation.   03/10/2021 88 >60 mL/min/[1.73_m2] Final     Comment:     Non  GFR Calc  Starting 12/18/2018, serum creatinine based estimated GFR (eGFR) will be   calculated using the Chronic Kidney Disease Epidemiology Collaboration   (CKD-EPI) equation.       GFR Estimate If Black   Date Value Ref Range Status   03/10/2021 >90 >60 mL/min/[1.73_m2] Final     Comment:      GFR Calc  Starting 12/18/2018, serum creatinine based estimated GFR (eGFR) will be   calculated using the Chronic Kidney Disease Epidemiology Collaboration   (CKD-EPI) equation.       Lab Results   Component Value Date    CR 0.60 06/17/2025 "    CR 0.60 03/10/2021     Lab Results   Component Value Date    MICROL 24 09/23/2020    UMALCR 28.42 (H) 09/23/2020    UCRR 86 09/23/2020 6/30/2025   Healthy Eating   Healthy Eating Assessed Today Yes   Meal planning/habits Avoiding sweets;Carb counting;Low carb;Keeps food records   Who cooks/prepares meals for you? Self   Who purchases food in  your home? Self;Other       Market side RX through [employer]. Didnt have a lot of options d/t finances. Friend comes every saturday to shop, has acct at co-op. Daughter set up delivery service at [employer], buys food from 2 farms (gets egg/chicken/milk/lamb).   Meals include Breakfast;Lunch;Dinner   Breakfast 8AM-egg + britt + 1/2 avocado + coffee + unsweetened coconut milk + egg wrap OR 2 oz cheese + cucumbers/onion + nina lettuce + sometimes granny apple OR Fritatta with no crust.   Lunch 1PM-salad wtih pumpkin seeds +chicken breast + milk   Dinner 6PM-salad + tomato + avocado + meat OR susie salad + 1/2 peach OR had larger dinner 2 cups milk, crackers 18g, peanut butter   Other 2/3/25- has reduced amount eating, based on serving size, only eats 1 serving. Has noticed if she doesn't eat any cho, insulin doesnt work as well. Eating ~15g cho per meal. Doesnt eat after 6pm. Eating 0689-5565 calories per day. 5/9-fridge stocked with turkey, salmon, cristi greens, salad. 6/30-had bells palsy episode, on steroids which caused BG to spike, then harder to eat d/t some mouth paralysis with wound in mouth. Eating more sweets over the weekend, now getting back on track. Getting food box through our [employer] box.   Beverages Water;Tea;Coffee;Milk   Has patient met with a dietitian in the past? No         6/30/2025   Being Active   Being Active Assessed Today Yes       2/3-After meals lifts calf 10-12x, BG goes down by 5 units. Is doing PT exercise.   Exercise: Yes       5/9-goes every other week to PT, doesn't like PT bc not doing as much activity. Starting MOnday going  to start walking with Jacky again. 6/30- daughter making program for her to do exercises. Hasnt been able to walk d/t knee.   Days per week of moderate to strenuous exercise (like a brisk walk) 7   How intense was your typical exercise?  Light (like stretching or slow walking)   Barrier to exercise Physical limitation       got her new wheelchair in August 24 6/30/2025   Monitoring   Monitoring Assessed Today Yes       Looking at clarity every 2 days to assess how she is doing. Checks BG every 30 min   Did patient bring glucose meter to appointment?  Yes       needs to get hooked up to clarity robbi   Blood Glucose Meter CGM   Times checking blood sugar at home (number) 5+   Times checking blood sugar at home (per) Day   Blood glucose trend Increasing           Diabetes Medication(s)       Insulin       insulin NPH (NOVOLIN N RELION) 100 UNIT/ML vial INJECT 28-31 UNITS SUBCUTANEOUSLY IN THE MORNING AND 18 IN THE EVENING. Take 31 units AM if fasting 150 or greater, otherwise 28 units in AM              6/30/2025   Taking Medications   Taking Medication Assessed Today Yes       has decided to not take Novolog.   Current Treatments Diet;Insulin Injections   Dose schedule Pre-breakfast;Pre-dinner       has been sometimes taking insulin after d/t forgetting it.   Given by Patient   Problems taking diabetes medications regularly? Yes       was out of insulin for 2 days   Diabetes medication side effects? Yes       weight gain         6/30/2025   Problem Solving   Problem Solving Assessed Today Yes   Is the patient at risk for hypoglycemia? Yes   Hypoglycemia Frequency Never   Hypoglycemia None   Hypoglycemia Complications None       Kerry Patterson RD, LD, [facility]    Time Spent: 30 minutes  Encounter Type: Individual    Any diabetes medication dose changes were made via the [facility] Standing Orders under the patient's referring provider.

## 2025-07-01 ENCOUNTER — TELEPHONE (OUTPATIENT)
Dept: OPHTHALMOLOGY | Facility: CLINIC | Age: 80
End: 2025-07-01
Payer: MEDICARE

## 2025-07-01 DIAGNOSIS — H04.122 DRY EYE SYNDROME, LEFT: Primary | ICD-10-CM

## 2025-07-01 RX ORDER — MINERAL OIL, PETROLATUM 425; 573 MG/G; MG/G
OINTMENT OPHTHALMIC
Qty: 3.5 G | Refills: 11 | Status: SHIPPED | OUTPATIENT
Start: 2025-07-01

## 2025-07-01 NOTE — TELEPHONE ENCOUNTER
Updated Rx-- replaced toothpaste size strip with 0.5 inch strip.    Soy Simon RN 8:26 AM 07/01/25

## 2025-07-01 NOTE — TELEPHONE ENCOUNTER
[facility] Call Center    Phone Message    May a detailed message be left on voicemail: yes     Reason for Call: Medication Question or concern regarding medication   Prescription Clarification  Name of Medication: artificial tears OINT ophthalmic ointment  Prescribing Provider: Dr Monroy   Pharmacy: [facility] - [place], [place] - [address] [place] [address] AT [facility]   What on the order needs clarification? Per pharmacy [facility] unable to fill this RX and is requesting a substitute RX to be faxed to pharmacy Please contact pharmacy with any questions and select option 4 Thank you       Action Taken: Message routed to:  [facility]    Travel Screening: Not Applicable     Date of Service:

## 2025-07-07 ENCOUNTER — OFFICE VISIT (OUTPATIENT)
Dept: FAMILY MEDICINE | Facility: CLINIC | Age: 80
End: 2025-07-07
Payer: MEDICARE

## 2025-07-07 VITALS
OXYGEN SATURATION: 96 % | TEMPERATURE: 97.9 F | HEART RATE: 51 BPM | SYSTOLIC BLOOD PRESSURE: 150 MMHG | DIASTOLIC BLOOD PRESSURE: 75 MMHG | HEIGHT: 65 IN | RESPIRATION RATE: 17 BRPM | BODY MASS INDEX: 40.94 KG/M2

## 2025-07-07 DIAGNOSIS — H25.9 AGE-RELATED CATARACT OF BOTH EYES, UNSPECIFIED AGE-RELATED CATARACT TYPE: ICD-10-CM

## 2025-07-07 DIAGNOSIS — E11.65 TYPE 2 DIABETES MELLITUS WITH HYPERGLYCEMIA, WITH LONG-TERM CURRENT USE OF INSULIN (H): ICD-10-CM

## 2025-07-07 DIAGNOSIS — Z23 NEED FOR VACCINATION: ICD-10-CM

## 2025-07-07 DIAGNOSIS — E03.9 HYPOTHYROIDISM, UNSPECIFIED TYPE: ICD-10-CM

## 2025-07-07 DIAGNOSIS — I89.0 LYMPHEDEMA: ICD-10-CM

## 2025-07-07 DIAGNOSIS — I38 DIASTOLIC MURMUR: ICD-10-CM

## 2025-07-07 DIAGNOSIS — Z79.4 TYPE 2 DIABETES MELLITUS WITH HYPERGLYCEMIA, WITH LONG-TERM CURRENT USE OF INSULIN (H): ICD-10-CM

## 2025-07-07 DIAGNOSIS — Z01.818 PREOP GENERAL PHYSICAL EXAM: Primary | ICD-10-CM

## 2025-07-07 DIAGNOSIS — I10 ESSENTIAL HYPERTENSION: ICD-10-CM

## 2025-07-07 PROCEDURE — 82570 ASSAY OF URINE CREATININE: CPT | Performed by: FAMILY MEDICINE

## 2025-07-07 PROCEDURE — 82043 UR ALBUMIN QUANTITATIVE: CPT | Performed by: FAMILY MEDICINE

## 2025-07-07 ASSESSMENT — PAIN SCALES - GENERAL: PAINLEVEL_OUTOF10: NO PAIN (0)

## 2025-07-07 NOTE — PROGRESS NOTES
Preoperative Evaluation  [address]  [address] [place] [id number]  Phone: [phone]  Fax: [phone]  Primary Provider: Nini Heaton MD  Pre-op Performing Provider: Nini Heaton MD  Jul 7, 2025 7/7/2025   Surgical Information   What procedure is being done? cataract and glaucoma    Facility or [facility] where procedure[facility]    Who is doing the procedure / surgery? Dr. Conway    Date of surgery / procedure: 07/23/25    Time of surgery / procedure: 10 am    Where do you plan to recover after surgery? Other -         Proxy-reported     Fax number for surgical facility: NEEDS TO BE COLLECTED    Assessment & Plan     The proposed surgical procedure is considered LOW risk.    Age-related cataract of both eyes, unspecified age-related cataract type  Undergoing both eyes also with some glaucoma treatment. Notes that she will be under general.     Need for vaccination  COVID today     Lymphedema  Her helper has been managing with skin care and looks improved. Will refer for more formal cares to ensure we are not missing any treatment options. She does have   - Lymphedema Therapy  Referral; Future    Type 2 diabetes mellitus with hyperglycemia, with long-term current use of insulin (H)  Much better controlled in the last 3 months with last HgA1c at 7.7. Too early to recheck. Fasting at 115 today.    Plan to follow CDE recs around insulin mgmt prior to her surgery.   - Albumin Random Urine Quantitative with Creat Ratio; Future  - Albumin Random Urine Quantitative with Creat Ratio    Diastolic murmur  Has an echo planned per her in the next week.  [facility] notes it as a cardiology visit. Either way, needs echo prior to general anesthesia. She is wheelchair bound. DASI is 2.7 which is below the 4 METs. Recently seen for Bels Palsy in the ED and negative troponins.     Hypertension  Reasonably controlled for surgery.  Will not hold the Valsartan- HCTZ since tends to run high.     Absence of left hip joint  Hardware removed (6/2022) due to recurrent dislocations    Hypothyroid  No changes - controlled at last check     Risks and Recommendations  The patient has the following additional risks and recommendations for perioperative complications:  Diabetes:  - Patient is on insulin therapy; diabetic NPO guidelines provided and discussed.   - Mikayla has difficulty with positioning and mobility    Preoperative Medication Instructions  Antiplatelet or Anticoagulation Medication Instructions   - We reviewed the medication list and the patient is not on an antiplatelet or anticoagulation medications.    Additional Medication Instructions   - Long acting insulin (e.g. glargine, detemir): 7/22 (day prior to surgery) reduce NPH by 20% (14 units), the morning of reduce by 50% (14 units). Carry something with you to correct low BG as needed.     Recommendation  Approval given to proceed with proposed procedure pending review of diagnostic evaluation.  Is getting cardiac echo.         Subjective   Mikayla is a 79 year old, presenting for the following:  Pre-Op Exam (Pre op - )          7/7/2025    10:22 AM   Additional Questions   Roomed by Lizzette   Accompanied by self         7/7/2025    Information    services provided? No     HPI:   Is going to have an echo at the  in 8 days - needs to be at the .     Recent episode of Bell's Palsy that was treated with steroids with much improvement although food spills out on the right at times.  At times will throb on the left face.  No numbness.       DM: 115 fasting this am  If has higher fasting, will take extra 3 U of NPH in am  Sees podiatry       BP: Jacky taking it at home.  140 -142/80.  While in the ED was at 140/80.       Lymphedema: Jacky puts on the Eucerin three times a week on her legs. She can't reach.  He also puts on her compression socks. Velcro and custom made.          7/7/2025   Pre-Op Questionnaire   Have you ever had a heart attack or stroke? No    Have you ever had surgery on your heart or blood vessels, such as a stent placement, a coronary artery bypass, or surgery on an artery in your head, neck, heart, or legs? No    Do you have chest pain with activity? No    Do you have a history of heart failure? No    Do you currently have a cold, bronchitis or symptoms of other infection? No    Do you have a cough, shortness of breath, or wheezing? No    Do you or anyone in your family have previous history of blood clots? No    Do you or does anyone in your family have a serious bleeding problem such as prolonged bleeding following surgeries or cuts? No    Have you ever had problems with anemia or been told to take iron pills? No    Have you had any abnormal blood loss such as black, tarry or bloody stools, or abnormal vaginal bleeding? No    Have you ever had a blood transfusion? No    Are you willing to have a blood transfusion if it is medically needed before, during, or after your surgery? Yes    Have you or any of your relatives ever had problems with anesthesia? No    Do you have sleep apnea, excessive snoring or daytime drowsiness? No    Do you have any artifical heart valves or other implanted medical devices like a pacemaker, defibrillator, or continuous glucose monitor? No    Do you have artificial joints? No    Are you allergic to latex? No        Proxy-reported     Advance Care Planning    Discussed advance care planning with patient; however, patient declined at this time.    Preoperative Review of    reviewed - no record of controlled substances prescribed.      Status of Chronic Conditions:  DIABETES - Patient has a longstanding history of DiabetesType Type II . Patient is being treated with insulin injections and denies significant side effects. Control has been good. Complicating factors include but are not limited to: retinopathy and morbid obesity .      HYPERTENSION - Patient has longstanding history of HTN , currently denies any symptoms referable to elevated blood pressure. Specifically denies chest pain, palpitations, dyspnea, orthopnea, PND or peripheral edema. Blood pressure readings have been just above the normal range. Current medication regimen is as listed below. Patient denies any side effects of medication.     HYPOTHYROIDISM - Patient has a longstanding history of chronic Hypothyroidism. Patient has been doing well, noting no tremor, insomnia, hair loss or changes in skin texture. Continues to take medications as directed, without adverse reactions or side effects. Last TSH   Lab Results   Component Value Date    TSH 2.84 05/23/2025   .      Patient Active Problem List    Diagnosis Date Noted    Body mass index (BMI) 40.0-44.9, adult (H) 03/14/2025     Priority: Medium    Urge incontinence of urine 02/27/2024     Priority: Medium    Type 2 diabetes mellitus with left eye affected by proliferative retinopathy and macular edema, with long-term current use of insulin (H) 01/26/2024     Priority: Medium    Dependent for wheelchair mobility 01/12/2024     Priority: Medium    Class 3 obesity (H) 08/14/2023     Priority: Medium    Radiculopathy, lumbar region 06/14/2022     Priority: Medium    Type 2 diabetes mellitus with right eye affected by moderate nonproliferative retinopathy and macular edema, with long-term current use of insulin (H) 06/14/2022     Priority: Medium    Acquired absence of left hip joint 06/14/2022     Priority: Medium    Other chronic pain 06/14/2022     Priority: Medium    Difficulty in walking, not elsewhere classified 06/14/2022     Priority: Medium    Iron deficiency anemia secondary to blood loss (chronic) 06/14/2022     Priority: Medium    Muscle weakness (generalized) 06/14/2022     Priority: Medium    Other abnormalities of gait and mobility 06/14/2022     Priority: Medium    Other retention of urine 06/14/2022      Priority: Medium    Recurrent dislocation, left hip 06/14/2022     Priority: Medium    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 Chronic condition, aggravated by left hip problems limiting WB on left leg. Last Assessment & Plan: Formatting of this note might be different from the original. Aggravated right knee pain when she fell out of her wheelchair in an elevator accident.  She like to get another steroid injection as they have been helping.  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       Chronic pain of right wrist 08/02/2017     Priority: Medium     Last Assessment & Plan: Formatting of this note might be different from the original. 71yoF with h/o right wrist pain. No complaints today. - continue WBAT RUE, continue activities as tolerated - order for [employer] crutches and gait training given - Follow up as needed      Degenerative disc disease, lumbar 08/02/2017     Priority: Medium     Last Assessment & Plan: Formatting of this note might be different from the original. Back pain, lower lumber, improving with PT.      Closed fracture of left wrist 08/02/2017     Priority: Medium     Last Assessment & Plan: Formatting of this note might be different from the original. 71yoF 5 months s/p left scaphoid wrist fracture treated  conservatively in a thumb spica splint. 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 Cared [place] [facility] [place]      Carpal tunnel syndrome 10/26/2012     Priority: Medium    Hyperlipidemia LDL goal <100 10/26/2012     Priority: Medium    Hypothyroidism 10/26/2012     Priority: Medium    Decreased sensation of foot 10/26/2012     Priority: Medium     Left greater toe has negative monofilament testing for sensation - likely due to DM but also may be due to her frequent left sided hip replacements and resultant nerve injury (per patient). 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 Post-polio syndrome Last Assessment & Plan: Formatting of this note might be different from the original. Appointment pending with Soy Rojo in PM&R.      S/P hip replacement 12/21/2009     Priority: Medium     Formatting of this note might be different from the original. History of left total hip replacement decades ago with multiple revisions, complicated by recurrent hip instability.  Patient with revision September 2019 with good outcome until this past Sunday, when the hip is dislocated multiple times in a row.  She has been able to self reduce these dislocations.  The dislocations have occurred during her normal activities  of daily living, such as simply getting out of bed the same where she has in the past.  Mikayla denies any other problems.  She has had no pain in her back.  She has had no recent illnesses.  She feels that she is healthy and has not had any malaise fevers or chills.  Her main complaint is her opposite right knee which is known to have severe patellofemoral arthritis.  The combination of her right knee and left hip has left her fairly immobile. Last Assessment & Plan: Formatting of this note might be different from the original. Strengthening improved, able to walk a bit without her walker. Still sleeping in her recliner. Overall fairly optimistic.  Still hoping for surgery next year.      Closed dislocation of hip (H) 01/25/2007     Priority: Medium      Past Medical History:   Diagnosis Date    Acute posthemorrhagic anemia 06/14/2022    Arthritis     Hypertension     Ulcer of right leg, limited to breakdown of skin (H) 03/14/2025     Past Surgical History:   Procedure Laterality Date    HIP SURGERY      removed left hip bone     Current Outpatient Medications   Medication Sig Dispense Refill    Nan Protect (EUCERIN) external cream Apply topically 2 times daily as needed for dry skin or other 142 g 4    blood glucose (NO BRAND SPECIFIED) test strip Use to test blood sugar up to 1 times daily or as directed. To accompany: Blood Glucose Monitor Brands: [employer] Glucocard meter 100 strip 11    cholecalciferol 50 MCG (2000 UT) CAPS Take 1 capsule by mouth daily      Continuous Blood Gluc  (DEXCOM G6 ) BERTHA 1 Units 7 times daily Use to read blood sugars as per 's instructions. 3 each 11    Continuous Blood Gluc Sensor (DEXCOM G6 SENSOR) MISC 1 each every 10 days Change every 10 days. 3 each 11    Continuous Blood Gluc Sensor (DEXCOM G6 SENSOR) MISC Change every 10 days.      Continuous Blood Gluc Transmit (DEXCOM G6 TRANSMITTER) MISC 1 each every 3 months Change every 3 months. 1 each 3     "Continuous Blood Gluc Transmit (DEXCOM G6 TRANSMITTER) MISC 1 Units 7 times daily Change every 3 months. 1 each 0    dextran 70-hypromellose (TEARS NATURALE FREE PF) 0.1-0.3 % ophthalmic solution Place 1 drop Into the left eye daily as needed. 36 each 0    dorzolamide-timolol (COSOPT) 2-0.5 % ophthalmic solution Place 1 drop into both eyes 2 times daily. 10 mL 3    Homeopathic Products (ARNICARE) GEL Apply topically to affected area(s) as needed for pain.      insulin NPH (NOVOLIN N RELION) 100 UNIT/ML vial INJECT 28-31 UNITS SUBCUTANEOUSLY IN THE MORNING AND 18 IN THE EVENING. Take 31 units AM if fasting 150 or greater, otherwise 28 units in AM-take up to 34 units in AM. TDD=56 units 40 mL 3    insulin pen needle (32G X 4 MM) 32G X 4 MM miscellaneous Use 4 pen needles daily or as directed. 100 each 11    insulin pen needle (B-D U/F) 31G X 8 MM miscellaneous Use 4 daily as directed. 1 each PRN    insulin syringe-needle U-100 (31G X 5/16\" 0.3 ML) 31G X 5/16\" 0.3 ML miscellaneous Use 2 syringes daily or as directed. 100 each 11    levothyroxine (SYNTHROID/LEVOTHROID) 88 MCG tablet Take 1 tablet (88 mcg) by mouth every morning (before breakfast). 90 tablet 1    miconazole (MICATIN) 2 % external powder Apply topically 2 times daily 85 g 0    STATIN NOT PRESCRIBED (INTENTIONAL) Please choose reason not prescribed, below      thin (NO BRAND SPECIFIED) lancets Use to test blood sugar up to 1 time daily or as directed. To accompany: Blood Glucose Monitor Brands: [employer] Glucocard. ONLY send when requested by patient 100 each 11    triamcinolone (KENALOG) 0.025 % external ointment Apply 2 pea sized amounts to each leg daily. 80 g 1    valsartan-hydrochlorothiazide (DIOVAN HCT) 80-12.5 MG tablet Take 1 tablet by mouth daily. 90 tablet 1    White Petrolatum-Mineral Oil (REFRESH P.M.) OINT 0.5 inch strip in left eye before bedtime 3.5 g 11       Allergies   Allergen Reactions    Hydromorphone Itching    Ketamine Other (See " "Comments)        Social History     Tobacco Use    Smoking status: Never    Smokeless tobacco: Never   Substance Use Topics    Alcohol use: Yes     Comment: Lightly       History   Drug Use Unknown             Review of Systems  Constitutional, neuro, ENT, endocrine, pulmonary, cardiac, gastrointestinal, genitourinary, musculoskeletal, integument and psychiatric systems are negative, except as otherwise noted.    Objective    BP (!) 150/75   Pulse 51   Temp 97.9  F (36.6  C) (Skin)   Resp 17   Ht 1.651 m (5' 5\")   LMP  (LMP Unknown)   SpO2 96%   BMI 40.94 kg/m     Estimated body mass index is 40.94 kg/m  as calculated from the following:    Height as of this encounter: 1.651 m (5' 5\").    Weight as of 9/19/23: 111.6 kg (246 lb).  Physical Exam  GENERAL: alert and no distress  EYES: Eyes grossly normal to inspection, PERRL and conjunctivae and sclerae normal. No conjunctivitis and able to close left eye.   HENT: ear canals and TM's normal, mouth without ulcers or lesions - reasonable posterior pharyngeal space  NECK: no adenopathy, no asymmetry, masses, or scars  RESP: lungs clear to auscultation - no rales, rhonchi or wheezes  CV: regular rate and rhythm, 2/6 diastolic murmur heard best at the right sternal border, no click or rub,  MS: lymphedema of both LE, worse on the right, with baseline diffuse and mild erythema and some thickened crusting (more on the left). No open wounds, no signs of cellulitis. Overall better than before.  Neuro: slight facial droop on the left, noticeable most at the corner of her mouth. Able to move facial muscles.       Recent Labs   Lab Test 06/17/25  1206 05/23/25  0954 05/23/25  0946 01/14/25  1020   HGB 11.6*  --   --   --      --   --   --     143  --  139   POTASSIUM 4.5 4.2  --  4.2   CR 0.60 0.50*  --  0.90   A1C  --   --  7.7* 9.0*        Diagnostics  No labs were ordered during this visit.   No EKG required for low risk surgery (cataract, skin procedure, " breast biopsy, etc).    Revised Cardiac Risk Index (RCRI)  The patient has the following serious cardiovascular risks for perioperative complications:   - Diabetes Mellitus (on Insulin) = 1 point     RCRI Interpretation: 1 point: Class II (low risk - 0.9% complication rate)         Signed Electronically by: Nini Heaton MD  A copy of this evaluation report is provided to the requesting physician.

## 2025-07-07 NOTE — Clinical Note
Srikanth Crocker We don't have her fax for her surgery either.  Can you call her to find out what it is? She will be at [facility] I think.  Migdalia

## 2025-07-08 LAB
CREAT UR-MCNC: 78.3 MG/DL
MICROALBUMIN UR-MCNC: 16.3 MG/L
MICROALBUMIN/CREAT UR: 20.82 MG/G CR (ref 0–25)

## 2025-07-09 ENCOUNTER — TELEPHONE (OUTPATIENT)
Dept: FAMILY MEDICINE | Facility: CLINIC | Age: 80
End: 2025-07-09
Payer: MEDICARE

## 2025-07-09 NOTE — TELEPHONE ENCOUNTER
Prior Authorization Retail Medication Request    Medication/Dose:  insulin NPH (NOVOLIN N RELION) 100 UNIT/ML vial  Diagnosis and ICD code (if different than what is on RX):    New/renewal/insurance change PA/secondary ins. PA:  Previously Tried and Failed:    Rationale:      Insurance   Primary: MEDICARE   Insurance ID:  [id number]   Secondary (if applicable):MEDICAID [place]   Insurance ID:  [id number]     Pharmacy Information (if different than what is on RX)  Name:    Phone:    Fax:    Clinic Information  Preferred routing pool for dept communication:

## 2025-07-10 ENCOUNTER — TELEPHONE (OUTPATIENT)
Dept: FAMILY MEDICINE | Facility: CLINIC | Age: 80
End: 2025-07-10
Payer: MEDICARE

## 2025-07-10 NOTE — TELEPHONE ENCOUNTER
Retail Pharmacy Prior Authorization Team   Phone: [phone]    Prior Authorization Not Needed per Insurance    Medication: NOVOLIN N RELION VIAL 100 UNIT/ML SC SUSP  Insurance Company:    Expected CoPay: $    Pharmacy Filling the Rx: [facility] - [address] AT [facility]  Pharmacy Notified: YES  Patient Notified: YES    CALLED PHARMACY TO OBTAIN INSURANCE INFORMATION - PHARMACIST INFORMED ME THIS IS BEING PROCESSED THROUGH [facility] THEREFORE NO PRIOR AUTHORIZATION IS NEEDED.

## 2025-07-10 NOTE — TELEPHONE ENCOUNTER
[facility] phone call message- general phone call:    Reason for call: The patient to state the name and fax number to her surgeon is:    Dr Inderjit Conway  F: [phone]    Return call needed: No    OK to leave a message on voice mail? No    Primary language: English      needed? No    Call taken on July 10, 2025 at 8:36 AM by Nuvia Bello

## 2025-07-11 NOTE — PATIENT INSTRUCTIONS
How to Take Your Medication Before Surgery  Preoperative Medication Instructions   Antiplatelet or Anticoagulation Medication Instructions   - We reviewed the medication list and the patient is not on an antiplatelet or anticoagulation medications.    Additional Medication Instructions   - Long acting insulin (e.g. glargine, detemir):   (day prior to surgery) reduce NPH by 20% (14 units), the morning of reduce by 50% (14 units). Carry something with you to correct low BG as needed.     Patient Education   Preparing for Your Surgery  For Adults  Getting started  In most cases, a nurse will call to review your health history and instructions. They will give you an arrival time based on your scheduled surgery time. Please be ready to share:  Your doctor's clinic name and phone number  Your medical, surgical, and anesthesia history  A list of allergies and sensitivities  A list of medicines, including herbal treatments and over-the-counter drugs  Whether the patient has a legal guardian (ask how to send us the papers in advance)  Note: You may not receive a call if you were seen at our PAC (Preoperative Assessment Center).  Please tell us if you're pregnant--or if there's any chance you might be pregnant. Some surgeries may injure a fetus (unborn baby), so they require a pregnancy test. Surgeries that are safe for a fetus don't always need a test, and you can choose whether to have one.   Preparing for surgery  Within 10 to 30 days of surgery: Have a pre-op exam (sometimes called an H&P, or History and Physical). This can be done at a clinic or pre-operative center.  If you're having a , you may not need this exam. Talk to your care team.  At your pre-op exam, talk to your care team about all medicines you take. (This includes CBD oil and any drugs, such as THC, marijuana, and other forms of cannabis.) If you need to stop any medicine before surgery, ask when to start taking it again.  This is for your  safety. Many medicines and drugs can make you bleed too much during surgery. Some change how well surgery (anesthesia) drugs work.  Call your insurance company to let them know you're having surgery. (If you don't have insurance, call [phone].)  Call your clinic if there's any change in your health. This includes a scrape or scratch near the surgery site, or any signs of a cold (sore throat, runny nose, cough, rash, fever).  Eating and drinking guidelines  For your safety: Unless your surgeon tells you otherwise, follow the guidelines below.  Eat and drink as normal until 8 hours before you arrive for surgery. After that, no food or milk. You can spit out gum when you arrive.  Drink clear liquids until 2 hours before you arrive. These are liquids you can see through, like water, Gatorade, and Propel Water. They also include plain black coffee and tea (no cream or milk).  No alcohol for 24 hours before you arrive. The night before surgery, stop any drinks that contain THC.  If your care team tells you to take medicine on the morning of surgery, it's okay to take it with a sip of water. No other medicines or drugs are allowed (including CBD oil)--follow your care team's instructions.  If you have questions the day of surgery, call your hospital or surgery center.   Preventing infection  Shower or bathe the night before and the morning of surgery. Follow the instructions your clinic gave you. (If no instructions, use regular soap.)  Don't shave or clip hair near your surgery site. We'll remove the hair if needed.  Don't smoke or vape the morning of surgery. No chewing tobacco for 6 hours before you arrive. A nicotine patch is okay. You may spit out nicotine gum when you arrive.  For some surgeries, the surgeon will tell you to fully quit smoking and nicotine.  We will make every effort to keep you safe from infection. We will:  Clean our hands often with soap and water (or an alcohol-based hand rub).  Clean the  skin at your surgery site with a special soap that kills germs.  Give you a special gown to keep you warm. (Cold raises the risk of infection.)  Wear hair covers, masks, gowns, and gloves during surgery.  Give antibiotic medicine, if prescribed. Not all surgeries need this medicine.  What to bring on the day of surgery  Photo ID and insurance card  Copy of your health care directive, if you have one  Glasses and hearing aids (bring cases)  You can't wear contacts during surgery  Inhaler and eye drops, if you use them (tell us about these when you arrive)  CPAP machine or breathing device, if you use them  A few personal items, if spending the night  If you have . . .  A pacemaker, ICD (cardiac defibrillator), or other implant: Bring the ID card.  An implanted stimulator: Bring the remote control.  A legal guardian: Bring a copy of the certified (court-stamped) guardianship papers.  Please remove any jewelry, including body piercings. Leave jewelry and other valuables at home.  If you're going home the day of surgery  You must have a support person drive you home. They should stay with you overnight, and they may need to help with your self-care.  If you don't have a support person, please tells us as soon as possible. We can help.  After surgery  If it's hard to control your pain or you need more pain medicine, please call your surgeon's office.  Questions?   If you have any questions for your care team, list them here:   ____________________________________________________________________________________________________________________________________________________________________________________________________________________________________________________________  For informational purposes only. Not to replace the advice of your health care provider. Copyright   2003, 2019 [facility]. All rights reserved. Clinically reviewed by Jayden Rosenberg MD. [employer] [id number] - REV 02/25.

## 2025-07-15 ENCOUNTER — HOSPITAL ENCOUNTER (OUTPATIENT)
Dept: CARDIOLOGY | Facility: CLINIC | Age: 80
Discharge: HOME | End: 2025-07-15
Attending: FAMILY MEDICINE
Payer: MEDICARE

## 2025-07-15 DIAGNOSIS — I38 DIASTOLIC MURMUR: ICD-10-CM

## 2025-07-15 LAB — LVEF ECHO: NORMAL

## 2025-07-15 PROCEDURE — 93306 TTE W/DOPPLER COMPLETE: CPT | Mod: 26 | Performed by: INTERNAL MEDICINE

## 2025-07-15 PROCEDURE — 255N000002 HC RX 255 OP 636: Performed by: INTERNAL MEDICINE

## 2025-07-15 PROCEDURE — 999N000208 ECHOCARDIOGRAM COMPLETE

## 2025-07-15 RX ADMIN — HUMAN ALBUMIN MICROSPHERES AND PERFLUTREN 6 ML (DILUTED): 10; .22 INJECTION, SOLUTION INTRAVENOUS at 12:08

## 2025-07-17 ENCOUNTER — DOCUMENTATION ONLY (OUTPATIENT)
Dept: FAMILY MEDICINE | Facility: CLINIC | Age: 80
End: 2025-07-17
Payer: MEDICARE

## 2025-07-17 NOTE — PROGRESS NOTES
"When opening a documentation only encounter, be sure to enter in \"Chief Complaint\" Forms and in \" Comments\" Title of form, description if needed.    Mikayla is a 79 year old  female  Form received via: Fax  Form now resides in: Provider Ready    Brenda Thompson MA               "

## 2025-07-18 ENCOUNTER — TELEPHONE (OUTPATIENT)
Dept: FAMILY MEDICINE | Facility: CLINIC | Age: 80
End: 2025-07-18

## 2025-07-24 ENCOUNTER — OFFICE VISIT (OUTPATIENT)
Dept: OPHTHALMOLOGY | Facility: CLINIC | Age: 80
End: 2025-07-24
Attending: OPHTHALMOLOGY
Payer: MEDICARE

## 2025-07-24 DIAGNOSIS — Z98.890 POST-OPERATIVE STATE: Primary | ICD-10-CM

## 2025-07-24 PROCEDURE — G0463 HOSPITAL OUTPT CLINIC VISIT: HCPCS | Performed by: OPHTHALMOLOGY

## 2025-07-24 ASSESSMENT — PACHYMETRY
OS_CT(UM): 586
OD_CT(UM): 587
EXAM_DATE: 8/22/2023

## 2025-07-24 ASSESSMENT — VISUAL ACUITY
METHOD: SNELLEN - LINEAR
OS_SC+: +1
OD_SC: 20/70
OS_SC: 20/80
OD_SC+: -2
OD_PH_SC: 20/60
OD_PH_SC+: +2

## 2025-07-24 ASSESSMENT — TONOMETRY
OD_IOP_MMHG: 17
OS_IOP_MMHG: 10
IOP_METHOD: TONOPEN

## 2025-07-24 ASSESSMENT — SLIT LAMP EXAM - LIDS
COMMENTS: NORMAL
COMMENTS: NORMAL

## 2025-07-24 ASSESSMENT — EXTERNAL EXAM - RIGHT EYE: OD_EXAM: NORMAL

## 2025-07-24 NOTE — PROGRESS NOTES
HPI       Post Op (Ophthalmology) Right Eye    In right eye. Additional comments: S/p RIGHT KELMAN PHACOEMULSIFICATION CLEAR CORNEAL INTRAOCULAR LENS IMPLANT WITH Inifinite I-STENT.               Last edited by Erinn Marquez CO on 7/24/2025 11:10 AM.          Review of systems for the eyes was negative other than the pertinent positives/negatives listed in the HPI.      Assessment & Plan      Mikayla Timmons is a 79 year old female with the following diagnoses:   1. Post-operative state         S/P cataract extraction + iStent Infinite both eyes (targeting myopia) 7/23/25  Doing well  Ok to resume normal activities  Continue Cosopt twice a day both eyes for now  Taper Predforte as directed  Glasses prescription updated  Artificial tears as needed      Patient disposition:   Return in about 3 weeks (around 8/14/2025) for Refraction, DFE.           Attending Physician Attestation:  Complete documentation of historical and exam elements from today's encounter can be found in the full encounter summary report (not reduplicated in this progress note).  I personally obtained the chief complaint(s) and history of present illness.  I confirmed and edited as necessary the review of systems, past medical/surgical history, family history, social history, and examination findings as documented by others; and I examined the patient myself.  I personally reviewed the relevant tests, images, and reports as documented above.  I formulated and edited as necessary the assessment and plan and discussed the findings and management plan with the patient and family. . - Inderjit Conway MD

## 2025-08-07 ENCOUNTER — OFFICE VISIT (OUTPATIENT)
Dept: OPHTHALMOLOGY | Facility: CLINIC | Age: 80
End: 2025-08-07
Attending: OPHTHALMOLOGY
Payer: MEDICARE

## 2025-08-07 DIAGNOSIS — Z96.1 PSEUDOPHAKIA, BOTH EYES: ICD-10-CM

## 2025-08-07 DIAGNOSIS — H40.1132 PRIMARY OPEN-ANGLE GLAUCOMA, BILATERAL, MODERATE STAGE: ICD-10-CM

## 2025-08-07 DIAGNOSIS — E11.65 TYPE 2 DIABETES MELLITUS WITH HYPERGLYCEMIA, WITH LONG-TERM CURRENT USE OF INSULIN (H): ICD-10-CM

## 2025-08-07 DIAGNOSIS — Z79.4 TYPE 2 DIABETES MELLITUS WITH HYPERGLYCEMIA, WITH LONG-TERM CURRENT USE OF INSULIN (H): ICD-10-CM

## 2025-08-07 DIAGNOSIS — Z98.890 POST-OPERATIVE STATE: Primary | ICD-10-CM

## 2025-08-07 PROCEDURE — G0463 HOSPITAL OUTPT CLINIC VISIT: HCPCS | Performed by: OPHTHALMOLOGY

## 2025-08-07 PROCEDURE — 92134 CPTRZ OPH DX IMG PST SGM RTA: CPT | Performed by: OPHTHALMOLOGY

## 2025-08-07 RX ORDER — PREDNISOLONE ACETATE 10 MG/ML
1 SUSPENSION/ DROPS OPHTHALMIC
COMMUNITY
Start: 2025-07-23

## 2025-08-07 ASSESSMENT — VISUAL ACUITY
OD_SC: 20/70
OS_SC: J10
METHOD: SNELLEN - LINEAR
OD_SC: J5
OD_PH_SC: 20/60
OS_PH_SC: 20/70
OS_SC: 20/150

## 2025-08-07 ASSESSMENT — REFRACTION_MANIFEST
METHOD_AUTOREFRACTION: 1
OS_CYLINDER: +1.00
OD_CYLINDER: +1.25
OS_AXIS: 178
OS_ADD: +2.50
OS_SPHERE: -3.50
OS_CYLINDER: +1.00
OD_ADD: +2.50
OD_CYLINDER: +1.25
OS_AXIS: 157
OD_SPHERE: -2.50
OS_SPHERE: -2.50
OD_SPHERE: -3.00
OD_AXIS: 013
OD_AXIS: 175

## 2025-08-07 ASSESSMENT — CUP TO DISC RATIO
OS_RATIO: 0.4
OD_RATIO: 0.4

## 2025-08-07 ASSESSMENT — TONOMETRY
OD_IOP_MMHG: 13
IOP_METHOD: TONOPEN
OS_IOP_MMHG: 13

## 2025-08-07 ASSESSMENT — EXTERNAL EXAM - LEFT EYE: OS_EXAM: NORMAL

## 2025-08-07 ASSESSMENT — EXTERNAL EXAM - RIGHT EYE: OD_EXAM: NORMAL

## 2025-08-07 ASSESSMENT — SLIT LAMP EXAM - LIDS
COMMENTS: NORMAL
COMMENTS: NORMAL

## 2025-08-16 ENCOUNTER — HEALTH MAINTENANCE LETTER (OUTPATIENT)
Age: 80
End: 2025-08-16

## 2025-08-18 ENCOUNTER — OFFICE VISIT (OUTPATIENT)
Dept: ORTHOPEDICS | Facility: CLINIC | Age: 80
End: 2025-08-18
Payer: MEDICARE

## 2025-08-18 DIAGNOSIS — L60.3 NAIL DYSTROPHY: ICD-10-CM

## 2025-08-18 DIAGNOSIS — I89.0 LYMPHEDEMA: ICD-10-CM

## 2025-08-18 DIAGNOSIS — I73.9 PVD (PERIPHERAL VASCULAR DISEASE): ICD-10-CM

## 2025-08-18 DIAGNOSIS — I73.89 OTHER SPECIFIED PERIPHERAL VASCULAR DISEASES: Primary | ICD-10-CM

## 2025-08-18 PROCEDURE — G0127 TRIM NAIL(S): HCPCS | Mod: Q8 | Performed by: PODIATRIST

## 2025-08-18 PROCEDURE — 99207 PR DROP WITH A PROCEDURE: CPT | Performed by: PODIATRIST

## 2025-08-21 ENCOUNTER — OFFICE VISIT (OUTPATIENT)
Dept: OPHTHALMOLOGY | Facility: CLINIC | Age: 80
End: 2025-08-21
Attending: OPHTHALMOLOGY
Payer: MEDICARE

## 2025-08-21 ENCOUNTER — OFFICE VISIT (OUTPATIENT)
Dept: OPTOMETRY | Facility: CLINIC | Age: 80
End: 2025-08-21
Payer: MEDICARE

## 2025-08-21 DIAGNOSIS — Z79.4 TYPE 2 DIABETES MELLITUS WITH HYPERGLYCEMIA, WITH LONG-TERM CURRENT USE OF INSULIN (H): ICD-10-CM

## 2025-08-21 DIAGNOSIS — Z96.1 PSEUDOPHAKIA, BOTH EYES: Primary | ICD-10-CM

## 2025-08-21 DIAGNOSIS — H40.1132 PRIMARY OPEN-ANGLE GLAUCOMA, BILATERAL, MODERATE STAGE: ICD-10-CM

## 2025-08-21 DIAGNOSIS — Z96.1 PSEUDOPHAKIA OF BOTH EYES: Primary | ICD-10-CM

## 2025-08-21 DIAGNOSIS — H52.4 PRESBYOPIA: ICD-10-CM

## 2025-08-21 DIAGNOSIS — E11.65 TYPE 2 DIABETES MELLITUS WITH HYPERGLYCEMIA, WITH LONG-TERM CURRENT USE OF INSULIN (H): ICD-10-CM

## 2025-08-21 PROBLEM — E11.3299 NONPROLIFERATIVE DIABETIC RETINOPATHY ASSOCIATED WITH TYPE 2 DIABETES MELLITUS (H): Status: ACTIVE | Noted: 2022-06-14

## 2025-08-21 PROBLEM — Z90.89 ACQUIRED ABSENCE OF ORGAN: Status: ACTIVE | Noted: 2022-06-14

## 2025-08-21 PROBLEM — R26.9 ABNORMAL GAIT: Status: ACTIVE | Noted: 2022-06-14

## 2025-08-21 PROBLEM — M25.539 WRIST JOINT PAIN: Status: ACTIVE | Noted: 2022-06-20

## 2025-08-21 PROCEDURE — G0463 HOSPITAL OUTPT CLINIC VISIT: HCPCS | Performed by: OPHTHALMOLOGY

## 2025-08-21 ASSESSMENT — VISUAL ACUITY
OD_SC: 20/50
OS_SC: 20/200
OS_SC: 20/100
OD_PH_SC: 20/40
OD_SC+: -1
OD_PH_SC+: -1
OD_SC: 20/70
OD_SC: 20/60
OD_SC+: -1
METHOD: SNELLEN - LINEAR
OD_PH_SC: 20/40
OD_PH_SC+: -1
OS_SC: 20/200
OD_SC: 20/70
OS_PH_SC: 20/60
OS_PH_SC: 20/60
OS_SC: 20/100
METHOD: SNELLEN - LINEAR

## 2025-08-21 ASSESSMENT — REFRACTION_MANIFEST
OS_AXIS: 010
OD_CYLINDER: +1.00
OD_AXIS: 010
OS_ADD: +3.00
OS_CYLINDER: +1.00
OS_SPHERE: -2.75
OD_ADD: +3.00
OD_SPHERE: -2.50

## 2025-08-21 ASSESSMENT — TONOMETRY
OS_IOP_MMHG: 14
IOP_METHOD: ICARE
OD_IOP_MMHG: 16
IOP_METHOD: ICARE
OD_IOP_MMHG: 16
OS_IOP_MMHG: 14

## 2025-08-21 ASSESSMENT — SLIT LAMP EXAM - LIDS
COMMENTS: NORMAL
COMMENTS: NORMAL

## 2025-08-21 ASSESSMENT — EXTERNAL EXAM - LEFT EYE: OS_EXAM: NORMAL

## 2025-08-21 ASSESSMENT — EXTERNAL EXAM - RIGHT EYE: OD_EXAM: NORMAL

## 2025-08-25 ENCOUNTER — VIRTUAL VISIT (OUTPATIENT)
Dept: EDUCATION SERVICES | Facility: CLINIC | Age: 80
End: 2025-08-25
Payer: MEDICARE

## 2025-08-25 DIAGNOSIS — E11.3311 TYPE 2 DIABETES MELLITUS WITH RIGHT EYE AFFECTED BY MODERATE NONPROLIFERATIVE RETINOPATHY AND MACULAR EDEMA, WITH LONG-TERM CURRENT USE OF INSULIN (H): Primary | ICD-10-CM

## 2025-08-25 DIAGNOSIS — Z79.4 TYPE 2 DIABETES MELLITUS WITH RIGHT EYE AFFECTED BY MODERATE NONPROLIFERATIVE RETINOPATHY AND MACULAR EDEMA, WITH LONG-TERM CURRENT USE OF INSULIN (H): Primary | ICD-10-CM

## 2025-08-25 PROCEDURE — G0108 DIAB MANAGE TRN  PER INDIV: HCPCS | Mod: 93 | Performed by: DIETITIAN, REGISTERED
